# Patient Record
Sex: FEMALE | Race: WHITE | Employment: OTHER | URBAN - METROPOLITAN AREA
[De-identification: names, ages, dates, MRNs, and addresses within clinical notes are randomized per-mention and may not be internally consistent; named-entity substitution may affect disease eponyms.]

---

## 2017-11-30 ENCOUNTER — OFFICE VISIT (OUTPATIENT)
Dept: VASCULAR SURGERY | Age: 77
End: 2017-11-30

## 2017-11-30 ENCOUNTER — PROCEDURE VISIT (OUTPATIENT)
Dept: VASCULAR SURGERY | Age: 77
End: 2017-11-30

## 2017-11-30 VITALS
HEIGHT: 68 IN | WEIGHT: 182 LBS | DIASTOLIC BLOOD PRESSURE: 70 MMHG | BODY MASS INDEX: 27.58 KG/M2 | SYSTOLIC BLOOD PRESSURE: 130 MMHG

## 2017-11-30 DIAGNOSIS — I65.23 BILATERAL CAROTID ARTERY STENOSIS: Primary | ICD-10-CM

## 2017-11-30 PROCEDURE — 1036F TOBACCO NON-USER: CPT | Performed by: SURGERY

## 2017-11-30 PROCEDURE — 93880 EXTRACRANIAL BILAT STUDY: CPT | Performed by: SURGERY

## 2017-11-30 PROCEDURE — 1090F PRES/ABSN URINE INCON ASSESS: CPT | Performed by: SURGERY

## 2017-11-30 PROCEDURE — 1123F ACP DISCUSS/DSCN MKR DOCD: CPT | Performed by: SURGERY

## 2017-11-30 PROCEDURE — G8417 CALC BMI ABV UP PARAM F/U: HCPCS | Performed by: SURGERY

## 2017-11-30 PROCEDURE — G8484 FLU IMMUNIZE NO ADMIN: HCPCS | Performed by: SURGERY

## 2017-11-30 PROCEDURE — 4040F PNEUMOC VAC/ADMIN/RCVD: CPT | Performed by: SURGERY

## 2017-11-30 PROCEDURE — G8427 DOCREV CUR MEDS BY ELIG CLIN: HCPCS | Performed by: SURGERY

## 2017-11-30 PROCEDURE — G8400 PT W/DXA NO RESULTS DOC: HCPCS | Performed by: SURGERY

## 2017-11-30 PROCEDURE — G8598 ASA/ANTIPLAT THER USED: HCPCS | Performed by: SURGERY

## 2017-11-30 PROCEDURE — 99213 OFFICE O/P EST LOW 20 MIN: CPT | Performed by: SURGERY

## 2017-11-30 NOTE — PROGRESS NOTES
Subjective:      Patient ID: Ivana Lilly is a 69 y.o. female. HPI   Routine follow up for carotid surveillance (IVAN 96-74% and LICA 06-23%). No TIA, amaurosis or CVA. Past Medical History:   Diagnosis Date    Anxiety     Arthritis     Asthma     poorly controlled    CAD (coronary artery disease)     Cancer of peritoneum ([facility] [address]     Chronic low back pain     Depression     Hyperlipidemia 4/23/2012    Hypertension     Postoperative delirium 05/26/2016    Type II or unspecified type diabetes mellitus without mention of complication, not stated as uncontrolled      Allergies   Allergen Reactions    Cephalexin Rash    Pcn [Penicillins] Rash    Cephalosporins     Statins      Body aches    Cephalexin Rash     Current Outpatient Prescriptions   Medication Sig Dispense Refill    oxyCODONE-acetaminophen (PERCOCET)  MG per tablet Take 1 tablet by mouth every 8 hours as needed for Pain . Earliest Fill Date: 8/7/17 90 tablet 0    fluocinonide (LIDEX) 0.05 % cream Apply thin layer topically 2 times daily. 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  1 each 2    magnesium oxide (MAG-OX) 400 (241.3 MG) MG TABS tablet Take 1 tablet by mouth 3 times daily 90 tablet 1    lidocaine-prilocaine (EMLA) 2.5-2.5 % cream Apply a nickel sized amount to port 30 minutes prior to access as needed and cover with plastic wrap. 1 Tube 3    insulin glargine (LANTUS) 100 UNIT/ML injection vial Inject 5 Units into the skin nightly (Patient taking differently: Inject 18 Units into the skin nightly ) 1 vial 3    insulin lispro (HUMALOG) 100 UNIT/ML injection vial Inject 0-12 Units into the skin 3 times daily (with meals) 1 vial 3    ibuprofen (ADVIL;MOTRIN) 800 MG tablet Take 1 tablet by mouth every 6 hours as needed for Pain 40 tablet 1    amLODIPine (NORVASC) 5 MG tablet Take 5 mg by mouth nightly      Pregabalin (LYRICA

## 2018-08-17 ENCOUNTER — HOSPITAL ENCOUNTER (OUTPATIENT)
Dept: SURGERY | Age: 78
Discharge: HOME | End: 2018-08-17
Attending: ANESTHESIOLOGY | Admitting: ANESTHESIOLOGY

## 2018-08-17 VITALS
RESPIRATION RATE: 19 BRPM | DIASTOLIC BLOOD PRESSURE: 61 MMHG | BODY MASS INDEX: 25.39 KG/M2 | HEIGHT: 68 IN | OXYGEN SATURATION: 97 % | TEMPERATURE: 97.3 F | SYSTOLIC BLOOD PRESSURE: 142 MMHG | HEART RATE: 72 BPM | WEIGHT: 167.56 LBS

## 2018-08-17 DIAGNOSIS — R52 PAIN: ICD-10-CM

## 2018-08-17 LAB
ANION GAP SERPL CALCULATED.3IONS-SCNC: 11 MMOL/L (ref 3–16)
BASOPHILS ABSOLUTE: 0 K/UL (ref 0–0.2)
BASOPHILS RELATIVE PERCENT: 0.8 %
BUN BLDV-MCNC: 20 MG/DL (ref 7–20)
CALCIUM SERPL-MCNC: 9.5 MG/DL (ref 8.3–10.6)
CHLORIDE BLD-SCNC: 96 MMOL/L (ref 99–110)
CO2: 28 MMOL/L (ref 21–32)
CREAT SERPL-MCNC: 1 MG/DL (ref 0.6–1.2)
EKG ATRIAL RATE: 73 BPM
EKG DIAGNOSIS: NORMAL
EKG P AXIS: 46 DEGREES
EKG P-R INTERVAL: 178 MS
EKG Q-T INTERVAL: 398 MS
EKG QRS DURATION: 92 MS
EKG QTC CALCULATION (BAZETT): 438 MS
EKG R AXIS: -2 DEGREES
EKG T AXIS: 76 DEGREES
EKG VENTRICULAR RATE: 73 BPM
EOSINOPHILS ABSOLUTE: 0.1 K/UL (ref 0–0.6)
EOSINOPHILS RELATIVE PERCENT: 4.1 %
GFR AFRICAN AMERICAN: >60
GFR NON-AFRICAN AMERICAN: 54
GLUCOSE BLD-MCNC: 212 MG/DL (ref 70–99)
GLUCOSE BLD-MCNC: 287 MG/DL (ref 70–99)
GLUCOSE BLD-MCNC: 292 MG/DL (ref 70–99)
HCT VFR BLD CALC: 29.4 % (ref 36–48)
HEMOGLOBIN: 10 G/DL (ref 12–16)
LYMPHOCYTES ABSOLUTE: 0.7 K/UL (ref 1–5.1)
LYMPHOCYTES RELATIVE PERCENT: 20.1 %
MCH RBC QN AUTO: 31.1 PG (ref 26–34)
MCHC RBC AUTO-ENTMCNC: 33.9 G/DL (ref 31–36)
MCV RBC AUTO: 91.8 FL (ref 80–100)
MONOCYTES ABSOLUTE: 0.3 K/UL (ref 0–1.3)
MONOCYTES RELATIVE PERCENT: 9.5 %
NEUTROPHILS ABSOLUTE: 2.4 K/UL (ref 1.7–7.7)
NEUTROPHILS RELATIVE PERCENT: 65.5 %
PDW BLD-RTO: 15.1 % (ref 12.4–15.4)
PERFORMED ON: ABNORMAL
PERFORMED ON: ABNORMAL
PLATELET # BLD: 228 K/UL (ref 135–450)
PMV BLD AUTO: 7.9 FL (ref 5–10.5)
POTASSIUM SERPL-SCNC: 4.3 MMOL/L (ref 3.5–5.1)
RBC # BLD: 3.2 M/UL (ref 4–5.2)
SODIUM BLD-SCNC: 135 MMOL/L (ref 136–145)
WBC # BLD: 3.6 K/UL (ref 4–11)

## 2018-08-17 PROCEDURE — 93010 ELECTROCARDIOGRAM REPORT: CPT | Performed by: INTERNAL MEDICINE

## 2018-08-17 RX ORDER — LABETALOL HYDROCHLORIDE 5 MG/ML
5 INJECTION, SOLUTION INTRAVENOUS EVERY 10 MIN PRN
Status: DISCONTINUED | OUTPATIENT
Start: 2018-08-17 | End: 2018-08-18 | Stop reason: HOSPADM

## 2018-08-17 RX ORDER — DEXTROSE MONOHYDRATE 25 G/50ML
12.5 INJECTION, SOLUTION INTRAVENOUS PRN
Status: DISCONTINUED | OUTPATIENT
Start: 2018-08-17 | End: 2018-08-18 | Stop reason: HOSPADM

## 2018-08-17 RX ORDER — FENTANYL CITRATE 50 UG/ML
25 INJECTION, SOLUTION INTRAMUSCULAR; INTRAVENOUS EVERY 5 MIN PRN
Status: DISCONTINUED | OUTPATIENT
Start: 2018-08-17 | End: 2018-08-18 | Stop reason: HOSPADM

## 2018-08-17 RX ORDER — CLINDAMYCIN PHOSPHATE 900 MG/50ML
900 INJECTION INTRAVENOUS ONCE
Status: COMPLETED | OUTPATIENT
Start: 2018-08-17 | End: 2018-08-17

## 2018-08-17 RX ORDER — ONDANSETRON 2 MG/ML
4 INJECTION INTRAMUSCULAR; INTRAVENOUS
Status: ACTIVE | OUTPATIENT
Start: 2018-08-17 | End: 2018-08-17

## 2018-08-17 RX ORDER — SODIUM CHLORIDE 9 MG/ML
INJECTION, SOLUTION INTRAVENOUS CONTINUOUS
Status: DISCONTINUED | OUTPATIENT
Start: 2018-08-17 | End: 2018-08-18 | Stop reason: HOSPADM

## 2018-08-17 RX ORDER — HYDROMORPHONE HCL 110MG/55ML
0.25 PATIENT CONTROLLED ANALGESIA SYRINGE INTRAVENOUS EVERY 5 MIN PRN
Status: DISCONTINUED | OUTPATIENT
Start: 2018-08-17 | End: 2018-08-18 | Stop reason: HOSPADM

## 2018-08-17 RX ORDER — NICOTINE POLACRILEX 4 MG
15 LOZENGE BUCCAL PRN
Status: DISCONTINUED | OUTPATIENT
Start: 2018-08-17 | End: 2018-08-18 | Stop reason: HOSPADM

## 2018-08-17 RX ORDER — SODIUM CHLORIDE 0.9 % (FLUSH) 0.9 %
10 SYRINGE (ML) INJECTION PRN
Status: DISCONTINUED | OUTPATIENT
Start: 2018-08-17 | End: 2018-08-18 | Stop reason: HOSPADM

## 2018-08-17 RX ORDER — HYDROMORPHONE HCL 110MG/55ML
0.5 PATIENT CONTROLLED ANALGESIA SYRINGE INTRAVENOUS EVERY 5 MIN PRN
Status: DISCONTINUED | OUTPATIENT
Start: 2018-08-17 | End: 2018-08-18 | Stop reason: HOSPADM

## 2018-08-17 RX ORDER — FENTANYL CITRATE 50 UG/ML
50 INJECTION, SOLUTION INTRAMUSCULAR; INTRAVENOUS EVERY 5 MIN PRN
Status: DISCONTINUED | OUTPATIENT
Start: 2018-08-17 | End: 2018-08-18 | Stop reason: HOSPADM

## 2018-08-17 RX ORDER — DEXTROSE MONOHYDRATE 50 MG/ML
100 INJECTION, SOLUTION INTRAVENOUS PRN
Status: DISCONTINUED | OUTPATIENT
Start: 2018-08-17 | End: 2018-08-18 | Stop reason: HOSPADM

## 2018-08-17 RX ORDER — SODIUM CHLORIDE, SODIUM LACTATE, POTASSIUM CHLORIDE, CALCIUM CHLORIDE 600; 310; 30; 20 MG/100ML; MG/100ML; MG/100ML; MG/100ML
INJECTION, SOLUTION INTRAVENOUS CONTINUOUS
Status: DISCONTINUED | OUTPATIENT
Start: 2018-08-17 | End: 2018-08-18 | Stop reason: HOSPADM

## 2018-08-17 RX ORDER — SODIUM CHLORIDE 0.9 % (FLUSH) 0.9 %
10 SYRINGE (ML) INJECTION EVERY 12 HOURS SCHEDULED
Status: DISCONTINUED | OUTPATIENT
Start: 2018-08-17 | End: 2018-08-18 | Stop reason: HOSPADM

## 2018-08-17 RX ADMIN — SODIUM CHLORIDE: 9 INJECTION, SOLUTION INTRAVENOUS at 10:20

## 2018-08-17 RX ADMIN — CLINDAMYCIN PHOSPHATE 900 MG: 900 INJECTION INTRAVENOUS at 10:37

## 2018-08-17 ASSESSMENT — PAIN - FUNCTIONAL ASSESSMENT: PAIN_FUNCTIONAL_ASSESSMENT: 0-10

## 2018-08-17 ASSESSMENT — ENCOUNTER SYMPTOMS: SHORTNESS OF BREATH: 0

## 2018-08-17 ASSESSMENT — PAIN DESCRIPTION - DESCRIPTORS: DESCRIPTORS: ACHING

## 2018-08-17 NOTE — ANESTHESIA PRE-OP
Department of Anesthesiology  Preprocedure Note       Name:  Fady Gavin   Age:  69 y.o.  :  1940                                          MRN:  [id number]         Date:  2018      Surgeon:    Procedure:    Medications prior to admission:   Prior to Admission medications    Medication Sig Start Date End Date Taking? Authorizing Provider   oxyCODONE-acetaminophen (PERCOCET)  MG per tablet Take 1 tablet by mouth every 8 hours as needed for Pain . Earliest Fill Date: 17   Diann Major MD   fluocinonide (LIDEX) 0.05 % cream Apply thin layer topically 2 times daily. 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  16   Beatris Chamberlain MD   magnesium oxide (MAG-OX) 400 (241.3 MG) MG TABS tablet Take 1 tablet by mouth 3 times daily 16   DANIAL Grace CNP   lidocaine-prilocaine (EMLA) 2.5-2.5 % cream Apply a nickel sized amount to port 30 minutes prior to access as needed and cover with plastic wrap. 16   Beatris Chamberlain MD   prochlorperazine (COMPAZINE) 10 MG tablet Take 1 tablet by mouth every 6 hours as needed 16   Beatris Chamberlain MD   insulin glargine (LANTUS) 100 UNIT/ML injection vial Inject 5 Units into the skin nightly  Patient taking differently: Inject 18 Units into the skin nightly  16   Ana Maria Harris MD   insulin lispro (HUMALOG) 100 UNIT/ML injection vial Inject 0-12 Units into the skin 3 times daily (with meals) 16   Ana Maria Harris MD   ibuprofen (ADVIL;MOTRIN) 800 MG tablet Take 1 tablet by mouth every 6 hours as needed for Pain 16   Sidra Mendoza DO   amLODIPine (NORVASC) 5 MG tablet Take 5 mg by mouth nightly    Historical Provider, MD   mometasone-formoterol (DULERA) 200-5 MCG/ACT inhaler Inhale 2 puffs into the lungs every 12 hours    Historical Provider, MD   Pregabalin (LYRICA PO) Take 75 mg by mouth 3 times (COMPAZINE) 10 MG tablet Take 1 tablet by mouth every 6 hours as needed 60 tablet 3    insulin glargine (LANTUS) 100 UNIT/ML injection vial Inject 5 Units into the skin nightly (Patient taking differently: Inject 18 Units into the skin nightly ) 1 vial 3    insulin lispro (HUMALOG) 100 UNIT/ML injection vial Inject 0-12 Units into the skin 3 times daily (with meals) 1 vial 3    ibuprofen (ADVIL;MOTRIN) 800 MG tablet Take 1 tablet by mouth every 6 hours as needed for Pain 40 tablet 1    amLODIPine (NORVASC) 5 MG tablet Take 5 mg by mouth nightly      mometasone-formoterol (DULERA) 200-5 MCG/ACT inhaler Inhale 2 puffs into the lungs every 12 hours      Pregabalin (LYRICA PO) Take 75 mg by mouth 3 times daily Usually takes only BID      rosuvastatin (CRESTOR) 40 MG tablet Take 40 mg by mouth every evening.  alprazolam (XANAX) 0.5 MG tablet Take 0.5 mg by mouth nightly as needed.  ropinirole (REQUIP) 1 MG tablet Take 2 mg by mouth nightly       venlafaxine (EFFEXOR-XR) 37.5 MG XR capsule Take 37.5 mg by mouth daily.  aspirin 81 MG EC tablet Take 81 mg by mouth daily.  losartan (COZAAR) 25 MG tablet Take 100 mg by mouth daily Pt. takes 100mg of Cozaar daily.        Current Facility-Administered Medications   Medication Dose Route Frequency Provider Last Rate Last Dose    clindamycin (CLEOCIN) 900 mg in dextrose 5 % 50 mL IVPB  900 mg Intravenous Once Susana Marks MD         Facility-Administered Medications Ordered in Other Encounters   Medication Dose Route Frequency Provider Last Rate Last Dose    famotidine (PEPCID) injection 20 mg  20 mg Intravenous Daily Sunil Gutierrez MD   20 mg at 11/07/16 1320        LABS:  Lab Results   Component Value Date    WBC 3.9 (L) 06/19/2017    HGB 8.9 (L) 06/19/2017    HCT 28.3 (L) 06/19/2017    MCV 90.4 06/19/2017     06/19/2017    GLUCOSE 212 (H) 06/19/2017    PROTIME 12.2 05/17/2016    INR 1.07 05/17/2016    APTT 35.9 05/17/2016 Lab Results   Component Value Date     06/19/2017    K 4.2 06/19/2017     06/19/2017    CO2 28 06/19/2017    PHOS 3.5 06/06/2016    BUN 22 06/19/2017    CREATININE 1.2 06/19/2017       Problem List:  Patient Active Problem List   Diagnosis    Hyperlipidemia    Diabetes mellitus, type II ([facility] [address]    HTN (hypertension)    Vitamin D deficiency    CAD (coronary artery disease)    Elevated CA-125    Uterine leiomyoma    Abnormal abdominal CT scan    Cancer of peritoneum ([facility] [address]    Delirium due to general medical condition    Iron deficiency anemia    Encounter for antineoplastic chemotherapy    Impaired intestinal absorption    Neoplasm related pain    Weight loss           Jason Garcia MD   8/17/2018

## 2018-08-17 NOTE — PROGRESS NOTES
Discharge instructions reviewed with patient/. All home medications have been reviewed, questions answered and patient verbalized understanding. Discharge instructions signed. Pt dc'd per wheelchair. Patient discharged home with dentures, glasses, her walker and other belongings.  taking stable pt home.

## 2018-08-17 NOTE — H&P
by mouth daily Pt. takes 100mg of Cozaar daily.  fluocinonide (LIDEX) 0.05 % cream Apply thin layer topically 2 times daily. 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 1 each 2    lidocaine-prilocaine (EMLA) 2.5-2.5 % cream Apply a nickel sized amount to port 30 minutes prior to access as needed and cover with plastic wrap. 1 Tube 3    prochlorperazine (COMPAZINE) 10 MG tablet Take 1 tablet by mouth every 6 hours as needed 60 tablet 3    aspirin 81 MG EC tablet Take 81 mg by mouth daily. Current Facility-Administered Medications on File Prior to Encounter   Medication Dose Route Frequency Provider Last Rate Last Dose    famotidine (PEPCID) injection 20 mg  20 mg Intravenous Daily Ashia Lopez MD   20 mg at 11/07/16 1320       Allergies: Allergies   Allergen Reactions    Cephalexin Rash    Pcn [Penicillins] Rash    Cephalosporins     Cephalexin Rash        Social History:   reports that she has quit smoking. She has a 20.00 pack-year smoking history. She has never used smokeless tobacco. She reports that she drinks about 0.6 oz of alcohol per week . She reports that she does not use drugs. Family History:  family history includes Diabetes in her father and maternal uncle; Heart Disease in her brother, father, and maternal grandmother; High Blood Pressure in her mother. Physical Exam:  BP (!) 146/69   Pulse 88   Temp 99.2 °F (37.3 °C) (Temporal)   Resp 16   Ht 5' 8\" (1.727 m)   Wt 167 lb 9 oz (76 kg)   SpO2 96%   BMI 25.48 kg/m²     General appearance:  Appears comfortable. Well nourished  Eyes: Sclera clear, pupils equal  ENT: Moist mucus membranes, no thrush. Trachea midline. Cardiovascular: Regular rhythm, normal S1, S2. No murmur, gallop, rub.  No edema in lower extremities  Respiratory: Clear to auscultation bilaterally, no wheeze, good inspiratory effort  Gastrointestinal: Abdomen soft, non-tender, not distended, normal bowel sounds  Musculoskeletal: No cyanosis in digits, neck supple  Neurology: Cranial nerves grossly intact. Alert and oriented in time, place and person. No speech or motor deficits  Psychiatry: Appropriate affect. Not agitated  Skin: Warm, dry, normal turgor, no rash    Labs:  CBC:   Lab Results   Component Value Date    WBC 3.6 08/17/2018    RBC 3.20 08/17/2018    RBC 3.13 06/19/2017    HGB 10.0 08/17/2018    HCT 29.4 08/17/2018    MCV 91.8 08/17/2018    MCH 31.1 08/17/2018    MCHC 33.9 08/17/2018    RDW 15.1 08/17/2018     08/17/2018    MPV 7.9 08/17/2018     BMP:    Lab Results   Component Value Date     06/19/2017    K 4.2 06/19/2017     06/19/2017    CO2 28 06/19/2017    BUN 22 06/19/2017    CREATININE 1.2 06/19/2017    CALCIUM 9.0 06/19/2017    GFRAA >60 03/13/2017    GFRAA 100 04/23/2012    LABGLOM 46.5 06/19/2017    GLUCOSE 212 06/19/2017     EKG reviewed; NSR    Problem List  Active Problems:    * No active hospital problems. *  Resolved Problems:    * No resolved hospital problems. *        Assessment & Recommendation:     1. Chronic back pain-Patient is medically optimized for surgery. 2. DM 2-uncontrolled. Followed by endocrinology. Has had regular insulin this am.   3. CAD-stable. No cp. EKG reviewed. Thank you for the opportunity to participate in the care of your patient.   Joe Velazco PA-C    8/17/2018 10:38 AM

## 2018-08-21 NOTE — OP NOTE
Attending Surgeon:  Rupert Grullon MD  Assistant Surgeon:   None  Anesthesia:    [address]  Specimen Removed:  none  Complications:  none  Antibiotics: 2 gm Ancef     Preoperative Diagnosis:     1- M96.1  Post Laminectomy Syndrome   2- M54.17  Radiculopathy                                                 Interpretation  2. Placement of specialized epidural needle to the L1-2 interspace  4. Placement of SCS lead with 8 electrodes to the T7-8-9 area   5. Placement of SCS lead with 8 electrodes to the T8-9- area   6. Intra-operative complex programming of lead taking 30 minutes. 7.             Anchoring the spinal cord stimulator lead to the skin  8. Complex programming in recovery taking 30 minutes. 9.     Replacement of the [employer] generator with a [id number] [employer] [address]       The patient is an established patient with the above known diagnosis. The patient understands the reason for the procedure along with the risks, benefits and alternatives available. The patient and her  had the opportunity to ask questions prior to the procedure, and the patient has given written, informed, consent to proceed with the procedure. The patient is scheduled today for an SCS Permanent trial, this is one of the few cases that I have few choices to help control the patient pain, the patient has had [place] [facility] [place]amine[place] lead implanted by another surgeon, unfortunately the implant did not go well and she had coverage on the non-painful side, and she continues to have pain on the right side. Unfortunately and despite all the efforts, the patient pain was not controlled and the patient  has progressive scoliosis and Kyphosis which make her trial very challenging adding the fact she already has a surgical lead and she is post laminectomy patient.     As well to minimize the risks, the best way to do it is to do a permanent implant this way I can get her out of the bed and moving, decrease her requirements of Opioids and have a better chance to start her physical therapy. I do not see any other of trying to get the patient comfortable, the cost of the procedure will be much less than the cost of admitting the patient in the hospital, keeping in mind that is the only way I see fit to help this poor patient to ease her pain and give her some of her quality of life and dignity back. Medical Necessity: This patient has chronic pain that has failed to respond to conservative measures as outlined in the original history and physical exam on the patients symptoms include low back pain and disability of a moderate to severe degree with intermittent leg pain. The goals of treatment are to   1) Achieve optimal pain control, recognizing that a pain-free state may not be achievable;   2) Minimize adverse outcomes;   3) Enhance functional abilities, and physical and psychological well-being; and   4) Enhance the quality of life for patients with chronic pain. PROCEDURE NOTE:  After obtaining written informed consent patient was taken to the procedure room. Pre-procedure blood pressure and pulse were stable and recorded in patients clinic chart     A 20 -G IV was started on the patient hand with lactated ringers. The patient was placed in the prone position on fluoroscopy operating table. The area around the injection site was cleansed, shaved, and prepped in surgical asepsis manner under Strict Aseptic technique that was used to prep the patient. To help reduce the risk of infection 2 gm of Ancef was given IV within 30 minutes of the procedures. The patient was draped in typical sterile fashion. The patient was attached to a vital sign monitor, continuously monitoring saturation through pulse oximeter, pulse rate, and routine checks on the blood pressure.      After confirmation of vertebral count through thoracolumbar films via the C-Arm, the C-arm was used 4 gauze and tegaderm. The patient was then taken from the procedure room via stretcher, and placed in a recovery room. Once in the recovery room, the fine tune adjustments were made to the stimulation pattern. This additional programming was needed to give the patient optimal coverage during the trial.  Post operatively the patient was monitored until stable. Following the procedure the patient's vital signs were stable. Patients subjective complaints were reduced by 95% with the NRS score pre and post procedure, Vital Signs and Fluoroscopy time are recorded in the patient chart. The patient will continue to follow the previously established treatment plan, and will be seen in a few days for a dressing change and any reprogramming needs.      The patient was discharged home in good condition after being given discharge instructions

## 2019-01-17 ENCOUNTER — PROCEDURE VISIT (OUTPATIENT)
Dept: VASCULAR SURGERY | Age: 79
End: 2019-01-17
Payer: MEDICARE

## 2019-01-17 ENCOUNTER — OFFICE VISIT (OUTPATIENT)
Dept: VASCULAR SURGERY | Age: 79
End: 2019-01-17
Payer: MEDICARE

## 2019-01-17 VITALS
SYSTOLIC BLOOD PRESSURE: 128 MMHG | BODY MASS INDEX: 25.31 KG/M2 | DIASTOLIC BLOOD PRESSURE: 62 MMHG | WEIGHT: 167 LBS | HEIGHT: 68 IN

## 2019-01-17 DIAGNOSIS — I65.23 BILATERAL CAROTID ARTERY STENOSIS: Primary | ICD-10-CM

## 2019-01-17 PROCEDURE — G8400 PT W/DXA NO RESULTS DOC: HCPCS | Performed by: SURGERY

## 2019-01-17 PROCEDURE — 1101F PT FALLS ASSESS-DOCD LE1/YR: CPT | Performed by: SURGERY

## 2019-01-17 PROCEDURE — G8427 DOCREV CUR MEDS BY ELIG CLIN: HCPCS | Performed by: SURGERY

## 2019-01-17 PROCEDURE — 4040F PNEUMOC VAC/ADMIN/RCVD: CPT | Performed by: SURGERY

## 2019-01-17 PROCEDURE — 93880 EXTRACRANIAL BILAT STUDY: CPT | Performed by: SURGERY

## 2019-01-17 PROCEDURE — G8598 ASA/ANTIPLAT THER USED: HCPCS | Performed by: SURGERY

## 2019-01-17 PROCEDURE — G8419 CALC BMI OUT NRM PARAM NOF/U: HCPCS | Performed by: SURGERY

## 2019-01-17 PROCEDURE — G8484 FLU IMMUNIZE NO ADMIN: HCPCS | Performed by: SURGERY

## 2019-01-17 PROCEDURE — 1036F TOBACCO NON-USER: CPT | Performed by: SURGERY

## 2019-01-17 PROCEDURE — 99214 OFFICE O/P EST MOD 30 MIN: CPT | Performed by: SURGERY

## 2019-01-17 PROCEDURE — 1090F PRES/ABSN URINE INCON ASSESS: CPT | Performed by: SURGERY

## 2019-01-17 PROCEDURE — 1123F ACP DISCUSS/DSCN MKR DOCD: CPT | Performed by: SURGERY

## 2019-04-15 RX ORDER — NAPROXEN 250 MG/1
250 TABLET ORAL 2 TIMES DAILY WITH MEALS
COMMUNITY
End: 2019-12-22

## 2019-04-15 NOTE — PROGRESS NOTES
Name_______________________________________Printed:____________________  Date and time of surgery___5/6/19  1030_____________________Arrival Time:_____FEC  0900___________   1. Do not eat or drink anything after 12 midnight (or____hours) prior to surgery. This includes no water, chewing gum or mints. Endoscopy patients follow your doctors bowel prep instructions,which may include taking part of prep after midnight. 2. Take the following pills with a small sip of water on the morning of surgery_______losartan, takes amlodipine HS______________________________________________   3. Aspirin, Ibuprofen, Advil, Naproxen, Vitamin E and other Anti-inflammatory products should be stopped for 5 days before surgery or as directed by your physician. 4. Check with your Doctor regarding stopping Plavix, Coumadin,Eliquis, Lovenox,Effient,Pradaxa,Xarelto, Fragmin or other blood thinners and follow their instructions. 5. Do not smoke, and do not drink any alcoholic beverages 24 hours prior to surgery. This includes NA Beer. Refrain from the usage of any recreational drugs. 6. You may brush your teeth and gargle the morning of surgery. DO NOT SWALLOW WATER   7. You MUST make arrangements for a responsible adult to stay on site while you are here and take you home after your surgery. You will not be allowed to leave alone or drive yourself home. It is strongly suggested someone stay with you the first 24 hrs. Your surgery will be cancelled if you do not have a ride home. 8. A parent/legal guardian must accompany a child scheduled for surgery and plan to stay at the hospital until the child is discharged. Please do not bring other children with you. 9. Please wear simple, loose fitting clothing to the hospital.  Rocío Samsonain not bring valuables (money, credit cards, checkbooks, etc.) Do not wear any makeup (including no eye makeup) or nail polish on your fingers or toes.              10. DO NOT wear any jewelry or piercings on day of surgery. All body piercing jewelry must be removed. 11. If you have ___dentures, they will be removed before going to the OR; we will provide you a container. If you wear ___contact lenses or ___glasses, they will be removed; please bring a case for them. 12. Please see your family doctor/pediatrician for a history & physical and/or concerning medications. Bring any test results/reports from your physician's office. PCP__________________Phone___________H&P Appt. Date________             13 If you  have a Living Will and Durable Power of  for Healthcare, please bring in a copy. 15. Notify your Surgeon if you develop any illness between now and surgery  time, cough, cold, fever, sore throat, nausea, vomiting, etc.  Please notify your surgeon if you experience dizziness, shortness of breath or blurred vision between now & the time of your surgery             15. DO NOT shave your operative site 96 hours prior to surgery. For face & neck surgery, men may use an electric razor 48 hours prior to surgery. 16. Shower the night before surgery with ___Antibacterial soap ___Hibiclens             17. To provide excellent care visitors will be limited to one in the room at any given time. 18.  Please bring picture ID and insurance card. 19.  Visit our web site for additional information:  [email or web address]              20.During flu season no children under the age of 15 are permitted in the hospital for the safety of all patients. 21. If you take a long acting insulin in the evening only  take half of your usual  dose the night  before your procedure              22. If you use a c-pap please bring DOS if staying overnight,             23.For your convenience [facility] has a pharmacy on site to fill your prescriptions.              24. If you use oxygen and have a portable tank please bring it  with you the DOS             22. Bring a complete list of all your medications with name and dose include any supplements. 26. Other__________________________________________   *Please call pre admission testing if you any further questions   Zaid Argueta   [address]    [place] [phone] [place]  [phone]   [facility] [address] ED[address] JEAN   [phone]       All above information reviewed with patient/ or by phone. Patient/ verbalizes understanding. All questions and concerns addressed.                                                                                                  Patient/Rep____________________                                                                                                                                    PRE OP INSTRUCTIONS

## 2019-04-16 ENCOUNTER — ANESTHESIA EVENT (OUTPATIENT)
Dept: ENDOSCOPY | Age: 79
End: 2019-04-16
Payer: MEDICARE

## 2019-05-06 ENCOUNTER — ANESTHESIA (OUTPATIENT)
Dept: ENDOSCOPY | Age: 79
End: 2019-05-06
Payer: MEDICARE

## 2019-05-30 NOTE — PROGRESS NOTES
Name_______________________________________Printed:____________________  Date and time of surgery_6/6/19  1030_______________________Arrival Time:_____FEC  0900___________   1. Do not eat or drink anything after 12 midnight (or____hours) prior to surgery. This includes no water, chewing gum or mints. Endoscopy patients follow your doctors bowel prep instructions,which may include taking part of prep after midnight. 2. Take the following pills with a small sip of water on the morning of surgery_________losartan__________________________________________                  Do not take blood pressure medications ending in pril or sartan the carol prior to surgery or the morning of surgery_   3. Aspirin, Ibuprofen, Advil, Naproxen, Vitamin E and other Anti-inflammatory products should be stopped for 5 days before surgery or as directed by your physician. 4. Check with your Doctor regarding stopping Plavix, Coumadin,Eliquis, Lovenox,Effient,Pradaxa,Xarelto, Fragmin or other blood thinners and follow their instructions. 5. Do not smoke, and do not drink any alcoholic beverages 24 hours prior to surgery. This includes NA Beer. Refrain from the usage of any recreational drugs. 6. You may brush your teeth and gargle the morning of surgery. DO NOT SWALLOW WATER   7. You MUST make arrangements for a responsible adult to stay on site while you are here and take you home after your surgery. You will not be allowed to leave alone or drive yourself home. It is strongly suggested someone stay with you the first 24 hrs. Your surgery will be cancelled if you do not have a ride home. 8. A parent/legal guardian must accompany a child scheduled for surgery and plan to stay at the hospital until the child is discharged. Please do not bring other children with you.    9. Please wear simple, loose fitting clothing to the hospital.  Do not bring valuables (money, credit cards, checkbooks, etc.) Do not wear any makeup (including no eye makeup) or nail polish on your fingers or toes. 10. DO NOT wear any jewelry or piercings on day of surgery. All body piercing jewelry must be removed. 11. If you have ___dentures, they will be removed before going to the OR; we will provide you a container. If you wear ___contact lenses or ___glasses, they will be removed; please bring a case for them. 12. Please see your family doctor/pediatrician for a history & physical and/or concerning medications. Bring any test results/reports from your physician's office. PCP__________________Phone___________H&P Appt. Date________             13 If you  have a Living Will and Durable Power of  for Healthcare, please bring in a copy. 15. Notify your Surgeon if you develop any illness between now and surgery  time, cough, cold, fever, sore throat, nausea, vomiting, etc.  Please notify your surgeon if you experience dizziness, shortness of breath or blurred vision between now & the time of your surgery             15. DO NOT shave your operative site 96 hours prior to surgery. For face & neck surgery, men may use an electric razor 48 hours prior to surgery. 16. Shower the night before surgery with ___Antibacterial soap ___Hibiclens             17. To provide excellent care visitors will be limited to one in the room at any given time. 18.  Please bring picture ID and insurance card. 19.  Visit our web site for additional information:  [email or web address]              20.During flu season no children under the age of 15 are permitted in the hospital for the safety of all patients.                               21. If you take a long acting insulin in the evening only  take half of your usual  dose the night  before your procedure              22. If you use a c-pap please bring DOS if staying overnight,             23.For your convenience [facility] has a pharmacy on site to fill your prescriptions. 24. If you use oxygen and have a portable tank please bring it  with you the DOS             25. Bring a complete list of all your medications with name and dose include any supplements. 26. Other__________________________________________   *Please call pre admission testing if you any further questions   [address] [place] [address]. [facility]  [phone]   [address] [place] [address]       All above information reviewed with patient in person or by phone. Patient verbalizes understanding. All questions and concerns addressed.                                                                                                  Patient/Rep____________________                                                                                                                                    PRE OP INSTRUCTIONS

## 2019-06-06 ENCOUNTER — HOSPITAL ENCOUNTER (OUTPATIENT)
Age: 79
Setting detail: OUTPATIENT SURGERY
Discharge: HOME | End: 2019-06-06
Attending: INTERNAL MEDICINE | Admitting: INTERNAL MEDICINE
Payer: MEDICARE

## 2019-06-06 VITALS
SYSTOLIC BLOOD PRESSURE: 108 MMHG | RESPIRATION RATE: 11 BRPM | OXYGEN SATURATION: 100 % | DIASTOLIC BLOOD PRESSURE: 53 MMHG

## 2019-06-06 VITALS
HEART RATE: 67 BPM | WEIGHT: 170 LBS | BODY MASS INDEX: 25.76 KG/M2 | SYSTOLIC BLOOD PRESSURE: 139 MMHG | DIASTOLIC BLOOD PRESSURE: 66 MMHG | RESPIRATION RATE: 16 BRPM | OXYGEN SATURATION: 100 % | TEMPERATURE: 97.2 F | HEIGHT: 68 IN

## 2019-06-06 LAB
C DIFF TOXIN/ANTIGEN: NORMAL
GLUCOSE BLD-MCNC: 140 MG/DL (ref 70–99)
GLUCOSE BLD-MCNC: 172 MG/DL (ref 70–99)
PERFORMED ON: ABNORMAL
PERFORMED ON: ABNORMAL

## 2019-06-06 PROCEDURE — 87505 NFCT AGENT DETECTION GI: CPT

## 2019-06-06 PROCEDURE — 87328 CRYPTOSPORIDIUM AG IA: CPT

## 2019-06-06 PROCEDURE — 87324 CLOSTRIDIUM AG IA: CPT

## 2019-06-06 PROCEDURE — 87336 ENTAMOEB HIST DISPR AG IA: CPT

## 2019-06-06 PROCEDURE — 3700000001 HC ADD 15 MINUTES (ANESTHESIA): Performed by: INTERNAL MEDICINE

## 2019-06-06 PROCEDURE — 2709999900 HC NON-CHARGEABLE SUPPLY: Performed by: INTERNAL MEDICINE

## 2019-06-06 PROCEDURE — 3609009900 HC COLONOSCOPY W/CONTROL BLEEDING ANY METHOD: Performed by: INTERNAL MEDICINE

## 2019-06-06 PROCEDURE — 7100000011 HC PHASE II RECOVERY - ADDTL 15 MIN: Performed by: INTERNAL MEDICINE

## 2019-06-06 PROCEDURE — 2780000010 HC IMPLANT OTHER: Performed by: INTERNAL MEDICINE

## 2019-06-06 PROCEDURE — 7100000010 HC PHASE II RECOVERY - FIRST 15 MIN: Performed by: INTERNAL MEDICINE

## 2019-06-06 PROCEDURE — 87449 NOS EACH ORGANISM AG IA: CPT

## 2019-06-06 PROCEDURE — 2580000003 HC RX 258: Performed by: ANESTHESIOLOGY

## 2019-06-06 PROCEDURE — 6360000002 HC RX W HCPCS: Performed by: NURSE ANESTHETIST, CERTIFIED REGISTERED

## 2019-06-06 PROCEDURE — 3609010300 HC COLONOSCOPY W/BIOPSY SINGLE/MULTIPLE: Performed by: INTERNAL MEDICINE

## 2019-06-06 PROCEDURE — 88305 TISSUE EXAM BY PATHOLOGIST: CPT

## 2019-06-06 PROCEDURE — 2500000003 HC RX 250 WO HCPCS: Performed by: NURSE ANESTHETIST, CERTIFIED REGISTERED

## 2019-06-06 PROCEDURE — 87046 STOOL CULTR AEROBIC BACT EA: CPT

## 2019-06-06 PROCEDURE — 3700000000 HC ANESTHESIA ATTENDED CARE: Performed by: INTERNAL MEDICINE

## 2019-06-06 DEVICE — CLIP INT L235CM WRK CHN DIA2.8MM OPN 11MM BRAID CATH ROT BX/10: Type: IMPLANTABLE DEVICE | Site: TRANSVERSE COLON | Status: FUNCTIONAL

## 2019-06-06 RX ORDER — SODIUM CHLORIDE 0.9 % (FLUSH) 0.9 %
10 SYRINGE (ML) INJECTION EVERY 12 HOURS SCHEDULED
Status: DISCONTINUED | OUTPATIENT
Start: 2019-06-06 | End: 2019-06-06 | Stop reason: HOSPADM

## 2019-06-06 RX ORDER — PROPOFOL 10 MG/ML
INJECTION, EMULSION INTRAVENOUS PRN
Status: DISCONTINUED | OUTPATIENT
Start: 2019-06-06 | End: 2019-06-06 | Stop reason: SDUPTHER

## 2019-06-06 RX ORDER — LIDOCAINE HYDROCHLORIDE 10 MG/ML
1 INJECTION, SOLUTION EPIDURAL; INFILTRATION; INTRACAUDAL; PERINEURAL
Status: DISCONTINUED | OUTPATIENT
Start: 2019-06-06 | End: 2019-06-06 | Stop reason: HOSPADM

## 2019-06-06 RX ORDER — SODIUM CHLORIDE 9 MG/ML
INJECTION, SOLUTION INTRAVENOUS CONTINUOUS
Status: DISCONTINUED | OUTPATIENT
Start: 2019-06-06 | End: 2019-06-06 | Stop reason: HOSPADM

## 2019-06-06 RX ORDER — SODIUM CHLORIDE 0.9 % (FLUSH) 0.9 %
10 SYRINGE (ML) INJECTION PRN
Status: DISCONTINUED | OUTPATIENT
Start: 2019-06-06 | End: 2019-06-06 | Stop reason: HOSPADM

## 2019-06-06 RX ORDER — LIDOCAINE HYDROCHLORIDE 20 MG/ML
INJECTION, SOLUTION INFILTRATION; PERINEURAL PRN
Status: DISCONTINUED | OUTPATIENT
Start: 2019-06-06 | End: 2019-06-06 | Stop reason: SDUPTHER

## 2019-06-06 RX ADMIN — PROPOFOL 60 MG: 10 INJECTION, EMULSION INTRAVENOUS at 10:41

## 2019-06-06 RX ADMIN — PHENYLEPHRINE HYDROCHLORIDE 50 MCG: 10 INJECTION INTRAVENOUS at 10:50

## 2019-06-06 RX ADMIN — SODIUM CHLORIDE: 9 INJECTION, SOLUTION INTRAVENOUS at 10:11

## 2019-06-06 RX ADMIN — PROPOFOL 30 MG: 10 INJECTION, EMULSION INTRAVENOUS at 10:45

## 2019-06-06 RX ADMIN — PROPOFOL 20 MG: 10 INJECTION, EMULSION INTRAVENOUS at 10:51

## 2019-06-06 RX ADMIN — LIDOCAINE HYDROCHLORIDE 50 MG: 20 INJECTION, SOLUTION INFILTRATION; PERINEURAL at 10:41

## 2019-06-06 RX ADMIN — PROPOFOL 20 MG: 10 INJECTION, EMULSION INTRAVENOUS at 10:54

## 2019-06-06 ASSESSMENT — PAIN SCALES - GENERAL: PAINLEVEL_OUTOF10: 0

## 2019-06-06 ASSESSMENT — PAIN - FUNCTIONAL ASSESSMENT: PAIN_FUNCTIONAL_ASSESSMENT: 0-10

## 2019-06-06 NOTE — ANESTHESIA PRE PROCEDURE
Department of Anesthesiology  Preprocedure Note       Name:  Michael Kirkland   Age:  69 y.o.  :  1940                                          MRN:  [id number]         Date:  2019      Surgeon: Yosef Contreras):  Ange Hopkins MD    Procedure: COLONOSCOPY (N/A )    Medications prior to admission:   Prior to Admission medications    Medication Sig Start Date End Date Taking? Authorizing Provider   Olaparib (LYNPARZA) 150 MG TABS Take by mouth 3 times daily   Yes Historical Provider, MD   naproxen (NAPROSYN) 250 MG tablet Take 250 mg by mouth 2 times daily (with meals)   Yes Historical Provider, MD   NONFORMULARY Take by mouth 3 times daily LYMPARZA    Historical Provider, MD   oxyCODONE-acetaminophen (PERCOCET)  MG per tablet Take 1 tablet by mouth every 8 hours as needed for Pain . 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 16   Kim Cassidy MD   lidocaine-prilocaine (EMLA) 2.5-2.5 % cream Apply a nickel sized amount to port 30 minutes prior to access as needed and cover with plastic wrap. 16   Kim Cassidy MD   insulin glargine (LANTUS) 100 UNIT/ML injection vial Inject 5 Units into the skin nightly  Patient taking differently: Inject 18 Units into the skin nightly  16   Jamila Plaza MD   insulin lispro (HUMALOG) 100 UNIT/ML injection vial Inject 0-12 Units into the skin 3 times daily (with meals) 16   Jamila Plaza MD   ibuprofen (ADVIL;MOTRIN) 800 MG tablet Take 1 tablet by mouth every 6 hours as needed for Pain 16   Sidra Mendoza DO   amLODIPine (NORVASC) 5 MG tablet Take 5 mg by mouth nightly    Historical Provider, MD   Pregabalin (LYRICA PO) Take 75 mg by mouth 3 times daily Usually takes only BID    Historical Provider, MD   rosuvastatin (CRESTOR) 40 MG tablet Take 40 mg by mouth every evening.       Historical Provider, MD   alprazolam (XANAX) 0.5 MG tablet Take 0.5 mg by mouth nightly as needed. Historical Provider, MD   ropinirole (REQUIP) 1 MG tablet Take 2 mg by mouth nightly     Historical Provider, MD   venlafaxine (EFFEXOR-XR) 37.5 MG XR capsule Take 37.5 mg by mouth daily. Historical Provider, MD   aspirin 81 MG EC tablet Take 81 mg by mouth daily. Historical Provider, MD   losartan (COZAAR) 25 MG tablet Take 100 mg by mouth daily Pt. takes 100mg of Cozaar daily. Historical Provider, MD       Current medications:    Current Facility-Administered Medications   Medication Dose Route Frequency Provider Last Rate Last Dose    sodium chloride flush 0.9 % injection 10 mL  10 mL Intravenous 2 times per day Steve Bell MD        sodium chloride flush 0.9 % injection 10 mL  10 mL Intravenous PRN Steve Bell MD        lidocaine PF 1 % injection 1 mL  1 mL Intradermal Once PRN Steve Bell MD        0.9 % sodium chloride infusion   Intravenous Continuous Steve Bell MD        sodium chloride flush 0.9 % injection 10 mL  10 mL Intravenous 2 times per day Steve Bell MD        sodium chloride flush 0.9 % injection 10 mL  10 mL Intravenous PRN Steve Bell MD        lidocaine PF 1 % injection 1 mL  1 mL Intradermal Once PRN Steve Bell MD        0.9 % sodium chloride infusion   Intravenous Continuous Steve Bell MD         Facility-Administered Medications Ordered in Other Encounters   Medication Dose Route Frequency Provider Last Rate Last Dose    famotidine (PEPCID) injection 20 mg  20 mg Intravenous Daily Breann Vazquez MD   20 mg at 11/07/16 1320       Allergies:     Allergies   Allergen Reactions    Cephalexin Rash    Pcn [Penicillins] Rash    Cephalosporins     Cephalexin Rash       Problem List:    Patient Active Problem List   Diagnosis Code    Hyperlipidemia E78.5    Diabetes mellitus, type II ([facility] E11.9    HTN (hypertension) I10    Vitamin D deficiency E55.9    CAD (coronary artery disease) I25.10    Elevated CA-125 R97.1    Uterine leiomyoma D25.9    Abnormal abdominal CT scan R93.5    Cancer of peritoneum (HCC) C48.2    Delirium due to general medical condition F05    Iron deficiency anemia D50.9    Impaired intestinal absorption K90.9    Neoplasm related pain G89.3    Weight loss R63.4       Past Medical History:        Diagnosis Date    Anxiety     Arthritis     Asthma     poorly controlled    CAD (coronary artery disease)     Cancer of peritoneum ([address]     Chronic low back pain     Depression     Hyperlipidemia 4/23/2012    Hypertension     Postoperative delirium 05/26/2016    Type II or unspecified type diabetes mellitus without mention of complication, not stated as uncontrolled        Past Surgical History:        Procedure Laterality Date    APPENDECTOMY      BREAST BIOPSY Left 06/23/2017    Fibroadenomatous type fibrocystic change    CARDIAC SURGERY      CATARACT REMOVAL Bilateral     COLONOSCOPY      CORONARY ANGIOPLASTY WITH STENT PLACEMENT  1991    CORONARY ARTERY BYPASS GRAFT  2007    DILATION AND CURETTAGE OF UTERUS      Miscarriage    ENDOSCOPY, COLON, DIAGNOSTIC      EYE SURGERY      HEMICOLECTOMY  05/2016    HYSTERECTOMY      JOINT REPLACEMENT Bilateral     OTHER SURGICAL HISTORY N/A 08/17/2018    SPINAL CORD STIMULATOR LEAD REVISION AND GENERATOR    FRACISCO AND BSO  05/26/2016    TUNNELED VENOUS PORT PLACEMENT Left 7/27/2016    PORT-A-CATH PLACEMENT,LEFT        Social History:    Social History     Tobacco Use    Smoking status: Former Smoker     Packs/day: 2.00     Years: 10.00     Pack years: 20.00    Smokeless tobacco: Never Used   Substance Use Topics    Alcohol use:  Yes     Alcohol/week: 0.6 oz     Types: 1 Glasses of wine per week     Comment: 1-2 PER WEEK                                Counseling given: Not Answered      Vital Signs (Current):   Vitals: 04/15/19 1750 05/30/19 1240   Weight: 170 lb (77.1 kg) 170 lb (77.1 kg)   Height: 5' 8\" (1.727 m) 5' 8\" (1.727 m)                                              BP Readings from Last 3 Encounters:   01/17/19 128/62   08/17/18 (!) 142/61   11/30/17 130/70       NPO Status:                                                                                 BMI:   Wt Readings from Last 3 Encounters:   05/30/19 170 lb (77.1 kg)   01/17/19 167 lb (75.8 kg)   08/17/18 167 lb 9 oz (76 kg)     Body mass index is 25.85 kg/m². CBC:   Lab Results   Component Value Date    WBC 3.6 08/17/2018    RBC 3.20 08/17/2018    RBC 3.13 06/19/2017    HGB 10.0 08/17/2018    HCT 29.4 08/17/2018    MCV 91.8 08/17/2018    RDW 15.1 08/17/2018     08/17/2018       CMP:   Lab Results   Component Value Date     08/17/2018    K 4.3 08/17/2018    CL 96 08/17/2018    CO2 28 08/17/2018    BUN 20 08/17/2018    CREATININE 1.0 08/17/2018    GFRAA >60 08/17/2018    GFRAA 100 04/23/2012    AGRATIO 2 03/13/2017    LABGLOM 54 08/17/2018    GLUCOSE 287 08/17/2018    GLUCOSE 212 06/19/2017    PROT 6.0 06/19/2017    CALCIUM 9.5 08/17/2018    BILITOT 0.7 06/19/2017    ALKPHOS 66 06/19/2017    ALKPHOS 53 03/13/2017    AST 27 06/19/2017    ALT 16 06/19/2017       POC Tests: No results for input(s): POCGLU, POCNA, POCK, POCCL, POCBUN, POCHEMO, POCHCT in the last 72 hours.     Coags:   Lab Results   Component Value Date    PROTIME 12.2 05/17/2016    PROTIME 12.9 11/30/2011    INR 1.07 05/17/2016    APTT 35.9 05/17/2016       HCG (If Applicable): No results found for: PREGTESTUR, PREGSERUM, HCG, HCGQUANT     ABGs:   Lab Results   Component Value Date    PHART 7.372 05/30/2016    PO2ART 98.1 05/30/2016    IXH9DUN 21.6 05/30/2016    DYT4HZP 12.2 05/30/2016    BEART -11.3 05/30/2016    V8GERTVX 97.8 05/30/2016        Type & Screen (If Applicable):  No results found for: LABABO, LABRH    Anesthesia Evaluation    Airway: Mallampati: II  TM distance: >3 FB   Neck ROM: full  Mouth opening: > = 3 FB Dental:          Pulmonary:   (+) asthma:                            Cardiovascular:    (+) hypertension:, CAD:,         Rhythm: regular  Rate: normal                    Neuro/Psych:   (+) psychiatric history:            GI/Hepatic/Renal:             Endo/Other:    (+) Diabetes, . Abdominal:           Vascular:                                        Anesthesia Plan      MAC     ASA 2       Induction: intravenous. Anesthetic plan and risks discussed with patient. Plan discussed with CRNA.                   Sima Wheeler MD   6/6/2019

## 2019-06-06 NOTE — PROGRESS NOTES
Name:  Emmanuel Temple  Age:  69 y.o.  CSN:  [id number]            Past Medical History:        Diagnosis Date    Anxiety     Arthritis     Asthma     poorly controlled    CAD (coronary artery disease)     Cancer of peritoneum ([address]     Chronic low back pain     Depression     Hyperlipidemia 4/23/2012    Hypertension     Postoperative delirium 05/26/2016    Type II or unspecified type diabetes mellitus without mention of complication, not stated as uncontrolled        Past Surgical History:      Procedure Laterality Date    APPENDECTOMY      BREAST BIOPSY Left 06/23/2017    Fibroadenomatous type fibrocystic change    CARDIAC SURGERY      CATARACT REMOVAL Bilateral     COLONOSCOPY      CORONARY ANGIOPLASTY WITH STENT PLACEMENT  1991    CORONARY ARTERY BYPASS GRAFT  2007    DILATION AND CURETTAGE OF UTERUS      Miscarriage    ENDOSCOPY, COLON, DIAGNOSTIC      EYE SURGERY      HEMICOLECTOMY  05/2016    HYSTERECTOMY      JOINT REPLACEMENT Bilateral     bilat knee    OTHER SURGICAL HISTORY N/A 08/17/2018    SPINAL CORD STIMULATOR LEAD REVISION AND GENERATOR    FRACISCO AND BSO  05/26/2016    TUNNELED VENOUS PORT PLACEMENT Left 7/27/2016    PORT-A-CATH PLACEMENT,LEFT        Medications Prior to Admission:  Medications Prior to Admission: Olaparib (LYNPARZA) 150 MG TABS, Take by mouth 2 times daily   oxyCODONE-acetaminophen (PERCOCET)  MG per tablet, Take 1 tablet by mouth every 8 hours as needed for Pain . 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   insulin glargine (LANTUS) 100 UNIT/ML injection vial, Inject 5 Units into the skin nightly (Patient taking differently: Inject 10 Units into the skin nightly )  insulin lispro (HUMALOG) 100 UNIT/ML injection vial, Inject 0-12 Units into the skin 3 times daily (with meals) (Patient taking differently: Inject 10 Units into the skin 3 times daily (with meals) )  amLODIPine (NORVASC) 5 MG tablet, Take 5 mg by mouth nightly  Pregabalin (LYRICA PO), Take 75 mg by mouth 2 times daily Usually takes only BID  rosuvastatin (CRESTOR) 40 MG tablet, Take 40 mg by mouth every evening. alprazolam (XANAX) 0.5 MG tablet, Take 0.5 mg by mouth nightly as needed. ropinirole (REQUIP) 1 MG tablet, Take 2 mg by mouth nightly   venlafaxine (EFFEXOR-XR) 37.5 MG XR capsule, Take 75 mg by mouth daily   aspirin 81 MG EC tablet, Take 81 mg by mouth daily. losartan (COZAAR) 25 MG tablet, Take 100 mg by mouth daily Pt. takes 100mg of Cozaar daily. naproxen (NAPROSYN) 250 MG tablet, Take 250 mg by mouth 2 times daily (with meals)  NONFORMULARY, Take by mouth 3 times daily LYMPARZA  lidocaine-prilocaine (EMLA) 2.5-2.5 % cream, Apply a nickel sized amount to port 30 minutes prior to access as needed and cover with plastic wrap.  ibuprofen (ADVIL;MOTRIN) 800 MG tablet, Take 1 tablet by mouth every 6 hours as needed for Pain    Allergies:  Cephalexin; Pcn [penicillins];  Cephalosporins; and Cephalexin    Social History:  Social History     Socioeconomic History    Marital status:      Spouse name: Not on file    Number of children: Not on file    Years of education: Not on file    Highest education level: Not on file   Occupational History    Not on file   Social Needs    Financial resource strain: Not on file    Food insecurity:     Worry: Not on file     Inability: Not on file    Transportation needs:     Medical: Not on file     Non-medical: Not on file   Tobacco Use    Smoking status: Former Smoker     Packs/day: 2.00     Years: 10.00     Pack years: 20.00    Smokeless tobacco: Never Used   Substance and Sexual Activity    Alcohol use: Not Currently     Alcohol/week: 0.6 oz     Types: 1 Glasses of wine per week    Drug use: No    Sexual activity: Not on file   Lifestyle    Physical activity:     Days per week: Not on file     Minutes per session: Not on file    Stress: Not on file   Relationships    Social connections:     Talks on phone: Not on file     Gets together: Not on file     Attends Anabaptism service: Not on file     Active member of club or organization: Not on file     Attends meetings of clubs or organizations: Not on file     Relationship status: Not on file    Intimate partner violence:     Fear of current or ex partner: Not on file     Emotionally abused: Not on file     Physically abused: Not on file     Forced sexual activity: Not on file   Other Topics Concern    Not on file   Social History Narrative    Not on file       Family History:      Problem Relation Age of Onset    High Blood Pressure Mother     Heart Disease Father     Diabetes Father     Heart Disease Maternal Grandmother     Heart Disease Brother     Diabetes Maternal Uncle        Vital Signs:  Vitals:    06/06/19 0935   BP: (!) 142/46   Pulse: 75   Resp: 16   Temp: 97.1 °F (36.2 °C)   SpO2: 99%

## 2019-06-06 NOTE — ANESTHESIA POSTPROCEDURE EVALUATION
Department of Anesthesiology  Postprocedure Note    Patient: Fabiano Rice  MRN: [id number]  YOB: 1940  Date of evaluation: 6/6/2019  Time:  11:44 AM     Procedure Summary     Date:  06/06/19 Room / Location:  [facility] ENDO 02 / [facility] ENDOSCOPY    Anesthesia Start:  0088 Anesthesia Stop:  5941    Procedures:       COLONOSCOPY WITH BIOPSY (N/A )      COLONOSCOPY CONTROL HEMORRHAGE Diagnosis:  (CHRONIC DIARRHE K52.9)    Surgeon:  Caro Christianson MD Responsible Provider:  Liz Foreman MD    Anesthesia Type:  MAC ASA Status:  2          Anesthesia Type: MAC    Kat Phase I: Kat Score: 10    Kat Phase II: Kat Score: 8    Last vitals: Reviewed and per EMR flowsheets.        Anesthesia Post Evaluation    Level of consciousness: awake  Complications: no

## 2019-06-07 LAB
CRYPTOSPORIDIUM ANTIGEN STOOL: NORMAL
E HISTOLYTICA ANTIGEN STOOL: NORMAL
GI BACTERIAL PATHOGENS BY PCR: NORMAL
GIARDIA ANTIGEN STOOL: NORMAL

## 2019-09-27 ENCOUNTER — HOSPITAL ENCOUNTER (OUTPATIENT)
Age: 79
Discharge: HOME | End: 2019-09-27
Payer: MEDICARE

## 2019-09-27 ENCOUNTER — HOSPITAL ENCOUNTER (OUTPATIENT)
Dept: CT IMAGING | Age: 79
Discharge: HOME | End: 2019-09-27
Payer: MEDICARE

## 2019-09-27 DIAGNOSIS — C48.2 MALIGNANT NEOPLASM OF PERITONEUM, UNSPECIFIED (HCC): ICD-10-CM

## 2019-09-27 DIAGNOSIS — R97.1 ELEVATED CANCER ANTIGEN 125 (CA 125): ICD-10-CM

## 2019-09-27 PROCEDURE — 74177 CT ABD & PELVIS W/CONTRAST: CPT

## 2019-09-27 PROCEDURE — 6360000004 HC RX CONTRAST MEDICATION: Performed by: OBSTETRICS & GYNECOLOGY

## 2019-09-27 RX ADMIN — IOPAMIDOL 75 ML: 755 INJECTION, SOLUTION INTRAVENOUS at 15:02

## 2019-09-27 RX ADMIN — IOHEXOL 50 ML: 240 INJECTION, SOLUTION INTRATHECAL; INTRAVASCULAR; INTRAVENOUS; ORAL at 15:01

## 2019-10-10 ENCOUNTER — OFFICE VISIT (OUTPATIENT)
Dept: VASCULAR SURGERY | Age: 79
End: 2019-10-10
Payer: MEDICARE

## 2019-10-10 VITALS
DIASTOLIC BLOOD PRESSURE: 60 MMHG | WEIGHT: 174 LBS | BODY MASS INDEX: 27.97 KG/M2 | SYSTOLIC BLOOD PRESSURE: 132 MMHG | HEIGHT: 66 IN

## 2019-10-10 DIAGNOSIS — I70.1 RENAL ARTERY STENOSIS (HCC): Primary | ICD-10-CM

## 2019-10-10 PROCEDURE — G8598 ASA/ANTIPLAT THER USED: HCPCS | Performed by: SURGERY

## 2019-10-10 PROCEDURE — 4040F PNEUMOC VAC/ADMIN/RCVD: CPT | Performed by: SURGERY

## 2019-10-10 PROCEDURE — 1123F ACP DISCUSS/DSCN MKR DOCD: CPT | Performed by: SURGERY

## 2019-10-10 PROCEDURE — 1036F TOBACCO NON-USER: CPT | Performed by: SURGERY

## 2019-10-10 PROCEDURE — G8400 PT W/DXA NO RESULTS DOC: HCPCS | Performed by: SURGERY

## 2019-10-10 PROCEDURE — G8428 CUR MEDS NOT DOCUMENT: HCPCS | Performed by: SURGERY

## 2019-10-10 PROCEDURE — G8484 FLU IMMUNIZE NO ADMIN: HCPCS | Performed by: SURGERY

## 2019-10-10 PROCEDURE — G8419 CALC BMI OUT NRM PARAM NOF/U: HCPCS | Performed by: SURGERY

## 2019-10-10 PROCEDURE — 99214 OFFICE O/P EST MOD 30 MIN: CPT | Performed by: SURGERY

## 2019-10-10 PROCEDURE — 1090F PRES/ABSN URINE INCON ASSESS: CPT | Performed by: SURGERY

## 2019-12-22 ENCOUNTER — HOSPITAL ENCOUNTER (EMERGENCY)
Age: 79
Discharge: HOME | End: 2019-12-23
Attending: EMERGENCY MEDICINE
Payer: MEDICARE

## 2019-12-22 DIAGNOSIS — C48.2 PRIMARY PERITONEAL CARCINOMATOSIS (HCC): ICD-10-CM

## 2019-12-22 DIAGNOSIS — R73.9 HYPERGLYCEMIA: ICD-10-CM

## 2019-12-22 DIAGNOSIS — E86.0 DEHYDRATION: ICD-10-CM

## 2019-12-22 DIAGNOSIS — R53.83 OTHER FATIGUE: Primary | ICD-10-CM

## 2019-12-22 PROCEDURE — 99284 EMERGENCY DEPT VISIT MOD MDM: CPT

## 2019-12-22 ASSESSMENT — PAIN DESCRIPTION - PAIN TYPE: TYPE: ACUTE PAIN

## 2019-12-22 ASSESSMENT — PAIN SCALES - GENERAL: PAINLEVEL_OUTOF10: 8

## 2019-12-22 ASSESSMENT — PAIN DESCRIPTION - LOCATION: LOCATION: GENERALIZED

## 2019-12-23 ENCOUNTER — APPOINTMENT (OUTPATIENT)
Dept: GENERAL RADIOLOGY | Age: 79
End: 2019-12-23
Payer: MEDICARE

## 2019-12-23 VITALS
HEART RATE: 74 BPM | OXYGEN SATURATION: 96 % | HEIGHT: 69 IN | BODY MASS INDEX: 23.55 KG/M2 | TEMPERATURE: 98.4 F | DIASTOLIC BLOOD PRESSURE: 51 MMHG | SYSTOLIC BLOOD PRESSURE: 123 MMHG | RESPIRATION RATE: 14 BRPM | WEIGHT: 159 LBS

## 2019-12-23 LAB
ALBUMIN SERPL-MCNC: 3.6 G/DL (ref 3.4–5)
ALP BLD-CCNC: 44 U/L (ref 40–129)
ALT SERPL-CCNC: 6 U/L (ref 10–40)
ANION GAP SERPL CALCULATED.3IONS-SCNC: 9 MMOL/L (ref 3–16)
AST SERPL-CCNC: 16 U/L (ref 15–37)
BASE EXCESS VENOUS: 2 (ref -3–3)
BASOPHILS ABSOLUTE: 0.1 K/UL (ref 0–0.2)
BASOPHILS RELATIVE PERCENT: 0.7 %
BILIRUB SERPL-MCNC: 0.5 MG/DL (ref 0–1)
BILIRUBIN DIRECT: <0.2 MG/DL (ref 0–0.3)
BILIRUBIN URINE: NEGATIVE
BILIRUBIN, INDIRECT: ABNORMAL MG/DL (ref 0–1)
BLOOD, URINE: NEGATIVE
BUN BLDV-MCNC: 30 MG/DL (ref 7–20)
CALCIUM SERPL-MCNC: 9.1 MG/DL (ref 8.3–10.6)
CHLORIDE BLD-SCNC: 98 MMOL/L (ref 99–110)
CLARITY: CLEAR
CO2: 23 MMOL/L (ref 21–32)
COLOR: YELLOW
CREAT SERPL-MCNC: 0.7 MG/DL (ref 0.6–1.2)
EOSINOPHILS ABSOLUTE: 0.1 K/UL (ref 0–0.6)
EOSINOPHILS RELATIVE PERCENT: 0.8 %
GFR AFRICAN AMERICAN: >60
GFR NON-AFRICAN AMERICAN: >60
GLUCOSE BLD-MCNC: 229 MG/DL (ref 70–99)
GLUCOSE URINE: NEGATIVE MG/DL
HCO3 VENOUS: 25.8 MMOL/L (ref 23–29)
HCT VFR BLD CALC: 26.1 % (ref 36–48)
HEMOGLOBIN: 8.8 G/DL (ref 12–16)
INR BLD: 1.59 (ref 0.86–1.14)
KETONES, URINE: NEGATIVE MG/DL
LACTATE: 0.92 MMOL/L (ref 0.4–2)
LACTATE: 1 MMOL/L (ref 0.4–2)
LEUKOCYTE ESTERASE, URINE: NEGATIVE
LYMPHOCYTES ABSOLUTE: 0.6 K/UL (ref 1–5.1)
LYMPHOCYTES RELATIVE PERCENT: 5.7 %
MCH RBC QN AUTO: 35.6 PG (ref 26–34)
MCHC RBC AUTO-ENTMCNC: 33.9 G/DL (ref 31–36)
MCV RBC AUTO: 105.1 FL (ref 80–100)
MICROSCOPIC EXAMINATION: NORMAL
MONOCYTES ABSOLUTE: 0.1 K/UL (ref 0–1.3)
MONOCYTES RELATIVE PERCENT: 1.1 %
NEUTROPHILS ABSOLUTE: 9.5 K/UL (ref 1.7–7.7)
NEUTROPHILS RELATIVE PERCENT: 91.7 %
NITRITE, URINE: NEGATIVE
O2 SAT, VEN: 92 %
PCO2, VEN: 33.9 MM HG (ref 40–50)
PDW BLD-RTO: 16.8 % (ref 12.4–15.4)
PERFORMED ON: ABNORMAL
PERFORMED ON: NORMAL
PH UA: 7 (ref 5–8)
PH VENOUS: 7.49 (ref 7.35–7.45)
PLATELET # BLD: 134 K/UL (ref 135–450)
PMV BLD AUTO: 10.5 FL (ref 5–10.5)
PO2, VEN: 58 MM HG
POC SAMPLE TYPE: ABNORMAL
POC SAMPLE TYPE: NORMAL
POTASSIUM REFLEX MAGNESIUM: 4.3 MMOL/L (ref 3.5–5.1)
PRO-BNP: 625 PG/ML (ref 0–449)
PROTEIN UA: NEGATIVE MG/DL
PROTHROMBIN TIME: 18.5 SEC (ref 10–13.2)
RBC # BLD: 2.48 M/UL (ref 4–5.2)
SODIUM BLD-SCNC: 130 MMOL/L (ref 136–145)
SPECIFIC GRAVITY UA: 1.01 (ref 1–1.03)
TCO2 CALC VENOUS: 27 MMOL/L
TOTAL PROTEIN: 5.1 G/DL (ref 6.4–8.2)
TROPONIN: <0.01 NG/ML
URINE TYPE: NORMAL
UROBILINOGEN, URINE: 0.2 E.U./DL
WBC # BLD: 10.4 K/UL (ref 4–11)

## 2019-12-23 PROCEDURE — 83880 ASSAY OF NATRIURETIC PEPTIDE: CPT

## 2019-12-23 PROCEDURE — 80048 BASIC METABOLIC PNL TOTAL CA: CPT

## 2019-12-23 PROCEDURE — 96361 HYDRATE IV INFUSION ADD-ON: CPT

## 2019-12-23 PROCEDURE — 82803 BLOOD GASES ANY COMBINATION: CPT

## 2019-12-23 PROCEDURE — 84484 ASSAY OF TROPONIN QUANT: CPT

## 2019-12-23 PROCEDURE — 80076 HEPATIC FUNCTION PANEL: CPT

## 2019-12-23 PROCEDURE — 96360 HYDRATION IV INFUSION INIT: CPT

## 2019-12-23 PROCEDURE — 81003 URINALYSIS AUTO W/O SCOPE: CPT

## 2019-12-23 PROCEDURE — 83605 ASSAY OF LACTIC ACID: CPT

## 2019-12-23 PROCEDURE — 85610 PROTHROMBIN TIME: CPT

## 2019-12-23 PROCEDURE — 71045 X-RAY EXAM CHEST 1 VIEW: CPT

## 2019-12-23 PROCEDURE — 2580000003 HC RX 258: Performed by: EMERGENCY MEDICINE

## 2019-12-23 PROCEDURE — 85025 COMPLETE CBC W/AUTO DIFF WBC: CPT

## 2019-12-23 RX ORDER — 0.9 % SODIUM CHLORIDE 0.9 %
1000 INTRAVENOUS SOLUTION INTRAVENOUS ONCE
Status: COMPLETED | OUTPATIENT
Start: 2019-12-23 | End: 2019-12-23

## 2019-12-23 RX ADMIN — SODIUM CHLORIDE 1000 ML: 9 INJECTION, SOLUTION INTRAVENOUS at 02:31

## 2020-01-30 ENCOUNTER — HOSPITAL ENCOUNTER (EMERGENCY)
Age: 80
Discharge: HOME | End: 2020-01-30
Attending: EMERGENCY MEDICINE
Payer: MEDICARE

## 2020-01-30 VITALS
SYSTOLIC BLOOD PRESSURE: 188 MMHG | OXYGEN SATURATION: 100 % | DIASTOLIC BLOOD PRESSURE: 71 MMHG | RESPIRATION RATE: 18 BRPM | HEART RATE: 88 BPM | BODY MASS INDEX: 22.88 KG/M2 | HEIGHT: 68 IN | WEIGHT: 151 LBS | TEMPERATURE: 98.1 F

## 2020-01-30 LAB
HCT VFR BLD CALC: 26.9 % (ref 36–48)
HEMOGLOBIN: 8.9 G/DL (ref 12–16)
MCH RBC QN AUTO: 30.6 PG (ref 26–34)
MCHC RBC AUTO-ENTMCNC: 33.3 G/DL (ref 31–36)
MCV RBC AUTO: 91.9 FL (ref 80–100)
PDW BLD-RTO: 18.5 % (ref 12.4–15.4)
PLATELET # BLD: 101 K/UL (ref 135–450)
PMV BLD AUTO: 9.2 FL (ref 5–10.5)
RBC # BLD: 2.92 M/UL (ref 4–5.2)
WBC # BLD: 3.4 K/UL (ref 4–11)

## 2020-01-30 PROCEDURE — 30901 CONTROL OF NOSEBLEED: CPT

## 2020-01-30 PROCEDURE — 85027 COMPLETE CBC AUTOMATED: CPT

## 2020-01-30 PROCEDURE — 99283 EMERGENCY DEPT VISIT LOW MDM: CPT

## 2020-01-30 PROCEDURE — 36415 COLL VENOUS BLD VENIPUNCTURE: CPT

## 2020-01-30 NOTE — ED PROVIDER NOTES
tablet Take 100 mg by mouth daily Pt. takes 100mg of Cozaar daily. Allergies     She is allergic to cephalexin; pcn [penicillins]; cephalosporins; and cephalexin. Physical Exam     INITIAL VITALS: BP: (!) 188/71, Temp: 98.1 °F (36.7 °C), Pulse: 88, Resp: 18, SpO2: 100 %     Nursing note and vitals reviewed. General:  Adult female, alert and appropriately interactive. In no distress. HENT: Punctate lesion of right nasal septum with slow bleeding, no other lesions, no clots. Left nare patent and without blood. Eyes: Conjunctivae normal. No scleral icterus. Neck: Neck supple. No tracheal deviation present. CV: Normal rate. Regular rhythm. S1/S2 auscultated. No murmurs, gallops or rubs. Chest: Effort normal. Breath sounds clear to auscultation bilaterally. No wheezes. No rales or rhonchi. Musculoskeletal: No edema. No gross deformities. Neurological: Alert and appropriately interactive. Face symmetric, speech without dysarthria or obvious aphasia. Moving all extremities spontaneously. Skin: Warm, dry. No rash. No diaphoresis or erythema. Psychiatric: Calm and cooperative with appropriate mood and affect. Diagnostic Results       RADIOLOGY:  No orders to display       LABS:   Results for orders placed or performed during the hospital encounter of 01/30/20   CBC   Result Value Ref Range    WBC 3.4 (L) 4.0 - 11.0 K/uL    RBC 2.92 (L) 4.00 - 5.20 M/uL    Hemoglobin 8.9 (L) 12.0 - 16.0 g/dL    Hematocrit 26.9 (L) 36.0 - 48.0 %    MCV 91.9 80.0 - 100.0 fL    MCH 30.6 26.0 - 34.0 pg    MCHC 33.3 31.0 - 36.0 g/dL    RDW 18.5 (H) 12.4 - 15.4 %    Platelets 790 (L) 139 - 450 K/uL    MPV 9.2 5.0 - 10.5 fL         RECENT VITALS:  BP: (!) 188/71,Temp: 98.1 °F (36.7 °C), Pulse: 88, Resp: 18, SpO2: 100 %     Procedures   Epistaxis Mgmt  Date/Time: 1/30/2020 8:06 PM  Performed by: Claudean Gails, MD  Authorized by:  Claudean Gails, MD     Consent:     Consent obtained:  Verbal    Consent given by: Patient  Procedure details:     Treatment site:  R septum    Treatment method:  Silver nitrate    Treatment complexity:  Limited    Treatment episode: initial    Post-procedure details:     Assessment:  Bleeding stopped    Patient tolerance of procedure: Tolerated well, no immediate complications          ED Course     Nursing Notes, Past Medical Hx, Past Surgical Hx, Social Hx,Allergies, and Family Hx were reviewed. Patient was given the following medications:  Orders Placed This Encounter   Medications    DISCONTD: silver nitrate applicators applicator 1 each    DISCONTD: silver nitrate applicators 71-07 % applicator     ANAND HERRMANN: cabinet override       CONSULTS:  None    MEDICAL DECISIONMAKING / ASSESSMENT / Chay Vita is a 67 y.o. female who presents emergency department today with right epistaxis intermittently for 3 days. She has minimal bleeding on arrival and vital signs reassuring. There is a punctate spot on the right nasal septum that appears to be the source of bleeding. This was cauterized using silver nitrate with good hemostasis. Provided nasal clamp should she have any minor recurrent bleeding. Provided strict return instructions. Currently has ENT follow-up appointment on Monday which she will keep if any ongoing concerns. Discharged in stable condition with written and verbal instructions for which to return to the ED. Clinical Impression     1. Epistaxis    2.  Anticoagulated        Disposition     PATIENT REFERRED TO:  [facility] Emergency Department  [address]  [address] [facility] [address]  [phone]    If symptoms worsen      DISCHARGE MEDICATIONS:  Discharge Medication List as of 1/30/2020  5:02 PM          DISPOSITION Decision To Discharge 01/30/2020 04:36:27 PM       Serge Ellis MD  01/30/20 1167

## 2020-01-31 ENCOUNTER — HOSPITAL ENCOUNTER (EMERGENCY)
Age: 80
Discharge: HOME | End: 2020-01-31
Attending: EMERGENCY MEDICINE
Payer: MEDICARE

## 2020-01-31 VITALS
TEMPERATURE: 97.8 F | HEIGHT: 68 IN | WEIGHT: 150 LBS | DIASTOLIC BLOOD PRESSURE: 72 MMHG | RESPIRATION RATE: 18 BRPM | BODY MASS INDEX: 22.73 KG/M2 | HEART RATE: 84 BPM | SYSTOLIC BLOOD PRESSURE: 175 MMHG | OXYGEN SATURATION: 100 %

## 2020-01-31 PROCEDURE — 30901 CONTROL OF NOSEBLEED: CPT

## 2020-01-31 PROCEDURE — 99283 EMERGENCY DEPT VISIT LOW MDM: CPT

## 2020-01-31 PROCEDURE — 6370000000 HC RX 637 (ALT 250 FOR IP): Performed by: EMERGENCY MEDICINE

## 2020-01-31 PROCEDURE — 6370000000 HC RX 637 (ALT 250 FOR IP): Performed by: STUDENT IN AN ORGANIZED HEALTH CARE EDUCATION/TRAINING PROGRAM

## 2020-01-31 RX ORDER — OXYMETAZOLINE HYDROCHLORIDE 0.05 G/100ML
2 SPRAY NASAL ONCE
Status: COMPLETED | OUTPATIENT
Start: 2020-01-31 | End: 2020-01-31

## 2020-01-31 RX ADMIN — SILVER NITRATE APPLICATORS 1 EACH: 25; 75 STICK TOPICAL at 14:47

## 2020-01-31 RX ADMIN — OXYMETAZOLINE HCL 2 SPRAY: 0.05 SPRAY NASAL at 14:44

## 2020-01-31 ASSESSMENT — ENCOUNTER SYMPTOMS
DIARRHEA: 0
BLOOD IN STOOL: 0
COUGH: 0
TROUBLE SWALLOWING: 0
NAUSEA: 0
SORE THROAT: 0
SHORTNESS OF BREATH: 0
VOMITING: 0
ABDOMINAL PAIN: 0
APNEA: 0

## 2020-01-31 NOTE — ED PROVIDER NOTES
surgery; dieter and bso (cervix removed) (05/26/2016); hemicolectomy (05/2016); Cardiac surgery; Hysterectomy; Tunneled venous port placement (Left, 7/27/2016); other surgical history (N/A, 08/17/2018); Breast biopsy (Left, 06/23/2017); joint replacement (Bilateral); Colonoscopy (N/A, 6/6/2019); and Colonoscopy (6/6/2019). Her family history includes Diabetes in her father and maternal uncle; Heart Disease in her brother, father, and maternal grandmother; High Blood Pressure in her mother. She reports that she has quit smoking. She has a 20.00 pack-year smoking history. She has never used smokeless tobacco. She reports previous alcohol use of about 1.0 standard drinks of alcohol per week. She reports that she does not use drugs. Medications     Previous Medications    ALPRAZOLAM (XANAX) 0.5 MG TABLET    Take 0.5 mg by mouth nightly as needed. AMLODIPINE (NORVASC) 5 MG TABLET    Take 5 mg by mouth nightly    APIXABAN (ELIQUIS) 2.5 MG TABS TABLET    Take by mouth 2 times daily    ASPIRIN 81 MG EC TABLET    Take 81 mg by mouth daily. INSULIN GLARGINE (LANTUS) 100 UNIT/ML INJECTION VIAL    Inject 5 Units into the skin nightly    INSULIN LISPRO (HUMALOG) 100 UNIT/ML INJECTION VIAL    Inject 0-12 Units into the skin 3 times daily (with meals)    LIDOCAINE-PRILOCAINE (EMLA) 2.5-2.5 % CREAM    Apply a nickel sized amount to port 30 minutes prior to access as needed and cover with plastic wrap. LOSARTAN (COZAAR) 25 MG TABLET    Take 100 mg by mouth daily Pt. takes 100mg of Cozaar daily. NONFORMULARY    Take by mouth 3 times daily LYMPARZA    OLAPARIB (LYNPARZA) 150 MG TABS    Take by mouth 2 times daily     OXYCODONE-ACETAMINOPHEN (PERCOCET)  MG PER TABLET    Take 1 tablet by mouth every 8 hours as needed for Pain .  Earliest Fill Date: 8/7/17    PREGABALIN (LYRICA PO)    Take 75 mg by mouth 2 times daily Usually takes only BID    PROSTHESIS MISC    Cranial prosthesis for chemotherapy induced alopecia were reviewed. The patient was given the followingmedications:  Orders Placed This Encounter   Medications    oxymetazoline (AFRIN) 0.05 % nasal spray 2 spray    silver nitrate applicators applicator 1 each       CONSULTS:  None    MEDICAL DECISION MAKING / ASSESSMENT / Maria Esther Hi is a 67 y.o. female who presents with epistaxis. On presentation she was in no acute distress and hemodynamically stable. On physical examination the bleeding was almost completely hemostatic except for very slight bruise on the right anterior septum. Silver nitrate was applied to the spot. She was given Afrin and a nasal clip for further episodes at home. She was given strict return precautions and instructed to follow-up with ENT on Monday. This patient was also evaluated by the attending physician. All care plans werediscussed and agreed upon. Clinical Impression     1. Epistaxis        Disposition     PATIENT REFERRED TO:  No follow-up provider specified.     DISCHARGE MEDICATIONS:  New Prescriptions    No medications on file       DISPOSITION Decision To Discharge 01/31/2020 02:31:27 PM       Mason Kebede MD  Resident  01/31/20 [id number]

## 2020-02-08 ENCOUNTER — HOSPITAL ENCOUNTER (EMERGENCY)
Age: 80
Discharge: HOME | End: 2020-02-08
Attending: EMERGENCY MEDICINE
Payer: MEDICARE

## 2020-02-08 VITALS
WEIGHT: 150 LBS | BODY MASS INDEX: 22.73 KG/M2 | OXYGEN SATURATION: 100 % | TEMPERATURE: 98.2 F | HEIGHT: 68 IN | HEART RATE: 58 BPM | SYSTOLIC BLOOD PRESSURE: 164 MMHG | DIASTOLIC BLOOD PRESSURE: 105 MMHG | RESPIRATION RATE: 16 BRPM

## 2020-02-08 PROCEDURE — 99282 EMERGENCY DEPT VISIT SF MDM: CPT

## 2020-02-08 PROCEDURE — 30901 CONTROL OF NOSEBLEED: CPT

## 2020-02-08 RX ORDER — TRANEXAMIC ACID 100 MG/ML
1000 INJECTION, SOLUTION INTRAVENOUS ONCE
Status: DISCONTINUED | OUTPATIENT
Start: 2020-02-08 | End: 2020-02-08 | Stop reason: HOSPADM

## 2020-02-08 ASSESSMENT — ENCOUNTER SYMPTOMS
CHEST TIGHTNESS: 0
STRIDOR: 0
COUGH: 0
APNEA: 0
CONSTIPATION: 0
SORE THROAT: 0
SINUS PRESSURE: 0
DIARRHEA: 0
SHORTNESS OF BREATH: 0
NAUSEA: 0
WHEEZING: 0
ABDOMINAL PAIN: 0
VOMITING: 0
CHOKING: 0

## 2020-02-08 ASSESSMENT — PAIN SCALES - GENERAL: PAINLEVEL_OUTOF10: 6

## 2020-02-08 ASSESSMENT — PAIN DESCRIPTION - PAIN TYPE: TYPE: ACUTE PAIN

## 2020-02-08 NOTE — ED TRIAGE NOTES
Pt is an [facility] patient who arrives c/o continuing nosebleed. Pt was put on a new medication yesterday whose side effect includes nose bleeds. Pt was here a week ago for epistaxis as well.

## 2020-02-08 NOTE — ED PROVIDER NOTES
spelling/grammar errors. If there are any questions or concerns please feel free to contact the dictating provider for clarification.          Robert Singh MD  02/08/20 5031

## 2020-02-08 NOTE — ED PROVIDER NOTES
[address] [facility] RESIDENT NOTE       Date of evaluation: 2/8/2020    Chief Complaint     Epistaxis      History of Present Illness     Anali Cordova is a 67 y.o. female with past medical history of ovarian cancer who presents for 1/2-hour history of bleeding from right naris. Patient has been seen multiple times the past month for similar issues. Last Tuesday patient visited ENT and had areas cauterized with silver nitrate and both right and left nares. Patient states putting on her close when she started having right nosebleed. Stated been ongoing for about an hour and a half. Was dripping when she arrived at the ED but had stopped by the time she was seen by provider. There is evidence of bleeding from right naris on nasal exam and evidence of blood in oropharynx. Patient denies any lightheadedness or dizziness. She is on Eliquis for history of PE.  ENT gave her Afrin spray to use for 3 days and nasal saline spray to use for 2 weeks. Review of Systems     Review of Systems   Constitutional: Negative for chills, diaphoresis, fatigue and fever. HENT: Positive for nosebleeds and postnasal drip. Negative for congestion, sinus pressure, sneezing, sore throat and tinnitus. Respiratory: Negative for apnea, cough, choking, chest tightness, shortness of breath, wheezing and stridor. Cardiovascular: Negative for chest pain, palpitations and leg swelling. Gastrointestinal: Negative for abdominal pain, constipation, diarrhea, nausea and vomiting. All other systems reviewed and are negative.       Past Medical, Surgical, Family, and Social History     She has a past medical history of Anxiety, Arthritis, Asthma, CAD (coronary artery disease), Cancer of peritoneum ([address] Chronic low back pain, Depression, Embolism ([facility] [address] Hyperlipidemia, Hypertension, Postoperative delirium, and Type II or unspecified type diabetes mellitus without mention of complication, Behavior normal.         Thought Content: Thought content normal.         Judgment: Judgment normal.         Diagnostic Results     RADIOLOGY:  No orders to display       LABS:   No results found for this visit on 02/08/20. RECENT VITALS:  BP: (!) 164/105, Temp: 98.2 °F (36.8 °C),Pulse: 58, Resp: 16, SpO2: 100 %     Procedures     ED Course     Nursing Notes, Past Medical Hx, Past Surgical Hx, Social Hx, Allergies, and FamilyHx were reviewed. The patient was giventhe following medications:  Orders Placed This Encounter   Medications    tranexamic acid (CYKLOKAPRON) injection 1,000 mg    silver nitrate applicators applicator 1 each    silver nitrate applicators 08-45 % applicator     ZAHRAA LIMON: cabinet override       CONSULTS:  None    MEDICAL DECISION MAKING / ASSESSMENT / Arlene Weston is a 67 y.o. female who presents with nosebleeds. Patient underwent cautery with silver nitrate by ENT on 2/3/2020. Comes in today again with nosebleed. Was not bleeding on initial exam.  On reevaluation, patient was having slow nosebleed from right side. Even the recurrent nosebleeds, different treatment options were explained and ultimately patient and  agreed to have rapid [employer] placed. Patient tolerated placement of [employer] and was no further evidence of bleeding post placement. She was instructed to follow-up with her ENT in 2 days to have the [employer] removed and for further evaluation of nosebleeds. This patient was also evaluated by the attending physician. All care plans were discussed and agreed upon. Clinical Impression     1. Epistaxis        Disposition     PATIENT REFERRED TO:  Magda Magana MD  [address] [place] [address].   [address] [place] [address]    In 2 days  For [employer] removal.      DISCHARGE MEDICATIONS:  New Prescriptions    No medications on file       DISPOSITION  Discharge Home        Brook Montana MD  Resident  02/08/20 7938

## 2020-02-08 NOTE — ED NOTES
Discharge instructions reviewed with patient. Pt verbalized understanding. Pt discharged home with family.      Mike Merida RN  02/08/20 1053

## 2020-02-13 ENCOUNTER — HOSPITAL ENCOUNTER (OUTPATIENT)
Dept: VASCULAR LAB | Age: 80
Discharge: HOME | End: 2020-02-13
Payer: MEDICARE

## 2020-02-13 PROCEDURE — 93880 EXTRACRANIAL BILAT STUDY: CPT

## 2020-03-13 ENCOUNTER — TELEPHONE (OUTPATIENT)
Dept: VASCULAR SURGERY | Age: 80
End: 2020-03-13

## 2020-03-13 NOTE — TELEPHONE ENCOUNTER
I spoke to patients  and told him that per Dr. Padmini Daniel there are no changes in scan. RTO 6 months with scan & OV.

## 2020-06-21 ENCOUNTER — APPOINTMENT (OUTPATIENT)
Dept: GENERAL RADIOLOGY | Age: 80
End: 2020-06-21
Payer: MEDICARE

## 2020-06-21 ENCOUNTER — HOSPITAL ENCOUNTER (EMERGENCY)
Age: 80
Discharge: HOME | End: 2020-06-22
Attending: EMERGENCY MEDICINE
Payer: MEDICARE

## 2020-06-21 ENCOUNTER — APPOINTMENT (OUTPATIENT)
Dept: CT IMAGING | Age: 80
End: 2020-06-21
Payer: MEDICARE

## 2020-06-21 LAB
ALBUMIN SERPL-MCNC: 4.1 G/DL (ref 3.4–5)
ALP BLD-CCNC: 70 U/L (ref 40–129)
ALT SERPL-CCNC: 6 U/L (ref 10–40)
ANION GAP SERPL CALCULATED.3IONS-SCNC: 11 MMOL/L (ref 3–16)
AST SERPL-CCNC: 20 U/L (ref 15–37)
BANDED NEUTROPHILS RELATIVE PERCENT: 3 % (ref 0–7)
BASOPHILS ABSOLUTE: 0 K/UL (ref 0–0.2)
BASOPHILS RELATIVE PERCENT: 0 %
BILIRUB SERPL-MCNC: 0.6 MG/DL (ref 0–1)
BILIRUBIN DIRECT: <0.2 MG/DL (ref 0–0.3)
BILIRUBIN, INDIRECT: ABNORMAL MG/DL (ref 0–1)
BUN BLDV-MCNC: 32 MG/DL (ref 7–20)
CALCIUM SERPL-MCNC: 9.7 MG/DL (ref 8.3–10.6)
CHLORIDE BLD-SCNC: 96 MMOL/L (ref 99–110)
CO2: 23 MMOL/L (ref 21–32)
CREAT SERPL-MCNC: 0.8 MG/DL (ref 0.6–1.2)
EOSINOPHILS ABSOLUTE: 0 K/UL (ref 0–0.6)
EOSINOPHILS RELATIVE PERCENT: 0 %
GFR AFRICAN AMERICAN: >60
GFR NON-AFRICAN AMERICAN: >60
GLUCOSE BLD-MCNC: 137 MG/DL (ref 70–99)
HCT VFR BLD CALC: 33.1 % (ref 36–48)
HEMOGLOBIN: 11 G/DL (ref 12–16)
LACTIC ACID: 1.3 MMOL/L (ref 0.4–2)
LIPASE: 23 U/L (ref 13–60)
LYMPHOCYTES ABSOLUTE: 1 K/UL (ref 1–5.1)
LYMPHOCYTES RELATIVE PERCENT: 3 %
MCH RBC QN AUTO: 31.8 PG (ref 26–34)
MCHC RBC AUTO-ENTMCNC: 33.1 G/DL (ref 31–36)
MCV RBC AUTO: 96.1 FL (ref 80–100)
METAMYELOCYTES RELATIVE PERCENT: 1 %
MONOCYTES ABSOLUTE: 1 K/UL (ref 0–1.3)
MONOCYTES RELATIVE PERCENT: 3 %
NEUTROPHILS ABSOLUTE: 30 K/UL (ref 1.7–7.7)
NEUTROPHILS RELATIVE PERCENT: 90 %
PDW BLD-RTO: 18.8 % (ref 12.4–15.4)
PLATELET # BLD: 158 K/UL (ref 135–450)
PMV BLD AUTO: 7.9 FL (ref 5–10.5)
POTASSIUM REFLEX MAGNESIUM: 5 MMOL/L (ref 3.5–5.1)
RBC # BLD: 3.45 M/UL (ref 4–5.2)
SODIUM BLD-SCNC: 130 MMOL/L (ref 136–145)
TOTAL PROTEIN: 6.3 G/DL (ref 6.4–8.2)
WBC # BLD: 31.9 K/UL (ref 4–11)

## 2020-06-21 PROCEDURE — 81001 URINALYSIS AUTO W/SCOPE: CPT

## 2020-06-21 PROCEDURE — 80048 BASIC METABOLIC PNL TOTAL CA: CPT

## 2020-06-21 PROCEDURE — 85025 COMPLETE CBC W/AUTO DIFF WBC: CPT

## 2020-06-21 PROCEDURE — 99284 EMERGENCY DEPT VISIT MOD MDM: CPT

## 2020-06-21 PROCEDURE — 83690 ASSAY OF LIPASE: CPT

## 2020-06-21 PROCEDURE — 74177 CT ABD & PELVIS W/CONTRAST: CPT

## 2020-06-21 PROCEDURE — 6360000004 HC RX CONTRAST MEDICATION: Performed by: STUDENT IN AN ORGANIZED HEALTH CARE EDUCATION/TRAINING PROGRAM

## 2020-06-21 PROCEDURE — 71045 X-RAY EXAM CHEST 1 VIEW: CPT

## 2020-06-21 PROCEDURE — 80076 HEPATIC FUNCTION PANEL: CPT

## 2020-06-21 PROCEDURE — 83605 ASSAY OF LACTIC ACID: CPT

## 2020-06-21 RX ADMIN — IOPAMIDOL 80 ML: 755 INJECTION, SOLUTION INTRAVENOUS at 22:25

## 2020-06-21 ASSESSMENT — PAIN SCALES - GENERAL
PAINLEVEL_OUTOF10: 10
PAINLEVEL_OUTOF10: 6

## 2020-06-21 ASSESSMENT — PAIN DESCRIPTION - FREQUENCY
FREQUENCY: CONTINUOUS
FREQUENCY: CONTINUOUS

## 2020-06-21 ASSESSMENT — PAIN DESCRIPTION - DESCRIPTORS
DESCRIPTORS: ACHING
DESCRIPTORS: CRAMPING

## 2020-06-21 ASSESSMENT — PAIN DESCRIPTION - LOCATION
LOCATION: ABDOMEN
LOCATION: ABDOMEN

## 2020-06-21 ASSESSMENT — PAIN DESCRIPTION - PAIN TYPE
TYPE: ACUTE PAIN
TYPE: ACUTE PAIN

## 2020-06-22 VITALS
TEMPERATURE: 99.3 F | OXYGEN SATURATION: 99 % | HEIGHT: 68 IN | RESPIRATION RATE: 16 BRPM | HEART RATE: 82 BPM | SYSTOLIC BLOOD PRESSURE: 152 MMHG | BODY MASS INDEX: 22.58 KG/M2 | DIASTOLIC BLOOD PRESSURE: 70 MMHG | WEIGHT: 149 LBS

## 2020-06-22 LAB
BACTERIA: ABNORMAL /HPF
BILIRUBIN URINE: NEGATIVE
BLOOD, URINE: ABNORMAL
CLARITY: CLEAR
COLOR: YELLOW
EPITHELIAL CELLS, UA: ABNORMAL /HPF (ref 0–5)
GLUCOSE URINE: NEGATIVE MG/DL
KETONES, URINE: NEGATIVE MG/DL
LEUKOCYTE ESTERASE, URINE: ABNORMAL
MICROSCOPIC EXAMINATION: YES
NITRITE, URINE: NEGATIVE
PH UA: 6 (ref 5–8)
PROTEIN UA: 100 MG/DL
RBC UA: ABNORMAL /HPF (ref 0–4)
SPECIFIC GRAVITY UA: 1.01 (ref 1–1.03)
URINE TYPE: ABNORMAL
UROBILINOGEN, URINE: 0.2 E.U./DL
WBC UA: ABNORMAL /HPF (ref 0–5)

## 2020-06-22 RX ORDER — SENNA AND DOCUSATE SODIUM 50; 8.6 MG/1; MG/1
1 TABLET, FILM COATED ORAL DAILY
Qty: 30 TABLET | Refills: 0 | Status: ON HOLD | OUTPATIENT
Start: 2020-06-22 | End: 2020-07-03

## 2020-06-22 RX ORDER — POLYETHYLENE GLYCOL 3350 17 G/17G
17 POWDER, FOR SOLUTION ORAL DAILY
Qty: 1 BOTTLE | Refills: 0 | Status: SHIPPED | OUTPATIENT
Start: 2020-06-22 | End: 2020-06-29

## 2020-06-22 NOTE — ED TRIAGE NOTES
Pt to ED with c/o abdominal cramping, diarrhea, and nausea since chemo 2 days ago. Denies any vomiting, blood in stool, or urinary symptoms.

## 2020-06-22 NOTE — ED PROVIDER NOTES
VENLAFAXINE (EFFEXOR-XR) 37.5 MG XR CAPSULE    Take 75 mg by mouth daily        Allergies     She is allergic to cephalexin; pcn [penicillins]; cephalosporins; and cephalexin. Physical Exam     INITIAL VITALS: BP: (!) 193/67, Temp: 99.3 °F (37.4 °C), Pulse: 88, Resp: 16, SpO2: 99 %     General: Elderly appearing but in no acute distress  Eyes:  PERRLA. EOMI. No discharge from eyes. ENT:  No discharge from nose. OP clear. No tonsillar exudates. Neck:  Supple without lymphadenopathy. Pulmonary:   Non-labored breathing. Lungs CTAB. Cardiac:  Regular rate and rhythm. No murmurs, rubs, or gallops. Abdomen:  Soft. Periumbilical tenderness to palpation without rigidity rebound or guarding  Musculoskeletal:  No long bone deformity. No CVA tenderness. Vascular:  Extremities warm and perfused. Capillary refill <2seconds. Skin:  No rash. Neuro: Alert and oriented x 4. CN II-XII intact. 5/5 strength in all 4 distal extremities. Sensation grossly intact to light touch. Speech and mentation normal.    Extremities:  No peripheral edema. DiagnosticResults     RADIOLOGY:  XR CHEST PORTABLE   Final Result      1. No acute process or consolidation   2. Central venous catheter remains stable and some chronic interstitial changes are appreciated without segmental pneumonia               CT ABDOMEN PELVIS W IV CONTRAST Additional Contrast? None   Final Result      1. Small bowel ileus with fluid-filled slightly dilated small bowel loops with air-fluid levels and mild amount of ascites noted along the liver margin and right paracolic region of uncertain etiology. 2. Moderate amount of stool noted throughout the colon and in particular the rectum which may indicate constipation   3. Anterior abdominal wall small complex collections on either side of the umbilicus which may represent the sequela of previous injections or trauma, hematomas as seen on image 98   4.  Atherosclerotic disease without aneurysm and 23.0 13.0 - 60.0 U/L   Urinalysis   Result Value Ref Range    Color, UA Yellow Straw/Yellow    Clarity, UA Clear Clear    Glucose, Ur Negative Negative mg/dL    Bilirubin Urine Negative Negative    Ketones, Urine Negative Negative mg/dL    Specific Gravity, UA 1.010 1.005 - 1.030    Blood, Urine TRACE-INTACT (A) Negative    pH, UA 6.0 5.0 - 8.0    Protein,  (A) Negative mg/dL    Urobilinogen, Urine 0.2 <2.0 E.U./dL    Nitrite, Urine Negative Negative    Leukocyte Esterase, Urine SMALL (A) Negative    Microscopic Examination YES     Urine Type Voided    Microscopic Urinalysis   Result Value Ref Range    WBC, UA 6-9 (A) 0 - 5 /HPF    RBC, UA 0-2 0 - 4 /HPF    Epithelial Cells, UA 0-1 0 - 5 /HPF    Bacteria, UA 1+ (A) None Seen /HPF           RECENT VITALS:  BP: (!) 178/59, Temp: 99.3 °F (37.4 °C), Pulse: 85,Resp: 16, SpO2: 99 %     Procedures         ED Course     Nursing Notes, Past Medical Hx, Past Surgical Hx, Social Hx, Allergies, and Family Hx were reviewed. The patient was given the followingmedications:  Orders Placed This Encounter   Medications    iopamidol (ISOVUE-370) 76 % injection 80 mL    polyethylene glycol (MIRALAX) 17 GM/SCOOP powder     Sig: Take 17 g by mouth daily for 7 days PRN constipation     Dispense:  1 Bottle     Refill:  0    sennosides-docusate sodium (SENOKOT-S) 8.6-50 MG tablet     Sig: Take 1 tablet by mouth daily     Dispense:  30 tablet     Refill:  0       CONSULTS:  IP CONSULT TO Vika / MEGAN / Romana Bharat is a 67 y.o. female who presented to the emergency department with Abdominal Pain and Diarrhea   with a history and presentation as described above in HPI. The patient was evaluated by myself and the ED Attending Physician listed above. All management and disposition plans were discussed and agreed upon. Furthermore, a thorough chart review was performed during this encounter.       Vital signs on arrival: Hypertensive otherwise unremarkable. Labs remarkable for a significant leukocytosis of 31.9. Spoke to the oncology team and based on her chemotherapy regimen they would expect her leukocytosis to be this high. Patient without fever. Urinalysis only has 6 white blood cell counts without any urinary symptoms. Chest x-ray clear. Lower concern for occult bacteremia. Suspect leukocytosis is mostly driven by her chemotherapy regimen and white blood cell stimulation. Patient with an ileus on CT scan however she is been able to tolerate p.o. intake here in the emergency department and has been having bowel movements just prior to arrival.  Patient discharged home with a bowel read for constipation. She was informed of the finding of ascites is concerning for possible relapse of her cancer. Patient to be discharged with strict return precautions advised to follow-up with her oncologist early this week    The patient was reassessed prior to final disposition. Summary of therapeutics provided in the emergency department:  Medications   iopamidol (ISOVUE-370) 76 % injection 80 mL (80 mLs Intravenous Given 6/21/20 2225)       Clinical Impression     1. Ileus (HCC)    2. Leukocytosis, unspecified type        Disposition     PATIENT REFERRED TO:  No follow-up provider specified.     DISCHARGE MEDICATIONS:  New Prescriptions    POLYETHYLENE GLYCOL (MIRALAX) 17 GM/SCOOP POWDER    Take 17 g by mouth daily for 7 days PRN constipation    SENNOSIDES-DOCUSATE SODIUM (SENOKOT-S) 8.6-50 MG TABLET    Take 1 tablet by mouth daily       DISPOSITION Decision To Discharge 06/22/2020 12:35:14 Dariana Van MD  Resident  06/22/20 [id number]

## 2020-06-23 ENCOUNTER — CARE COORDINATION (OUTPATIENT)
Dept: FAMILY MEDICINE CLINIC | Age: 80
End: 2020-06-23

## 2020-06-23 NOTE — CARE COORDINATION
ACM placed call to patient to follow up on recent ED visit. HIPPA compliant message left with ACM contact information.     Marina Longoria RN, MSN  Ambulatory Care Manager  [phone]

## 2020-07-03 ENCOUNTER — APPOINTMENT (OUTPATIENT)
Dept: GENERAL RADIOLOGY | Age: 80
DRG: 375 | End: 2020-07-03
Payer: MEDICARE

## 2020-07-03 ENCOUNTER — APPOINTMENT (OUTPATIENT)
Dept: CT IMAGING | Age: 80
DRG: 375 | End: 2020-07-03
Payer: MEDICARE

## 2020-07-03 ENCOUNTER — HOSPITAL ENCOUNTER (INPATIENT)
Age: 80
LOS: 3 days | Discharge: HOME | DRG: 375 | End: 2020-07-06
Attending: EMERGENCY MEDICINE | Admitting: INTERNAL MEDICINE
Payer: MEDICARE

## 2020-07-03 PROBLEM — R10.9 ABDOMINAL PAIN: Status: ACTIVE | Noted: 2020-07-03

## 2020-07-03 LAB
ALBUMIN SERPL-MCNC: 3.8 G/DL (ref 3.4–5)
ALP BLD-CCNC: 70 U/L (ref 40–129)
ALT SERPL-CCNC: 9 U/L (ref 10–40)
AMORPHOUS: ABNORMAL /HPF
ANION GAP SERPL CALCULATED.3IONS-SCNC: 9 MMOL/L (ref 3–16)
AST SERPL-CCNC: 18 U/L (ref 15–37)
BACTERIA: ABNORMAL /HPF
BASOPHILS ABSOLUTE: 0 K/UL (ref 0–0.2)
BASOPHILS RELATIVE PERCENT: 0.6 %
BILIRUB SERPL-MCNC: 0.4 MG/DL (ref 0–1)
BILIRUBIN DIRECT: <0.2 MG/DL (ref 0–0.3)
BILIRUBIN URINE: NEGATIVE
BILIRUBIN, INDIRECT: ABNORMAL MG/DL (ref 0–1)
BLOOD, URINE: ABNORMAL
BUN BLDV-MCNC: 23 MG/DL (ref 7–20)
CALCIUM SERPL-MCNC: 9.5 MG/DL (ref 8.3–10.6)
CHLORIDE BLD-SCNC: 98 MMOL/L (ref 99–110)
CLARITY: CLEAR
CO2: 24 MMOL/L (ref 21–32)
COLOR: YELLOW
CREAT SERPL-MCNC: 0.8 MG/DL (ref 0.6–1.2)
EOSINOPHILS ABSOLUTE: 0 K/UL (ref 0–0.6)
EOSINOPHILS RELATIVE PERCENT: 0.4 %
EPITHELIAL CELLS, UA: ABNORMAL /HPF (ref 0–5)
GFR AFRICAN AMERICAN: >60
GFR NON-AFRICAN AMERICAN: >60
GLUCOSE BLD-MCNC: 176 MG/DL (ref 70–99)
GLUCOSE BLD-MCNC: 214 MG/DL (ref 70–99)
GLUCOSE BLD-MCNC: 251 MG/DL (ref 70–99)
GLUCOSE URINE: 100 MG/DL
HCT VFR BLD CALC: 27.3 % (ref 36–48)
HEMOGLOBIN: 9.2 G/DL (ref 12–16)
INR BLD: 1.28 (ref 0.86–1.14)
KETONES, URINE: NEGATIVE MG/DL
LACTIC ACID, SEPSIS: 0.8 MMOL/L (ref 0.4–1.9)
LACTIC ACID, SEPSIS: 0.9 MMOL/L (ref 0.4–1.9)
LEUKOCYTE ESTERASE, URINE: NEGATIVE
LIPASE: 21 U/L (ref 13–60)
LYMPHOCYTES ABSOLUTE: 0.4 K/UL (ref 1–5.1)
LYMPHOCYTES RELATIVE PERCENT: 7.3 %
MCH RBC QN AUTO: 32.3 PG (ref 26–34)
MCHC RBC AUTO-ENTMCNC: 33.5 G/DL (ref 31–36)
MCV RBC AUTO: 96.5 FL (ref 80–100)
MICROSCOPIC EXAMINATION: YES
MONOCYTES ABSOLUTE: 0.3 K/UL (ref 0–1.3)
MONOCYTES RELATIVE PERCENT: 5.7 %
MUCUS: ABNORMAL /LPF
NEUTROPHILS ABSOLUTE: 5.1 K/UL (ref 1.7–7.7)
NEUTROPHILS RELATIVE PERCENT: 86 %
NITRITE, URINE: NEGATIVE
PDW BLD-RTO: 17.4 % (ref 12.4–15.4)
PERFORMED ON: ABNORMAL
PERFORMED ON: ABNORMAL
PH UA: 6 (ref 5–8)
PLATELET # BLD: 131 K/UL (ref 135–450)
PMV BLD AUTO: 8.6 FL (ref 5–10.5)
POTASSIUM SERPL-SCNC: 5 MMOL/L (ref 3.5–5.1)
PROTEIN UA: >=300 MG/DL
PROTHROMBIN TIME: 14.9 SEC (ref 10–13.2)
RBC # BLD: 2.83 M/UL (ref 4–5.2)
RBC UA: ABNORMAL /HPF (ref 0–4)
SODIUM BLD-SCNC: 131 MMOL/L (ref 136–145)
SPECIFIC GRAVITY UA: 1.02 (ref 1–1.03)
TOTAL PROTEIN: 5.9 G/DL (ref 6.4–8.2)
TROPONIN: 0.02 NG/ML
TROPONIN: 0.02 NG/ML
URINE TYPE: ABNORMAL
UROBILINOGEN, URINE: 0.2 E.U./DL
WBC # BLD: 6 K/UL (ref 4–11)
WBC UA: ABNORMAL /HPF (ref 0–5)

## 2020-07-03 PROCEDURE — 96374 THER/PROPH/DIAG INJ IV PUSH: CPT

## 2020-07-03 PROCEDURE — 6370000000 HC RX 637 (ALT 250 FOR IP): Performed by: STUDENT IN AN ORGANIZED HEALTH CARE EDUCATION/TRAINING PROGRAM

## 2020-07-03 PROCEDURE — 83605 ASSAY OF LACTIC ACID: CPT

## 2020-07-03 PROCEDURE — 99285 EMERGENCY DEPT VISIT HI MDM: CPT

## 2020-07-03 PROCEDURE — 2580000003 HC RX 258: Performed by: EMERGENCY MEDICINE

## 2020-07-03 PROCEDURE — 2580000003 HC RX 258: Performed by: STUDENT IN AN ORGANIZED HEALTH CARE EDUCATION/TRAINING PROGRAM

## 2020-07-03 PROCEDURE — 85610 PROTHROMBIN TIME: CPT

## 2020-07-03 PROCEDURE — 6360000004 HC RX CONTRAST MEDICATION: Performed by: EMERGENCY MEDICINE

## 2020-07-03 PROCEDURE — 74022 RADEX COMPL AQT ABD SERIES: CPT

## 2020-07-03 PROCEDURE — 85025 COMPLETE CBC W/AUTO DIFF WBC: CPT

## 2020-07-03 PROCEDURE — 81001 URINALYSIS AUTO W/SCOPE: CPT

## 2020-07-03 PROCEDURE — 71260 CT THORAX DX C+: CPT

## 2020-07-03 PROCEDURE — 80048 BASIC METABOLIC PNL TOTAL CA: CPT

## 2020-07-03 PROCEDURE — 80076 HEPATIC FUNCTION PANEL: CPT

## 2020-07-03 PROCEDURE — 99223 1ST HOSP IP/OBS HIGH 75: CPT | Performed by: SURGERY

## 2020-07-03 PROCEDURE — 36415 COLL VENOUS BLD VENIPUNCTURE: CPT

## 2020-07-03 PROCEDURE — 6360000002 HC RX W HCPCS: Performed by: EMERGENCY MEDICINE

## 2020-07-03 PROCEDURE — 1200000000 HC SEMI PRIVATE

## 2020-07-03 PROCEDURE — 74177 CT ABD & PELVIS W/CONTRAST: CPT

## 2020-07-03 PROCEDURE — 83690 ASSAY OF LIPASE: CPT

## 2020-07-03 PROCEDURE — 84484 ASSAY OF TROPONIN QUANT: CPT

## 2020-07-03 PROCEDURE — 51798 US URINE CAPACITY MEASURE: CPT

## 2020-07-03 PROCEDURE — 93005 ELECTROCARDIOGRAM TRACING: CPT | Performed by: EMERGENCY MEDICINE

## 2020-07-03 PROCEDURE — 6360000002 HC RX W HCPCS: Performed by: STUDENT IN AN ORGANIZED HEALTH CARE EDUCATION/TRAINING PROGRAM

## 2020-07-03 RX ORDER — POLYETHYLENE GLYCOL 3350 17 G/17G
17 POWDER, FOR SOLUTION ORAL DAILY
Status: DISCONTINUED | OUTPATIENT
Start: 2020-07-03 | End: 2020-07-06 | Stop reason: HOSPADM

## 2020-07-03 RX ORDER — ACETAMINOPHEN 650 MG/1
650 SUPPOSITORY RECTAL EVERY 6 HOURS PRN
Status: DISCONTINUED | OUTPATIENT
Start: 2020-07-03 | End: 2020-07-06

## 2020-07-03 RX ORDER — BISACODYL 10 MG
10 SUPPOSITORY, RECTAL RECTAL DAILY PRN
Status: DISCONTINUED | OUTPATIENT
Start: 2020-07-03 | End: 2020-07-06 | Stop reason: HOSPADM

## 2020-07-03 RX ORDER — DEXTROSE MONOHYDRATE 25 G/50ML
12.5 INJECTION, SOLUTION INTRAVENOUS PRN
Status: DISCONTINUED | OUTPATIENT
Start: 2020-07-03 | End: 2020-07-06 | Stop reason: HOSPADM

## 2020-07-03 RX ORDER — ALPRAZOLAM 0.5 MG/1
0.5 TABLET ORAL NIGHTLY PRN
Status: DISCONTINUED | OUTPATIENT
Start: 2020-07-03 | End: 2020-07-06 | Stop reason: HOSPADM

## 2020-07-03 RX ORDER — NICOTINE POLACRILEX 4 MG
15 LOZENGE BUCCAL PRN
Status: DISCONTINUED | OUTPATIENT
Start: 2020-07-03 | End: 2020-07-06 | Stop reason: HOSPADM

## 2020-07-03 RX ORDER — ONDANSETRON 2 MG/ML
4 INJECTION INTRAMUSCULAR; INTRAVENOUS EVERY 6 HOURS PRN
Status: DISCONTINUED | OUTPATIENT
Start: 2020-07-03 | End: 2020-07-06 | Stop reason: HOSPADM

## 2020-07-03 RX ORDER — VENLAFAXINE HYDROCHLORIDE 75 MG/1
75 CAPSULE, EXTENDED RELEASE ORAL DAILY
Status: DISCONTINUED | OUTPATIENT
Start: 2020-07-03 | End: 2020-07-06 | Stop reason: HOSPADM

## 2020-07-03 RX ORDER — HYDRALAZINE HYDROCHLORIDE 20 MG/ML
10 INJECTION INTRAMUSCULAR; INTRAVENOUS EVERY 6 HOURS PRN
Status: DISCONTINUED | OUTPATIENT
Start: 2020-07-03 | End: 2020-07-06 | Stop reason: HOSPADM

## 2020-07-03 RX ORDER — SODIUM CHLORIDE 0.9 % (FLUSH) 0.9 %
10 SYRINGE (ML) INJECTION PRN
Status: DISCONTINUED | OUTPATIENT
Start: 2020-07-03 | End: 2020-07-06 | Stop reason: HOSPADM

## 2020-07-03 RX ORDER — SENNA AND DOCUSATE SODIUM 50; 8.6 MG/1; MG/1
2 TABLET, FILM COATED ORAL DAILY
Status: DISCONTINUED | OUTPATIENT
Start: 2020-07-03 | End: 2020-07-06 | Stop reason: HOSPADM

## 2020-07-03 RX ORDER — ACETAMINOPHEN 325 MG/1
650 TABLET ORAL EVERY 6 HOURS PRN
Status: DISCONTINUED | OUTPATIENT
Start: 2020-07-03 | End: 2020-07-06 | Stop reason: HOSPADM

## 2020-07-03 RX ORDER — SODIUM CHLORIDE 0.9 % (FLUSH) 0.9 %
10 SYRINGE (ML) INJECTION EVERY 12 HOURS SCHEDULED
Status: DISCONTINUED | OUTPATIENT
Start: 2020-07-03 | End: 2020-07-06 | Stop reason: HOSPADM

## 2020-07-03 RX ORDER — ROSUVASTATIN CALCIUM 20 MG/1
40 TABLET, COATED ORAL EVERY EVENING
Status: DISCONTINUED | OUTPATIENT
Start: 2020-07-03 | End: 2020-07-06 | Stop reason: HOSPADM

## 2020-07-03 RX ORDER — ONDANSETRON 2 MG/ML
4 INJECTION INTRAMUSCULAR; INTRAVENOUS ONCE
Status: COMPLETED | OUTPATIENT
Start: 2020-07-03 | End: 2020-07-03

## 2020-07-03 RX ORDER — ASPIRIN 81 MG/1
81 TABLET ORAL DAILY
Status: DISCONTINUED | OUTPATIENT
Start: 2020-07-03 | End: 2020-07-06 | Stop reason: HOSPADM

## 2020-07-03 RX ORDER — BISACODYL 10 MG
10 SUPPOSITORY, RECTAL RECTAL DAILY
Status: DISCONTINUED | OUTPATIENT
Start: 2020-07-03 | End: 2020-07-06 | Stop reason: HOSPADM

## 2020-07-03 RX ORDER — PROMETHAZINE HYDROCHLORIDE 12.5 MG/1
12.5 TABLET ORAL EVERY 6 HOURS PRN
Status: DISCONTINUED | OUTPATIENT
Start: 2020-07-03 | End: 2020-07-06 | Stop reason: HOSPADM

## 2020-07-03 RX ORDER — LOSARTAN POTASSIUM 100 MG/1
100 TABLET ORAL DAILY
Status: DISCONTINUED | OUTPATIENT
Start: 2020-07-03 | End: 2020-07-06 | Stop reason: HOSPADM

## 2020-07-03 RX ORDER — HYDRALAZINE HYDROCHLORIDE 25 MG/1
25 TABLET, FILM COATED ORAL PRN
Status: DISCONTINUED | OUTPATIENT
Start: 2020-07-03 | End: 2020-07-03

## 2020-07-03 RX ORDER — OXYCODONE AND ACETAMINOPHEN 10; 325 MG/1; MG/1
1 TABLET ORAL EVERY 8 HOURS PRN
Status: DISCONTINUED | OUTPATIENT
Start: 2020-07-03 | End: 2020-07-06 | Stop reason: HOSPADM

## 2020-07-03 RX ORDER — AMLODIPINE BESYLATE 5 MG/1
5 TABLET ORAL NIGHTLY
Status: DISCONTINUED | OUTPATIENT
Start: 2020-07-03 | End: 2020-07-04

## 2020-07-03 RX ORDER — PREGABALIN 75 MG/1
75 CAPSULE ORAL 2 TIMES DAILY
Status: DISCONTINUED | OUTPATIENT
Start: 2020-07-03 | End: 2020-07-06 | Stop reason: HOSPADM

## 2020-07-03 RX ORDER — ROPINIROLE 2 MG/1
2 TABLET, FILM COATED ORAL NIGHTLY
Status: DISCONTINUED | OUTPATIENT
Start: 2020-07-03 | End: 2020-07-06 | Stop reason: HOSPADM

## 2020-07-03 RX ORDER — DEXTROSE MONOHYDRATE 50 MG/ML
100 INJECTION, SOLUTION INTRAVENOUS PRN
Status: DISCONTINUED | OUTPATIENT
Start: 2020-07-03 | End: 2020-07-06 | Stop reason: HOSPADM

## 2020-07-03 RX ORDER — SODIUM CHLORIDE, SODIUM LACTATE, POTASSIUM CHLORIDE, CALCIUM CHLORIDE 600; 310; 30; 20 MG/100ML; MG/100ML; MG/100ML; MG/100ML
1000 INJECTION, SOLUTION INTRAVENOUS CONTINUOUS
Status: DISCONTINUED | OUTPATIENT
Start: 2020-07-03 | End: 2020-07-04

## 2020-07-03 RX ADMIN — ONDANSETRON 4 MG: 2 INJECTION INTRAMUSCULAR; INTRAVENOUS at 08:38

## 2020-07-03 RX ADMIN — LOSARTAN POTASSIUM 100 MG: 100 TABLET, FILM COATED ORAL at 16:11

## 2020-07-03 RX ADMIN — AMLODIPINE BESYLATE 5 MG: 5 TABLET ORAL at 20:10

## 2020-07-03 RX ADMIN — OXYCODONE AND ACETAMINOPHEN 1 TABLET: 10; 325 TABLET ORAL at 21:37

## 2020-07-03 RX ADMIN — SODIUM CHLORIDE, POTASSIUM CHLORIDE, SODIUM LACTATE AND CALCIUM CHLORIDE 1000 ML: 600; 310; 30; 20 INJECTION, SOLUTION INTRAVENOUS at 20:02

## 2020-07-03 RX ADMIN — Medication 10 ML: at 20:04

## 2020-07-03 RX ADMIN — POLYETHYLENE GLYCOL 3350 17 G: 17 POWDER, FOR SOLUTION ORAL at 16:11

## 2020-07-03 RX ADMIN — HYDRALAZINE HYDROCHLORIDE 10 MG: 20 INJECTION INTRAMUSCULAR; INTRAVENOUS at 16:55

## 2020-07-03 RX ADMIN — PREGABALIN 75 MG: 75 CAPSULE ORAL at 16:13

## 2020-07-03 RX ADMIN — SODIUM CHLORIDE, POTASSIUM CHLORIDE, SODIUM LACTATE AND CALCIUM CHLORIDE 1000 ML: 600; 310; 30; 20 INJECTION, SOLUTION INTRAVENOUS at 08:38

## 2020-07-03 RX ADMIN — VENLAFAXINE HYDROCHLORIDE 75 MG: 75 CAPSULE, EXTENDED RELEASE ORAL at 16:12

## 2020-07-03 RX ADMIN — ALPRAZOLAM 0.5 MG: 0.5 TABLET ORAL at 21:37

## 2020-07-03 RX ADMIN — ROSUVASTATIN CALCIUM 40 MG: 20 TABLET, COATED ORAL at 16:11

## 2020-07-03 RX ADMIN — IOPAMIDOL 80 ML: 755 INJECTION, SOLUTION INTRAVENOUS at 10:19

## 2020-07-03 RX ADMIN — ASPIRIN 81 MG: 81 TABLET, COATED ORAL at 16:11

## 2020-07-03 RX ADMIN — MINERAL OIL 330 ML: 1000 SOLUTION ORAL at 21:15

## 2020-07-03 ASSESSMENT — PAIN DESCRIPTION - DESCRIPTORS
DESCRIPTORS: ACHING
DESCRIPTORS: ACHING

## 2020-07-03 ASSESSMENT — ENCOUNTER SYMPTOMS
CONSTIPATION: 1
NAUSEA: 1
ABDOMINAL PAIN: 1
BLOOD IN STOOL: 0
VOMITING: 1
ANAL BLEEDING: 0
TROUBLE SWALLOWING: 0

## 2020-07-03 ASSESSMENT — PAIN SCALES - GENERAL
PAINLEVEL_OUTOF10: 3
PAINLEVEL_OUTOF10: 9
PAINLEVEL_OUTOF10: 6
PAINLEVEL_OUTOF10: 8

## 2020-07-03 ASSESSMENT — PAIN DESCRIPTION - PROGRESSION
CLINICAL_PROGRESSION: NOT CHANGED

## 2020-07-03 ASSESSMENT — PAIN DESCRIPTION - LOCATION
LOCATION: ABDOMEN
LOCATION: ABDOMEN

## 2020-07-03 ASSESSMENT — PAIN DESCRIPTION - ONSET
ONSET: ON-GOING
ONSET: ON-GOING

## 2020-07-03 ASSESSMENT — HEART SCORE: ECG: 0

## 2020-07-03 ASSESSMENT — PAIN DESCRIPTION - FREQUENCY
FREQUENCY: CONTINUOUS
FREQUENCY: CONTINUOUS

## 2020-07-03 ASSESSMENT — PAIN DESCRIPTION - PAIN TYPE
TYPE: ACUTE PAIN
TYPE: ACUTE PAIN

## 2020-07-03 ASSESSMENT — PAIN DESCRIPTION - ORIENTATION
ORIENTATION: MID
ORIENTATION: MID

## 2020-07-03 ASSESSMENT — PAIN - FUNCTIONAL ASSESSMENT: PAIN_FUNCTIONAL_ASSESSMENT: ACTIVITIES ARE NOT PREVENTED

## 2020-07-03 NOTE — ED TRIAGE NOTES
Pt arrived with c/o abd pain, vomiting, and constipation. Pt has history of ileus. Pt is awake alert and oriented to baseline. Respirations equal and unlabored. Skin is warm, dry and appropriate color.

## 2020-07-03 NOTE — ED NOTES
Patient ambulated to restroom with assist of walker (brought from home).       Sade Alegria RN  07/03/20 5046

## 2020-07-03 NOTE — PROGRESS NOTES
Pt admitted to room 6328 by wheelchair. Alert and oriented. Tele monitor applied, rate and rhythm verified. Call light in reach.

## 2020-07-03 NOTE — H&P
Internal Medicine PGY-1 Resident History & Physical      PCP: Caroline Nagy MD    Date of Admission: 7/3/2020    Date of Service: Pt seen/examined on 07/03/2020 and Admitted to Inpatient with expected LOS greater than two midnights due to medical therapy. Chief Complaint:  Abdominal pain      History Of Present Illness:    67 y.o. female with a PMHx of DM, HTN, HLD, CAD, CABG (2007), Depression, ovarian cancer, who presented to [facility] with abdominal pain, emesis and constipation. Patient abdominal pain, nausea and vomiting began yesterday night. Pt states that she presented to the ED 2 weeks ago for similar symptoms and was diagnosed with ileus on CT. The pain is dull and localized to the periumbilical region. She states that the pain got worse after having her breakfast and when she later that day drank some juice. She also states that she has chest pain when she moves and leans forward but that it is not related to taking stairs or activity level. She states that the chest pain has been going on for weeks. Pt stated that 1 week ago pt completed a course of chemotherapy. Her last BM was yesterday morning. Pt denied any fever, chills, SOB, and changes in medication or eating habits.     Past Medical History:          Diagnosis Date    Anxiety     Arthritis     Asthma     poorly controlled    CAD (coronary artery disease)     Cancer of peritoneum ([address]     Chronic low back pain     Depression     Embolism (HCC)     small pulmonary clot    Hyperlipidemia 4/23/2012    Hypertension     Postoperative delirium 05/26/2016    Type II or unspecified type diabetes mellitus without mention of complication, not stated as uncontrolled        Past Surgical History:          Procedure Laterality Date    APPENDECTOMY      BREAST BIOPSY Left 06/23/2017    Fibroadenomatous type fibrocystic change    CARDIAC SURGERY      CATARACT REMOVAL Bilateral     COLONOSCOPY      COLONOSCOPY N/A 6/6/2019 tablet Take 0.5 mg by mouth nightly. Yes Historical Provider, MD   ropinirole (REQUIP) 1 MG tablet Take 2 mg by mouth nightly    Yes Historical Provider, MD   venlafaxine (EFFEXOR-XR) 37.5 MG XR capsule Take 75 mg by mouth daily    Yes Historical Provider, MD   losartan (COZAAR) 25 MG tablet Take 100 mg by mouth daily Pt. takes 100mg of Cozaar daily. Yes Historical Provider, MD       Allergies:  Cephalexin; Pcn [penicillins]; Cephalosporins; and Cephalexin    Social History:      The patient currently lives at home with her     TOBACCO:   reports that she has quit smoking. She has a 20.00 pack-year smoking history. She has never used smokeless tobacco.  ETOH:  reports previous alcohol use of about 1.0 standard drinks of alcohol per week. Family History:     Positive as follows:        Problem Relation Age of Onset    High Blood Pressure Mother     Heart Disease Father     Diabetes Father     Heart Disease Maternal Grandmother     Heart Disease Brother     Diabetes Maternal Uncle        REVIEW OF SYSTEMS: Pertinent positives as noted in the HPI. All other systems reviewed and negative. ROS: Review of Systems    PHYSICAL EXAM PERFORMED:    BP (!) 173/70   Pulse 73   Temp 97.6 °F (36.4 °C) (Oral)   Resp 16   Ht 5' 8\" (1.727 m)   Wt 147 lb 14.9 oz (67.1 kg)   SpO2 100%   BMI 22.49 kg/m²     General appearance:  No apparent distress, appears stated age and cooperative. HEENT:  Normal cephalic,atraumatic without obvious deformity. Pupils equal, round, and reactive to light. Extra ocular muscles intact. Conjunctivae/corneas clear. Neck: Supple, with full range of motion. No jugular venous distention. Trachea midline. Respiratory:  Normal respiratory effort. Clear to auscultation, bilaterally without Rales/Wheezes/Rhonchi. Cardiovascular:  Regular rate and rhythm with normal S1/S2 without murmurs, rubs or gallops.   Abdomen: Soft, mildy tender in whit-umbilical region, non-distended with evidence of pulmonary embolism or acute intrathoracic abnormality. Dilation of the ulnar arteries may suggest underlying pulmonary arterial hypertension. Nonspecific subpleural reticular opacities in both lungs. This may represent a nonspecific interstitial fibrosis. ABDOMEN AND PELVIS:   Mildly dilated loops of fluid-filled small bowel throughout the abdomen, similar to the prior study, without discrete transition point. Mild ascites. Given mild scalloping of the liver contour, malignant ascites would be difficult to exclude. XR Acute Abd Series Chest 1 VW   Final Result          ASSESSMENT & PLAN:  Grisel Donahue is a 67 y.o. female w/ PMHx of DM, HTN, HLD, CAD, CABG (2007), Depression, ovarian cancer, who presented to [facility]. with abdominal pain, emesis and constipation. Abdominal pain likely 2/2 ileus  - CT abdomen pelvis showed mildly dilated loops of fluid-filled small bowel throughout the abdomen without discrete transition point identified. Similar to prior CT.  - Prior CT consistent with Iieus  - Pt abdomen has no rebound tenderness or guarding   - Pt put on bisacodyl, glycolax, senokot to 2 tabs qd  - consult to surgery    Chest pain likely muskoloskeletal vs CAD  - EKG with no signs of ischemia  - Pain not related to exercise.  - CT chest sowed no evidence of PE  - Pt with hx of CAD, CABG  - Troponin 0.02. Will trend.   - On ASA 81mg  - consult to cardio    HTN  -On admission, /70 -->191/77 -->179/77  - home meds reordered  - added Hydralazine q6Hr PRN for SBP >160    DM  -Insulin Glargine 5 U qhs    HLD  -rosuvastatin 40 qd    Depression  -Venlafaxine 75mg qd      Active Hospital Problems    Diagnosis Date Noted    Abdominal pain [R10.9] 07/03/2020                 DVT Prophylaxis: Apixaban  Diet: Diet NPO Effective Now  Code Status: Full Code    PT/OT Eval Status:    Dispo - >2 night stay       Desiree Donohue MD    I will discuss the patient with the senior resident and MD Topher Samayoa MD  Internal Medicine Resident,PGY-1

## 2020-07-03 NOTE — CONSULTS
General Surgery   Resident Consult Note    Reason for Consult: Obstruction vs Ileus    History of Present Illness:   Cathy Broussard is a 67 y.o. female with Hx of ovarian cancer with peritoneal metastasis, DM, Parkinson disease, and CAD who presents to [facility] for abdominal pain, constipation, and vomiting. She reports that her LLQ and periumbilical abdominal started a few days ago and has been relatively constant since onset. She believes that this pain is similar to that which brought her to the ED about 2 weeks ago. However, she believes her last BM was also a few days ago, which was softer than her typical BM. She also endorses nausea and an episode of vomiting last night; she has not had anything to eat or drink today. She denies any fever or chills. Of note, patient is a poor historian, often forgetting large aspects of her current illness and her medical history.     Past Medical History:        Diagnosis Date    Anxiety     Arthritis     Asthma     poorly controlled    CAD (coronary artery disease)     Cancer of peritoneum ([address]     Chronic low back pain     Depression     Embolism (HCC)     small pulmonary clot    Hyperlipidemia 4/23/2012    Hypertension     Postoperative delirium 05/26/2016    Type II or unspecified type diabetes mellitus without mention of complication, not stated as uncontrolled        Past Surgical History:        Procedure Laterality Date    APPENDECTOMY      BREAST BIOPSY Left 06/23/2017    Fibroadenomatous type fibrocystic change    CARDIAC SURGERY      CATARACT REMOVAL Bilateral     COLONOSCOPY      COLONOSCOPY N/A 6/6/2019    COLONOSCOPY WITH BIOPSY performed by Vinay Walker MD at [facility] [address]  6/6/2019    COLONOSCOPY CONTROL HEMORRHAGE performed by Vinay Walker MD at [address] [place]  2007    DILATION AND CURETTAGE OF UTERUS      Miscarriage  ENDOSCOPY, COLON, DIAGNOSTIC      EYE SURGERY      HEMICOLECTOMY  05/2016    HYSTERECTOMY      JOINT REPLACEMENT Bilateral     bilat knee    OTHER SURGICAL HISTORY N/A 08/17/2018    SPINAL CORD STIMULATOR LEAD REVISION AND GENERATOR    FRACISCO AND BSO  05/26/2016    TUNNELED VENOUS PORT PLACEMENT Left 7/27/2016    PORT-A-CATH PLACEMENT,LEFT        Allergies:  Cephalexin; Pcn [penicillins]; Cephalosporins; and Cephalexin    Medications:   Home Meds  Current Facility-Administered Medications on File Prior to Encounter   Medication Dose Route Frequency Provider Last Rate Last Dose    famotidine (PEPCID) injection 20 mg  20 mg Intravenous Daily Gabe Donaldson MD   20 mg at 11/07/16 1320     Current Outpatient Medications on File Prior to Encounter   Medication Sig Dispense Refill    apixaban (ELIQUIS) 2.5 MG TABS tablet Take 5 mg by mouth daily       oxyCODONE-acetaminophen (PERCOCET)  MG per tablet Take 1 tablet by mouth every 8 hours as needed for Pain . Earliest Fill Date: 8/7/17 (Patient taking differently: Take 1 tablet by mouth every 4-6 hours as needed for Pain. ) 90 tablet 0    insulin glargine (LANTUS) 100 UNIT/ML injection vial Inject 5 Units into the skin nightly (Patient taking differently: Inject 10 Units into the skin nightly ) 1 vial 3    insulin lispro (HUMALOG) 100 UNIT/ML injection vial Inject 0-12 Units into the skin 3 times daily (with meals) (Patient taking differently: Inject 6-16 Units into the skin 3 times daily (before meals) ) 1 vial 3    amLODIPine (NORVASC) 5 MG tablet Take 5 mg by mouth nightly      Pregabalin (LYRICA PO) Take 75 mg by mouth 2 times daily Usually takes only BID      rosuvastatin (CRESTOR) 40 MG tablet Take 40 mg by mouth every evening.  alprazolam (XANAX) 0.5 MG tablet Take 0.5 mg by mouth nightly.        ropinirole (REQUIP) 1 MG tablet Take 2 mg by mouth nightly       venlafaxine (EFFEXOR-XR) 37.5 MG XR capsule Take 75 mg by mouth daily  losartan (COZAAR) 25 MG tablet Take 100 mg by mouth daily Pt. takes 100mg of Cozaar daily. Current Meds  lactated ringers infusion 1,000 mL, Continuous  sodium chloride flush 0.9 % injection 10 mL, 2 times per day  sodium chloride flush 0.9 % injection 10 mL, PRN  acetaminophen (TYLENOL) tablet 650 mg, Q6H PRN    Or  acetaminophen (TYLENOL) suppository 650 mg, Q6H PRN  promethazine (PHENERGAN) tablet 12.5 mg, Q6H PRN    Or  ondansetron (ZOFRAN) injection 4 mg, Q6H PRN  bisacodyl (DULCOLAX) suppository 10 mg, Daily PRN  ALPRAZolam (XANAX) tablet 0.5 mg, Nightly PRN  amLODIPine (NORVASC) tablet 5 mg, Nightly  apixaban (ELIQUIS) tablet 5 mg, BID  aspirin EC tablet 81 mg, Daily  insulin glargine (LANTUS;BASAGLAR) injection pen 5 Units, Nightly  losartan (COZAAR) tablet 100 mg, Daily  oxyCODONE-acetaminophen (PERCOCET)  MG per tablet 1 tablet, Q8H PRN  pregabalin (LYRICA) capsule 75 mg, BID  rOPINIRole (REQUIP) tablet 2 mg, Nightly  rosuvastatin (CRESTOR) tablet 40 mg, QPM  sennosides-docusate sodium (SENOKOT-S) 8.6-50 MG tablet 2 tablet, Daily  venlafaxine (EFFEXOR XR) extended release capsule 75 mg, Daily  glucose (GLUTOSE) 40 % oral gel 15 g, PRN  dextrose 50 % IV solution, PRN  glucagon (rDNA) injection 1 mg, PRN  dextrose 5 % solution, PRN  bisacodyl (DULCOLAX) suppository 10 mg, Daily  polyethylene glycol (GLYCOLAX) packet 17 g, Daily  hydrALAZINE (APRESOLINE) injection 10 mg, Q6H PRN    famotidine (PEPCID) injection 20 mg, Daily        Family History:   Family History   Problem Relation Age of Onset    High Blood Pressure Mother     Heart Disease Father     Diabetes Father     Heart Disease Maternal Grandmother     Heart Disease Brother     Diabetes Maternal Uncle        Social History:   TOBACCO:   reports that she has quit smoking. She has a 20.00 pack-year smoking history.  She has never used smokeless tobacco.  ETOH:   reports previous alcohol use of about 1.0 standard drinks of alcohol per week. DRUGS:   reports no history of drug use. Review of Systems:     Constitutional: Negative. HEENT: Negative. Eyes: Negative. Respiratory: Negative. Cardiovascular: Negative. Gastrointestinal: Negative except for abdominal pain, nausea. Genitourinary: Negative. Musculoskeletal: Negative. Skin: Negative. Endocrine: Negative. Allergic/Immunologic: Negative. Neurological: Negative except for memory problems. Hematological: Negative. Psychiatric/Behavioral: Negative.     Physical exam:    Vitals:    07/03/20 1111 07/03/20 1600 07/03/20 1654 07/03/20 1726   BP:  (!) 191/77 (!) 179/77 (!) 185/73   Pulse:  89     Resp:  18     Temp:  97.9 °F (36.6 °C)     TempSrc:  Oral     SpO2:  99%     Weight: 147 lb 14.9 oz (67.1 kg)      Height: 5' 8\" (1.727 m)          General appearance: alert, no acute distress, grooming appropriate  Eyes: PERRLA, no scleral icterus, EOMI  Neck: trachea midline, no JVD, no lymphadenopathy  Chest/Lungs: No crackles/rales, wheezes/rhonchi, normal inspiratory effort  Cardiovascular: normal rate, regular rhythm  Abdomen: soft, non-distended, mild tenderness to palpation at the umbilicus and within the LLQ, no guarding/rigidity, well-healed midline laparotomy scar  Skin: warm and dry, no rashes  Extremities: no edema, no cyanosis  Neuro: A&Ox3, no focal deficits, sensation intact, slow gait    Labs:    CBC:   Recent Labs     07/03/20  0720   WBC 6.0   HGB 9.2*   HCT 27.3*   MCV 96.5   *     BMP:   Recent Labs     07/03/20  0720   *   K 5.0   CL 98*   CO2 24   BUN 23*   CREATININE 0.8     PT/INR:   Recent Labs     07/03/20  0720   PROTIME 14.9*   INR 1.28*     Liver Profile:   Lab Results   Component Value Date    AST 18 07/03/2020    ALT 9 07/03/2020    BILIDIR <0.2 07/03/2020    BILITOT 0.4 07/03/2020    ALKPHOS 70 07/03/2020     Lab Results   Component Value Date    CHOL 148 05/21/2012    HDL 58 05/21/2012    TRIG 65 05/21/2012     UA:   Lab Results   Component Value Date    COLORU Yellow 07/03/2020    PHUR 6.0 07/03/2020    WBCUA 3-5 07/03/2020    RBCUA 0-2 07/03/2020    MUCUS 2+ 07/03/2020    BACTERIA 1+ 07/03/2020    CLARITYU Clear 07/03/2020    SPECGRAV 1.025 07/03/2020    LEUKOCYTESUR Negative 07/03/2020    UROBILINOGEN 0.2 07/03/2020    BILIRUBINUR Negative 07/03/2020    BILIRUBINUR Negative 01/04/2012    BLOODU TRACE-INTACT 07/03/2020    GLUCOSEU 100 07/03/2020    GLUCOSEU Negative 01/04/2012    AMORPHOUS 2+ 07/03/2020       Imaging:   CT ABDOMEN PELVIS W IV CONTRAST Additional Contrast? None   Final Result      CHEST:   No evidence of pulmonary embolism or acute intrathoracic abnormality. Dilation of the ulnar arteries may suggest underlying pulmonary arterial hypertension. Nonspecific subpleural reticular opacities in both lungs. This may represent a nonspecific interstitial fibrosis. ABDOMEN AND PELVIS:   Mildly dilated loops of fluid-filled small bowel throughout the abdomen, similar to the prior study, without discrete transition point. Mild ascites. Given mild scalloping of the liver contour, malignant ascites would be difficult to exclude. CT CHEST PULMONARY EMBOLISM W CONTRAST   Final Result      CHEST:   No evidence of pulmonary embolism or acute intrathoracic abnormality. Dilation of the ulnar arteries may suggest underlying pulmonary arterial hypertension. Nonspecific subpleural reticular opacities in both lungs. This may represent a nonspecific interstitial fibrosis. ABDOMEN AND PELVIS:   Mildly dilated loops of fluid-filled small bowel throughout the abdomen, similar to the prior study, without discrete transition point. Mild ascites. Given mild scalloping of the liver contour, malignant ascites would be difficult to exclude. XR Acute Abd Series Chest 1 VW   Final Result            Assessment/Plan:   This is a 67 y.o. female with Hx of ovarian cancer with peritoneal metastasis, DM, Parkinson disease, and CAD presenting with LLQ abdominal pain, nausea/vomiting, and obstipation. History, physical exam, and labs/imaging most consistent with obstructive process vs ileus. Also with large stool burden on CT imaging.    - No acute surgical intervention at this time.  - SMOG enema tonight  - PO contrast tomorrow morning with abdominal films 4 hours afterward  - Continue NPO status. - Rest of care per primary team.  - Further plan pending surgical staff discussion. Xavier Kapoor MD PGY-1  07/03/20  6:31 PM  [phone]     I have seen, examined, and reviewed the patients chart. I agree with the residents assessment and have made appropriate changes.     Julio C Co

## 2020-07-03 NOTE — ED PROVIDER NOTES
[address] [facility]          ATTENDING PHYSICIAN NOTE       Date of evaluation: 7/3/2020    Chief Complaint     Emesis; Abdominal Pain; and Constipation      History of Present Illness     Jeison Martin is a 67 y.o. female who presents to the emergency department because of difficulty having bowel movement this morning in addition to nausea and vomiting yesterday with nausea continuing today. 7d ago last chemo cacoplat, taxol round. ONc Dr. Boom Olivera [address]  History of DM  Not eating since yest, vomit yest, no stool all yest.  Nauseated    Also with sternal pain wors with bending lifting. 5w ago started; Braulio Spray; hx heart ptca  And cabg 12y ago. No radiation. Positive nausea. Achy in sensation    She had a CT scan 1 week ago of abdomen pelvis. No modifying factors or associated signs or symptoms    Review of Systems     Review of Systems   Constitutional: Positive for appetite change. Negative for chills. HENT: Negative for trouble swallowing. Cardiovascular: Positive for chest pain. Gastrointestinal: Positive for abdominal pain, constipation, nausea and vomiting. Negative for anal bleeding and blood in stool. Genitourinary: Negative for difficulty urinating. Skin: Negative for rash. All other systems reviewed and are negative. Past Medical, Surgical, Family, and Social History     She has a past medical history of Anxiety, Arthritis, Asthma, CAD (coronary artery disease), Cancer of peritoneum ([facility] [address] Chronic low back pain, Depression, Embolism ([facility] [address] Hyperlipidemia, Hypertension, Postoperative delirium, and Type II or unspecified type diabetes mellitus without mention of complication, not stated as uncontrolled. She has a past surgical history that includes Coronary artery bypass graft (2007); Appendectomy; Cataract removal (Bilateral); Dilation and curettage of uterus; Coronary angioplasty with stent (1991);  Colonoscopy; Endoscopy, colon, diagnostic; eye surgery; dieter and bso (cervix removed) (05/26/2016); hemicolectomy (05/2016); Cardiac surgery; Hysterectomy; Tunneled venous port placement (Left, 7/27/2016); other surgical history (N/A, 08/17/2018); Breast biopsy (Left, 06/23/2017); joint replacement (Bilateral); Colonoscopy (N/A, 6/6/2019); and Colonoscopy (6/6/2019). Her family history includes Diabetes in her father and maternal uncle; Heart Disease in her brother, father, and maternal grandmother; High Blood Pressure in her mother. She reports that she has quit smoking. She has a 20.00 pack-year smoking history. She has never used smokeless tobacco. She reports previous alcohol use of about 1.0 standard drinks of alcohol per week. She reports that she does not use drugs. Medications     Previous Medications    ALPRAZOLAM (XANAX) 0.5 MG TABLET    Take 0.5 mg by mouth nightly as needed. AMLODIPINE (NORVASC) 5 MG TABLET    Take 5 mg by mouth nightly    APIXABAN (ELIQUIS) 2.5 MG TABS TABLET    Take by mouth 2 times daily    ASPIRIN 81 MG EC TABLET    Take 81 mg by mouth daily. INSULIN GLARGINE (LANTUS) 100 UNIT/ML INJECTION VIAL    Inject 5 Units into the skin nightly    INSULIN LISPRO (HUMALOG) 100 UNIT/ML INJECTION VIAL    Inject 0-12 Units into the skin 3 times daily (with meals)    LIDOCAINE-PRILOCAINE (EMLA) 2.5-2.5 % CREAM    Apply a nickel sized amount to port 30 minutes prior to access as needed and cover with plastic wrap. LOSARTAN (COZAAR) 25 MG TABLET    Take 100 mg by mouth daily Pt. takes 100mg of Cozaar daily. NONFORMULARY    Take by mouth 3 times daily LYMPARZA    OLAPARIB (LYNPARZA) 150 MG TABS    Take by mouth 2 times daily     OXYCODONE-ACETAMINOPHEN (PERCOCET)  MG PER TABLET    Take 1 tablet by mouth every 8 hours as needed for Pain .  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 ROPINIROLE (REQUIP) 1 MG TABLET    Take 2 mg by mouth nightly     ROSUVASTATIN (CRESTOR) 40 MG TABLET    Take 40 mg by mouth every evening. SENNOSIDES-DOCUSATE SODIUM (SENOKOT-S) 8.6-50 MG TABLET    Take 1 tablet by mouth daily    VENLAFAXINE (EFFEXOR-XR) 37.5 MG XR CAPSULE    Take 75 mg by mouth daily        Allergies     She is allergic to cephalexin; pcn [penicillins]; cephalosporins; and cephalexin. Physical Exam     INITIAL VITALS: BP: (!) 189/75, Temp: 98.4 °F (36.9 °C), Pulse: 89, Resp: 16, SpO2: 100 %   Physical Exam  Vitals signs and nursing note reviewed. Constitutional:       General: She is not in acute distress. Appearance: She is well-developed. She is not diaphoretic. HENT:      Head: Normocephalic and atraumatic. Eyes:      Conjunctiva/sclera: Conjunctivae normal.      Pupils: Pupils are equal, round, and reactive to light. Neck:      Musculoskeletal: Normal range of motion. Cardiovascular:      Rate and Rhythm: Normal rate and regular rhythm. Pulmonary:      Effort: Pulmonary effort is normal. No respiratory distress. Abdominal:      Palpations: Abdomen is soft. There is no mass. Tenderness: There is abdominal tenderness (Mild epigastric). There is no guarding or rebound. Musculoskeletal: Normal range of motion. Skin:     General: Skin is warm and dry. Neurological:      Mental Status: She is alert and oriented to person, place, and time. Diagnostic Results     EKG   Sinus rhythm. No acute ST elevation or depression. First-degree AV block. 2 PVCs. Indication sternal pain. Intervals per electronic record.     RADIOLOGY:  XR Acute Abd Series Chest 1 VW   Final Result          LABS:   Results for orders placed or performed during the hospital encounter of 07/03/20   CBC auto differential   Result Value Ref Range    WBC 6.0 4.0 - 11.0 K/uL    RBC 2.83 (L) 4.00 - 5.20 >=300 (A) Negative mg/dL    Urobilinogen, Urine 0.2 <2.0 E.U./dL    Nitrite, Urine Negative Negative    Leukocyte Esterase, Urine Negative Negative    Microscopic Examination YES     Urine Type NotGiven    Troponin (lab)   Result Value Ref Range    Troponin 0.02 (H) <0.01 ng/mL       ED BEDSIDE ULTRASOUND:      RECENT VITALS:  BP: 135/71,Temp: 98.4 °F (36.9 °C), Pulse: 72, Resp: 16, SpO2: 99 %     Procedures         ED Course     Nursing Notes, Past Medical Hx, Past Surgical Hx, Social Hx,Allergies, and Family Hx were reviewed. patient was given the following medications:  Orders Placed This Encounter   Medications    lactated ringers infusion 1,000 mL    ondansetron (ZOFRAN) injection 4 mg       CONSULTS:  IP CONSULT TO ONCOLOGY  IP CONSULT TO HOSPITALIST    MEDICAL DECISIONMAKING / ASSESSMENT / Celio Vu is a 67 y.o. female presented to the emergency department with above symptoms. Her troponin was slightly elevated. EKG without any acute ischemia seen. He does have a significant past medical history including stents and bypass. During her ED visit she continued to remain with abdominal discomfort and intermittent achy sensation in the chest.  Her urine was negative for leukocytes or nitrate. Her white count is 6000 and it was elevated last visit to 31,000 secondary to Neulasta most likely. Her H&H is 9 and 27 which runs about baseline looking at all labs. Her functions were unremarkable and an acute abdominal series showed no obstruction. She just had a CT scan a week ago and therefore I do not think she needs a repeat CT scan based on my physical exam today. I discussed the case with oncology on-call. Calls been placed to hospitalist secondary to elevated troponin cardiac history and vague symptoms. Heart score of 5. Clinical Impression     1. Generalized abdominal pain    2.  Chest pain, unspecified type        Disposition     PATIENT REFERRED TO:  No follow-up provider specified.     DISCHARGE MEDICATIONS:  New Prescriptions    No medications on file       DISPOSITION Decision To Admit 07/03/2020 09:04:41 AM       Wilner Kirby MD  07/03/20 [id number]

## 2020-07-04 ENCOUNTER — APPOINTMENT (OUTPATIENT)
Dept: GENERAL RADIOLOGY | Age: 80
DRG: 375 | End: 2020-07-04
Payer: MEDICARE

## 2020-07-04 LAB
ALBUMIN SERPL-MCNC: 3.9 G/DL (ref 3.4–5)
ANION GAP SERPL CALCULATED.3IONS-SCNC: 10 MMOL/L (ref 3–16)
BUN BLDV-MCNC: 17 MG/DL (ref 7–20)
CALCIUM SERPL-MCNC: 9.6 MG/DL (ref 8.3–10.6)
CHLORIDE BLD-SCNC: 98 MMOL/L (ref 99–110)
CO2: 25 MMOL/L (ref 21–32)
CREAT SERPL-MCNC: 0.8 MG/DL (ref 0.6–1.2)
FERRITIN: 683.4 NG/ML (ref 15–150)
FOLATE: 13.88 NG/ML (ref 4.78–24.2)
GFR AFRICAN AMERICAN: >60
GFR NON-AFRICAN AMERICAN: >60
GLUCOSE BLD-MCNC: 167 MG/DL (ref 70–99)
GLUCOSE BLD-MCNC: 169 MG/DL (ref 70–99)
GLUCOSE BLD-MCNC: 187 MG/DL (ref 70–99)
GLUCOSE BLD-MCNC: 193 MG/DL (ref 70–99)
GLUCOSE BLD-MCNC: 196 MG/DL (ref 70–99)
GLUCOSE BLD-MCNC: 202 MG/DL (ref 70–99)
HCT VFR BLD CALC: 25 % (ref 36–48)
HCT VFR BLD CALC: 25.2 % (ref 36–48)
HEMOGLOBIN: 8.6 G/DL (ref 12–16)
IMMATURE RETIC FRACT: 0.52 (ref 0.21–0.37)
IRON SATURATION: 28 % (ref 15–50)
IRON: 56 UG/DL (ref 37–145)
MAGNESIUM: 1.8 MG/DL (ref 1.8–2.4)
MCH RBC QN AUTO: 32.7 PG (ref 26–34)
MCHC RBC AUTO-ENTMCNC: 34.5 G/DL (ref 31–36)
MCV RBC AUTO: 94.8 FL (ref 80–100)
PDW BLD-RTO: 17.7 % (ref 12.4–15.4)
PERFORMED ON: ABNORMAL
PHOSPHORUS: 3.3 MG/DL (ref 2.5–4.9)
PLATELET # BLD: 117 K/UL (ref 135–450)
PMV BLD AUTO: 8.4 FL (ref 5–10.5)
POTASSIUM SERPL-SCNC: 4.5 MMOL/L (ref 3.5–5.1)
RBC # BLD: 2.64 M/UL (ref 4–5.2)
RETICULOCYTE ABSOLUTE COUNT: 0.05 M/UL (ref 0.02–0.1)
RETICULOCYTE COUNT PCT: 1.81 % (ref 0.5–2.18)
SODIUM BLD-SCNC: 133 MMOL/L (ref 136–145)
TOTAL IRON BINDING CAPACITY: 201 UG/DL (ref 260–445)
TRANSFERRIN: 179 MG/DL (ref 200–360)
TROPONIN: 0.02 NG/ML
VITAMIN B-12: >2000 PG/ML (ref 211–911)
WBC # BLD: 4.6 K/UL (ref 4–11)

## 2020-07-04 PROCEDURE — 1200000000 HC SEMI PRIVATE

## 2020-07-04 PROCEDURE — 80069 RENAL FUNCTION PANEL: CPT

## 2020-07-04 PROCEDURE — 99223 1ST HOSP IP/OBS HIGH 75: CPT | Performed by: INTERNAL MEDICINE

## 2020-07-04 PROCEDURE — 51798 US URINE CAPACITY MEASURE: CPT

## 2020-07-04 PROCEDURE — 6370000000 HC RX 637 (ALT 250 FOR IP): Performed by: STUDENT IN AN ORGANIZED HEALTH CARE EDUCATION/TRAINING PROGRAM

## 2020-07-04 PROCEDURE — 85027 COMPLETE CBC AUTOMATED: CPT

## 2020-07-04 PROCEDURE — 2580000003 HC RX 258: Performed by: STUDENT IN AN ORGANIZED HEALTH CARE EDUCATION/TRAINING PROGRAM

## 2020-07-04 PROCEDURE — 99232 SBSQ HOSP IP/OBS MODERATE 35: CPT | Performed by: SURGERY

## 2020-07-04 PROCEDURE — 36415 COLL VENOUS BLD VENIPUNCTURE: CPT

## 2020-07-04 PROCEDURE — 83540 ASSAY OF IRON: CPT

## 2020-07-04 PROCEDURE — 84484 ASSAY OF TROPONIN QUANT: CPT

## 2020-07-04 PROCEDURE — 6360000002 HC RX W HCPCS: Performed by: STUDENT IN AN ORGANIZED HEALTH CARE EDUCATION/TRAINING PROGRAM

## 2020-07-04 PROCEDURE — 84466 ASSAY OF TRANSFERRIN: CPT

## 2020-07-04 PROCEDURE — 6370000000 HC RX 637 (ALT 250 FOR IP): Performed by: INTERNAL MEDICINE

## 2020-07-04 PROCEDURE — 83735 ASSAY OF MAGNESIUM: CPT

## 2020-07-04 PROCEDURE — 74019 RADEX ABDOMEN 2 VIEWS: CPT

## 2020-07-04 PROCEDURE — 82728 ASSAY OF FERRITIN: CPT

## 2020-07-04 PROCEDURE — 85045 AUTOMATED RETICULOCYTE COUNT: CPT

## 2020-07-04 PROCEDURE — 82746 ASSAY OF FOLIC ACID SERUM: CPT

## 2020-07-04 PROCEDURE — 51701 INSERT BLADDER CATHETER: CPT

## 2020-07-04 PROCEDURE — 82607 VITAMIN B-12: CPT

## 2020-07-04 RX ORDER — INSULIN LISPRO 100 [IU]/ML
0-3 INJECTION, SOLUTION INTRAVENOUS; SUBCUTANEOUS NIGHTLY
Status: DISCONTINUED | OUTPATIENT
Start: 2020-07-04 | End: 2020-07-05

## 2020-07-04 RX ORDER — SIMETHICONE 80 MG
80 TABLET,CHEWABLE ORAL 2 TIMES DAILY
Status: DISCONTINUED | OUTPATIENT
Start: 2020-07-04 | End: 2020-07-06 | Stop reason: HOSPADM

## 2020-07-04 RX ORDER — AMLODIPINE BESYLATE 10 MG/1
10 TABLET ORAL NIGHTLY
Status: DISCONTINUED | OUTPATIENT
Start: 2020-07-04 | End: 2020-07-05

## 2020-07-04 RX ORDER — MAGNESIUM SULFATE IN WATER 40 MG/ML
2 INJECTION, SOLUTION INTRAVENOUS ONCE
Status: COMPLETED | OUTPATIENT
Start: 2020-07-04 | End: 2020-07-04

## 2020-07-04 RX ORDER — INSULIN LISPRO 100 [IU]/ML
0-6 INJECTION, SOLUTION INTRAVENOUS; SUBCUTANEOUS
Status: DISCONTINUED | OUTPATIENT
Start: 2020-07-05 | End: 2020-07-05

## 2020-07-04 RX ADMIN — HYDRALAZINE HYDROCHLORIDE 10 MG: 20 INJECTION INTRAMUSCULAR; INTRAVENOUS at 17:02

## 2020-07-04 RX ADMIN — AMLODIPINE BESYLATE 10 MG: 10 TABLET ORAL at 21:30

## 2020-07-04 RX ADMIN — SIMETHICONE 80 MG: 80 TABLET, CHEWABLE ORAL at 11:12

## 2020-07-04 RX ADMIN — Medication 10 ML: at 21:31

## 2020-07-04 RX ADMIN — APIXABAN 5 MG: 2.5 TABLET, FILM COATED ORAL at 21:30

## 2020-07-04 RX ADMIN — SIMETHICONE 80 MG: 80 TABLET, CHEWABLE ORAL at 21:29

## 2020-07-04 RX ADMIN — ACETAMINOPHEN 650 MG: 325 TABLET ORAL at 01:49

## 2020-07-04 RX ADMIN — PREGABALIN 75 MG: 75 CAPSULE ORAL at 21:30

## 2020-07-04 RX ADMIN — ROPINIROLE HYDROCHLORIDE 2 MG: 2 TABLET, FILM COATED ORAL at 21:30

## 2020-07-04 RX ADMIN — LOSARTAN POTASSIUM 100 MG: 100 TABLET, FILM COATED ORAL at 15:31

## 2020-07-04 RX ADMIN — ROSUVASTATIN CALCIUM 40 MG: 20 TABLET, COATED ORAL at 18:40

## 2020-07-04 RX ADMIN — OXYCODONE AND ACETAMINOPHEN 1 TABLET: 10; 325 TABLET ORAL at 21:29

## 2020-07-04 RX ADMIN — MAGNESIUM SULFATE HEPTAHYDRATE 2 G: 40 INJECTION, SOLUTION INTRAVENOUS at 10:45

## 2020-07-04 RX ADMIN — INSULIN LISPRO 1 UNITS: 100 INJECTION, SOLUTION INTRAVENOUS; SUBCUTANEOUS at 21:28

## 2020-07-04 RX ADMIN — ASPIRIN 81 MG: 81 TABLET, COATED ORAL at 15:32

## 2020-07-04 RX ADMIN — ALPRAZOLAM 0.5 MG: 0.5 TABLET ORAL at 15:32

## 2020-07-04 RX ADMIN — HYDRALAZINE HYDROCHLORIDE 10 MG: 20 INJECTION INTRAMUSCULAR; INTRAVENOUS at 11:44

## 2020-07-04 RX ADMIN — APIXABAN 5 MG: 2.5 TABLET, FILM COATED ORAL at 15:32

## 2020-07-04 RX ADMIN — INSULIN GLARGINE 5 UNITS: 100 INJECTION, SOLUTION SUBCUTANEOUS at 21:28

## 2020-07-04 ASSESSMENT — PAIN DESCRIPTION - PROGRESSION

## 2020-07-04 ASSESSMENT — PAIN DESCRIPTION - ORIENTATION
ORIENTATION: ANTERIOR
ORIENTATION: ANTERIOR

## 2020-07-04 ASSESSMENT — PAIN - FUNCTIONAL ASSESSMENT
PAIN_FUNCTIONAL_ASSESSMENT: PREVENTS OR INTERFERES SOME ACTIVE ACTIVITIES AND ADLS
PAIN_FUNCTIONAL_ASSESSMENT: PREVENTS OR INTERFERES SOME ACTIVE ACTIVITIES AND ADLS

## 2020-07-04 ASSESSMENT — PAIN DESCRIPTION - DESCRIPTORS
DESCRIPTORS: ACHING
DESCRIPTORS: ACHING

## 2020-07-04 ASSESSMENT — PAIN DESCRIPTION - PAIN TYPE
TYPE: ACUTE PAIN
TYPE: ACUTE PAIN

## 2020-07-04 ASSESSMENT — PAIN SCALES - GENERAL
PAINLEVEL_OUTOF10: 7
PAINLEVEL_OUTOF10: 3
PAINLEVEL_OUTOF10: 0

## 2020-07-04 ASSESSMENT — PAIN DESCRIPTION - FREQUENCY
FREQUENCY: CONTINUOUS
FREQUENCY: CONTINUOUS

## 2020-07-04 ASSESSMENT — PAIN DESCRIPTION - LOCATION
LOCATION: HEAD
LOCATION: HEAD

## 2020-07-04 ASSESSMENT — PAIN DESCRIPTION - ONSET
ONSET: ON-GOING
ONSET: ON-GOING

## 2020-07-04 NOTE — PROGRESS NOTES
Surgery Daily Progress Note      CC: Ileus vs constipation    SUBJECTIVE:  No acute events overnight. Pain improved from yesterday. Reports multiple BMs. No nausea. ROS:   A 14 point review of systems was conducted, significant findings as noted above. All other systems negative. OBJECTIVE:    PHYSICAL EXAM:  Vitals:    07/03/20 1726 07/03/20 1954 07/03/20 2239 07/04/20 0327   BP: (!) 185/73 (!) 170/77 (!) 152/70 131/71   Pulse:  93 80 78   Resp:  18 18 17   Temp:  98 °F (36.7 °C) 98.1 °F (36.7 °C) 98 °F (36.7 °C)   TempSrc:  Oral Oral Oral   SpO2:  100% 99% 100%   Weight:       Height:           General appearance: Alert, no acute distress, well-developed, well-nourished  HEENT: Normocephalic, extraocular movements grossly intact, moist mucous membranes  Chest/Lungs: normal inspiratory effort, symmetric chest rise, no accessory muscle use  Cardiovascular: Regular rate and rhythm  Abdomen: Soft, non-distended, non-tender to palpation throughout, no guarding/rebound  Neuro: A&Ox3, no gross motor or sensory neuro deficits  Extremities: no edema, no cyanosis  : No Begum catheter       ASSESSMENT & PLAN:   Fabienne Keyes is a 67 y.o. female with ileus vs constipation. Resolving, as pt is having multiple BMs.    - Advance diet as tolerated. - Medical management per primary team.    Diaz Jackson MD, PGY-1  07/04/20  6:15 AM  [phone]    I have seen, examined, and reviewed the patients chart. I agree with the residents assessment and have made appropriate changes.     Mariel Rose

## 2020-07-04 NOTE — PLAN OF CARE
Problem: Falls - Risk of:  Goal: Will remain free from falls  Description: Will remain free from falls  Outcome: Ongoing  Note: Patient is up ad trace and steady on her feet. Patient has non skid socks on. Will continue to monitor her safety. Problem: Pain:  Goal: Pain level will decrease  Description: Pain level will decrease  Outcome: Ongoing  Note: Patient has no complaints of pain at this time. Patient is resting in bed. Will continue to monitor her pain. Problem: Cardiac:  Goal: Ability to maintain vital signs within normal range will improve  Description: Ability to maintain vital signs within normal range will improve  Outcome: Ongoing  Note: Patient has elevated BP today due to being NPO and missing morning medications. Doctor was notified and interventions provided. Will continue to recheck vitals and make sure BP is WNL.

## 2020-07-04 NOTE — CONSULTS
GYNECOLOGIC ONCOLOGY CONSULT NOTE    [facility]  [id number]  7/4/2020    CONSULT TO: Di BRUNNER DO  CONSULT FROM: Brandon Rhodes MD  REASON FOR CONSULT: Ovarian cancer patient, known to our service. Now with abdominal pain. Chief Complaint: abdominal pain  History of present illness:  Melisa Turner is a pleasant 66 yo female admitted to [facility] under the care of the hospitalist service with abdominal pain, nausea, emesis and constipation. Past medical history significant for CAD, status post CABG in 2007, hypertension, hyperlipidemia, type 2 diabetes mellitus, metastatic ovarian cancer. She also has a hx of chronic low back pain for which she has a sacral nerve stimulator. Upon presentation to the ED, troponins were mildly elevated and so she has been on the cardiac unit and is planned to have a stress test on Monday. Additionally, her w/u included a CT A/P which demonstrated some constipation, however, no active gyn malignancy. Gen Sx was consulted regarding her abdominal symptoms. She was given a SMOG enema and a contrasted abdominal series was done yesterday showing some improvement. She has had BMs and states that pain and N/V has resolved. Melisa Turner has been on carbo/Doxil under the care of Dr. Juliana Nava. She received C6 of therapy on 6/19/20. She did have a pre tx ECHO done which showed a normal EF. GYNECOLOGIC ONCOLOGY HX:  Initial diagnosis of Ovarian carcinoma in 2016.  Stage IIIC  1. 5/26/2016: Surgery    Diagnostic laparoscopy, exploratory laparotomy, total abdominal hysterectomy, bilateral salpingo-oophorectomy, omentectomy, right hemicolectomy, removal of portion of sigmoid colon, removal of mesenteric carcinomatosis and fulguration of remaining <1cm lesions, removal of posterior cul-de-sac peritoneal carcinomatosis and fulguration of remaining <1cm lesions, removal of bladder and anterior pelvis peritoneal carcinomatosis and fulguration of remaining <1cm lesions, removal of superficial liver suspected metastatic lesion and fulguration of remaining <1cm lesions, and fulguration of diaphragmatic carcinomatosis all areas <1cm lesions, with overall achievement of optimal debulking With Dr. Kiana Heart and Gaetano      2. 7/1/2016 - 11/17/2016 Chemotherapy    Carbo/Taxol with Dr. Luis Navarrete  - Carbo/Taxol x 4 7/1/16 to 9/7/16 (stopped 2/2 taxol-induced PN)  - Carbo/Taxotere x 1, 10/5/16  - Carbo single agent x 1, 11/17/16    3. Maintenance chemotherapy  - Walterine Jerson - starting 3/5/18   - discontinued because of progression noted in December 2019. Recurrent Disease diagnosed in 12/2019  4. Second line chemotherapy  - Resumed cisplatin and taxotere on 12/20/19 but her  asked to stop because of hyperglycemia from steroid use. 5. Chemotherapy  - Start Carboplatin, Doxorubicin and Mvasi on 1/21/20 with chemo every month and Mvasi every other week. Dutch John Finer stopped because of epistaxis.    - C6 of carbo/doxil given on 6/19/20       Past Medical History:   Past Medical History:   Diagnosis Date    Anxiety     Arthritis     Asthma     poorly controlled    CAD (coronary artery disease)     Cancer of peritoneum ([address]     Chronic low back pain     Depression     Embolism (HCC)     small pulmonary clot    Hyperlipidemia 4/23/2012    Hypertension     Postoperative delirium 05/26/2016    Type II or unspecified type diabetes mellitus without mention of complication, not stated as uncontrolled        Past Surgical History:   Past Surgical History:   Procedure Laterality Date    APPENDECTOMY      BREAST BIOPSY Left 06/23/2017    Fibroadenomatous type fibrocystic change    CARDIAC SURGERY      CATARACT REMOVAL Bilateral     COLONOSCOPY      COLONOSCOPY N/A 6/6/2019    COLONOSCOPY WITH BIOPSY performed by Lizbeth Wills MD at [address]  6/6/2019    COLONOSCOPY CONTROL HEMORRHAGE performed by Lizbeth Wills MD at [place] WITH STENT PLACEMENT  1991    CORONARY ARTERY BYPASS GRAFT  2007    DILATION AND CURETTAGE OF UTERUS      Miscarriage    ENDOSCOPY, COLON, DIAGNOSTIC      EYE SURGERY      HEMICOLECTOMY  05/2016    HYSTERECTOMY      JOINT REPLACEMENT Bilateral     bilat knee    OTHER SURGICAL HISTORY N/A 08/17/2018    SPINAL CORD STIMULATOR LEAD REVISION AND GENERATOR    FRACISCO AND BSO  05/26/2016    TUNNELED VENOUS PORT PLACEMENT Left 7/27/2016    PORT-A-CATH PLACEMENT,LEFT        OB History: [id number]    GYN History: No abnormal PAPs or STDs    Social History:   Social History     Socioeconomic History    Marital status:      Spouse name: None    Number of children: None    Years of education: None    Highest education level: None   Occupational History    None   Social Needs    Financial resource strain: None    Food insecurity     Worry: None     Inability: None    Transportation needs     Medical: None     Non-medical: None   Tobacco Use    Smoking status: Former Smoker     Packs/day: 2.00     Years: 10.00     Pack years: 20.00    Smokeless tobacco: Never Used   Substance and Sexual Activity    Alcohol use: Not Currently     Alcohol/week: 1.0 standard drinks     Types: 1 Glasses of wine per week    Drug use: No    Sexual activity: None   Lifestyle    Physical activity     Days per week: None     Minutes per session: None    Stress: None   Relationships    Social connections     Talks on phone: None     Gets together: None     Attends Druze service: None     Active member of club or organization: None     Attends meetings of clubs or organizations: None     Relationship status: None    Intimate partner violence     Fear of current or ex partner: None     Emotionally abused: None     Physically abused: None     Forced sexual activity: None   Other Topics Concern    None   Social History Narrative    None       Family History:   Family History   Problem Relation Age of Onset    High Blood Pressure Mother     Heart Disease Father     Diabetes Father     Heart Disease Maternal Grandmother     Heart Disease Brother     Diabetes Maternal Uncle        Allergies:    Allergies   Allergen Reactions    Cephalexin Rash    Pcn [Penicillins] Rash    Cephalosporins     Cephalexin Rash       Meds:   Current Facility-Administered Medications   Medication Dose Route Frequency Provider Last Rate Last Dose    enoxaparin (LOVENOX) injection 60 mg  60 mg Subcutaneous BID Yosef Lay MD        [facility]) chewable tablet 80 mg  80 mg Oral BID Rebecca Perez MD   80 mg at 07/04/20 1112    lactated ringers infusion 1,000 mL  1,000 mL Intravenous Continuous Leonila Redmond  mL/hr at 07/03/20 2002 1,000 mL at 07/03/20 2002    sodium chloride flush 0.9 % injection 10 mL  10 mL Intravenous 2 times per day Holden Craig MD   10 mL at 07/03/20 2004    sodium chloride flush 0.9 % injection 10 mL  10 mL Intravenous PRN Holden Craig MD        acetaminophen (TYLENOL) tablet 650 mg  650 mg Oral Q6H PRN Holden Craig MD   650 mg at 07/04/20 0149    Or    acetaminophen (TYLENOL) suppository 650 mg  650 mg Rectal Q6H PRN Holden Craig MD        promethazine (PHENERGAN) tablet 12.5 mg  12.5 mg Oral Q6H PRN Holden Craig MD        Or    ondansetron [facility]) injection 4 mg  4 mg Intravenous Q6H PRN Holden Craig MD        bisacodyl (DULCOLAX) suppository 10 mg  10 mg Rectal Daily PRN Holden Craig MD        ALPRAZolam Thamas Myles) tablet 0.5 mg  0.5 mg Oral Nightly PRN Holden Craig MD   0.5 mg at 07/03/20 2137    [Held by provider] amLODIPine (NORVASC) tablet 5 mg  5 mg Oral Nightly Holden Craig MD   5 mg at 07/03/20 2010    [Held by provider] apixaban (ELIQUIS) tablet 5 mg  5 mg Oral BID Holden Craig MD       [facility] by [facility]] aspirin EC tablet 81 mg  81 mg Oral Daily Holden Craig MD   81 mg at 07/03/20 1611    insulin glargine (LANTUS;BASAGLAR) injection pen 5 Ears: No change in hearing. Musculoskeletal: No numbness, tingling or weakness of extremities. Cardio: No chest pain, palpitations, orthopnea or pedal edema. Pulmonary: No shortness of breath, coughing, sputum, difficulty in breathing, wheezing or dyspnea. Gastrointestinal: + abdominal pain, nausea and emesis and constipation. Genito-urinary: No vaginal bleeding, vaginal discharge, dysuria or hematuria. Integumentary: No rashes or excoriations of the skin. Breasts: No breast discharge or masses. Psych: No major psych symptoms. Vital Signs: BP (!) 186/76   Pulse 80   Temp 97.7 °F (36.5 °C) (Oral)   Resp 18   Ht 5' 8\" (1.727 m)   Wt 147 lb 14.9 oz (67.1 kg)   SpO2 98%   BMI 22.49 kg/m²     Physical Exam:  General: Alert and oriented x 3, Answers questions appropriately  HEENT:  Head Normocephalic. Neck supple, no enlarge thyroid, no lymphadenopathy. Heart:  Regular Rate and Rhythm. No S3 or S4. No murmurs  Lungs:  Clear to auscultation. Equal excursion bilaterally. No wheeze or rhonchi. Abdomen:  Soft, non tender. No pain. +Bowel sounds. No organomegaly. Extremities:  Equal strength. Skin:  No rashes, no edema, clubbing or cyanosis  Neuro:  CN II -XII grossly intact. Imaging:  See attached tabs    Labs:  Recent Labs     07/04/20  0535   WBC 4.6   HGB 8.6*   HCT 25.2*  25.0*   *     Recent Labs     07/04/20  0535   *   K 4.5   CL 98*   CO2 25   BUN 17   CREATININE 0.8   CALCIUM 9.6   PHOS 3.3     Recent Labs     07/03/20  0720   AST 18   ALT 9*   BILIDIR <0.2   BILITOT 0.4   ALKPHOS 70     Recent Labs     07/03/20  0720   INR 1.28*     No results for input(s):  in the last 72 hours.     Assessment:   Patient Active Problem List   Diagnosis Code    Hyperlipidemia E78.5    Diabetes mellitus, type II ([facility] [address] E11.9    HTN (hypertension) I10    Vitamin D deficiency E55.9    CAD (coronary artery disease) I25.10    Elevated CA-125 R97.1    Uterine leiomyoma D25.9    Abnormal abdominal CT scan R93.5    Cancer of peritoneum (HCC) C48.2    Delirium due to general medical condition F05    Iron deficiency anemia D50.9    Impaired intestinal absorption K90.9    Neoplasm related pain G89.3    Weight loss R63.4    Abdominal pain R10.9       Plan:      1. Discussed exam, labs and imaging findings with Kevin Ochoa and her . No signs of active disease. GI symptoms improving. Plan is for DC home on Monday pending stress test results. No active GYN ONC intervention at this time. Will have Kevin Don f/u with Dr. Zaid Guillen and Dr. Claudell Helm in the office upon discharge for further counseling regarding maintenance chemo. 2. We discussed that her acute mild elevation in troponins could be multi-factorial.  She has a hx of CABG, so could certainly be cardiac in nature. Could also be in result to her recent pain, N/V. Could also be due to her Doxil as this does have potential cardiac toxicity. She did have a pre-tx ECHO which showed a normal EF per patient and her . Will await further results and cardio recs from her stress test.     Will cont to follow. Please call if any questions/concerns.      Eladia Blevins  Gynecologic Oncology  [facility]  [phone]  7/4/2020  1:21 PM

## 2020-07-04 NOTE — PROGRESS NOTES
pt up to bathroom, unable to void, bladder scan for 497. St cath for 350 clear yellow urine. [employer] [facility] Resident as ordered.

## 2020-07-04 NOTE — PROGRESS NOTES
 venlafaxine  75 mg Oral Daily    bisacodyl  10 mg Rectal Daily    polyethylene glycol  17 g Oral Daily     PRN Meds: sodium chloride flush, acetaminophen **OR** acetaminophen, promethazine **OR** ondansetron, bisacodyl, ALPRAZolam, oxyCODONE-acetaminophen, glucose, dextrose, glucagon (rDNA), dextrose, hydrALAZINE      Intake/Output Summary (Last 24 hours) at 7/4/2020 1525  Last data filed at 7/4/2020 1511  Gross per 24 hour   Intake 3221.67 ml   Output 900 ml   Net 2321.67 ml       Physical Exam Performed:    BP (!) 181/74   Pulse 80   Temp 97.7 °F (36.5 °C) (Oral)   Resp 18   Ht 5' 8\" (1.727 m)   Wt 147 lb 14.9 oz (67.1 kg)   SpO2 98%   BMI 22.49 kg/m²     · Telemetry: Sinus rhythm   · Constitutional: Alert. Oriented to person, place, and time. No distress. · Head: Normocephalic and atraumatic. · Mouth/Throat: Lips appear moist. Oropharynx is clear and moist.  · Eyes: Conjunctivae normal. EOM are normal.   · Neck: Neck supple. No lymphadenopathy. No rigidity. No JVD present. · Cardiovascular: Normal rate, regular rhythm. Normal S1&S2. Carotid pulse 2+ bilaterally. · Pulmonary/Chest: Bilateral respiratory sounds present. No respiratory accessory muscle use. No wheezes, No rhonchi. · Abdominal: Soft. Normal bowel sounds present. No distension, No tenderness. No splenomegaly. No hernia. · Musculoskeletal: No tenderness. No edema    · Lymphadenopathy: Has no cervical adenopathy. · Neurological: Alert and oriented. Cranial nerve II-XII grossly intact, No gross deficit to touch. · Skin: Skin is warm and dry. No rash, lesions, ulcerations noted.   · Psychiatric: Mildly anxious regarding being in hospital      Labs:   Recent Labs     07/03/20  0720 07/04/20  0535   WBC 6.0 4.6   HGB 9.2* 8.6*   HCT 27.3* 25.2*  25.0*   * 117*     Recent Labs     07/03/20  0720 07/04/20  0535   * 133*   K 5.0 4.5   CL 98* 98*   CO2 24 25   BUN 23* 17   CREATININE 0.8 0.8   CALCIUM 9.5 9.6   PHOS --  3.3     Recent Labs     07/03/20  0720   AST 18   ALT 9*   BILIDIR <0.2   BILITOT 0.4   ALKPHOS 70     Recent Labs     07/03/20  0720   INR 1.28*     Recent Labs     07/03/20  0720 07/03/20  1749 07/04/20  0014   TROPONINI 0.02* 0.02* 0.02*       Urinalysis:      Lab Results   Component Value Date    NITRU Negative 07/03/2020    WBCUA 3-5 07/03/2020    BACTERIA 1+ 07/03/2020    RBCUA 0-2 07/03/2020    BLOODU TRACE-INTACT 07/03/2020    SPECGRAV 1.025 07/03/2020    GLUCOSEU 100 07/03/2020    GLUCOSEU Negative 01/04/2012       Radiology:  XR ABDOMEN (2 VIEWS)   Final Result   1. Gas within the transverse colon with no evidence of obstruction or residual contrast.   2. Small bowel dilatation in the lower abdomen, pelvis persists      CT ABDOMEN PELVIS W IV CONTRAST Additional Contrast? None   Final Result   Addendum 1 of 1   [** ADDENDUM #1 **   Addendum by Dr. Stephanie Garcia dictation by Dr. Noam Sears      On axial image 90 of series 609 corresponding to sagittal image 72 of    series 611 and coronal image 36 of 610      There is a 2.7 x 2.3 cm enhancing mural mass or intraluminal lesion within    the dilated small bowel which appears to be the obstructing point with    decompressed small bowel in the pelvis. FINDINGS reviewed with surgery resident staff. Final      CT CHEST PULMONARY EMBOLISM W CONTRAST   Final Result   Addendum 1 of 1   [** ADDENDUM #1 **   Addendum by Dr. Stephanie Garcia dictation by Dr. Noam Sears      On axial image 90 of series 609 corresponding to sagittal image 72 of    series 611 and coronal image 36 of 610      There is a 2.7 x 2.3 cm enhancing mural mass or intraluminal lesion within    the dilated small bowel which appears to be the obstructing point with    decompressed small bowel in the pelvis. FINDINGS reviewed with surgery resident staff.       Final      XR Acute Abd Series Chest 1 VW   Final Result      NM MYOCARDIAL SPECT REST EXERCISE OR RX    (Results Pending)     Assessment/Plan:    Octavio Francisco, 67 y.o. female w/ PMHx ovarian cancer. Admitted for abdominal pain and vague chest pain. Suspicion for constipation as etiology. Active Hospital Problems    Diagnosis Date Noted    Abdominal pain [R10.9] 07/03/2020     Abdominal pain likely 2/2 ileus due to chronic opioid use  - CT abdomen pelvis showed mildly dilated loops of fluid-filled small bowel throughout the abdomen without discrete transition point identified. Similar to prior CT.  - Prior CT consistent with Iieus. Pt takes 2 percocet daily.  - Pt abdomen has no rebound tenderness or guarding   - Pt put on bisacodyl, glycolax, senokot to 2 tabs qd, pain improved s/p bowel movement with enema  - liver panel and lipase negative  - clear liquid diet, advance as tolerated  - Per CT abdo/plavix addendum: There is a 2.7 x 2.3 cm enhancing mural mass or intraluminal lesion within the dilated small bowel which appears to be the obstructing point with decompressed small bowel in the pelvis. - no surgical intervention over the weekend, will await further surgical recommendations.     Chest pain likely muskoloskeletal vs CAD  - EKG with no signs of ischemia  - CT chest sowed no evidence of PE  - Pt with hx of CAD, CABG  - Troponin 0.02 x2, troponemia could be multi-factorial due to Doxil or recent pain/N/V  - On ASA 81mg  - consult to cardio  - NPO Sunday night for stress test on Monday     HTN, not controlled  - BP elevated at this time  - amlodipine increased to 10 mg nightly  - losartan 100 mg PO qD  - added Hydralazine q6Hr PRN for SBP >160     DM  -Insulin Glargine 5 U qhs  -hypoglycemia orders     HLD  -rosuvastatin 40 qd     Depression/Anxiety  -Venlafaxine 75mg qd  - Xanax PRN    Ovarian cancer  -seen by gyn/onc, no intervention recommended  -f/u with Dr. Solo Elmore and Dr. Jake Chew in the office upon discharge for further counseling regarding maintenance chemo.      DVT Prophylaxis: eliquis  Diet: DIET CLEAR LIQUID;  Diet NPO Time Specified  Code Status: Full Code    PT/OT Eval Status: pt up with assistance, no difficulties ambulating    Dispo - GMF, -plan for DC home Monday pending stress test results    Sohan Sauer MD    I will discuss the patient with the senior resident and MD Sohan Bhatt MD  Internal Medicine Resident PGY-3

## 2020-07-04 NOTE — CONSULTS
Results   Component Value Date    BNP 62.6 10/05/2012     Lab Results   Component Value Date    PROTIME 14.9 07/03/2020    PROTIME 18.5 12/23/2019    PROTIME 12.2 05/17/2016    PROTIME 12.9 11/30/2011    INR 1.28 07/03/2020    INR 1.59 12/23/2019    INR 1.07 05/17/2016     Lab Results   Component Value Date    CHOL 148 05/21/2012    HDL 58 05/21/2012    TRIG 65 05/21/2012       Diagnostic and imaging results reviewed. ECG: Normal sinus rhythm, no significant ST-T changes    I independently reviewed the ECG and telemetry.     Scheduled Meds:   enoxaparin  60 mg Subcutaneous BID    magnesium sulfate  2 g Intravenous Once    simethicone  80 mg Oral BID    sodium chloride flush  10 mL Intravenous 2 times per day    [Held by provider] amLODIPine  5 mg Oral Nightly    [Held by provider] apixaban  5 mg Oral BID    [Held by provider] aspirin  81 mg Oral Daily    insulin glargine  5 Units Subcutaneous Nightly    [Held by provider] losartan  100 mg Oral Daily    [Held by provider] pregabalin  75 mg Oral BID    [Held by provider] rOPINIRole  2 mg Oral Nightly    [Held by provider] rosuvastatin  40 mg Oral QPM    [Held by provider] sennosides-docusate sodium  2 tablet Oral Daily    [Held by provider] venlafaxine  75 mg Oral Daily    bisacodyl  10 mg Rectal Daily    [Held by provider] polyethylene glycol  17 g Oral Daily     Continuous Infusions:   lactated ringers 1,000 mL (07/03/20 2002)    dextrose       PRN Meds:.sodium chloride flush, acetaminophen **OR** acetaminophen, promethazine **OR** ondansetron, bisacodyl, ALPRAZolam, oxyCODONE-acetaminophen, glucose, dextrose, glucagon (rDNA), dextrose, hydrALAZINE     Assessment:   Patient Active Problem List    Diagnosis Date Noted    Abdominal pain 07/03/2020    Neoplasm related pain 07/08/2016    Weight loss 07/08/2016    Impaired intestinal absorption 07/05/2016    Iron deficiency anemia 06/13/2016    Delirium due to general medical condition 05/28/2016    Cancer [facility] [facility] [address]     Elevated CA-125 05/10/2016    Uterine leiomyoma 05/10/2016    Abnormal abdominal CT scan 05/10/2016    Hyperlipidemia 04/23/2012    Diabetes mellitus, type II ([facility] [address] 04/23/2012    HTN (hypertension) 04/23/2012    Vitamin D deficiency 04/23/2012    CAD (coronary artery disease) 04/23/2012      Active Hospital Problems    Diagnosis Date Noted    Abdominal pain [R10.9] 07/03/2020         Recommendation(s):    1. Atypical chest pain  2. CAD, status post CABG  3. Hypertension  4. Abdominal pain, on evaluation  5. Metastatic cancer    Patient could not state what kind of symptoms she had before her CABG  Clinically, this sounds like more musculoskeletal  EKG is within normal limits  Mild elevation of troponin of 0.02 and continues to be flat  Less likely to be cardiac  On palpation, no point tenderness noted    Patient stated that her  is very concerned about this and they would like to know whether it is coming from the heart or not    Proceed with a Lexiscan on Monday    Secondary to abdominal discomfort, her p.o. medications are on hold and she is on Lovenox 60 mg subcu twice daily    Thank you for allowing me to participate in the care of Ivelisse Polk . If you have any questions/comments, please do not hesitate to contact us. Giorgi Christopher MD   Cardiac Electrophysiology  [address] [place] [address]  Office [phone]    For any EP related issues after 5 PM, contact [facility] on call cardiology through .

## 2020-07-04 NOTE — PLAN OF CARE
Problem: Pain:  Goal: Pain level will decrease  Description: Pain level will decrease  Outcome: Ongoing   Medicated with Percocet ES one tab as ordered for generalized pain. Will continue to monitor alteration in comfort.

## 2020-07-05 LAB
ALBUMIN SERPL-MCNC: 3.8 G/DL (ref 3.4–5)
ANION GAP SERPL CALCULATED.3IONS-SCNC: 8 MMOL/L (ref 3–16)
BUN BLDV-MCNC: 15 MG/DL (ref 7–20)
CALCIUM SERPL-MCNC: 8.8 MG/DL (ref 8.3–10.6)
CHLORIDE BLD-SCNC: 99 MMOL/L (ref 99–110)
CO2: 25 MMOL/L (ref 21–32)
CREAT SERPL-MCNC: 0.8 MG/DL (ref 0.6–1.2)
GFR AFRICAN AMERICAN: >60
GFR NON-AFRICAN AMERICAN: >60
GLUCOSE BLD-MCNC: 123 MG/DL (ref 70–99)
GLUCOSE BLD-MCNC: 132 MG/DL (ref 70–99)
GLUCOSE BLD-MCNC: 151 MG/DL (ref 70–99)
GLUCOSE BLD-MCNC: 179 MG/DL (ref 70–99)
GLUCOSE BLD-MCNC: 240 MG/DL (ref 70–99)
HCT VFR BLD CALC: 23.8 % (ref 36–48)
HEMOGLOBIN: 8.2 G/DL (ref 12–16)
MAGNESIUM: 2.1 MG/DL (ref 1.8–2.4)
MCH RBC QN AUTO: 32.4 PG (ref 26–34)
MCHC RBC AUTO-ENTMCNC: 34.4 G/DL (ref 31–36)
MCV RBC AUTO: 94.1 FL (ref 80–100)
PDW BLD-RTO: 17.7 % (ref 12.4–15.4)
PERFORMED ON: ABNORMAL
PHOSPHORUS: 3.1 MG/DL (ref 2.5–4.9)
PLATELET # BLD: 120 K/UL (ref 135–450)
PMV BLD AUTO: 7.8 FL (ref 5–10.5)
POTASSIUM SERPL-SCNC: 4.2 MMOL/L (ref 3.5–5.1)
RBC # BLD: 2.53 M/UL (ref 4–5.2)
SODIUM BLD-SCNC: 132 MMOL/L (ref 136–145)
WBC # BLD: 4.4 K/UL (ref 4–11)

## 2020-07-05 PROCEDURE — 1200000000 HC SEMI PRIVATE

## 2020-07-05 PROCEDURE — 6370000000 HC RX 637 (ALT 250 FOR IP): Performed by: STUDENT IN AN ORGANIZED HEALTH CARE EDUCATION/TRAINING PROGRAM

## 2020-07-05 PROCEDURE — 36415 COLL VENOUS BLD VENIPUNCTURE: CPT

## 2020-07-05 PROCEDURE — 80069 RENAL FUNCTION PANEL: CPT

## 2020-07-05 PROCEDURE — 6370000000 HC RX 637 (ALT 250 FOR IP): Performed by: INTERNAL MEDICINE

## 2020-07-05 PROCEDURE — 97116 GAIT TRAINING THERAPY: CPT

## 2020-07-05 PROCEDURE — 99232 SBSQ HOSP IP/OBS MODERATE 35: CPT | Performed by: SURGERY

## 2020-07-05 PROCEDURE — 99233 SBSQ HOSP IP/OBS HIGH 50: CPT | Performed by: INTERNAL MEDICINE

## 2020-07-05 PROCEDURE — 2580000003 HC RX 258: Performed by: STUDENT IN AN ORGANIZED HEALTH CARE EDUCATION/TRAINING PROGRAM

## 2020-07-05 PROCEDURE — 97161 PT EVAL LOW COMPLEX 20 MIN: CPT

## 2020-07-05 PROCEDURE — 85027 COMPLETE CBC AUTOMATED: CPT

## 2020-07-05 PROCEDURE — 83735 ASSAY OF MAGNESIUM: CPT

## 2020-07-05 RX ORDER — INSULIN LISPRO 100 [IU]/ML
0-9 INJECTION, SOLUTION INTRAVENOUS; SUBCUTANEOUS NIGHTLY
Status: DISCONTINUED | OUTPATIENT
Start: 2020-07-05 | End: 2020-07-06 | Stop reason: HOSPADM

## 2020-07-05 RX ORDER — METOPROLOL SUCCINATE 50 MG/1
50 TABLET, EXTENDED RELEASE ORAL DAILY
Status: DISCONTINUED | OUTPATIENT
Start: 2020-07-05 | End: 2020-07-06

## 2020-07-05 RX ORDER — INSULIN LISPRO 100 [IU]/ML
0-18 INJECTION, SOLUTION INTRAVENOUS; SUBCUTANEOUS
Status: DISCONTINUED | OUTPATIENT
Start: 2020-07-05 | End: 2020-07-06 | Stop reason: HOSPADM

## 2020-07-05 RX ORDER — DILTIAZEM HYDROCHLORIDE 240 MG/1
240 CAPSULE, COATED, EXTENDED RELEASE ORAL DAILY
Status: DISCONTINUED | OUTPATIENT
Start: 2020-07-05 | End: 2020-07-05

## 2020-07-05 RX ORDER — AMLODIPINE BESYLATE 10 MG/1
10 TABLET ORAL DAILY
Status: DISCONTINUED | OUTPATIENT
Start: 2020-07-05 | End: 2020-07-06 | Stop reason: HOSPADM

## 2020-07-05 RX ADMIN — DOCUSATE SODIUM 50 MG AND SENNOSIDES 8.6 MG 2 TABLET: 8.6; 5 TABLET, FILM COATED ORAL at 08:32

## 2020-07-05 RX ADMIN — PREGABALIN 75 MG: 75 CAPSULE ORAL at 08:32

## 2020-07-05 RX ADMIN — PREGABALIN 75 MG: 75 CAPSULE ORAL at 21:23

## 2020-07-05 RX ADMIN — AMLODIPINE BESYLATE 10 MG: 10 TABLET ORAL at 11:23

## 2020-07-05 RX ADMIN — VENLAFAXINE HYDROCHLORIDE 75 MG: 75 CAPSULE, EXTENDED RELEASE ORAL at 08:32

## 2020-07-05 RX ADMIN — OXYCODONE AND ACETAMINOPHEN 1 TABLET: 10; 325 TABLET ORAL at 21:22

## 2020-07-05 RX ADMIN — ROSUVASTATIN CALCIUM 40 MG: 20 TABLET, COATED ORAL at 18:08

## 2020-07-05 RX ADMIN — ASPIRIN 81 MG: 81 TABLET, COATED ORAL at 08:32

## 2020-07-05 RX ADMIN — LOSARTAN POTASSIUM 100 MG: 100 TABLET, FILM COATED ORAL at 08:32

## 2020-07-05 RX ADMIN — INSULIN LISPRO 2 UNITS: 100 INJECTION, SOLUTION INTRAVENOUS; SUBCUTANEOUS at 21:13

## 2020-07-05 RX ADMIN — SIMETHICONE 80 MG: 80 TABLET, CHEWABLE ORAL at 08:32

## 2020-07-05 RX ADMIN — ALPRAZOLAM 0.5 MG: 0.5 TABLET ORAL at 21:23

## 2020-07-05 RX ADMIN — APIXABAN 5 MG: 2.5 TABLET, FILM COATED ORAL at 21:22

## 2020-07-05 RX ADMIN — Medication 10 ML: at 21:16

## 2020-07-05 RX ADMIN — INSULIN LISPRO 6 UNITS: 100 INJECTION, SOLUTION INTRAVENOUS; SUBCUTANEOUS at 16:47

## 2020-07-05 RX ADMIN — APIXABAN 5 MG: 2.5 TABLET, FILM COATED ORAL at 08:32

## 2020-07-05 RX ADMIN — ROPINIROLE HYDROCHLORIDE 2 MG: 2 TABLET, FILM COATED ORAL at 21:23

## 2020-07-05 RX ADMIN — METOPROLOL SUCCINATE 50 MG: 50 TABLET, EXTENDED RELEASE ORAL at 11:23

## 2020-07-05 RX ADMIN — ACETAMINOPHEN 650 MG: 325 TABLET ORAL at 23:59

## 2020-07-05 RX ADMIN — INSULIN LISPRO 1 UNITS: 100 INJECTION, SOLUTION INTRAVENOUS; SUBCUTANEOUS at 11:24

## 2020-07-05 RX ADMIN — INSULIN GLARGINE 5 UNITS: 100 INJECTION, SOLUTION SUBCUTANEOUS at 21:12

## 2020-07-05 RX ADMIN — SIMETHICONE 80 MG: 80 TABLET, CHEWABLE ORAL at 21:26

## 2020-07-05 RX ADMIN — Medication 10 ML: at 08:33

## 2020-07-05 ASSESSMENT — PAIN DESCRIPTION - PROGRESSION
CLINICAL_PROGRESSION: NOT CHANGED

## 2020-07-05 ASSESSMENT — PAIN DESCRIPTION - ONSET: ONSET: ON-GOING

## 2020-07-05 ASSESSMENT — PAIN SCALES - GENERAL
PAINLEVEL_OUTOF10: 0
PAINLEVEL_OUTOF10: 0
PAINLEVEL_OUTOF10: 7
PAINLEVEL_OUTOF10: 0
PAINLEVEL_OUTOF10: 0
PAINLEVEL_OUTOF10: 3

## 2020-07-05 ASSESSMENT — PAIN DESCRIPTION - PAIN TYPE: TYPE: ACUTE PAIN

## 2020-07-05 ASSESSMENT — PAIN DESCRIPTION - FREQUENCY: FREQUENCY: CONTINUOUS

## 2020-07-05 ASSESSMENT — PAIN DESCRIPTION - ORIENTATION: ORIENTATION: ANTERIOR

## 2020-07-05 ASSESSMENT — PAIN DESCRIPTION - DESCRIPTORS: DESCRIPTORS: ACHING

## 2020-07-05 ASSESSMENT — PAIN DESCRIPTION - LOCATION: LOCATION: HEAD

## 2020-07-05 ASSESSMENT — PAIN - FUNCTIONAL ASSESSMENT: PAIN_FUNCTIONAL_ASSESSMENT: PREVENTS OR INTERFERES SOME ACTIVE ACTIVITIES AND ADLS

## 2020-07-05 NOTE — PROGRESS NOTES
Occupational Therapy  D/c Note    Per chart review and PT eval. Pt is up ad trace and demo no Occupational Therapy needs. Will sign off from OT services.       Mayo Obrien OTR/L  [id number]

## 2020-07-05 NOTE — PLAN OF CARE
Problem: Falls - Risk of:  Goal: Will remain free from falls  Description: Will remain free from falls  7/5/2020 1021 by Minal Middleton RN  Outcome: Ongoing  Note: Patient is up ad trace and steady on her feet. She uses call light appropriately. Will continue to monitor safety. Problem: Pain:  Goal: Pain level will decrease  Description: Pain level will decrease  7/5/2020 1021 by Minal Middleton RN  Outcome: Ongoing  Note: Patient reported 0/10 pain this morning. Will continue to monitor her pain. Problem: Cardiac:  Goal: Ability to maintain vital signs within normal range will improve  Description: Ability to maintain vital signs within normal range will improve  7/5/2020 1021 by Minal Middleton RN  Outcome: Ongoing  Note: Patient has been running elevated BP. She was placed on new BP medications this morning. Will continue to monitor her BP.

## 2020-07-05 NOTE — PROGRESS NOTES
Surgery Daily Progress Note      CC: Ileus vs constipation    SUBJECTIVE:  No acute events overnight. Pt tolerating CLD. Pt up and ambulating in room this AM. Denies N/V, states abd pain improved. She had multiple BM late in the afternoon yesterday. ROS:   A 14 point review of systems was conducted, significant findings as noted above. All other systems negative. OBJECTIVE:    PHYSICAL EXAM:  Vitals:    07/04/20 1841 07/04/20 1925 07/04/20 2349 07/05/20 0359   BP: (!) 158/62 (!) 150/78 (!) 143/74 (!) 145/69   Pulse:  82 82 87   Resp:  18 18 18   Temp:  98.4 °F (36.9 °C) 98.3 °F (36.8 °C) 97.9 °F (36.6 °C)   TempSrc:  Oral Oral Oral   SpO2:  98% 98% 97%   Weight:       Height:           General appearance: Alert, no acute distress, well-developed, well-nourished  HEENT: Normocephalic, extraocular movements grossly intact, moist mucous membranes  Chest/Lungs: normal inspiratory effort, symmetric chest rise, no accessory muscle use  Cardiovascular: Regular rate and rhythm  Abdomen: Soft, non-distended, non-tender to palpation throughout, no guarding/rebound  Neuro: A&Ox3, no gross motor or sensory neuro deficits  Extremities: no edema, no cyanosis  : No Begum catheter       ASSESSMENT & PLAN:   Jaye Gutierrez is a 67 y.o. female with ileus vs constipation. Resolving, as pt is having multiple BMs.    - Continue full liquid diabetic diet  - Would not advance to full liquid diet with supplements even at discharge, given the possibility that there is metastatic dz possibly causing partial obstruction   - Needs nutritional support with supplements   - Medical management per primary team.    Donald Joshi DO  PGY1, General Surgery  07/05/20  6:45 AM  [phone]    I have seen, examined, and reviewed the patients chart. I agree with the residents assessment and have made appropriate changes.     Saadia Sawyer

## 2020-07-05 NOTE — PROGRESS NOTES
Internal Medicine PGY-1 Resident Progress Note        PCP: Zenaida Massey MD    Date of Admission: 7/3/2020    Chief Complaint: abdominal pain    Hospital Course:    67 y.o. female with a PMHx of DM, HTN, HLD, CAD, CABG (2007), Depression, ovarian cancer, who [facility] with abdominal pain, emesis and constipation. Patient's abdominal pain, nausea and vomiting began the night prior to admission. Pt states that she presented to the ED 2 weeks ago for similar symptoms and was diagnosed with ileus on CT. The pain is dull and localized to the periumbilical region. She states that the pain got worse after having her breakfast and when she later that day drank some juice. She also states that she has chest pain when she moves and leans forward but that it is not related to taking stairs or activity level. She states that the chest pain has been going on for weeks. Pt stated that 1 week ago pt completed a course of chemotherapy. Pt denied any fever, chills, SOB, and changes in medication or eating habits. Patient was given an enema overnight with successful bowel movement and improvement in symptoms. Due to troponemia of 0.02, plan is for cardiac stress test for Monday. CT abdomen/pelvis addendum; there is a 2.7 x 2.3 cm enhancing mural mass or intraluminal lesion within the dilated small bowel which appears to be the obstructing point with decompressed small bowel in the pelvis. Surgery is aware and we are awaiting their input. Patient received an abdominal Xray. .. Subjective:     Patient is feeling well thi AM and was seen after ambulating in the hallways. She reports that her nausea has resolved and she is now only sore in the periumbilical region. Pt denies any SOB or CP. She passed a liquid dark stool this AM.  at bedside and all his questions were answered. Per cardio, awaiting stress test on Monday. Awaiting recs per surgery with respect to newly found mass.     Medications: Reviewed    Infusion Medications    dextrose       Scheduled Medications    metoprolol succinate  50 mg Oral Daily    amLODIPine  10 mg Oral Daily    simethicone  80 mg Oral BID    insulin lispro  0-6 Units Subcutaneous TID     insulin lispro  0-3 Units Subcutaneous Nightly    sodium chloride flush  10 mL Intravenous 2 times per day    apixaban  5 mg Oral BID    aspirin  81 mg Oral Daily    insulin glargine  5 Units Subcutaneous Nightly    losartan  100 mg Oral Daily    pregabalin  75 mg Oral BID    rOPINIRole  2 mg Oral Nightly    rosuvastatin  40 mg Oral QPM    sennosides-docusate sodium  2 tablet Oral Daily    venlafaxine  75 mg Oral Daily    bisacodyl  10 mg Rectal Daily    polyethylene glycol  17 g Oral Daily     PRN Meds: sodium chloride flush, acetaminophen **OR** acetaminophen, promethazine **OR** ondansetron, bisacodyl, ALPRAZolam, oxyCODONE-acetaminophen, glucose, dextrose, glucagon (rDNA), dextrose, hydrALAZINE      Intake/Output Summary (Last 24 hours) at 7/5/2020 1302  Last data filed at 7/5/2020 1122  Gross per 24 hour   Intake 1267 ml   Output 950 ml   Net 317 ml       Physical Exam Performed:    BP (!) 172/66   Pulse 83   Temp 98 °F (36.7 °C) (Oral)   Resp 16   Ht 5' 8\" (1.727 m)   Wt 147 lb 14.9 oz (67.1 kg)   SpO2 99%   BMI 22.49 kg/m²     General appearance:  No apparent distress, appears stated age and cooperative. HEENT:  Normal cephalic,atraumatic without obvious deformity. Pupils equal, round, and reactive to light. Extra ocular muscles intact. Conjunctivae/corneas clear. Neck: Supple, with full range of motion. No jugular venous distention. Trachea midline. Respiratory:  Normal respiratory effort. Clear to auscultation, bilaterally without Rales/Wheezes/Rhonchi. Cardiovascular:  Regular rate and rhythm with normal S1/S2 without murmurs, rubs or gallops. Abdomen: Soft, still mildy tender in whit-umbilical region, non-distended with normal bowel sounds.  No rigidity or rebound tenderness. Musculoskeletal:  No clubbing, cyanosis oredema bilaterally. Full range of motion without deformity. Skin: Skin color, texture, turgor normal.  Norashes or lesions. Neurologic:  Neurovascularly intact without any focal sensory/motor deficits. Cranialnerves: II-XII intact, grossly non-focal.  Psychiatric:  Alert and oriented, thought content appropriate,normal insight  Capillary Refill: Brisk,< 3 seconds   Peripheral Pulses: +2 palpable, equal bilaterally     Labs:   Recent Labs     07/03/20  0720 07/04/20  0535 07/05/20  0500   WBC 6.0 4.6 4.4   HGB 9.2* 8.6* 8.2*   HCT 27.3* 25.2*  25.0* 23.8*   * 117* 120*     Recent Labs     07/03/20  0720 07/04/20  0535 07/05/20  0500   * 133* 132*   K 5.0 4.5 4.2   CL 98* 98* 99   CO2 24 25 25   BUN 23* 17 15   CREATININE 0.8 0.8 0.8   CALCIUM 9.5 9.6 8.8   PHOS  --  3.3 3.1     Recent Labs     07/03/20  0720   AST 18   ALT 9*   BILIDIR <0.2   BILITOT 0.4   ALKPHOS 70     Recent Labs     07/03/20  0720   INR 1.28*     Recent Labs     07/03/20  0720 07/03/20  1749 07/04/20  0014   TROPONINI 0.02* 0.02* 0.02*       Urinalysis:      Lab Results   Component Value Date    NITRU Negative 07/03/2020    WBCUA 3-5 07/03/2020    BACTERIA 1+ 07/03/2020    RBCUA 0-2 07/03/2020    BLOODU TRACE-INTACT 07/03/2020    SPECGRAV 1.025 07/03/2020    GLUCOSEU 100 07/03/2020    GLUCOSEU Negative 01/04/2012       Radiology:  XR ABDOMEN (2 VIEWS)   Final Result   1.  Gas within the transverse colon with no evidence of obstruction or residual contrast.   2. Small bowel dilatation in the lower abdomen, pelvis persists      CT ABDOMEN PELVIS W IV CONTRAST Additional Contrast? None   Final Result   Addendum 1 of 1   [** ADDENDUM #1 **   Addendum by Dr. Ashlee Tyler dictation by Dr. Jyothi Dejesus      On axial image 90 of series 609 corresponding to sagittal image 72 of    series 611 and coronal image 36 of 610      There is a 2.7 x 2.3 cm enhancing mural mass or intraluminal lesion within    the dilated small bowel which appears to be the obstructing point with    decompressed small bowel in the pelvis. FINDINGS reviewed with surgery resident staff. Final      CT CHEST PULMONARY EMBOLISM W CONTRAST   Final Result   Addendum 1 of 1   [** ADDENDUM #1 **   Addendum by Dr. Pastor Strange dictation by Dr. Helena Ace      On axial image 90 of series 609 corresponding to sagittal image 72 of    series 611 and coronal image 36 of 610      There is a 2.7 x 2.3 cm enhancing mural mass or intraluminal lesion within    the dilated small bowel which appears to be the obstructing point with    decompressed small bowel in the pelvis. FINDINGS reviewed with surgery resident staff. Final      XR Acute Abd Series Chest 1 VW   Final Result      NM MYOCARDIAL SPECT REST EXERCISE OR RX    (Results Pending)     Assessment/Plan:    Linda Jane, 67 y.o. female w/ PMHx ovarian cancer. Admitted for abdominal pain and vague chest pain. Suspicion for constipation as etiology. Active Hospital Problems    Diagnosis Date Noted    Abdominal pain [R10.9] 07/03/2020     Abdominal pain likely 2/2 ileus due to chronic opioid use  - CT abdomen pelvis showed mildly dilated loops of fluid-filled small bowel throughout the abdomen without discrete transition point identified. Similar to prior CT.  - Prior CT consistent with Iieus. Pt takes 2 percocet daily.  - Pt abdomen has no rebound tenderness or guarding   - Pt put on bisacodyl, glycolax, senokot to 2 tabs qd, pain improved s/p bowel movement with enema  - liver panel and lipase negative  - clear liquid diet per surgery. - Per CT abdo/pelvis addendum: There is a 2.7 x 2.3 cm enhancing mural mass or intraluminal lesion within the dilated small bowel which appears to be the obstructing point with decompressed small bowel in the pelvis.   - no surgical intervention over the weekend, will await further surgical recommendations.     Chest pain likely muskoloskeletal vs CAD  - EKG with no signs of ischemia  - CT chest sowed no evidence of PE  - Pt with hx of CAD, CABG  - Troponin 0.02 x3, troponemia could be multi-factorial due to Doxil or recent pain/N/V  - On ASA 81mg  - Per cardio, likely muskuloskeletal, Lexiscan Monday to rule out ischemia in light of cardiac history.  - NPO Sunday night for stress test on Monday     HTN, not controlled  - BP elevated at this time  - amlodipine increased to 10 mg nightly  - losartan 100 mg PO qD  - added Hydralazine q6Hr PRN for SBP >160     DM  -Insulin Glargine 5 U qhs  -hypoglycemia orders     HLD  -rosuvastatin 40 qd     Depression/Anxiety  -Venlafaxine 75mg qd  - Xanax PRN    Ovarian cancer  -seen by gyn/onc, no intervention recommended  -f/u with Dr. Corrine No and Dr. Hollie Jewell in the office upon discharge for further counseling regarding maintenance chemo.      DVT Prophylaxis: eliquis  Diet: Diet NPO, After Midnight  Dietary Nutrition Supplements: Standard High Calorie Oral Supplement, Diabetic Oral Supplement, Wound Healing Oral Supplement  DIET FULL LIQUID; Carb Control: 3 carb choices (45 gms)/meal  Code Status: Full Code    PT/OT Eval Status: pt up with assistance, no difficulties ambulating    Dispo - [facility], -plan for DC home Monday pending stress test results    Topher Scanlon MD    I will discuss the patient with the senior resident and MD Topher Jaimes MD  Internal Medicine Resident PGY-1

## 2020-07-05 NOTE — PROGRESS NOTES
[facility]   Cardiac Electrophysiology Progress Note     Admit Date: 7/3/2020     Reason for follow up: Atypical chest pain, PVCs    HPI and Interval History:   Patient seen and examined. Clinical notes reviewed. Telemetry reviewed. Frequent BMs  Complaints that food is too sweet  No major events overnight. Denies having chest pain, shortness of breath, dyspnea on exertion, Orthopnea, PND at the time of this visit. Review of System:  All other systems reviewed except for that noted above. Pertinent negatives and positives are:     · General: negative for fever, chills   · Ophthalmic ROS: negative for - eye pain or loss of vision  · ENT ROS: negative for - headaches, sore throat   · Respiratory: negative for - cough, sputum  · Cardiovascular: Reviewed in HPI  · Gastrointestinal: negative for - abdominal pain, diarrhea, N/V  · Hematology: negative for - bleeding, blood clots, bruising or jaundice  · Genito-Urinary:  negative for - Dysuria or incontinence  · Musculoskeletal: negative for - Joint swelling, muscle pain  · Neurological: negative for - confusion, dizziness, headaches   · Psychiatric: No anxiety, no depression. · Dermatological: negative for - rash      Physical Examination:  Vitals:    20 0829   BP: (!) 170/80   Pulse: 86   Resp: 18   Temp: 98 °F (36.7 °C)   SpO2: 99%        Intake/Output Summary (Last 24 hours) at 2020 0926  Last data filed at 2020 0831  Gross per 24 hour   Intake 1147 ml   Output 950 ml   Net 197 ml     In: 3570 [P.O.:1050; I.V.:97]  Out: 950    Wt Readings from Last 3 Encounters:   20 147 lb 14.9 oz (67.1 kg)   20 149 lb (67.6 kg)   20 150 lb (68 kg)     Temp  Av.9 °F (36.6 °C)  Min: 97 °F (36.1 °C)  Max: 98.4 °F (36.9 °C)  Pulse  Av.8  Min: 80  Max: 87  BP  Min: 143/74  Max: 186/76  SpO2  Av %  Min: 97 %  Max: 99 %    · Telemetry: Sinus rhythm, outflow tract PVCs  · Constitutional: Alert.  Oriented to person, place, and time. No distress. · Head: Normocephalic and atraumatic. · Mouth/Throat: Lips appear moist. Oropharynx is clear and moist.  · Eyes: Conjunctivae normal. EOM are normal.   · Neck: Neck supple. No lymphadenopathy. No rigidity. No JVD present. · Cardiovascular: Normal rate, regular rhythm. Normal S1&S2. Carotid pulse 2+ bilaterally. · Pulmonary/Chest: Bilateral respiratory sounds present. No respiratory accessory muscle use. No wheezes, No rhonchi. · Abdominal: Soft. Normal bowel sounds present. No distension, No tenderness. No splenomegaly. No hernia. · Musculoskeletal: No tenderness. No edema    · Lymphadenopathy: Has no cervical adenopathy. · Neurological: Alert and oriented. Cranial nerve II-XII grossly intact, No gross deficit to touch. · Skin: Skin is warm and dry. No rash, lesions, ulcerations noted. · Psychiatric: No anxiety nor agitation. Labs, diagnostic and imaging results reviewed. Reviewed. Recent Labs     07/03/20 0720 07/04/20 0535 07/05/20  0500   * 133* 132*   K 5.0 4.5 4.2   CL 98* 98* 99   CO2 24 25 25   PHOS  --  3.3 3.1   BUN 23* 17 15   CREATININE 0.8 0.8 0.8     Recent Labs     07/03/20  0720 07/04/20  0535 07/05/20  0500   WBC 6.0 4.6 4.4   HGB 9.2* 8.6* 8.2*   HCT 27.3* 25.2*  25.0* 23.8*   MCV 96.5 94.8 94.1   * 117* 120*     Lab Results   Component Value Date    CKTOTAL 41 05/21/2012    TROPONINI 0.02 07/04/2020     Estimated Creatinine Clearance: 58 mL/min (based on SCr of 0.8 mg/dL).    Lab Results   Component Value Date    BNP 62.6 10/05/2012     Lab Results   Component Value Date    PROTIME 14.9 07/03/2020    PROTIME 18.5 12/23/2019    PROTIME 12.2 05/17/2016    PROTIME 12.9 11/30/2011    INR 1.28 07/03/2020    INR 1.59 12/23/2019    INR 1.07 05/17/2016     Lab Results   Component Value Date    CHOL 148 05/21/2012    HDL 58 05/21/2012    TRIG 65 05/21/2012       Scheduled Meds:   dilTIAZem  240 mg Oral Daily    simethicone  80 mg Oral BID    insulin lispro  0-6 Units Subcutaneous TID     insulin lispro  0-3 Units Subcutaneous Nightly    sodium chloride flush  10 mL Intravenous 2 times per day    apixaban  5 mg Oral BID    aspirin  81 mg Oral Daily    insulin glargine  5 Units Subcutaneous Nightly    losartan  100 mg Oral Daily    pregabalin  75 mg Oral BID    rOPINIRole  2 mg Oral Nightly    rosuvastatin  40 mg Oral QPM    sennosides-docusate sodium  2 tablet Oral Daily    venlafaxine  75 mg Oral Daily    bisacodyl  10 mg Rectal Daily    polyethylene glycol  17 g Oral Daily     Continuous Infusions:   dextrose       PRN Meds:sodium chloride flush, acetaminophen **OR** acetaminophen, promethazine **OR** ondansetron, bisacodyl, ALPRAZolam, oxyCODONE-acetaminophen, glucose, dextrose, glucagon (rDNA), dextrose, hydrALAZINE     Patient Active Problem List    Diagnosis Date Noted    Abdominal pain 07/03/2020    Neoplasm related pain 07/08/2016    Weight loss 07/08/2016    Impaired intestinal absorption 07/05/2016    Iron deficiency anemia 06/13/2016    Delirium due to general medical condition 05/28/2016    [facility] per[facility]um [facility] [address]     Elevated CA-125 05/10/2016    Uterine leiomyoma 05/10/2016    Abnormal abdominal CT scan 05/10/2016    Hyperlipidemia 04/23/2012    Diabetes mellitus, type II ([facility] [address] 04/23/2012    HTN (hypertension) 04/23/2012    Vitamin D deficiency 04/23/2012    CAD (coronary artery disease) 04/23/2012      Active Hospital Problems    Diagnosis Date Noted    Abdominal pain [R10.9] 07/03/2020       Assessment and Plan:     1. Atypical chest pain  2. CAD, status post CABG  3. Hypertension, poorly controlled  4. Abdominal pain, on evaluation  5. Metastatic cancer  6.   Outflow tract PVCs    Patient could not state what kind of symptoms she had before her CABG  Clinically, this sounds like more musculoskeletal  EKG is within normal limits, apart from outflow tract PVCs  Mild elevation of troponin of 0.02 and

## 2020-07-05 NOTE — PROGRESS NOTES
Physical Therapy    Facility/Department: [address]  Initial Assessment and treatment/discharge    NAME: Rosalva Wiley  : 1940  MRN: [id number]    Date of Service: 2020    Discharge Recommendations:    Rosalva Wiley scored a 23/24 on the AM-PAC short mobility form. At this time, no further PT is recommended upon discharge due to patient modified independent with mobility. Recommend patient returns to prior setting with prior services. PT Equipment Recommendations  Equipment Needed: No    Assessment   Assessment: Patient tolerated session well, transferring and ambulating in room and hallways as above with steady gait using rollator. Patient declined stair trial reporting no concerns with stairs upon discharge. Patient has been up ad trace in room and was encouraged to ambulate in hallways as tolerated; she verbalized understanding. Patient currently functioning near baseline level and not in need of skilled PT at this time. She is planning to d/c home with supervision from . No therapy needs upon discharge; will sign off at this time. Treatment Diagnosis: impaired mobility associated with abdominal pain  Prognosis: Good  Decision Making: Low Complexity  PT Education: PT Role;General Safety;Gait Training  Patient Education: patient verbalized understanding. No Skilled PT: Independent with functional mobility   REQUIRES PT FOLLOW UP: No  Activity Tolerance  Activity Tolerance: Patient Tolerated treatment well       Patient Diagnosis(es): The primary encounter diagnosis was Generalized abdominal pain. A diagnosis of Chest pain, unspecified type was also pertinent to this visit.      has a past medical history of Anxiety, Arthritis, Asthma, CAD (coronary artery disease), Cancer of peritoneum ([address] Chronic low back pain, Depression, Embolism ([facility] [address] Hyperlipidemia, Hypertension, Postoperative delirium, and Type II or unspecified type diabetes mellitus without mention of complication, not stated as uncontrolled. has a past surgical history that includes Coronary artery bypass graft (2007); Appendectomy; Cataract removal (Bilateral); Dilation and curettage of uterus; Coronary angioplasty with stent (1991); Colonoscopy; Endoscopy, colon, diagnostic; eye surgery; dieter and bso (cervix removed) (05/26/2016); hemicolectomy (05/2016); Cardiac surgery; Hysterectomy; Tunneled venous port placement (Left, 7/27/2016); other surgical history (N/A, 08/17/2018); Breast biopsy (Left, 06/23/2017); joint replacement (Bilateral); Colonoscopy (N/A, 6/6/2019); and Colonoscopy (6/6/2019). Restrictions  Position Activity Restriction  Other position/activity restrictions: up with assistance  Vision/Hearing  Vision: Impaired  Vision Exceptions: Wears glasses at all times  Hearing: Within functional limits     Subjective  General  Chart Reviewed: Yes  Patient assessed for rehabilitation services?: Yes  Additional Pertinent Hx: Patient is a 68 y/o female admitted 7/3 with emesis, abdominal pain and constipation. PMH significant for diabetes, HTN, HLD, CAD, cancer of peritoneum, PE, anxiety, chronic low back pain, B knee replacement, CABG in 2007. Response To Previous Treatment: Not applicable  Family / Caregiver Present: No  Referring Practitioner: George Liu MD  Referral Date : 07/03/20  Diagnosis: abdominal pain  Follows Commands: Within Functional Limits  General Comment  Comments: Patient sitting EOB upon arrival.  Subjective  Subjective: Patient agreeable to PT evaluation.    Pain Screening  Patient Currently in Pain: Denies  Vital Signs  Patient Currently in Pain: Denies       Orientation  Orientation  Overall Orientation Status: Within Normal Limits  Social/Functional History  Social/Functional History  Lives With: Spouse  Type of Home: House  Home Layout: Two level, Bed/Bath upstairs, 1/2 bath on main level  Home Access: Stairs to enter without rails  Entrance Stairs - Number of Steps: 2  Bathroom Shower/Tub: Walk-in shower  Bathroom Toilet: Standard  Bathroom Equipment: (none)  Home Equipment: 4 wheeled walker  ADL Assistance: Independent  Homemaking Assistance: Needs assistance(patient typically has someone to assist with cleaning, needs to get this started back up)  Ambulation Assistance: Independent(with 4WW as needed)  Transfer Assistance: Independent  Additional Comments: patient denies history of falls at home  Cognition   Cognition  Overall Cognitive Status: WNL    Objective          AROM RLE (degrees)  RLE AROM: WFL  AROM LLE (degrees)  LLE AROM : WFL  Strength RLE  Strength RLE: WFL  Strength LLE  Strength LLE: WFL        Bed mobility  Comment: patient sitting EOB upon arrival, up in bathroom at end of session  Transfers  Sit to Stand: Modified independent  Stand to sit: Modified independent  Ambulation  Ambulation?: Yes  Ambulation 1  Surface: level tile  Device: Rollator  Assistance: Modified Independent  Quality of Gait: steady gait with no loss of balance noted, nick and mechanics WFL  Distance: 250' in room and hallways, returning to EOB, 10' into bathroom  Stairs/Curb  Stairs?: No(patient declined stair trial, reports no concerns regarding stairs at home)     Balance  Sitting - Static: Good  Sitting - Dynamic: Good  Standing - Static: Good  Standing - Dynamic: Good(mod I to ambulate with rollator)        Plan   Plan  Times per week: d/c from acute care PT  Safety Devices  Type of devices: (patient up in bathroom upon exit, RN aware)    OutComes Score                                                  AM-PAC Score  AM-PAC Inpatient Mobility Raw Score : 23 (07/05/20 1047)  AM-PAC Inpatient T-Scale Score : 56.93 (07/05/20 1047)  Mobility Inpatient CMS 0-100% Score: 11.2 (07/05/20 1047)  Mobility Inpatient CMS G-Code Modifier : CI (07/05/20 1047)          Goals  Short term goals  Time Frame for Short term goals: none; patient will be discharged from acute care PT  Patient Goals Patient goals : to go home       Therapy Time   Individual Concurrent Group Co-treatment   Time In 0945         Time Out 1008         Minutes 23         Timed Code Treatment Minutes: 8 Minutes    Timed Code Treatment Minutes:  8 Minutes    Total Treatment Minutes:    8 minutes treatment + 15 minutes evaluation = 23 total treatment minutes    Rahel CHENG [address]

## 2020-07-06 ENCOUNTER — APPOINTMENT (OUTPATIENT)
Dept: GENERAL RADIOLOGY | Age: 80
DRG: 375 | End: 2020-07-06
Payer: MEDICARE

## 2020-07-06 VITALS
OXYGEN SATURATION: 100 % | BODY MASS INDEX: 22.42 KG/M2 | HEIGHT: 68 IN | SYSTOLIC BLOOD PRESSURE: 174 MMHG | WEIGHT: 147.93 LBS | TEMPERATURE: 97.2 F | DIASTOLIC BLOOD PRESSURE: 67 MMHG | HEART RATE: 72 BPM | RESPIRATION RATE: 18 BRPM

## 2020-07-06 PROBLEM — R07.89 CHEST PAIN, ATYPICAL: Status: ACTIVE | Noted: 2020-07-06

## 2020-07-06 PROBLEM — R07.82 INTERCOSTAL PAIN: Status: ACTIVE | Noted: 2020-07-06

## 2020-07-06 LAB
ALBUMIN SERPL-MCNC: 3.4 G/DL (ref 3.4–5)
ANION GAP SERPL CALCULATED.3IONS-SCNC: 7 MMOL/L (ref 3–16)
BUN BLDV-MCNC: 17 MG/DL (ref 7–20)
CALCIUM SERPL-MCNC: 8.7 MG/DL (ref 8.3–10.6)
CHLORIDE BLD-SCNC: 97 MMOL/L (ref 99–110)
CO2: 24 MMOL/L (ref 21–32)
CREAT SERPL-MCNC: 0.7 MG/DL (ref 0.6–1.2)
EKG ATRIAL RATE: 86 BPM
EKG DIAGNOSIS: NORMAL
EKG P AXIS: 48 DEGREES
EKG P-R INTERVAL: 212 MS
EKG Q-T INTERVAL: 382 MS
EKG QRS DURATION: 94 MS
EKG QTC CALCULATION (BAZETT): 457 MS
EKG R AXIS: 10 DEGREES
EKG T AXIS: 48 DEGREES
EKG VENTRICULAR RATE: 86 BPM
GFR AFRICAN AMERICAN: >60
GFR NON-AFRICAN AMERICAN: >60
GLUCOSE BLD-MCNC: 107 MG/DL (ref 70–99)
GLUCOSE BLD-MCNC: 140 MG/DL (ref 70–99)
GLUCOSE BLD-MCNC: 167 MG/DL (ref 70–99)
GLUCOSE BLD-MCNC: 212 MG/DL (ref 70–99)
HCT VFR BLD CALC: 23.4 % (ref 36–48)
HEMOGLOBIN: 8.1 G/DL (ref 12–16)
LV EF: 66 %
LVEF MODALITY: NORMAL
MAGNESIUM: 1.8 MG/DL (ref 1.8–2.4)
MCH RBC QN AUTO: 32.7 PG (ref 26–34)
MCHC RBC AUTO-ENTMCNC: 34.8 G/DL (ref 31–36)
MCV RBC AUTO: 93.8 FL (ref 80–100)
PDW BLD-RTO: 18.1 % (ref 12.4–15.4)
PERFORMED ON: ABNORMAL
PHOSPHORUS: 3 MG/DL (ref 2.5–4.9)
PLATELET # BLD: 128 K/UL (ref 135–450)
PMV BLD AUTO: 7.9 FL (ref 5–10.5)
POTASSIUM SERPL-SCNC: 3.9 MMOL/L (ref 3.5–5.1)
RBC # BLD: 2.49 M/UL (ref 4–5.2)
SODIUM BLD-SCNC: 128 MMOL/L (ref 136–145)
WBC # BLD: 5.2 K/UL (ref 4–11)

## 2020-07-06 PROCEDURE — 3430000000 HC RX DIAGNOSTIC RADIOPHARMACEUTICAL: Performed by: INTERNAL MEDICINE

## 2020-07-06 PROCEDURE — 2580000003 HC RX 258: Performed by: INTERNAL MEDICINE

## 2020-07-06 PROCEDURE — 74018 RADEX ABDOMEN 1 VIEW: CPT

## 2020-07-06 PROCEDURE — 36415 COLL VENOUS BLD VENIPUNCTURE: CPT

## 2020-07-06 PROCEDURE — 6360000002 HC RX W HCPCS: Performed by: INTERNAL MEDICINE

## 2020-07-06 PROCEDURE — 6370000000 HC RX 637 (ALT 250 FOR IP): Performed by: STUDENT IN AN ORGANIZED HEALTH CARE EDUCATION/TRAINING PROGRAM

## 2020-07-06 PROCEDURE — 93017 CV STRESS TEST TRACING ONLY: CPT

## 2020-07-06 PROCEDURE — 80069 RENAL FUNCTION PANEL: CPT

## 2020-07-06 PROCEDURE — 6370000000 HC RX 637 (ALT 250 FOR IP): Performed by: INTERNAL MEDICINE

## 2020-07-06 PROCEDURE — 2580000003 HC RX 258: Performed by: STUDENT IN AN ORGANIZED HEALTH CARE EDUCATION/TRAINING PROGRAM

## 2020-07-06 PROCEDURE — 78452 HT MUSCLE IMAGE SPECT MULT: CPT

## 2020-07-06 PROCEDURE — 83735 ASSAY OF MAGNESIUM: CPT

## 2020-07-06 PROCEDURE — 6360000002 HC RX W HCPCS: Performed by: STUDENT IN AN ORGANIZED HEALTH CARE EDUCATION/TRAINING PROGRAM

## 2020-07-06 PROCEDURE — 85027 COMPLETE CBC AUTOMATED: CPT

## 2020-07-06 PROCEDURE — 99232 SBSQ HOSP IP/OBS MODERATE 35: CPT | Performed by: SURGERY

## 2020-07-06 PROCEDURE — A9502 TC99M TETROFOSMIN: HCPCS | Performed by: INTERNAL MEDICINE

## 2020-07-06 RX ORDER — METOPROLOL SUCCINATE 25 MG/1
75 TABLET, EXTENDED RELEASE ORAL DAILY
Qty: 30 TABLET | Refills: 3 | Status: CANCELLED | OUTPATIENT
Start: 2020-07-07

## 2020-07-06 RX ORDER — AMLODIPINE BESYLATE 10 MG/1
10 TABLET ORAL NIGHTLY
Qty: 30 TABLET | Refills: 0 | Status: ON HOLD
Start: 2020-07-06 | End: 2020-07-30 | Stop reason: HOSPADM

## 2020-07-06 RX ORDER — MAGNESIUM SULFATE IN WATER 40 MG/ML
2 INJECTION, SOLUTION INTRAVENOUS ONCE
Status: COMPLETED | OUTPATIENT
Start: 2020-07-06 | End: 2020-07-06

## 2020-07-06 RX ORDER — BISACODYL 10 MG
10 SUPPOSITORY, RECTAL RECTAL DAILY PRN
Qty: 30 SUPPOSITORY | Refills: 0 | Status: SHIPPED | OUTPATIENT
Start: 2020-07-06 | End: 2020-08-05

## 2020-07-06 RX ORDER — POTASSIUM CHLORIDE 750 MG/1
10 TABLET, EXTENDED RELEASE ORAL ONCE
Status: DISCONTINUED | OUTPATIENT
Start: 2020-07-06 | End: 2020-07-06 | Stop reason: HOSPADM

## 2020-07-06 RX ORDER — ASPIRIN 81 MG/1
81 TABLET ORAL DAILY
Qty: 30 TABLET | Refills: 3 | Status: SHIPPED | OUTPATIENT
Start: 2020-07-06 | End: 2020-07-22 | Stop reason: ALTCHOICE

## 2020-07-06 RX ORDER — SODIUM CHLORIDE 0.9 % (FLUSH) 0.9 %
10 SYRINGE (ML) INJECTION PRN
Status: COMPLETED | OUTPATIENT
Start: 2020-07-06 | End: 2020-07-06

## 2020-07-06 RX ORDER — LOSARTAN POTASSIUM 100 MG/1
100 TABLET ORAL DAILY
Qty: 30 TABLET | Refills: 0 | Status: ON HOLD | OUTPATIENT
Start: 2020-07-06 | End: 2020-09-18

## 2020-07-06 RX ORDER — METOPROLOL SUCCINATE 100 MG/1
100 TABLET, EXTENDED RELEASE ORAL DAILY
Qty: 30 TABLET | Refills: 3 | Status: SHIPPED | OUTPATIENT
Start: 2020-07-07

## 2020-07-06 RX ORDER — METOPROLOL SUCCINATE 25 MG/1
25 TABLET, EXTENDED RELEASE ORAL ONCE
Status: COMPLETED | OUTPATIENT
Start: 2020-07-06 | End: 2020-07-06

## 2020-07-06 RX ORDER — METOPROLOL SUCCINATE 100 MG/1
100 TABLET, EXTENDED RELEASE ORAL DAILY
Status: DISCONTINUED | OUTPATIENT
Start: 2020-07-07 | End: 2020-07-06 | Stop reason: HOSPADM

## 2020-07-06 RX ORDER — SODIUM CHLORIDE 9 MG/ML
INJECTION, SOLUTION INTRAVENOUS CONTINUOUS
Status: DISCONTINUED | OUTPATIENT
Start: 2020-07-06 | End: 2020-07-06

## 2020-07-06 RX ADMIN — METOPROLOL SUCCINATE 25 MG: 25 TABLET, EXTENDED RELEASE ORAL at 12:47

## 2020-07-06 RX ADMIN — REGADENOSON 0.4 MG: 0.08 INJECTION, SOLUTION INTRAVENOUS at 11:10

## 2020-07-06 RX ADMIN — SODIUM CHLORIDE: 9 INJECTION, SOLUTION INTRAVENOUS at 03:57

## 2020-07-06 RX ADMIN — INSULIN LISPRO 3 UNITS: 100 INJECTION, SOLUTION INTRAVENOUS; SUBCUTANEOUS at 14:22

## 2020-07-06 RX ADMIN — ACETAMINOPHEN 650 MG: 325 TABLET ORAL at 08:22

## 2020-07-06 RX ADMIN — APIXABAN 5 MG: 2.5 TABLET, FILM COATED ORAL at 08:22

## 2020-07-06 RX ADMIN — SIMETHICONE 80 MG: 80 TABLET, CHEWABLE ORAL at 08:23

## 2020-07-06 RX ADMIN — POLYETHYLENE GLYCOL 3350 17 G: 17 POWDER, FOR SOLUTION ORAL at 08:24

## 2020-07-06 RX ADMIN — ASPIRIN 81 MG: 81 TABLET, COATED ORAL at 08:22

## 2020-07-06 RX ADMIN — DOCUSATE SODIUM 50 MG AND SENNOSIDES 8.6 MG 2 TABLET: 8.6; 5 TABLET, FILM COATED ORAL at 08:23

## 2020-07-06 RX ADMIN — Medication 10 ML: at 09:46

## 2020-07-06 RX ADMIN — HYDRALAZINE HYDROCHLORIDE 10 MG: 20 INJECTION INTRAMUSCULAR; INTRAVENOUS at 04:05

## 2020-07-06 RX ADMIN — Medication 10 ML: at 08:27

## 2020-07-06 RX ADMIN — VENLAFAXINE HYDROCHLORIDE 75 MG: 75 CAPSULE, EXTENDED RELEASE ORAL at 08:22

## 2020-07-06 RX ADMIN — Medication 10 ML: at 11:07

## 2020-07-06 RX ADMIN — HYDRALAZINE HYDROCHLORIDE 10 MG: 20 INJECTION INTRAMUSCULAR; INTRAVENOUS at 12:47

## 2020-07-06 RX ADMIN — MAGNESIUM SULFATE 2 G: 2 INJECTION INTRAVENOUS at 14:09

## 2020-07-06 RX ADMIN — AMLODIPINE BESYLATE 10 MG: 10 TABLET ORAL at 08:22

## 2020-07-06 RX ADMIN — TETROFOSMIN 34.4 MILLICURIE: 1.38 INJECTION, POWDER, LYOPHILIZED, FOR SOLUTION INTRAVENOUS at 11:08

## 2020-07-06 RX ADMIN — PREGABALIN 75 MG: 75 CAPSULE ORAL at 08:23

## 2020-07-06 RX ADMIN — METOPROLOL SUCCINATE 50 MG: 50 TABLET, EXTENDED RELEASE ORAL at 08:22

## 2020-07-06 RX ADMIN — TETROFOSMIN 11 MILLICURIE: 1.38 INJECTION, POWDER, LYOPHILIZED, FOR SOLUTION INTRAVENOUS at 09:46

## 2020-07-06 RX ADMIN — LOSARTAN POTASSIUM 100 MG: 100 TABLET, FILM COATED ORAL at 08:23

## 2020-07-06 ASSESSMENT — PAIN DESCRIPTION - DESCRIPTORS: DESCRIPTORS: ACHING

## 2020-07-06 ASSESSMENT — PAIN SCALES - GENERAL
PAINLEVEL_OUTOF10: 0
PAINLEVEL_OUTOF10: 5

## 2020-07-06 ASSESSMENT — PAIN DESCRIPTION - ONSET: ONSET: ON-GOING

## 2020-07-06 ASSESSMENT — PAIN DESCRIPTION - ORIENTATION: ORIENTATION: ANTERIOR

## 2020-07-06 ASSESSMENT — PAIN - FUNCTIONAL ASSESSMENT: PAIN_FUNCTIONAL_ASSESSMENT: PREVENTS OR INTERFERES SOME ACTIVE ACTIVITIES AND ADLS

## 2020-07-06 ASSESSMENT — PAIN DESCRIPTION - PROGRESSION
CLINICAL_PROGRESSION: NOT CHANGED

## 2020-07-06 ASSESSMENT — PAIN DESCRIPTION - LOCATION: LOCATION: HEAD

## 2020-07-06 ASSESSMENT — PAIN DESCRIPTION - FREQUENCY: FREQUENCY: CONTINUOUS

## 2020-07-06 ASSESSMENT — PAIN DESCRIPTION - PAIN TYPE: TYPE: ACUTE PAIN

## 2020-07-06 NOTE — PROGRESS NOTES
Patient is discharging home at this time. IV and Telemetry box removed. Discharge instructions and prescriptions reviewed, all questions answered. Patient and spouse sated had no further questions at this time. All personal belongings packed and sent with patient. Patient leaving the unit via wheelchair with nursing staff to much  at front entrance and drive home.

## 2020-07-06 NOTE — DISCHARGE SUMMARY
Hospital Medicine Discharge Summary    Patient ID: Alyssa Elias   Gender: female  : 1940   Age: 67 y.o. MRN: [id number]  Code Status: Full Code   Patient's PCP: Brenda Rader MD    Admit Date: 7/3/2020     Discharge Date:   2020    Admitting Physician: No admitting provider for patient encounter. Discharge Physician: Dearl Cranker, MD     Discharge Diagnoses: Active Hospital Problems    Diagnosis Date Noted    Abdominal pain [R10.9] 2020     Priority: High    Chest pain, atypical [R07.89] 2020     Priority: Medium       The patient was seen and examined on day of discharge and this discharge summary is in conjunction with any daily progress note from day of discharge. Hospital Course:   67 y. o. female with a PMHx of DM, HTN, HLD, CAD, CABG (), Depression, ovarian cancer, who presented to [facility] with abdominal pain, emesis and constipation. She also stated that she had chest pain when she moved and leaned forward but that it was not related to taking stairs or activity level. Pt stated that 1 week ago pt completed a course of chemotherapy. Pt denied any fever, chills, SOB, and changes in medication or eating habits. Patient was given an enema overnight with successful bowel movement and improvement in symptoms. Due to troponemia of 0.02 x3, pt received a cardiac stress test on Monday that showed no evidence of ischemia and was cleared per cardio for discharge. Initially, CT found dilated small bowel with no transition, which looked similar to the previous CT and was consistent with an ileus. Later, an addendum was made by radiology stating that there is a 2.7 x 2.3 cm enhancing mural mass or intraluminal lesion within the dilated small bowel which appears to be the obstructing point with decompressed small bowel in the pelvis. Surgery was notified and instructed the patient go on a full liquid diet.  Surgery recommended not intervening surgically as risks of operating outweigh the benefit. They recommended to have the patient closely follow up with her oncologist. Patient received a repeat abdominal Xray that was unchanged from the previous one with no acute abnormalities. Patient was discharged stable and was told to maintain a full liquid diet and to follow up closely with her oncologist.       Disposition:  Home    Physical Exam Performed:     BP (!) 174/67   Pulse 72   Temp 97.2 °F (36.2 °C) (Oral)   Resp 18   Ht 5' 8\" (1.727 m)   Wt 147 lb 14.9 oz (67.1 kg)   SpO2 100%   BMI 22.49 kg/m²       General appearance:  No apparent distress, appears stated age and cooperative. HEENT:  Normal cephalic, atraumatic without obvious deformity. Pupils equal, round, and reactive to light. Extra ocular muscles intact. Conjunctivae/corneas clear. Neck: Supple, with full range of motion. No jugular venous distention. Trachea midline. Respiratory:  Normal respiratory effort. Clear to auscultation, bilaterally without Rales/Wheezes/Rhonchi. Cardiovascular:  Regular rate and rhythm with normal S1/S2 without murmurs, rubs or gallops. Abdomen: Soft, non-tender, non-distended with normal bowel sounds. Musculoskeletal:  No clubbing, cyanosis or edema bilaterally. Full range of motion without deformity. Skin: Skin color, texture, turgor normal.  No rashes or lesions. Neurologic:  Neurovascularly intact without any focal sensory/motor deficits. Cranial nerves: II-XII intact, grossly non-focal.  Psychiatric:  Alert and oriented, thought content appropriate, normal insight  Capillary Refill: Brisk,< 3 seconds   Peripheral Pulses: +2 palpable, equal bilaterally       Labs:  For convenience and continuity at follow-up the following most recent labs are provided:      CBC:    Lab Results   Component Value Date    WBC 5.2 07/06/2020    HGB 8.1 07/06/2020    HCT 23.4 07/06/2020     07/06/2020       Renal:    Lab Results   Component Value Date     07/06/2020 K 3.9 07/06/2020    K 5.0 06/21/2020    CL 97 07/06/2020    CO2 24 07/06/2020    BUN 17 07/06/2020    CREATININE 0.7 07/06/2020    CALCIUM 8.7 07/06/2020    PHOS 3.0 07/06/2020         Significant Diagnostic Studies    Radiology:   NM MYOCARDIAL SPECT REST EXERCISE OR RX   Final Result      XR ABDOMEN (KUB) (SINGLE AP VIEW)   Final Result   Impression: No significant change or acute abnormality. XR ABDOMEN (2 VIEWS)   Final Result   1. Gas within the transverse colon with no evidence of obstruction or residual contrast.   2. Small bowel dilatation in the lower abdomen, pelvis persists      CT ABDOMEN PELVIS W IV CONTRAST Additional Contrast? None   Final Result   Addendum 1 of 1   [                    ADDENDUM #1   Addendum by Dr. Vandana Brown dictation by Dr. Jacob Brownlee      On axial image 90 of series 609 corresponding to sagittal image 72 of    series 611 and coronal image 36 of 610      There is a 2.7 x 2.3 cm enhancing mural mass or intraluminal lesion within    the dilated small bowel which appears to be the obstructing point with    decompressed small bowel in the pelvis. FINDINGS reviewed with surgery resident staff. Final      CT CHEST PULMONARY EMBOLISM W CONTRAST   Final Result   Addendum 1 of 1   [                   ADDENDUM #1    Addendum by Dr. Vandana Brown dictation by Dr. Jacob Brownlee      On axial image 90 of series 609 corresponding to sagittal image 72 of    series 611 and coronal image 36 of 610      There is a 2.7 x 2.3 cm enhancing mural mass or intraluminal lesion within    the dilated small bowel which appears to be the obstructing point with    decompressed small bowel in the pelvis. FINDINGS reviewed with surgery resident staff.       Final      XR Acute Abd Series Chest 1 VW   Final Result             Consults:     IP CONSULT TO ONCOLOGY  IP CONSULT TO HOSPITALIST  IP CONSULT TO CARDIOLOGY  IP CONSULT TO GENERAL SURGERY  IP CONSULT TO GYNECOLOGIC ONCOLOGY    Disposition:  Home     Condition at Discharge: Stable    Discharge Instructions/Follow-up:   For your blood pressure take Toprol  mg daily, Norvasc 10 mg nightly and Losartan 100 mg daily. Continue with full liquid diet to avoid small bowel obstruction. Take Ensures 3-4 times daily to ensure good nutritional supplementation (Breakfast, Lunch, Dinner and [employer]). A small bowel mass was identified on the CT abdomen on 7/4/2020 and management is currently non-operative. Should you have worsening abdominal pain, despite full liquid diet and rectal suppository for constipation, please return to the hospital as you could have developed a complete bowel obstruction and will require surgical re-evaluation at that time. Your cardiac stress test on 7/6/2020 was normal.   Follow-up with your cancer doctors and a copy of the CT abdomen has been provided to them. Code Status:  Full Code    Activity: activity as tolerated    Diet: clear liquids      Discharge Medications:     Current Discharge Medication List           Details   metoprolol succinate (TOPROL XL) 100 MG extended release tablet Take 1 tablet by mouth daily  Qty: 30 tablet, Refills: 3      bisacodyl (BISAC-EVAC) 10 MG suppository Place 1 suppository rectally daily as needed for Constipation  Qty: 30 suppository, Refills: 0              Details   aspirin 81 MG EC tablet Take 1 tablet by mouth daily  Qty: 30 tablet, Refills: 3      apixaban (ELIQUIS) 5 MG TABS tablet Take 1 tablet by mouth 2 times daily  Qty: 60 tablet, Refills: 0      losartan (COZAAR) 100 MG tablet Take 1 tablet by mouth daily Pt. takes 100mg of Cozaar daily. Qty: 30 tablet, Refills: 0      amLODIPine (NORVASC) 10 MG tablet Take 1 tablet by mouth nightly  Qty: 30 tablet, Refills: 0              Details   oxyCODONE-acetaminophen (PERCOCET)  MG per tablet Take 1 tablet by mouth every 8 hours as needed for Pain .  Earliest Fill Date: 8/7/17  Qty: 90 tablet, Refills: 0      insulin glargine (LANTUS) 100 UNIT/ML injection vial Inject 5 Units into the skin nightly  Qty: 1 vial, Refills: 3      insulin lispro (HUMALOG) 100 UNIT/ML injection vial Inject 0-12 Units into the skin 3 times daily (with meals)  Qty: 1 vial, Refills: 3      Pregabalin (LYRICA PO) Take 75 mg by mouth 2 times daily Usually takes only BID      rosuvastatin (CRESTOR) 40 MG tablet Take 40 mg by mouth every evening. alprazolam (XANAX) 0.5 MG tablet Take 0.5 mg by mouth nightly. ropinirole (REQUIP) 1 MG tablet Take 2 mg by mouth nightly       venlafaxine (EFFEXOR-XR) 37.5 MG XR capsule Take 75 mg by mouth daily              Time Spent on discharge is more than 30 minutes in the examination, evaluation, counseling and review of medications and discharge plan. Signed:    Srinivasan Wolfe MD   7/6/2020      Thank you Echo Marmolejo MD for the opportunity to be involved in this patient's care.

## 2020-07-06 NOTE — PROGRESS NOTES
[facility]  Gynecologic Oncology   Progress Note    Recurrent ovarian ca on Doxil/carbo  N/v/constipation  Elevated troponin    SUBJECTIVE:  Pt in procedure, +BM    OBJECTIVE:           Physical Exam  VITALS:  BP (!) 174/67   Pulse 72   Temp 97.2 °F (36.2 °C) (Oral)   Resp 18   Ht 5' 8\" (1.727 m)   Wt 147 lb 14.9 oz (67.1 kg)   SpO2 100%   BMI 22.49 kg/m²   Pt not in room spoke with  for today's evaluation and reviewed all notes     DATA:  CBC with Differential:    Lab Results   Component Value Date    WBC 5.2 07/06/2020    RBC 2.49 07/06/2020    RBC 3.13 06/19/2017    HGB 8.1 07/06/2020    HCT 23.4 07/06/2020     07/06/2020    MCV 93.8 07/06/2020    MCH 32.7 07/06/2020    MCHC 34.8 07/06/2020    RDW 18.1 07/06/2020    SEGSPCT 63 05/21/2012    BANDSPCT 3 06/21/2020    METASPCT 1 06/21/2020    LYMPHOPCT 7.3 07/03/2020    LYMPHOPCT 30.6 06/19/2017    MONOPCT 5.7 07/03/2020    EOSPCT 2 04/23/2012    BASOPCT 0.6 07/03/2020    MONOSABS 0.3 07/03/2020    LYMPHSABS 0.4 07/03/2020    EOSABS 0.0 07/03/2020    BASOSABS 0.0 07/03/2020     BMP:    Lab Results   Component Value Date     07/06/2020    K 3.9 07/06/2020    K 5.0 06/21/2020    CL 97 07/06/2020    CO2 24 07/06/2020    BUN 17 07/06/2020    LABALBU 3.4 07/06/2020    CREATININE 0.7 07/06/2020    CALCIUM 8.7 07/06/2020    GFRAA >60 07/06/2020    GFRAA 100 04/23/2012    LABGLOM >60 07/06/2020    GLUCOSE 107 07/06/2020    GLUCOSE 212 06/19/2017     Magnesium:    Lab Results   Component Value Date    MG 1.80 07/06/2020     Phosphorus:    Lab Results   Component Value Date    PHOS 3.0 07/06/2020       ASSESSMENT AND PLAN:    1. GI-not passing stool. Pt has a h/o GI dysfunction. Today's KUB does not suggest obstruction or ileus. 2. [facility]-getting stress test currently. 3. ONC-last  with improvement has been on carbo/doxil. DEBRA on last CT scan.  Can consider observation as she did not tolerate avastin and progressed on a parp-I.   4. F/u with me in the office. Cinda Romano, [address] [place] [address] Oncology  [phone])

## 2020-07-06 NOTE — CARE COORDINATION
Case Management Assessment           Initial Evaluation                Date / Time of Evaluation: 7/6/2020 2:31 PM                 Assessment Completed by: Nakia Prabhakar    Patient Name: Cathy Broussard     YOB: 1940  Diagnosis: Abdominal pain [R10.9]  Abdominal pain [R10.9]     Date / Time: 7/3/2020  6:37 AM    Patient Admission Status: Inpatient    If patient is discharged prior to next notation, then this note serves as note for discharge by case management.      Current PCP: Abram Rodriguez MD  Clinic Patient: No    Chart Reviewed: Yes  Patient/ Family Interviewed: Yes    Initial assessment completed at bedside with: patient    Hospitalization in the last 30 days: No    Emergency Contacts:  Extended Emergency Contact Information  Primary Emergency Contact: Angi [facility]  Address: [address] Phone: [phone]  Mobile Phone: [phone]  Relation: Spouse  Secondary Emergency Contact: Rachele Arroyo  Address:                 Home Phone: [phone]  Relation: Child    Advance Directives:   Code Status: Full [address]: No       Copy present: No     In paper Chart: No    Scanned into EMR No    Financial  Payor: MEDICARE / Plan: MEDICARE PART A AND B / Product Type: *No Product type* /     Pre-cert required for SNF: No    Pharmacy    [address] 609 [facility] 9  F [phone]  [address] [place] [address]  Phone: [phone] Fax: [phone] [facility] 151 609 [facility], [address] [place] [address]  Phone: [phone] Fax: 102 [facility] [address] [place] [address] [phone] [address]  [facility] [address]  Phone: [phone] Fax: [phone]      Potential assistance Purchasing Medications: Potential Assistance Purchasing Medications: No  Does Patient want to participate in local refill/ meds to beds program?: No    Meds To Beds General Rules:  1. Can ONLY be done Monday- Friday between 8:30am-5pm  2. Prescription(s) must be in pharmacy by 3pm to be filled same day  3. Copy of patient's insurance/ prescription drug card and patient face sheet must be sent along with the prescription(s)  4. Cost of Rx cannot be added to hospital bill. If financial assistance is needed, please contact unit  or ;  or  CANNOT provide pharmacy voucher for patients co-pays  5.  Patients can then  the prescription on their way out of the hospital at discharge, or pharmacy can deliver to the bedside if staff is available. (payment due at time of pick-up or delivery - cash, check, or card accepted)     Able to afford home medications/ co-pay costs: Yes    ADLS  Support Systems: Spouse/Significant Other    PT AM-PAC: 23 /24  OT AM-PAC:   /24    [place]: home with   Steps:      Plans to RETURN to current housing: Yes  Barriers to RETURNING to current housing: medical stability    Home Care Information  Currently ACTIVE with [address]: No  Home Care Agency: Not Applicable    Currently ACTIVE with [place] on Aging: No  Passport/ Waiver: No  Passport/ Waiver Services: Not Applicable    Durable Medical Equipment  DME Provider:    Equipment: 4 wheeled walker w/seat    Home Oxygen and [address] prior to admission: [address]: Not Applicable  Other Respiratory Equipment:      Dialysis  Active with HD/PD prior to admission: No  Nephrologist:      HD Center:  Not Applicable    DISCHARGE PLAN:  Disposition: Home- No Services Needed    Transportation PLAN for discharge: family     Factors facilitating achievement of predicted outcomes: Family support    Barriers to discharge: medical stability    Additional Case Management Notes:   CM met with pt. She lives at home with . She states she has never had home care and does not want home care at this time. Pt's  can transport at [facility]. CM will continue to follow for any potential needs. No needs anticipated at this time. The Plan for Transition of Care is related to the following treatment goals of Abdominal pain [R10.9]  Abdominal pain [R10.9]    The Patient and/or patient representative Jim Smith and her family were provided with a choice of provider and agrees with the discharge plan Not Indicated    Freedom of choice list was provided with basic dialogue that supports the patient's individualized plan of care/goals and shares the quality data associated with the providers.  Not Indicated    Care Transition patient: No    Yesica Ram RN  [facility]  Case Management Department  Ph: [phone]

## 2020-07-06 NOTE — PLAN OF CARE
Problem: Pain:  Goal: Pain level will decrease  Description: Pain level will decrease  Outcome: Ongoing   Medicated with Percocet ES one tab for complaints of back pain. Denied any chest pain or SOB. Vital signs stable, afebrile. NPO after midnight for Lexiscan in AM. Will continue to monitor alteration in comfort.

## 2020-07-15 ENCOUNTER — HOSPITAL ENCOUNTER (OUTPATIENT)
Dept: GENERAL RADIOLOGY | Age: 80
Discharge: HOME | End: 2020-07-15
Payer: MEDICARE

## 2020-07-15 PROCEDURE — 74250 X-RAY XM SM INT 1CNTRST STD: CPT

## 2020-07-21 ENCOUNTER — NURSE ONLY (OUTPATIENT)
Dept: PRIMARY CARE CLINIC | Age: 80
End: 2020-07-21
Payer: MEDICARE

## 2020-07-21 PROCEDURE — 99211 OFF/OP EST MAY X REQ PHY/QHP: CPT | Performed by: NURSE PRACTITIONER

## 2020-07-22 ENCOUNTER — ANESTHESIA EVENT (OUTPATIENT)
Dept: OPERATING ROOM | Age: 80
DRG: 330 | End: 2020-07-22
Payer: MEDICARE

## 2020-07-22 ENCOUNTER — TELEPHONE (OUTPATIENT)
Dept: SURGERY | Age: 80
End: 2020-07-22

## 2020-07-22 RX ORDER — ALBUTEROL SULFATE 90 UG/1
2 AEROSOL, METERED RESPIRATORY (INHALATION) EVERY 4 HOURS PRN
COMMUNITY

## 2020-07-22 NOTE — PROGRESS NOTES
Patient's  called pat office and stated patient throwing up bowel prep- states he had put a call into michael from dr. Joon Pedraza office;pat nurse left message with michael that patient threw up bowel prep and to call  back with further instructions- made aware pat nurse left message for michael to call him back also

## 2020-07-22 NOTE — PROGRESS NOTES
Pat interview finished and completed with  sarah-maddie meds and pre-op instructions with - instructed to call dr. Ritchie Scott office for instructions on what meds patient should take [facility]

## 2020-07-22 NOTE — PROGRESS NOTES
PARTIAL INTERVIEW DONE WITH PATIENT. PATIENT STATES  DOES HER MEDS FOR HER AND KNOWS MORE OF HER HEALTH HISTORY.   NOT AT HOME AT PRESENT PATIENT STATES HE WILL BE HOME BY NOON TO CALL THEN-7/22/2020

## 2020-07-22 NOTE — PROGRESS NOTES
[facility] time__1245__________        Surgery time__1445__________    Take the following medications with a sip of water: Follow your MD/Surgeons pre-procedure instructions regarding your medications    Do not eat or drink anything after 12:00 midnight prior to your surgery. This includes water chewing gum, mints and ice chips. You may brush your teeth and gargle the morning of your surgery, but do not swallow the water     Please see your family doctor/pediatrician for a history and physical and/or concerning medications. Bring any test results/reports from your physicians office. If you are under the care of a heart doctor or specialist doctor, please be aware that you may be asked to them for clearance    You may be asked to stop blood thinners such as Coumadin, Plavix, Fragmin, Lovenox, etc., or any anti-inflammatories such as:  Aspirin, Ibuprofen, Advil, Naproxen prior to your surgery. We also ask that you stop any OTC medications such as fish oil, vitamin E, glucosamine, garlic, Multivitamins, COQ 10, etc. Stop eloquis    We ask that you do not smoke 24 hours prior to surgery  We ask that you do not  drink any alcoholic beverages 24 hours prior to surgery     You must make arrangements for a responsible adult to take you home after your surgery. For your safety you will not be allowed to leave alone or drive yourself home. Your surgery will be cancelled if you do not have a ride home. Also for your safety, it is strongly suggested that someone stay with you the first 24 hours after your surgery. A parent or legal guardian must accompany a child scheduled for surgery and plan to stay at the hospital until the child is discharged. Please do not bring other children with you. For your comfort, please wear simple loose fitting clothing to the hospital.  Please do not bring valuables.     Do not wear any make-up or nail polish on your fingers or toes      For your safety, please do not wear any jewelry or body piercing's on the day of surgery. All jewelry must be removed. If you have dentures, they will be removed before going to operating room. For your convenience, we will provide you with a container. If you wear contact lenses or glasses, they will be removed, please bring a case for them. If you have a living will and a durable power of  for healthcare, please bring in a copy. As part of our patient safety program to minimize surgical site infections, we ask you to do the following:    · Please notify your surgeon if you develop any illness between         now and the  day of your surgery. · This includes a cough, cold, fever, sore throat, nausea,         or vomiting, and diarrhea, etc.  ·  Please notify your surgeon if you experience dizziness, shortness         of breath or blurred vision between now and the time of your surgery. Do not shave your operative site 96 hours prior to surgery. For face and neck surgery, men may use an electric razor 48 hours   prior to surgery. You may shower the night before surgery or the morning of   your surgery with an antibacterial soap. You will need to bring a photo ID and insurance card    [facility] has an onsite pharmacy, would you like to utilize our pharmacy     If you will be staying overnight and use a C-pap machine, please bring   your C-pap to hospital     Our goal is to provide you with excellent care, therefore, visitors will be limited to two(2) in the room at a time so that we may focus on providing this care for you. Please contact pre-admission testing if you have any further questions. [facility] phone number:  [phone] PAT fax number:  [phone]  Please note these are generalized instructions for all surgical cases, you may be provided with more specific instructions according to your surgery.

## 2020-07-22 NOTE — PROGRESS NOTES
DR. Riley Rounds OFFICE CALLED FOR NEW ANTIBIOTIC ORDERS TO BE FAXED-PATIENT ALLERGIC TO [facility]-SPOKE WITH FREDO

## 2020-07-22 NOTE — TELEPHONE ENCOUNTER
Jack basilio/ [facility] is requesting a return call regarding co-case w/ Dr Mikal Melendrez. Please advise. Thank you!

## 2020-07-22 NOTE — PROGRESS NOTES
C-Difficile admission screening and protocol:     * Admitted with diarrhea? NO     *Prior history of C-Diff. In last 3 months? NO     *Antibiotic use in the past 6-8 weeks? NO     *Prior hospitalization or nursing home in the last month?    YES

## 2020-07-22 NOTE — PROGRESS NOTES
Preoperative Screening for Elective Surgery/Invasive Procedures While COVID-19 present in the community     Have you tested positive or have been told to self-isolate for COVID-19 like symptoms within the past 28 days? NO   Do you currently have any of the following symptoms? o Fever >100.0 F or 99.9 F in immunocompromised patients? NO  o New onset cough, shortness of breath or difficulty breathing? NO  o New onset sore throat, myalgia (muscle aches and pains), headache, loss of taste/smell or diarrhea? NO   Have you had a potential exposure to COVID-19 within the past 14 days by:NO  o Close contact with a confirmed case? NOClose contact with a healthcare worker,  or essential infrastructure worker (grocery store, [employer], gas station, public utilities or transportation)? NO  o Do you reside in a congregate setting such as; skilled nursing facility, adult home, correctional facility, homeless shelter or other institutional setting? NO  o Have you had recent travel to a known COVID-19 hotspot? NO    Indicate if the patient has a positive screen by answering yes to one or more of the above questions. Patients who test positive or screen positive prior to surgery or on the day of surgery should be evaluated in conjunction with the surgeon/proceduralist/anesthesiologist to determine the urgency of the procedure.

## 2020-07-23 ENCOUNTER — ANESTHESIA (OUTPATIENT)
Dept: OPERATING ROOM | Age: 80
DRG: 330 | End: 2020-07-23
Payer: MEDICARE

## 2020-07-23 ENCOUNTER — HOSPITAL ENCOUNTER (INPATIENT)
Age: 80
LOS: 7 days | Discharge: HOME | DRG: 330 | End: 2020-07-30
Attending: OBSTETRICS & GYNECOLOGY | Admitting: OBSTETRICS & GYNECOLOGY
Payer: MEDICARE

## 2020-07-23 VITALS
TEMPERATURE: 96 F | DIASTOLIC BLOOD PRESSURE: 55 MMHG | OXYGEN SATURATION: 100 % | SYSTOLIC BLOOD PRESSURE: 103 MMHG | RESPIRATION RATE: 21 BRPM

## 2020-07-23 PROBLEM — K56.600 PARTIAL SMALL BOWEL OBSTRUCTION (HCC): Status: ACTIVE | Noted: 2020-07-23

## 2020-07-23 LAB
A/G RATIO: 1.8 (ref 1.1–2.2)
ABO/RH: NORMAL
ALBUMIN SERPL-MCNC: 4.5 G/DL (ref 3.4–5)
ALP BLD-CCNC: 58 U/L (ref 40–129)
ALT SERPL-CCNC: 13 U/L (ref 10–40)
ANION GAP SERPL CALCULATED.3IONS-SCNC: 14 MMOL/L (ref 3–16)
ANTIBODY SCREEN: NORMAL
AST SERPL-CCNC: 28 U/L (ref 15–37)
BACTERIA: ABNORMAL /HPF
BASOPHILS ABSOLUTE: 0 K/UL (ref 0–0.2)
BASOPHILS RELATIVE PERCENT: 0.8 %
BILIRUB SERPL-MCNC: 0.7 MG/DL (ref 0–1)
BILIRUBIN URINE: ABNORMAL
BLOOD, URINE: ABNORMAL
BUN BLDV-MCNC: 16 MG/DL (ref 7–20)
CALCIUM SERPL-MCNC: 9.4 MG/DL (ref 8.3–10.6)
CHLORIDE BLD-SCNC: 97 MMOL/L (ref 99–110)
CLARITY: ABNORMAL
CO2: 22 MMOL/L (ref 21–32)
COLOR: ABNORMAL
COMMENT UA: ABNORMAL
CREAT SERPL-MCNC: 1.1 MG/DL (ref 0.6–1.2)
EOSINOPHILS ABSOLUTE: 0 K/UL (ref 0–0.6)
EOSINOPHILS RELATIVE PERCENT: 0.4 %
FINE CASTS, UA: ABNORMAL /LPF (ref 0–2)
GFR AFRICAN AMERICAN: 58
GFR NON-AFRICAN AMERICAN: 48
GLOBULIN: 2.5 G/DL
GLUCOSE BLD-MCNC: 186 MG/DL (ref 70–99)
GLUCOSE BLD-MCNC: 242 MG/DL (ref 70–99)
GLUCOSE BLD-MCNC: 252 MG/DL (ref 70–99)
GLUCOSE URINE: NEGATIVE MG/DL
HCT VFR BLD CALC: 28 % (ref 36–48)
HEMOGLOBIN: 9.6 G/DL (ref 12–16)
HYALINE CASTS: ABNORMAL /LPF (ref 0–2)
KETONES, URINE: ABNORMAL MG/DL
LEUKOCYTE ESTERASE, URINE: ABNORMAL
LYMPHOCYTES ABSOLUTE: 0.5 K/UL (ref 1–5.1)
LYMPHOCYTES RELATIVE PERCENT: 13.7 %
MCH RBC QN AUTO: 32.6 PG (ref 26–34)
MCHC RBC AUTO-ENTMCNC: 34.3 G/DL (ref 31–36)
MCV RBC AUTO: 95 FL (ref 80–100)
MICROSCOPIC EXAMINATION: YES
MONOCYTES ABSOLUTE: 0.4 K/UL (ref 0–1.3)
MONOCYTES RELATIVE PERCENT: 10.2 %
NEUTROPHILS ABSOLUTE: 2.6 K/UL (ref 1.7–7.7)
NEUTROPHILS RELATIVE PERCENT: 74.9 %
NITRITE, URINE: NEGATIVE
PDW BLD-RTO: 18.5 % (ref 12.4–15.4)
PERFORMED ON: ABNORMAL
PERFORMED ON: ABNORMAL
PH UA: 5 (ref 5–8)
PLATELET # BLD: 149 K/UL (ref 135–450)
PMV BLD AUTO: 7.9 FL (ref 5–10.5)
POTASSIUM SERPL-SCNC: 4.6 MMOL/L (ref 3.5–5.1)
PROTEIN UA: >=300 MG/DL
RBC # BLD: 2.94 M/UL (ref 4–5.2)
RBC UA: ABNORMAL /HPF (ref 0–4)
REPORT: NORMAL
SARS-COV-2: NOT DETECTED
SODIUM BLD-SCNC: 133 MMOL/L (ref 136–145)
SPECIFIC GRAVITY UA: 1.02 (ref 1–1.03)
THIS TEST SENT TO: NORMAL
TOTAL PROTEIN: 7 G/DL (ref 6.4–8.2)
URINE REFLEX TO CULTURE: ABNORMAL
URINE TYPE: ABNORMAL
UROBILINOGEN, URINE: 0.2 E.U./DL
WBC # BLD: 3.5 K/UL (ref 4–11)
WBC UA: ABNORMAL /HPF (ref 0–5)

## 2020-07-23 PROCEDURE — 2500000003 HC RX 250 WO HCPCS

## 2020-07-23 PROCEDURE — 6360000002 HC RX W HCPCS: Performed by: OBSTETRICS & GYNECOLOGY

## 2020-07-23 PROCEDURE — 85025 COMPLETE CBC W/AUTO DIFF WBC: CPT

## 2020-07-23 PROCEDURE — 88112 CYTOPATH CELL ENHANCE TECH: CPT

## 2020-07-23 PROCEDURE — 7100000000 HC PACU RECOVERY - FIRST 15 MIN: Performed by: OBSTETRICS & GYNECOLOGY

## 2020-07-23 PROCEDURE — S2900 ROBOTIC SURGICAL SYSTEM: HCPCS | Performed by: OBSTETRICS & GYNECOLOGY

## 2020-07-23 PROCEDURE — 7100000001 HC PACU RECOVERY - ADDTL 15 MIN: Performed by: OBSTETRICS & GYNECOLOGY

## 2020-07-23 PROCEDURE — 80053 COMPREHEN METABOLIC PANEL: CPT

## 2020-07-23 PROCEDURE — 2580000003 HC RX 258: Performed by: ANESTHESIOLOGY

## 2020-07-23 PROCEDURE — 6360000002 HC RX W HCPCS: Performed by: NURSE ANESTHETIST, CERTIFIED REGISTERED

## 2020-07-23 PROCEDURE — 0DB80ZZ EXCISION OF SMALL INTESTINE, OPEN APPROACH: ICD-10-PCS | Performed by: OBSTETRICS & GYNECOLOGY

## 2020-07-23 PROCEDURE — 2580000003 HC RX 258: Performed by: NURSE ANESTHETIST, CERTIFIED REGISTERED

## 2020-07-23 PROCEDURE — 2580000003 HC RX 258: Performed by: OBSTETRICS & GYNECOLOGY

## 2020-07-23 PROCEDURE — 2500000003 HC RX 250 WO HCPCS: Performed by: OBSTETRICS & GYNECOLOGY

## 2020-07-23 PROCEDURE — 0DN80ZZ RELEASE SMALL INTESTINE, OPEN APPROACH: ICD-10-PCS | Performed by: OBSTETRICS & GYNECOLOGY

## 2020-07-23 PROCEDURE — 2500000003 HC RX 250 WO HCPCS: Performed by: ANESTHESIOLOGY

## 2020-07-23 PROCEDURE — 1200000000 HC SEMI PRIVATE

## 2020-07-23 PROCEDURE — 8E0W4CZ ROBOTIC ASSISTED PROCEDURE OF TRUNK REGION, PERCUTANEOUS ENDOSCOPIC APPROACH: ICD-10-PCS | Performed by: OBSTETRICS & GYNECOLOGY

## 2020-07-23 PROCEDURE — 44120 REMOVAL OF SMALL INTESTINE: CPT | Performed by: SURGERY

## 2020-07-23 PROCEDURE — 88305 TISSUE EXAM BY PATHOLOGIST: CPT

## 2020-07-23 PROCEDURE — 86850 RBC ANTIBODY SCREEN: CPT

## 2020-07-23 PROCEDURE — 2709999900 HC NON-CHARGEABLE SUPPLY: Performed by: OBSTETRICS & GYNECOLOGY

## 2020-07-23 PROCEDURE — 86901 BLOOD TYPING SEROLOGIC RH(D): CPT

## 2020-07-23 PROCEDURE — 3700000000 HC ANESTHESIA ATTENDED CARE: Performed by: OBSTETRICS & GYNECOLOGY

## 2020-07-23 PROCEDURE — 88307 TISSUE EXAM BY PATHOLOGIST: CPT

## 2020-07-23 PROCEDURE — 6360000002 HC RX W HCPCS: Performed by: ANESTHESIOLOGY

## 2020-07-23 PROCEDURE — 2500000003 HC RX 250 WO HCPCS: Performed by: NURSE ANESTHETIST, CERTIFIED REGISTERED

## 2020-07-23 PROCEDURE — 0DJD8ZZ INSPECTION OF LOWER INTESTINAL TRACT, VIA NATURAL OR ARTIFICIAL OPENING ENDOSCOPIC: ICD-10-PCS | Performed by: SURGERY

## 2020-07-23 PROCEDURE — 81001 URINALYSIS AUTO W/SCOPE: CPT

## 2020-07-23 PROCEDURE — 2720000010 HC SURG SUPPLY STERILE: Performed by: OBSTETRICS & GYNECOLOGY

## 2020-07-23 PROCEDURE — 86900 BLOOD TYPING SEROLOGIC ABO: CPT

## 2020-07-23 PROCEDURE — 3600000009 HC SURGERY ROBOT BASE: Performed by: OBSTETRICS & GYNECOLOGY

## 2020-07-23 PROCEDURE — 0DN84ZZ RELEASE SMALL INTESTINE, PERCUTANEOUS ENDOSCOPIC APPROACH: ICD-10-PCS | Performed by: OBSTETRICS & GYNECOLOGY

## 2020-07-23 PROCEDURE — 88342 IMHCHEM/IMCYTCHM 1ST ANTB: CPT

## 2020-07-23 PROCEDURE — 0DNE0ZZ RELEASE LARGE INTESTINE, OPEN APPROACH: ICD-10-PCS | Performed by: SURGERY

## 2020-07-23 PROCEDURE — 3700000001 HC ADD 15 MINUTES (ANESTHESIA): Performed by: OBSTETRICS & GYNECOLOGY

## 2020-07-23 PROCEDURE — 3600000019 HC SURGERY ROBOT ADDTL 15MIN: Performed by: OBSTETRICS & GYNECOLOGY

## 2020-07-23 RX ORDER — MAGNESIUM HYDROXIDE 1200 MG/15ML
LIQUID ORAL CONTINUOUS PRN
Status: COMPLETED | OUTPATIENT
Start: 2020-07-23 | End: 2020-07-23

## 2020-07-23 RX ORDER — POLYETHYLENE GLYCOL 3350 17 G/17G
17 POWDER, FOR SOLUTION ORAL DAILY
Status: DISCONTINUED | OUTPATIENT
Start: 2020-07-25 | End: 2020-07-24

## 2020-07-23 RX ORDER — HYDROCODONE BITARTRATE AND ACETAMINOPHEN 5; 325 MG/1; MG/1
1 TABLET ORAL PRN
Status: DISCONTINUED | OUTPATIENT
Start: 2020-07-23 | End: 2020-07-23 | Stop reason: HOSPADM

## 2020-07-23 RX ORDER — HYDRALAZINE HYDROCHLORIDE 20 MG/ML
5 INJECTION INTRAMUSCULAR; INTRAVENOUS EVERY 10 MIN PRN
Status: DISCONTINUED | OUTPATIENT
Start: 2020-07-23 | End: 2020-07-23 | Stop reason: HOSPADM

## 2020-07-23 RX ORDER — SODIUM CHLORIDE 0.9 % (FLUSH) 0.9 %
10 SYRINGE (ML) INJECTION EVERY 12 HOURS SCHEDULED
Status: DISCONTINUED | OUTPATIENT
Start: 2020-07-23 | End: 2020-07-23 | Stop reason: HOSPADM

## 2020-07-23 RX ORDER — ONDANSETRON 2 MG/ML
INJECTION INTRAMUSCULAR; INTRAVENOUS PRN
Status: DISCONTINUED | OUTPATIENT
Start: 2020-07-23 | End: 2020-07-23 | Stop reason: SDUPTHER

## 2020-07-23 RX ORDER — ACETAMINOPHEN 500 MG
500 TABLET ORAL EVERY 6 HOURS
Status: DISCONTINUED | OUTPATIENT
Start: 2020-07-23 | End: 2020-07-30 | Stop reason: HOSPADM

## 2020-07-23 RX ORDER — SODIUM CHLORIDE 9 MG/ML
INJECTION, SOLUTION INTRAVENOUS CONTINUOUS
Status: DISCONTINUED | OUTPATIENT
Start: 2020-07-23 | End: 2020-07-23

## 2020-07-23 RX ORDER — BUPIVACAINE HYDROCHLORIDE 2.5 MG/ML
INJECTION, SOLUTION EPIDURAL; INFILTRATION; INTRACAUDAL
Status: COMPLETED | OUTPATIENT
Start: 2020-07-23 | End: 2020-07-23

## 2020-07-23 RX ORDER — ROCURONIUM BROMIDE 10 MG/ML
INJECTION, SOLUTION INTRAVENOUS PRN
Status: DISCONTINUED | OUTPATIENT
Start: 2020-07-23 | End: 2020-07-23 | Stop reason: SDUPTHER

## 2020-07-23 RX ORDER — LIDOCAINE HYDROCHLORIDE 20 MG/ML
INJECTION, SOLUTION EPIDURAL; INFILTRATION; INTRACAUDAL; PERINEURAL PRN
Status: DISCONTINUED | OUTPATIENT
Start: 2020-07-23 | End: 2020-07-23 | Stop reason: SDUPTHER

## 2020-07-23 RX ORDER — DEXTROSE MONOHYDRATE 50 MG/ML
100 INJECTION, SOLUTION INTRAVENOUS PRN
Status: DISCONTINUED | OUTPATIENT
Start: 2020-07-23 | End: 2020-07-26 | Stop reason: SDUPTHER

## 2020-07-23 RX ORDER — SODIUM CHLORIDE, SODIUM LACTATE, POTASSIUM CHLORIDE, CALCIUM CHLORIDE 600; 310; 30; 20 MG/100ML; MG/100ML; MG/100ML; MG/100ML
INJECTION, SOLUTION INTRAVENOUS CONTINUOUS PRN
Status: DISCONTINUED | OUTPATIENT
Start: 2020-07-23 | End: 2020-07-23 | Stop reason: SDUPTHER

## 2020-07-23 RX ORDER — PROPOFOL 10 MG/ML
INJECTION, EMULSION INTRAVENOUS PRN
Status: DISCONTINUED | OUTPATIENT
Start: 2020-07-23 | End: 2020-07-23 | Stop reason: SDUPTHER

## 2020-07-23 RX ORDER — FENTANYL CITRATE 50 UG/ML
50 INJECTION, SOLUTION INTRAMUSCULAR; INTRAVENOUS EVERY 5 MIN PRN
Status: DISCONTINUED | OUTPATIENT
Start: 2020-07-23 | End: 2020-07-23 | Stop reason: HOSPADM

## 2020-07-23 RX ORDER — KETOROLAC TROMETHAMINE 15 MG/ML
15 INJECTION, SOLUTION INTRAMUSCULAR; INTRAVENOUS EVERY 6 HOURS
Status: DISCONTINUED | OUTPATIENT
Start: 2020-07-23 | End: 2020-07-25

## 2020-07-23 RX ORDER — ROPINIROLE 1 MG/1
2 TABLET, FILM COATED ORAL NIGHTLY
Status: DISCONTINUED | OUTPATIENT
Start: 2020-07-23 | End: 2020-07-30 | Stop reason: HOSPADM

## 2020-07-23 RX ORDER — PREGABALIN 75 MG/1
75 CAPSULE ORAL 2 TIMES DAILY
Status: DISCONTINUED | OUTPATIENT
Start: 2020-07-23 | End: 2020-07-30 | Stop reason: HOSPADM

## 2020-07-23 RX ORDER — FENTANYL CITRATE 50 UG/ML
25 INJECTION, SOLUTION INTRAMUSCULAR; INTRAVENOUS EVERY 5 MIN PRN
Status: DISCONTINUED | OUTPATIENT
Start: 2020-07-23 | End: 2020-07-23 | Stop reason: HOSPADM

## 2020-07-23 RX ORDER — NALOXONE HYDROCHLORIDE 0.4 MG/ML
0.4 INJECTION, SOLUTION INTRAMUSCULAR; INTRAVENOUS; SUBCUTANEOUS PRN
Status: DISCONTINUED | OUTPATIENT
Start: 2020-07-23 | End: 2020-07-24

## 2020-07-23 RX ORDER — INSULIN GLARGINE 100 [IU]/ML
5 INJECTION, SOLUTION SUBCUTANEOUS NIGHTLY
Status: DISCONTINUED | OUTPATIENT
Start: 2020-07-24 | End: 2020-07-26

## 2020-07-23 RX ORDER — SODIUM CHLORIDE 0.9 % (FLUSH) 0.9 %
10 SYRINGE (ML) INJECTION EVERY 12 HOURS SCHEDULED
Status: DISCONTINUED | OUTPATIENT
Start: 2020-07-23 | End: 2020-07-30 | Stop reason: HOSPADM

## 2020-07-23 RX ORDER — SODIUM CHLORIDE 450 MG/100ML
INJECTION, SOLUTION INTRAVENOUS CONTINUOUS
Status: DISCONTINUED | OUTPATIENT
Start: 2020-07-23 | End: 2020-07-24

## 2020-07-23 RX ORDER — DEXTROSE MONOHYDRATE 25 G/50ML
12.5 INJECTION, SOLUTION INTRAVENOUS PRN
Status: DISCONTINUED | OUTPATIENT
Start: 2020-07-23 | End: 2020-07-30 | Stop reason: HOSPADM

## 2020-07-23 RX ORDER — SODIUM CHLORIDE 0.9 % (FLUSH) 0.9 %
10 SYRINGE (ML) INJECTION PRN
Status: DISCONTINUED | OUTPATIENT
Start: 2020-07-23 | End: 2020-07-23 | Stop reason: HOSPADM

## 2020-07-23 RX ORDER — HYDROCODONE BITARTRATE AND ACETAMINOPHEN 5; 325 MG/1; MG/1
2 TABLET ORAL PRN
Status: DISCONTINUED | OUTPATIENT
Start: 2020-07-23 | End: 2020-07-23 | Stop reason: HOSPADM

## 2020-07-23 RX ORDER — AMLODIPINE BESYLATE 10 MG/1
10 TABLET ORAL NIGHTLY
Status: DISCONTINUED | OUTPATIENT
Start: 2020-07-23 | End: 2020-07-27

## 2020-07-23 RX ORDER — PHENYLEPHRINE HCL IN 0.9% NACL 1 MG/10 ML
SYRINGE (ML) INTRAVENOUS PRN
Status: DISCONTINUED | OUTPATIENT
Start: 2020-07-23 | End: 2020-07-23 | Stop reason: SDUPTHER

## 2020-07-23 RX ORDER — ALPRAZOLAM 0.5 MG/1
0.5 TABLET ORAL NIGHTLY
Status: DISCONTINUED | OUTPATIENT
Start: 2020-07-23 | End: 2020-07-30 | Stop reason: HOSPADM

## 2020-07-23 RX ORDER — MEPERIDINE HYDROCHLORIDE 25 MG/ML
12.5 INJECTION INTRAMUSCULAR; INTRAVENOUS; SUBCUTANEOUS
Status: DISCONTINUED | OUTPATIENT
Start: 2020-07-23 | End: 2020-07-23 | Stop reason: HOSPADM

## 2020-07-23 RX ORDER — NICOTINE POLACRILEX 4 MG
15 LOZENGE BUCCAL PRN
Status: DISCONTINUED | OUTPATIENT
Start: 2020-07-23 | End: 2020-07-30 | Stop reason: HOSPADM

## 2020-07-23 RX ORDER — DEXAMETHASONE SODIUM PHOSPHATE 4 MG/ML
INJECTION, SOLUTION INTRA-ARTICULAR; INTRALESIONAL; INTRAMUSCULAR; INTRAVENOUS; SOFT TISSUE PRN
Status: DISCONTINUED | OUTPATIENT
Start: 2020-07-23 | End: 2020-07-23 | Stop reason: SDUPTHER

## 2020-07-23 RX ORDER — ONDANSETRON 2 MG/ML
4 INJECTION INTRAMUSCULAR; INTRAVENOUS
Status: DISCONTINUED | OUTPATIENT
Start: 2020-07-23 | End: 2020-07-23 | Stop reason: HOSPADM

## 2020-07-23 RX ORDER — ONDANSETRON 4 MG/1
4 TABLET, ORALLY DISINTEGRATING ORAL EVERY 8 HOURS PRN
Status: DISCONTINUED | OUTPATIENT
Start: 2020-07-23 | End: 2020-07-30 | Stop reason: HOSPADM

## 2020-07-23 RX ORDER — CIPROFLOXACIN 2 MG/ML
400 INJECTION, SOLUTION INTRAVENOUS ONCE
Status: COMPLETED | OUTPATIENT
Start: 2020-07-23 | End: 2020-07-23

## 2020-07-23 RX ORDER — PROMETHAZINE HYDROCHLORIDE 25 MG/ML
6.25 INJECTION, SOLUTION INTRAMUSCULAR; INTRAVENOUS
Status: DISCONTINUED | OUTPATIENT
Start: 2020-07-23 | End: 2020-07-23 | Stop reason: HOSPADM

## 2020-07-23 RX ORDER — VENLAFAXINE HYDROCHLORIDE 75 MG/1
75 CAPSULE, EXTENDED RELEASE ORAL DAILY
Status: DISCONTINUED | OUTPATIENT
Start: 2020-07-24 | End: 2020-07-30 | Stop reason: HOSPADM

## 2020-07-23 RX ORDER — LOSARTAN POTASSIUM 100 MG/1
100 TABLET ORAL DAILY
Status: DISCONTINUED | OUTPATIENT
Start: 2020-07-24 | End: 2020-07-25

## 2020-07-23 RX ORDER — METOPROLOL SUCCINATE 50 MG/1
100 TABLET, EXTENDED RELEASE ORAL DAILY
Status: DISCONTINUED | OUTPATIENT
Start: 2020-07-24 | End: 2020-07-30 | Stop reason: HOSPADM

## 2020-07-23 RX ORDER — ONDANSETRON 2 MG/ML
4 INJECTION INTRAMUSCULAR; INTRAVENOUS EVERY 6 HOURS PRN
Status: DISCONTINUED | OUTPATIENT
Start: 2020-07-23 | End: 2020-07-30 | Stop reason: HOSPADM

## 2020-07-23 RX ORDER — SODIUM CHLORIDE 0.9 % (FLUSH) 0.9 %
10 SYRINGE (ML) INJECTION PRN
Status: DISCONTINUED | OUTPATIENT
Start: 2020-07-23 | End: 2020-07-30 | Stop reason: HOSPADM

## 2020-07-23 RX ORDER — ALBUTEROL SULFATE 2.5 MG/3ML
2.5 SOLUTION RESPIRATORY (INHALATION) EVERY 6 HOURS PRN
Status: DISCONTINUED | OUTPATIENT
Start: 2020-07-23 | End: 2020-07-30 | Stop reason: HOSPADM

## 2020-07-23 RX ORDER — EPHEDRINE SULFATE/0.9% NACL/PF 50 MG/5 ML
SYRINGE (ML) INTRAVENOUS PRN
Status: DISCONTINUED | OUTPATIENT
Start: 2020-07-23 | End: 2020-07-23 | Stop reason: SDUPTHER

## 2020-07-23 RX ORDER — FENTANYL CITRATE 50 UG/ML
INJECTION, SOLUTION INTRAMUSCULAR; INTRAVENOUS PRN
Status: DISCONTINUED | OUTPATIENT
Start: 2020-07-23 | End: 2020-07-23 | Stop reason: SDUPTHER

## 2020-07-23 RX ORDER — ROSUVASTATIN CALCIUM 40 MG/1
40 TABLET, COATED ORAL EVERY EVENING
Status: DISCONTINUED | OUTPATIENT
Start: 2020-07-23 | End: 2020-07-30 | Stop reason: HOSPADM

## 2020-07-23 RX ORDER — GLYCOPYRROLATE 0.2 MG/ML
INJECTION INTRAMUSCULAR; INTRAVENOUS PRN
Status: DISCONTINUED | OUTPATIENT
Start: 2020-07-23 | End: 2020-07-23 | Stop reason: SDUPTHER

## 2020-07-23 RX ADMIN — FENTANYL CITRATE 25 MCG: 50 INJECTION, SOLUTION INTRAMUSCULAR; INTRAVENOUS at 21:29

## 2020-07-23 RX ADMIN — SODIUM CHLORIDE: 9 INJECTION, SOLUTION INTRAVENOUS at 15:49

## 2020-07-23 RX ADMIN — DEXAMETHASONE SODIUM PHOSPHATE 8 MG: 4 INJECTION, SOLUTION INTRAMUSCULAR; INTRAVENOUS at 17:43

## 2020-07-23 RX ADMIN — CIPROFLOXACIN 400 MG: 2 INJECTION, SOLUTION INTRAVENOUS at 15:44

## 2020-07-23 RX ADMIN — ONDANSETRON 4 MG: 2 INJECTION INTRAMUSCULAR; INTRAVENOUS at 23:41

## 2020-07-23 RX ADMIN — ROCURONIUM BROMIDE 20 MG: 10 INJECTION INTRAVENOUS at 19:30

## 2020-07-23 RX ADMIN — Medication 10 MG: at 22:49

## 2020-07-23 RX ADMIN — ROCURONIUM BROMIDE 30 MG: 10 INJECTION INTRAVENOUS at 17:26

## 2020-07-23 RX ADMIN — Medication 20 MG: at 17:38

## 2020-07-23 RX ADMIN — SODIUM CHLORIDE, SODIUM LACTATE, POTASSIUM CHLORIDE, AND CALCIUM CHLORIDE: .6; .31; .03; .02 INJECTION, SOLUTION INTRAVENOUS at 17:19

## 2020-07-23 RX ADMIN — Medication 200 MCG: at 16:24

## 2020-07-23 RX ADMIN — SUGAMMADEX 200 MG: 100 INJECTION, SOLUTION INTRAVENOUS at 20:54

## 2020-07-23 RX ADMIN — Medication 200 MCG: at 16:39

## 2020-07-23 RX ADMIN — FENTANYL CITRATE 50 MCG: 50 INJECTION INTRAMUSCULAR; INTRAVENOUS at 16:04

## 2020-07-23 RX ADMIN — KETOROLAC TROMETHAMINE 15 MG: 15 INJECTION, SOLUTION INTRAMUSCULAR; INTRAVENOUS at 23:41

## 2020-07-23 RX ADMIN — GLYCOPYRROLATE 0.2 MG: 0.2 INJECTION, SOLUTION INTRAMUSCULAR; INTRAVENOUS at 15:50

## 2020-07-23 RX ADMIN — ROCURONIUM BROMIDE 50 MG: 10 INJECTION INTRAVENOUS at 16:01

## 2020-07-23 RX ADMIN — Medication 200 MCG: at 17:15

## 2020-07-23 RX ADMIN — FENTANYL CITRATE 25 MCG: 50 INJECTION, SOLUTION INTRAMUSCULAR; INTRAVENOUS at 21:23

## 2020-07-23 RX ADMIN — PROPOFOL 100 MG: 10 INJECTION, EMULSION INTRAVENOUS at 16:00

## 2020-07-23 RX ADMIN — ONDANSETRON 4 MG: 2 INJECTION INTRAMUSCULAR; INTRAVENOUS at 17:43

## 2020-07-23 RX ADMIN — SODIUM CHLORIDE, SODIUM LACTATE, POTASSIUM CHLORIDE, AND CALCIUM CHLORIDE: .6; .31; .03; .02 INJECTION, SOLUTION INTRAVENOUS at 18:41

## 2020-07-23 RX ADMIN — Medication 20 MG: at 18:20

## 2020-07-23 RX ADMIN — FENTANYL CITRATE 50 MCG: 50 INJECTION INTRAMUSCULAR; INTRAVENOUS at 15:51

## 2020-07-23 RX ADMIN — Medication 200 MCG: at 16:49

## 2020-07-23 RX ADMIN — SODIUM CHLORIDE: 9 INJECTION, SOLUTION INTRAVENOUS at 13:27

## 2020-07-23 RX ADMIN — LIDOCAINE HYDROCHLORIDE 100 MG: 20 INJECTION, SOLUTION EPIDURAL; INFILTRATION; INTRACAUDAL; PERINEURAL at 16:00

## 2020-07-23 RX ADMIN — Medication 200 MCG: at 17:02

## 2020-07-23 RX ADMIN — Medication 10 MG: at 16:51

## 2020-07-23 ASSESSMENT — PULMONARY FUNCTION TESTS
PIF_VALUE: 12
PIF_VALUE: 19
PIF_VALUE: 16
PIF_VALUE: 15
PIF_VALUE: 15
PIF_VALUE: 21
PIF_VALUE: 21
PIF_VALUE: 15
PIF_VALUE: 14
PIF_VALUE: 14
PIF_VALUE: 13
PIF_VALUE: 21
PIF_VALUE: 15
PIF_VALUE: 15
PIF_VALUE: 14
PIF_VALUE: 16
PIF_VALUE: 12
PIF_VALUE: 20
PIF_VALUE: 16
PIF_VALUE: 15
PIF_VALUE: 15
PIF_VALUE: 14
PIF_VALUE: 14
PIF_VALUE: 16
PIF_VALUE: 21
PIF_VALUE: 15
PIF_VALUE: 15
PIF_VALUE: 16
PIF_VALUE: 14
PIF_VALUE: 15
PIF_VALUE: 21
PIF_VALUE: 0
PIF_VALUE: 15
PIF_VALUE: 1
PIF_VALUE: 14
PIF_VALUE: 15
PIF_VALUE: 11
PIF_VALUE: 21
PIF_VALUE: 19
PIF_VALUE: 21
PIF_VALUE: 15
PIF_VALUE: 15
PIF_VALUE: 16
PIF_VALUE: 16
PIF_VALUE: 15
PIF_VALUE: 14
PIF_VALUE: 15
PIF_VALUE: 15
PIF_VALUE: 18
PIF_VALUE: 21
PIF_VALUE: 15
PIF_VALUE: 21
PIF_VALUE: 15
PIF_VALUE: 15
PIF_VALUE: 13
PIF_VALUE: 16
PIF_VALUE: 15
PIF_VALUE: 15
PIF_VALUE: 0
PIF_VALUE: 14
PIF_VALUE: 16
PIF_VALUE: 19
PIF_VALUE: 15
PIF_VALUE: 14
PIF_VALUE: 15
PIF_VALUE: 6
PIF_VALUE: 14
PIF_VALUE: 15
PIF_VALUE: 15
PIF_VALUE: 16
PIF_VALUE: 14
PIF_VALUE: 15
PIF_VALUE: 0
PIF_VALUE: 15
PIF_VALUE: 21
PIF_VALUE: 35
PIF_VALUE: 15
PIF_VALUE: 14
PIF_VALUE: 15
PIF_VALUE: 0
PIF_VALUE: 15
PIF_VALUE: 15
PIF_VALUE: 16
PIF_VALUE: 21
PIF_VALUE: 15
PIF_VALUE: 12
PIF_VALUE: 14
PIF_VALUE: 15
PIF_VALUE: 19
PIF_VALUE: 15
PIF_VALUE: 19
PIF_VALUE: 11
PIF_VALUE: 15
PIF_VALUE: 19
PIF_VALUE: 12
PIF_VALUE: 15
PIF_VALUE: 0
PIF_VALUE: 15
PIF_VALUE: 19
PIF_VALUE: 20
PIF_VALUE: 19
PIF_VALUE: 15
PIF_VALUE: 14
PIF_VALUE: 20
PIF_VALUE: 14
PIF_VALUE: 16
PIF_VALUE: 14
PIF_VALUE: 11
PIF_VALUE: 15
PIF_VALUE: 19
PIF_VALUE: 19
PIF_VALUE: 14
PIF_VALUE: 16
PIF_VALUE: 15
PIF_VALUE: 19
PIF_VALUE: 16
PIF_VALUE: 14
PIF_VALUE: 13
PIF_VALUE: 15
PIF_VALUE: 0
PIF_VALUE: 15
PIF_VALUE: 14
PIF_VALUE: 16
PIF_VALUE: 21
PIF_VALUE: 15
PIF_VALUE: 15
PIF_VALUE: 12
PIF_VALUE: 15
PIF_VALUE: 14
PIF_VALUE: 15
PIF_VALUE: 15
PIF_VALUE: 14
PIF_VALUE: 15
PIF_VALUE: 15
PIF_VALUE: 16
PIF_VALUE: 14
PIF_VALUE: 15
PIF_VALUE: 15
PIF_VALUE: 14
PIF_VALUE: 14
PIF_VALUE: 19
PIF_VALUE: 21
PIF_VALUE: 17
PIF_VALUE: 16
PIF_VALUE: 15
PIF_VALUE: 14
PIF_VALUE: 15
PIF_VALUE: 21
PIF_VALUE: 14
PIF_VALUE: 15
PIF_VALUE: 19
PIF_VALUE: 13
PIF_VALUE: 16
PIF_VALUE: 16
PIF_VALUE: 15
PIF_VALUE: 19
PIF_VALUE: 13
PIF_VALUE: 15
PIF_VALUE: 12
PIF_VALUE: 14
PIF_VALUE: 12
PIF_VALUE: 14
PIF_VALUE: 16
PIF_VALUE: 15
PIF_VALUE: 14
PIF_VALUE: 15
PIF_VALUE: 14
PIF_VALUE: 11
PIF_VALUE: 21
PIF_VALUE: 11
PIF_VALUE: 15
PIF_VALUE: 14
PIF_VALUE: 19
PIF_VALUE: 14
PIF_VALUE: 20
PIF_VALUE: 21
PIF_VALUE: 15
PIF_VALUE: 16
PIF_VALUE: 14
PIF_VALUE: 14
PIF_VALUE: 15
PIF_VALUE: 16
PIF_VALUE: 15
PIF_VALUE: 1
PIF_VALUE: 15
PIF_VALUE: 19
PIF_VALUE: 12
PIF_VALUE: 20
PIF_VALUE: 15
PIF_VALUE: 16
PIF_VALUE: 16
PIF_VALUE: 15
PIF_VALUE: 16
PIF_VALUE: 16
PIF_VALUE: 15
PIF_VALUE: 16
PIF_VALUE: 14
PIF_VALUE: 15
PIF_VALUE: 19
PIF_VALUE: 15
PIF_VALUE: 19
PIF_VALUE: 15
PIF_VALUE: 15
PIF_VALUE: 16
PIF_VALUE: 15
PIF_VALUE: 14
PIF_VALUE: 19
PIF_VALUE: 12
PIF_VALUE: 15
PIF_VALUE: 16
PIF_VALUE: 21
PIF_VALUE: 14
PIF_VALUE: 15
PIF_VALUE: 11
PIF_VALUE: 15
PIF_VALUE: 12
PIF_VALUE: 14
PIF_VALUE: 15
PIF_VALUE: 20
PIF_VALUE: 15
PIF_VALUE: 14
PIF_VALUE: 15
PIF_VALUE: 14
PIF_VALUE: 15
PIF_VALUE: 21
PIF_VALUE: 15
PIF_VALUE: 16
PIF_VALUE: 15
PIF_VALUE: 14
PIF_VALUE: 14
PIF_VALUE: 12
PIF_VALUE: 0
PIF_VALUE: 0
PIF_VALUE: 15
PIF_VALUE: 21
PIF_VALUE: 15
PIF_VALUE: 11
PIF_VALUE: 19
PIF_VALUE: 15
PIF_VALUE: 19
PIF_VALUE: 15
PIF_VALUE: 21
PIF_VALUE: 14
PIF_VALUE: 16
PIF_VALUE: 15
PIF_VALUE: 22
PIF_VALUE: 16
PIF_VALUE: 12
PIF_VALUE: 17
PIF_VALUE: 12
PIF_VALUE: 21
PIF_VALUE: 20
PIF_VALUE: 20
PIF_VALUE: 16
PIF_VALUE: 15
PIF_VALUE: 3
PIF_VALUE: 14
PIF_VALUE: 14
PIF_VALUE: 15
PIF_VALUE: 14
PIF_VALUE: 20

## 2020-07-23 ASSESSMENT — PAIN SCALES - GENERAL
PAINLEVEL_OUTOF10: 10

## 2020-07-23 ASSESSMENT — PAIN - FUNCTIONAL ASSESSMENT: PAIN_FUNCTIONAL_ASSESSMENT: 0-10

## 2020-07-23 ASSESSMENT — LIFESTYLE VARIABLES: SMOKING_STATUS: 0

## 2020-07-23 NOTE — H&P
Date of Surgery Update:  Micheline Cervantes was seen, history and physical examination reviewed, and patient examined by me today. There have been no significant clinical changes since the completion of the previous history and physical.    Preop clinic note is in care everywhere under [facility]. CVS-RRR  Pulm-CTAB    The risk, benefits, and alternatives of the proposed procedure have been explained to the patient (or appropriate guardian) and understanding verbalized. All questions answered. Patient wishes to proceed.     Electronically signed by: Marianne Lynn MD,7/23/2020,2:59 PM

## 2020-07-23 NOTE — ANESTHESIA PRE PROCEDURE
Department of Anesthesiology  Preprocedure Note       Name:  Jaye Gutierrez   Age:  67 y.o.  :  1940                                          MRN:  [id number]         Date:  2020      Surgeon: Kassandra Mcnamara):  MD Rosita May MD    Procedure: ROBOTIC LAPAROSCOPIC BOWEL RESECTION, REMOVAL OF TUMOR, POSSIBLE OPEN AND ANY OTHER PROCEDURES (N/A Pelvis)    Medications prior to admission:   Prior to Admission medications    Medication Sig Start Date End Date Taking? Authorizing Provider   albuterol sulfate HFA (VENTOLIN HFA) 108 (90 Base) MCG/ACT inhaler Inhale 2 puffs into the lungs every 4 hours as needed for Wheezing    Historical Provider, MD   apixaban (ELIQUIS) 5 MG TABS tablet Take 1 tablet by mouth 2 times daily 9/3/99 8/7/75  Edvin Vargas MD   losartan (COZAAR) 100 MG tablet Take 1 tablet by mouth daily Pt. takes 100mg of Cozaar daily. 68  Edvin Vargas MD   amLODIPine (NORVASC) 10 MG tablet Take 1 tablet by mouth nightly 47  Edvin Vargas MD   metoprolol succinate (TOPROL XL) 100 MG extended release tablet Take 1 tablet by mouth daily 49   Edvin Vargas MD   bisacodyl (BISAC-EVAC) 10 MG suppository Place 1 suppository rectally daily as needed for Constipation 22  Edvin Vargas MD   oxyCODONE-acetaminophen (PERCOCET)  MG per tablet Take 1 tablet by mouth every 8 hours as needed for Pain . Earliest Fill Date: 17  Patient taking differently: Take 1 tablet by mouth every 4-6 hours as needed for Pain.   17   Perez Mack MD   insulin glargine (LANTUS) 100 UNIT/ML injection vial Inject 5 Units into the skin nightly  Patient taking differently: Inject 6 Units into the skin nightly  16   Sonya Atkins MD   insulin lispro (HUMALOG) 100 UNIT/ML injection vial Inject 0-12 Units into the skin 3 times daily (with meals)  Patient taking differently: Inject 6-16 Units into the skin 3 times daily (before meals)  16   Sonya Atkins MD Pregabalin (LYRICA PO) Take 75 mg by mouth 2 times daily Usually takes only BID    Historical Provider, MD   rosuvastatin (CRESTOR) 40 MG tablet Take 40 mg by mouth every evening. Historical Provider, MD   alprazolam Daryel Ham) 0.5 MG tablet Take 0.5 mg by mouth nightly. Historical Provider, MD   ropinirole (REQUIP) 1 MG tablet Take 2 mg by mouth nightly     Historical Provider, MD   venlafaxine (EFFEXOR-XR) 37.5 MG XR capsule Take 75 mg by mouth daily     Historical Provider, MD       Current medications:    No current facility-administered medications for this visit. No current outpatient medications on file. Facility-Administered Medications Ordered in Other Visits   Medication Dose Route Frequency Provider Last Rate Last Dose    0.9 % sodium chloride infusion   Intravenous Continuous Ping Smith MD 75 mL/hr at 07/23/20 1327      sodium chloride flush 0.9 % injection 10 mL  10 mL Intravenous 2 times per day Ping Smith MD        sodium chloride flush 0.9 % injection 10 mL  10 mL Intravenous PRN Ping Smith MD        ciprofloxacin (CIPRO) IVPB 400 mg  400 mg Intravenous Once Kallie Barrera MD        metronidazole (FLAGYL) 500 mg in NaCl 100 mL IVPB premix  500 mg Intravenous Once Kallie Barrera MD        famotidine (PEPCID) injection 20 mg  20 mg Intravenous Daily Lashell Pa MD   20 mg at 11/07/16 1320       Allergies:     Allergies   Allergen Reactions    Cephalexin Rash    Pcn [Penicillins] Rash    Cephalosporins      UNABLE TO RECALL REACTION    Cephalexin Rash       Problem List:    Patient Active Problem List   Diagnosis Code    Hyperlipidemia E78.5    Diabetes mellitus, type II ([facility] [address] E11.9    HTN (hypertension) I10    Vitamin D deficiency E55.9    CAD (coronary artery disease) I25.10    Elevated CA-125 R97.1    Uterine leiomyoma D25.9    Abnormal abdominal CT scan R93.5    Cancer of peritoneum ([facility] [address] C48.2    Delirium due to general medical condition F05    Iron deficiency anemia D50.9    Impaired intestinal absorption K90.9    Neoplasm related pain G89.3    Weight loss R63.4    Abdominal pain R10.9    Chest pain, atypical R07.89       Past Medical History:        Diagnosis Date    Anxiety     Arthritis     Asthma     poorly controlled    CAD (coronary artery disease)     Cancer of peritoneum (HCC)     ovarian    Chronic low back pain     Depression     Embolism (HCC)     small pulmonary clot    Hx of blood clots     x1    Hyperlipidemia 4/23/2012    Hypertension     Postoperative delirium 05/26/2016    Pulmonary embolism ([address]     Short-term memory loss     Type II or unspecified type diabetes mellitus without mention of complication, not stated as uncontrolled     Wears dentures     Wears glasses        Past Surgical History:        Procedure Laterality Date    APPENDECTOMY      BREAST BIOPSY Left 06/23/2017    Fibroadenomatous type fibrocystic change    CARDIAC SURGERY      quadruple bypass    CATARACT REMOVAL Bilateral     COLONOSCOPY      COLONOSCOPY N/A 6/6/2019    COLONOSCOPY WITH BIOPSY performed by Nga Canchola MD at [address] [facility]  6/6/2019    COLONOSCOPY CONTROL HEMORRHAGE performed by Nga Canchola MD at [address] [facility] CORONARY ARTERY BYPASS GRAFT  2007    DILATION AND CURETTAGE OF UTERUS      Miscarriage    ENDOSCOPY, COLON, DIAGNOSTIC      EYE SURGERY      HEMICOLECTOMY  05/2016    HYSTERECTOMY      JOINT REPLACEMENT Bilateral     bilat knee    OTHER SURGICAL HISTORY N/A 08/17/2018    SPINAL CORD STIMULATOR LEAD REVISION AND GENERATOR    FRACISCO AND BSO  05/26/2016    TUNNELED VENOUS PORT PLACEMENT Left 7/27/2016    PORT-A-CATH PLACEMENT,LEFT        Social History:    Social History     Tobacco Use    Smoking status: Former Smoker     Packs/day: 2.00     Years: 10.00     Pack years: 20.00     Last attempt to quit: 7/22/2000 Years since quittin.0    Smokeless tobacco: Never Used   Substance Use Topics    Alcohol use: Not Currently     Alcohol/week: 1.0 standard drinks     Types: 1 Glasses of wine per week                                Counseling given: Not Answered      Vital Signs (Current): There were no vitals filed for this visit. BP Readings from Last 3 Encounters:   20 (!) 180/80   20 (!) 174/67   20 (!) 152/70       NPO Status:                                                                                 BMI:   Wt Readings from Last 3 Encounters:   20 150 lb (68 kg)   20 147 lb 14.9 oz (67.1 kg)   20 149 lb (67.6 kg)     There is no height or weight on file to calculate BMI.    CBC:   Lab Results   Component Value Date    WBC 5.2 2020    RBC 2.49 2020    RBC 3.13 2017    HGB 8.1 2020    HCT 23.4 2020    MCV 93.8 2020    RDW 18.1 2020     2020       CMP:   Lab Results   Component Value Date     2020    K 3.9 2020    K 5.0 2020    CL 97 2020    CO2 24 2020    BUN 17 2020    CREATININE 0.7 2020    GFRAA >60 2020    GFRAA 100 2012    AGRATIO 2 2017    LABGLOM >60 2020    GLUCOSE 107 2020    GLUCOSE 212 2017    PROT 5.9 2020    PROT 6.0 2017    CALCIUM 8.7 2020    BILITOT 0.4 2020    ALKPHOS 70 2020    AST 18 2020    ALT 9 2020       POC Tests: No results for input(s): POCGLU, POCNA, POCK, POCCL, POCBUN, POCHEMO, POCHCT in the last 72 hours.     Coags:   Lab Results   Component Value Date    PROTIME 14.9 2020    PROTIME 12.9 2011    INR 1.28 2020    APTT 35.9 2016       HCG (If Applicable): No results found for: PREGTESTUR, PREGSERUM, HCG, HCGQUANT     ABGs:   Lab Results   Component Value Date    PHART 7.372 2016    PO2ART 98.1 2016

## 2020-07-24 LAB
ANION GAP SERPL CALCULATED.3IONS-SCNC: 17 MMOL/L (ref 3–16)
BASOPHILS ABSOLUTE: 0 K/UL (ref 0–0.2)
BASOPHILS RELATIVE PERCENT: 0.3 %
BUN BLDV-MCNC: 18 MG/DL (ref 7–20)
CALCIUM SERPL-MCNC: 8.2 MG/DL (ref 8.3–10.6)
CHLORIDE BLD-SCNC: 97 MMOL/L (ref 99–110)
CO2: 18 MMOL/L (ref 21–32)
CREAT SERPL-MCNC: 1.1 MG/DL (ref 0.6–1.2)
EOSINOPHILS ABSOLUTE: 0 K/UL (ref 0–0.6)
EOSINOPHILS RELATIVE PERCENT: 0 %
GFR AFRICAN AMERICAN: 58
GFR NON-AFRICAN AMERICAN: 48
GLUCOSE BLD-MCNC: 141 MG/DL (ref 70–99)
GLUCOSE BLD-MCNC: 156 MG/DL (ref 70–99)
GLUCOSE BLD-MCNC: 253 MG/DL (ref 70–99)
GLUCOSE BLD-MCNC: 303 MG/DL (ref 70–99)
GLUCOSE BLD-MCNC: 320 MG/DL (ref 70–99)
GLUCOSE BLD-MCNC: 335 MG/DL (ref 70–99)
HCT VFR BLD CALC: 23.8 % (ref 36–48)
HEMOGLOBIN: 8.2 G/DL (ref 12–16)
LYMPHOCYTES ABSOLUTE: 0.1 K/UL (ref 1–5.1)
LYMPHOCYTES RELATIVE PERCENT: 1.5 %
MAGNESIUM: 1.8 MG/DL (ref 1.8–2.4)
MCH RBC QN AUTO: 33.2 PG (ref 26–34)
MCHC RBC AUTO-ENTMCNC: 34.4 G/DL (ref 31–36)
MCV RBC AUTO: 96.6 FL (ref 80–100)
MONOCYTES ABSOLUTE: 0.4 K/UL (ref 0–1.3)
MONOCYTES RELATIVE PERCENT: 4 %
NEUTROPHILS ABSOLUTE: 8.2 K/UL (ref 1.7–7.7)
NEUTROPHILS RELATIVE PERCENT: 94.2 %
PDW BLD-RTO: 18.5 % (ref 12.4–15.4)
PERFORMED ON: ABNORMAL
PHOSPHORUS: 4.3 MG/DL (ref 2.5–4.9)
PLATELET # BLD: 124 K/UL (ref 135–450)
PMV BLD AUTO: 7.9 FL (ref 5–10.5)
POTASSIUM REFLEX MAGNESIUM: 4.2 MMOL/L (ref 3.5–5.1)
RBC # BLD: 2.46 M/UL (ref 4–5.2)
SODIUM BLD-SCNC: 132 MMOL/L (ref 136–145)
TRIGL SERPL-MCNC: 23 MG/DL (ref 0–150)
WBC # BLD: 8.7 K/UL (ref 4–11)

## 2020-07-24 PROCEDURE — 2700000000 HC OXYGEN THERAPY PER DAY

## 2020-07-24 PROCEDURE — 1200000000 HC SEMI PRIVATE

## 2020-07-24 PROCEDURE — 36415 COLL VENOUS BLD VENIPUNCTURE: CPT

## 2020-07-24 PROCEDURE — 97530 THERAPEUTIC ACTIVITIES: CPT

## 2020-07-24 PROCEDURE — 83735 ASSAY OF MAGNESIUM: CPT

## 2020-07-24 PROCEDURE — 94770 HC ETCO2 MONITOR DAILY: CPT

## 2020-07-24 PROCEDURE — 6370000000 HC RX 637 (ALT 250 FOR IP): Performed by: OBSTETRICS & GYNECOLOGY

## 2020-07-24 PROCEDURE — 84478 ASSAY OF TRIGLYCERIDES: CPT

## 2020-07-24 PROCEDURE — 6370000000 HC RX 637 (ALT 250 FOR IP): Performed by: INTERNAL MEDICINE

## 2020-07-24 PROCEDURE — 97535 SELF CARE MNGMENT TRAINING: CPT

## 2020-07-24 PROCEDURE — 97162 PT EVAL MOD COMPLEX 30 MIN: CPT

## 2020-07-24 PROCEDURE — 80048 BASIC METABOLIC PNL TOTAL CA: CPT

## 2020-07-24 PROCEDURE — 6360000002 HC RX W HCPCS: Performed by: OBSTETRICS & GYNECOLOGY

## 2020-07-24 PROCEDURE — 97116 GAIT TRAINING THERAPY: CPT

## 2020-07-24 PROCEDURE — 97165 OT EVAL LOW COMPLEX 30 MIN: CPT

## 2020-07-24 PROCEDURE — 94761 N-INVAS EAR/PLS OXIMETRY MLT: CPT

## 2020-07-24 PROCEDURE — 2580000003 HC RX 258: Performed by: INTERNAL MEDICINE

## 2020-07-24 PROCEDURE — 2500000003 HC RX 250 WO HCPCS: Performed by: OBSTETRICS & GYNECOLOGY

## 2020-07-24 PROCEDURE — 84100 ASSAY OF PHOSPHORUS: CPT

## 2020-07-24 PROCEDURE — 2580000003 HC RX 258: Performed by: OBSTETRICS & GYNECOLOGY

## 2020-07-24 PROCEDURE — 85025 COMPLETE CBC W/AUTO DIFF WBC: CPT

## 2020-07-24 PROCEDURE — 94150 VITAL CAPACITY TEST: CPT

## 2020-07-24 RX ORDER — OXYCODONE AND ACETAMINOPHEN 10; 325 MG/1; MG/1
1 TABLET ORAL 4 TIMES DAILY PRN
COMMUNITY

## 2020-07-24 RX ORDER — OXYCODONE HYDROCHLORIDE 5 MG/1
5 TABLET ORAL EVERY 4 HOURS PRN
Status: DISCONTINUED | OUTPATIENT
Start: 2020-07-24 | End: 2020-07-30 | Stop reason: HOSPADM

## 2020-07-24 RX ORDER — ROPINIROLE 2 MG/1
2 TABLET, FILM COATED ORAL 3 TIMES DAILY PRN
COMMUNITY

## 2020-07-24 RX ORDER — OXYCODONE HYDROCHLORIDE 10 MG/1
10 TABLET ORAL EVERY 4 HOURS PRN
Status: DISCONTINUED | OUTPATIENT
Start: 2020-07-24 | End: 2020-07-30 | Stop reason: HOSPADM

## 2020-07-24 RX ORDER — VENLAFAXINE HYDROCHLORIDE 75 MG/1
75 CAPSULE, EXTENDED RELEASE ORAL DAILY
COMMUNITY

## 2020-07-24 RX ORDER — PREGABALIN 75 MG/1
75 CAPSULE ORAL 3 TIMES DAILY
Status: ON HOLD | COMMUNITY
End: 2020-07-30 | Stop reason: SDUPTHER

## 2020-07-24 RX ORDER — SODIUM CHLORIDE 9 MG/ML
INJECTION, SOLUTION INTRAVENOUS CONTINUOUS
Status: DISCONTINUED | OUTPATIENT
Start: 2020-07-24 | End: 2020-07-26

## 2020-07-24 RX ADMIN — SODIUM CHLORIDE, PRESERVATIVE FREE 10 ML: 5 INJECTION INTRAVENOUS at 21:10

## 2020-07-24 RX ADMIN — SODIUM CHLORIDE: 9 INJECTION, SOLUTION INTRAVENOUS at 10:15

## 2020-07-24 RX ADMIN — METOPROLOL SUCCINATE 100 MG: 50 TABLET, EXTENDED RELEASE ORAL at 08:03

## 2020-07-24 RX ADMIN — ACETAMINOPHEN 500 MG: 500 TABLET, FILM COATED ORAL at 22:48

## 2020-07-24 RX ADMIN — SODIUM CHLORIDE: 4.5 INJECTION, SOLUTION INTRAVENOUS at 00:30

## 2020-07-24 RX ADMIN — INSULIN LISPRO 3 UNITS: 100 INJECTION, SOLUTION INTRAVENOUS; SUBCUTANEOUS at 17:40

## 2020-07-24 RX ADMIN — INSULIN LISPRO 14 UNITS: 100 INJECTION, SOLUTION INTRAVENOUS; SUBCUTANEOUS at 08:03

## 2020-07-24 RX ADMIN — KETOROLAC TROMETHAMINE 15 MG: 15 INJECTION, SOLUTION INTRAMUSCULAR; INTRAVENOUS at 17:39

## 2020-07-24 RX ADMIN — AMLODIPINE BESYLATE 10 MG: 10 TABLET ORAL at 21:09

## 2020-07-24 RX ADMIN — ACETAMINOPHEN 500 MG: 500 TABLET, FILM COATED ORAL at 06:33

## 2020-07-24 RX ADMIN — VENLAFAXINE HYDROCHLORIDE 75 MG: 75 CAPSULE, EXTENDED RELEASE ORAL at 08:03

## 2020-07-24 RX ADMIN — ROPINIROLE HYDROCHLORIDE 2 MG: 1 TABLET, FILM COATED ORAL at 21:09

## 2020-07-24 RX ADMIN — INSULIN LISPRO 3 UNITS: 100 INJECTION, SOLUTION INTRAVENOUS; SUBCUTANEOUS at 13:08

## 2020-07-24 RX ADMIN — ACETAMINOPHEN 500 MG: 500 TABLET, FILM COATED ORAL at 17:40

## 2020-07-24 RX ADMIN — INSULIN LISPRO 1 UNITS: 100 INJECTION, SOLUTION INTRAVENOUS; SUBCUTANEOUS at 17:40

## 2020-07-24 RX ADMIN — ACETAMINOPHEN 500 MG: 500 TABLET, FILM COATED ORAL at 10:59

## 2020-07-24 RX ADMIN — INSULIN LISPRO 1 UNITS: 100 INJECTION, SOLUTION INTRAVENOUS; SUBCUTANEOUS at 21:10

## 2020-07-24 RX ADMIN — ROSUVASTATIN CALCIUM 40 MG: 40 TABLET, FILM COATED ORAL at 17:39

## 2020-07-24 RX ADMIN — SODIUM CHLORIDE, PRESERVATIVE FREE 10 ML: 5 INJECTION INTRAVENOUS at 08:03

## 2020-07-24 RX ADMIN — PREGABALIN 75 MG: 75 CAPSULE ORAL at 08:03

## 2020-07-24 RX ADMIN — INSULIN GLARGINE 5 UNITS: 100 INJECTION, SOLUTION SUBCUTANEOUS at 21:10

## 2020-07-24 RX ADMIN — INSULIN HUMAN 5 UNITS: 100 INJECTION, SUSPENSION SUBCUTANEOUS at 10:59

## 2020-07-24 RX ADMIN — KETOROLAC TROMETHAMINE 15 MG: 15 INJECTION, SOLUTION INTRAMUSCULAR; INTRAVENOUS at 22:47

## 2020-07-24 RX ADMIN — KETOROLAC TROMETHAMINE 15 MG: 15 INJECTION, SOLUTION INTRAMUSCULAR; INTRAVENOUS at 06:33

## 2020-07-24 RX ADMIN — PREGABALIN 75 MG: 75 CAPSULE ORAL at 21:09

## 2020-07-24 RX ADMIN — ALPRAZOLAM 0.5 MG: 0.5 TABLET ORAL at 21:09

## 2020-07-24 RX ADMIN — KETOROLAC TROMETHAMINE 15 MG: 15 INJECTION, SOLUTION INTRAMUSCULAR; INTRAVENOUS at 10:59

## 2020-07-24 RX ADMIN — LOSARTAN POTASSIUM 100 MG: 100 TABLET, FILM COATED ORAL at 08:02

## 2020-07-24 RX ADMIN — LEUCINE, PHENYLALANINE, LYSINE, METHIONINE, ISOLEUCINE, VALINE, HISTIDINE, THREONINE, TRYPTOPHAN, ALANINE, GLYCINE, ARGININE, PROLINE, SERINE, TYROSINE, SODIUM ACETATE, DIBASIC POTASSIUM PHOSPHATE, MAGNESIUM CHLORIDE, SODIUM CHLORIDE, CALCIUM CHLORIDE, DEXTROSE
365; 280; 290; 200; 300; 290; 240; 210; 90; 1035; 515; 575; 340; 250; 20; 340; 261; 51; 59; 33; 20 INJECTION INTRAVENOUS at 21:09

## 2020-07-24 ASSESSMENT — PAIN DESCRIPTION - DESCRIPTORS
DESCRIPTORS: ACHING;CONSTANT

## 2020-07-24 ASSESSMENT — PAIN - FUNCTIONAL ASSESSMENT
PAIN_FUNCTIONAL_ASSESSMENT: PREVENTS OR INTERFERES WITH MANY ACTIVE NOT PASSIVE ACTIVITIES
PAIN_FUNCTIONAL_ASSESSMENT: PREVENTS OR INTERFERES WITH ALL ACTIVE AND SOME PASSIVE ACTIVITIES
PAIN_FUNCTIONAL_ASSESSMENT: PREVENTS OR INTERFERES WITH MANY ACTIVE NOT PASSIVE ACTIVITIES
PAIN_FUNCTIONAL_ASSESSMENT: PREVENTS OR INTERFERES SOME ACTIVE ACTIVITIES AND ADLS

## 2020-07-24 ASSESSMENT — PAIN SCALES - GENERAL
PAINLEVEL_OUTOF10: 5
PAINLEVEL_OUTOF10: 5
PAINLEVEL_OUTOF10: 3
PAINLEVEL_OUTOF10: 10
PAINLEVEL_OUTOF10: 8
PAINLEVEL_OUTOF10: 7
PAINLEVEL_OUTOF10: 7

## 2020-07-24 ASSESSMENT — PAIN DESCRIPTION - ONSET
ONSET: ON-GOING

## 2020-07-24 ASSESSMENT — PAIN DESCRIPTION - LOCATION
LOCATION: ABDOMEN

## 2020-07-24 ASSESSMENT — PAIN DESCRIPTION - ORIENTATION
ORIENTATION: MID

## 2020-07-24 ASSESSMENT — PAIN DESCRIPTION - PAIN TYPE
TYPE: ACUTE PAIN;SURGICAL PAIN
TYPE: ACUTE PAIN;SURGICAL PAIN
TYPE: ACUTE PAIN
TYPE: ACUTE PAIN;SURGICAL PAIN

## 2020-07-24 ASSESSMENT — PAIN DESCRIPTION - PROGRESSION
CLINICAL_PROGRESSION: NOT CHANGED

## 2020-07-24 ASSESSMENT — PAIN DESCRIPTION - FREQUENCY
FREQUENCY: CONTINUOUS

## 2020-07-24 NOTE — PROGRESS NOTES
[facility]  Gynecologic Oncology   Progress Note    POD1 s/p meena conv elap/STACIE/SBR-RA/tumor debulking    SUBJECTIVE:  Pain controlled, forgetful    OBJECTIVE:           Physical Exam  VITALS:  BP (!) 137/57   Pulse 65   Temp 98.6 °F (37 °C) (Oral)   Resp 14   Ht 5' 8\" (1.727 m)   Wt 147 lb 0.8 oz (66.7 kg)   SpO2 100%   BMI 22.36 kg/m²   24HR INTAKE/OUTPUT:    Intake/Output Summary (Last 24 hours) at 7/24/2020 1908  Last data filed at 7/24/2020 1155  Gross per 24 hour   Intake 660 ml   Output 750 ml   Net -90 ml     CONSTITUTIONAL:  awake, alert, cooperative, no apparent distress, and appears stated age  ABDOMEN:  Incision c/d/i, gregory in place  MUSCULOSKELETAL:  There is no redness, warmth, or swelling of the joints. Full range of motion noted. Motor strength is 5 out of 5 all extremities bilaterally.   Tone is normal.    DATA:  CBC with Differential:    Lab Results   Component Value Date    WBC 8.7 07/24/2020    RBC 2.46 07/24/2020    RBC 3.13 06/19/2017    HGB 8.2 07/24/2020    HCT 23.8 07/24/2020     07/24/2020    MCV 96.6 07/24/2020    MCH 33.2 07/24/2020    MCHC 34.4 07/24/2020    RDW 18.5 07/24/2020    SEGSPCT 63 05/21/2012    BANDSPCT 3 06/21/2020    METASPCT 1 06/21/2020    LYMPHOPCT 1.5 07/24/2020    LYMPHOPCT 30.6 06/19/2017    MONOPCT 4.0 07/24/2020    EOSPCT 2 04/23/2012    BASOPCT 0.3 07/24/2020    MONOSABS 0.4 07/24/2020    LYMPHSABS 0.1 07/24/2020    EOSABS 0.0 07/24/2020    BASOSABS 0.0 07/24/2020     BMP:    Lab Results   Component Value Date     07/24/2020    K 4.2 07/24/2020    CL 97 07/24/2020    CO2 18 07/24/2020    BUN 18 07/24/2020    LABALBU 4.5 07/23/2020    CREATININE 1.1 07/24/2020    CALCIUM 8.2 07/24/2020    GFRAA 58 07/24/2020    GFRAA 100 04/23/2012    LABGLOM 48 07/24/2020    GLUCOSE 335 07/24/2020    GLUCOSE 212 06/19/2017     Magnesium:    Lab Results   Component Value Date    MG 1.80 07/24/2020     Phosphorus:    Lab Results   Component Value Date    PHOS 4.3 07/24/2020       ASSESSMENT AND PLAN:    Medicine team following as pt is a sensitive diabetic. It can be difficult to control her sugars. Her home sliding scale has been placed, her home lantus is ordered to start tonight. Reviewed surgery with  and pt. I expect pt to have a slow return to bowel function. She has had weight loss and has been on liquids for almost 3 weeks. I am starting TPN as we await her bowel function. Maintain clears until passing gas and then advance. I would not be surprised if pt experiences an ileus. PT/OT pt is elderly, lives with her . She has become more forgetful over the past year. Dr. Lucia Corrales will round this weekend. Cinda Melendrez, [address] Oncology  [phone])

## 2020-07-24 NOTE — ANESTHESIA POSTPROCEDURE EVALUATION
Department of Anesthesiology  Postprocedure Note    Patient: Alex Keen  MRN: [id number]  YOB: 1940  Date of evaluation: 7/23/2020  Time:  9:06 PM     Procedure Summary     Date:  07/23/20 Room / Location:  [facility] 10 / [facility]    Anesthesia Start:  1550 Anesthesia Stop:  2106    Procedure:  ROBOTIC LAPAROSCOPIC CONVERTED TO OPEN EXTENSIVE LYSIS OF ADHESIONS, SMALL BOWEL RESECTION WITH ANASTAMOSIS, FLEXIBLE SIGMOIDOSCOPY, REMOVAL OF TUMOR (N/A Pelvis) Diagnosis:  (PERITONEAL CANCER, BOWEL OBSTRUCTION)    Surgeon:  Olivia Seymour MD Responsible Provider:  Ngoc Manriquez MD    Anesthesia Type:  general ASA Status:  3          Anesthesia Type: general    Kat Phase I: Kat Score: 8    Kat Phase II:      Last vitals: Reviewed and per EMR flowsheets.        Anesthesia Post Evaluation    Patient location during evaluation: PACU  Patient participation: waiting for patient participation  Level of consciousness: sleepy but conscious  Pain score: 3  Airway patency: patent  Nausea & Vomiting: no nausea and no vomiting  Complications: no  Cardiovascular status: blood pressure returned to baseline  Respiratory status: acceptable  Hydration status: euvolemic

## 2020-07-24 NOTE — PROGRESS NOTES
4 Eyes Admission Assessment     I agree as the admission nurse that 2 RN's have performed a thorough Head to Toe Skin Assessment on the patient. ALL assessment sites listed below have been assessed on admission. Areas assessed by both nurses:   [x]   Head, Face, and Ears   [x]   Shoulders, Back, and Chest  [x]   Arms, Elbows, and Hands   [x]   Coccyx, Sacrum, and Ischum  [x]   Legs, Feet, and Heels         Does the Patient have Skin Breakdown?   No         Librado Prevention initiated:  No   Wound Care Orders initiated:  No      [facility] nurse consulted for Pressure Injury (Stage 3,4, Unstageable, DTI, NWPT, and Complex wounds):  No      Nurse 1 eSignature: Electronically signed by Kassy King RN on 7/24/20 at 1:09 AM EDT    **SHARE this note so that the co-signing nurse is able to place an eSignature**    Nurse 2 eSignature: Electronically signed by Anushka Nielson RN on 7/24/20 at 7:32 AM EDT

## 2020-07-24 NOTE — PLAN OF CARE
Problem: Nutrition  Goal: Optimal nutrition therapy  Outcome: Ongoing     Nutrition Problem #1: Inadequate oral intake  Intervention: Food and/or Nutrient Delivery: Continue Current Diet, Continue Current Parenteral Nutrition  Nutritional Goals:  Tolerate most appropriate nutrition therapy

## 2020-07-24 NOTE — OP NOTE
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 OPERATION PERFORMED:  Small bowel resection with anastomosis. SURGEON:  Giselle Dinh MD    ANESTHESIA:  General endotracheal.    COMPLICATIONS:  None. SPECIMENS:  Small bowel resection. ESTIMATED BLOOD LOSS:  100 mL. INDICATIONS:  The patient is a 61-year-old female. She is under the  care of Dr. Mary Elena of Gynecology/Oncology. She unfortunately has  peritoneal disease from ovarian cancer and has failed chemotherapy. She  started having intermittent obstructive-type symptoms without a clear  complete obstruction. Dr. Malou Romano was planning for a diagnostic  laparoscopy with lysis of adhesions, possible debulking. She asked me  to be involved if a small bowel lesion or abnormality was found. I did  have a chance to meet her and her  in the preoperative area and  discussed my plan for robotic versus laparoscopic versus open  adhesiolysis and possible bowel resection, possible partial colectomy,  possible ostomy depending on the operative circumstances. She is at  high risk given her recent chemotherapy, her malnutrition, her inability  to tolerate regular diet, and her age. She did understand the risks and  agreed to proceed.   Risks specifically discussed including bleeding;  infection; hernia; need for additional operations; damage to bowel,  bladder, ureter; anastomotic leak; potential ostomy. OPERATIVE PROCEDURE:  The patient was already in the operating room and  Dr. Clinton Scheuermann had obtained laparoscopic and robotic access with robotic  ports already placed. I was then called into the room during the  adhesiolysis. I then sat at the console of the already docked Allmoxy robot. I noted extensive postoperative adhesions from her previous laparotomies  in the pelvis. The small bowel was, using a combination of blunt and  sharp dissection,  out of the pelvis as best as possible. At  this point, we noted in the left lower quadrant pelvic sidewall, there  was tumor recurrence causing desmoplastic reaction in the small bowel  loops and in the mesentery. We also noted that the previous colon  anastomosis was in this area as well. After adhesiolysis for  approximately 45 minutes by myself and additional adhesiolysis by Dr. Clinton Scheuermann, we determined that this was actually very difficult and decided  to go ahead and convert to an open operation. I had briefly stepped out of the room and Dr. Clinton Scheuermann had already  converted to an open operation. An incision was made, and a large wound  protector was then placed. Dr. Clinton Scheuermann and I then commenced extensive  adhesiolysis. She also did secondary debulking of several lesions of  tumor recurrence. Eventually, we were able to lyse all of the small  bowel adhesions and to also separate out the colon from the recurrence  of the tumor as well as the bladder. Initially, there was a fair amount  of concern for colonic injury due to the tumor and the adhesiolysis, so  I decided to go ahead and perform a flexible sigmoidoscopy and a leak  test.    Saline was placed in the pelvis and I used a flexible colonoscope to  pass transanally under direct luminal visualization and insufflation to  approximately 40 cm from the anal verge.   The bowel was clamped from  above and I continued to view the lumen endoscopically. It actually  appeared to be completely healthy. There was no evidence of leak. There was no evidence of intraluminal abnormality and no evidence of  tumor invasion. I continued scoping all the way to the anal verge and  the scope was then removed after the colon was desufflated. At this point, I was reassured that there was no colonic injury, so no  colonic resection was going to be necessary. I scrubbed back into the  case, and we ran the small bowel and noted areas of concern including  area where there was tumor that was near obstructing the small bowel and  actually, this was in several areas that were fairly close together to  each other as well as some injury that was also in the same area from  the adhesiolysis. This was located in the distal jejunal/proximal  ileum. We chose this area of approximately 30 cm of small bowel for  resection. We noted on either side of this proposed area, there was  nice, healthy-appearing bowel. We chose areas of transection and  circumferentially dissected around using Bovie electrocautery, and SOURAV  75 blue staplers were used to transect both these areas. The  intervening mesentery was taken down using the LigaSure device with good  hemostasis. The small bowel was then passed off the table labeled as  small bowel resection. I then lined up an anastomosis. The corner of each staple line was  excised, and a SOURAV 75 blue stapler was passed down either end. The  antimesenteric borders were then brought together and the stapler was  then fired, creating a stapled side-to-side, functional end-to-end small  bowel to small bowel anastomosis. Stapler was then removed and the  anastomosis was inspected intraluminally, which was hemostatic.   I then  used Allis graspers to grasp the common enterotomy, and a TX-60 blue  load stapler was used to close the common enterotomy; 3-0 Vicryl sutures  were then used to reinforce the anastomosis in several areas including  the apex x2 as well as the staple line. The anastomosis was inspected  and appeared to be completely intact, widely patent, well perfused, and  hemostatic without any signs of twisting. The mesenteric defect was  then closed using 3-0 Vicryl [id number] in order to narrow the  mesenteric defect. The bowel was then placed back into the abdominal  cavity. The abdomen was then thoroughly irrigated in all four  quadrants. Dr. Kushal Nelson then continued debulking the tumor and this will be dictated  separately by her. I then did scrub out of the case and all counts were  correct at this time. There were no complications. Dr. Kushal Nelson then  completed the case with the closure, which will be dictated separately  by her.         Sampson Bañuelos MD    D: 07/24/2020 10:34:21       T: 07/24/2020 13:04:21     [email or web address]  Job#: [id number]     Doc#: [id number]    CC:

## 2020-07-24 NOTE — PROGRESS NOTES
Pt arrived to PACU from OR. Pt sleeping on 3l/nc with oral airway in place. Abd soft. Dressing C/D/I. Begum to gravity draining blue urine from surgery.   VSS

## 2020-07-24 NOTE — PROGRESS NOTES
Pt to room 3116 from PACU. Oriented to room and call light. Pt c/o abdominal pain rated 10/10, PCA pump started. No other complaints at this time. Begum in place draining blue urine. Fall precautions in place, call light and bedside table within reach.

## 2020-07-24 NOTE — PROGRESS NOTES
EXTENSIVE LYSIS OF ADHESIONS, EXCISION OF PERITONEAL NODULES, TUMOR DEBULKING, SMALL BOWEL RESECTION WITH ANASTAMOSIS, FLEXIBLE SIGMOIDOSCOPY. PMH as noted including CAD, CABG, Ovarian Ca, B TKR. Exam: See above. Clinical Presentation: See above. Patient Education: Role of PT, POC, Need to call for assist, Safe use of Walker. Barriers to Learning: Cognitive. REQUIRES PT FOLLOW UP: Yes  Activity Tolerance  Activity Tolerance: Patient Tolerated treatment well;Patient limited by cognitive status       Patient Diagnosis(es): There were no encounter diagnoses. has a past medical history of Anxiety, Arthritis, Asthma, CAD (coronary artery disease), Cancer of peritoneum ([address] Chronic low back pain, Depression, Embolism ([address] Hx of blood clots, Hyperlipidemia, Hypertension, Postoperative delirium, Pulmonary embolism ([address] Short-term memory loss, Type II or unspecified type diabetes mellitus without mention of complication, not stated as uncontrolled, Wears dentures, and Wears glasses. has a past surgical history that includes Coronary artery bypass graft (2007); Appendectomy; Cataract removal (Bilateral); Dilation and curettage of uterus; Coronary angioplasty with stent (1991); Colonoscopy; Endoscopy, colon, diagnostic; eye surgery; dieter and bso (cervix removed) (05/26/2016); hemicolectomy (05/2016); Hysterectomy; Tunneled venous port placement (Left, 7/27/2016); other surgical history (N/A, 08/17/2018); Breast biopsy (Left, 06/23/2017); joint replacement (Bilateral); Colonoscopy (N/A, 6/6/2019); Colonoscopy (6/6/2019); Cardiac surgery; and laparoscopy (N/A, 7/23/2020). Restrictions  Restrictions/Precautions  Restrictions/Precautions: Fall Risk  Position Activity Restriction  Other position/activity restrictions: Diet : Clear Liquid. PCA Pump. Recent Abdominal Surg : DESTIN Drain.   Vision/Hearing  Vision: Impaired  Vision Exceptions: Wears glasses at all times  Hearing: Within functional limits Subjective  General  Chart Reviewed: Yes  Patient assessed for rehabilitation services?: Yes  Additional Pertinent Hx: 66 y/o female admit 7/23/2020 with Peritoneal Ca, Bowel Obstruction. 7/23/2020 S/P ROBOTIC LAPAROSCOPIC CONVERTED TO OPEN EXTENSIVE LYSIS OF ADHESIONS, EXCISION OF PERITONEAL NODULES, TUMOR DEBULKING, SMALL BOWEL RESECTION WITH ANASTAMOSIS, FLEXIBLE SIGMOIDOSCOPY. PMH as noted including CAD, CABG, Ovarian Ca, B TKR. Family / Caregiver Present: Yes( Connor Crawford). )  Referring Practitioner: Dr. Floyd. Diagnosis: 7/23/2020 S/P ROBOTIC LAPAROSCOPIC CONVERTED TO OPEN EXTENSIVE LYSIS OF ADHESIONS, EXCISION OF PERITONEAL NODULES, TUMOR DEBULKING, SMALL BOWEL RESECTION WITH ANASTAMOSIS, FLEXIBLE SIGMOIDOSCOPY. Other (Comment): Pt forgetful, alittle impulsive. Subjective  Subjective: Pt/ agreeable to PT Eval/Rx. Pain Screening  Patient Currently in Pain: Yes          Orientation  Orientation  Overall Orientation Status: Impaired(Pt generally forgetful; confused to recent events/situation. Confused to use of PCA pump.)  Orientation Level: Oriented to person;Oriented to place  Social/Functional History  Social/Functional History  Lives With: Spouse  Type of Home: House  Home Layout: Two level, Bed/Bath upstairs, 1/2 bath on main level  Home Access: Stairs to enter without rails  Entrance Stairs - Number of Steps: 2 steps to enter. Bathroom Shower/Tub: Walk-in shower  Bathroom Toilet: Standard  Bathroom Accessibility: Accessible  Home Equipment: 4 wheeled walker  ADL Assistance: Independent  Homemaking Assistance: ( assist as needed.)  Ambulation Assistance: Independent(Occassional use of Walker.)  Transfer Assistance: Independent  Active : No( usually drives.)  Additional Comments:  very supportive and able to provide assist/support upon d/c.   Cognition   Cognition  Overall Cognitive Status: Exceptions  Arousal/Alertness: Appropriate responses to stimuli  Following Commands: Follows one step commands with repetition  Memory: Decreased recall of recent events  Safety Judgement: Decreased awareness of need for assistance  Problem Solving: Assistance required to identify errors made;Assistance required to correct errors made  Insights: Decreased awareness of deficits  Initiation: Requires cues for some  Sequencing: Requires cues for some    Objective     Observation/Palpation  Observation: Recent abdomial surg; DESTIN drain in place. AROM RLE (degrees)  RLE AROM: WFL  AROM LLE (degrees)  LLE AROM : WFL  AROM RUE (degrees)  RUE AROM : WFL  AROM LUE (degrees)  LUE AROM : WFL  Strength RLE  Strength RLE: WFL  Strength LLE  Strength LLE: WFL        Bed mobility  Supine to Sit: Contact guard assistance(HOB elevated. Use of Bedrail.)  Transfers  Sit to Stand: Contact guard assistance(With Walker. Cues for safe hand placement.)  Stand to sit: Contact guard assistance(With Walker. Cues for safe hand placement.)  Ambulation  Ambulation?: Yes  Ambulation 1  Surface: level tile  Device: Rolling Walker  Assistance: Contact guard assistance  Distance: Pt amb to/from bathroom (~15' x 2), 36' with Rolling Walker CGA. Kyphotic posture, diminished step length/clearance. Cues for safe transitional activities. Plan   Plan  Times per week: 3-5x week while in acute care setting.   Current Treatment Recommendations: Functional Mobility Training, Transfer Training, Gait Training, Stair training, Safety Education & Training, Patient/Caregiver Education & Training  Safety Devices  Type of devices: Call light within reach, Chair alarm in place, Left in chair, Nurse notified      AM-PAC Score  AM-PAC Inpatient Mobility Raw Score : 18 (07/24/20 1306)  AM-PAC Inpatient T-Scale Score : 43.63 (07/24/20 1306)  Mobility Inpatient CMS 0-100% Score: 46.58 (07/24/20 1306)  Mobility Inpatient CMS G-Code Modifier : CK (07/24/20 1306)          Goals  Short term goals  Time Frame for Short term goals: Upon d/c acute care setting. Short term goal 1: Bed Mob SBA. Short term goal 2: Transfers with/without assist device SBA/CGA. Short term goal 3: Amb with/without assist device 100' SBA/CGA. Short term goal 4: Stair Negotiation as approp. Patient Goals   Patient goals : Return home with family.        Therapy Time   Individual Concurrent Group Co-treatment   Time In 1100         Time Out 1140         Minutes 401 [facility] Bo Mcgee

## 2020-07-24 NOTE — PLAN OF CARE
Problem: Pain:  Goal: Pain level will decrease  Description: Pain level will decrease  Outcome: Ongoing  Note: Pt assessed for pain. Pt in pain and assessed with 0-10 pain rating scale. Pt given prescribed analgesic for pain. (See eMar) Pt satisfied with pain relief thus far. Will reassess and continue to monitor. Goal: Control of acute pain  Description: Control of acute pain  Outcome: Ongoing  Note: Pt assessed for pain. Pt in pain and assessed with 0-10 pain rating scale. Pt given prescribed analgesic for pain. (See eMar) Pt satisfied with pain relief thus far. Will reassess and continue to monitor. Goal: Control of chronic pain  Description: Control of chronic pain  Outcome: Ongoing  Note: Pt assessed for pain. Pt in pain and assessed with 0-10 pain rating scale. Pt given prescribed analgesic for pain. (See eMar) Pt satisfied with pain relief thus far. Will reassess and continue to monitor. Problem: Falls - Risk of:  Goal: Will remain free from falls  Description: Will remain free from falls  Outcome: Ongoing  Note: Fall risk assessment completed. Fall precautions in place. Call light within reach. Pt educated on calling for assistance before getting up. Walkway free of clutter. Will continue to monitor. Goal: Absence of physical injury  Description: Absence of physical injury  Outcome: Ongoing  Note: Pt is free of injury. No injury noted. Fall precautions in place. Call light within reach. Will monitor.

## 2020-07-24 NOTE — CONSULTS
Comprehensive Nutrition Assessment    Type and Reason for Visit:  Initial, Consult    Nutrition Recommendations/Plan:   1. Recommend check TG, Mg, Phos, CMP now if not done in last 24 hours. 2. Bag 1: Clinimix 5/20 starting at 30 mL/hr  3. Physician/LIP to monitor closely and correct lytes (Phos,Mg,K+) d/t risk of refeeding syndrome  4. Bag 2: As long as electrolytes WNL, advance Clinimix 5/20 to goal rate of 75 mL/hr  5. Recommend 250 mL 20% lipids 2x times per week  6. Recommend FSBS, monitor glucose, need for insulin  7. Pharmacy to adjust MVI and Trace Elements as needed   8. When PN to be discontinued, cut rate by 50% and let current bag run out. 9. Continue diet per MD  10. Recommend pharmacy consult as they order and manage TPN     Nutrition Assessment:  Consult for PN. Pt with hx of DM, HTN, HLD, CAD, CABG (2007), Depression, ovarian cancer. S/p surgery (yesterday) for peritoneal cancer and bowel  obstruction. Pt with poor po intake for 2-3 weeks PTA and MD anticipates slow return of bowel function. Pt was 174 lb x 9 months, currently 147 lb. Malnutrition Assessment:  Malnutrition Status: Moderate malnutrition    Context:  Chronic Illness     Findings of the 6 clinical characteristics of malnutrition:  Energy Intake:  7 - 75% or less estimated energy requirements for 1 month or longer  Weight Loss:  7 - Greater than 10% over 6 months     Body Fat Loss:  Unable to assess     Muscle Mass Loss:  Unable to assess    Fluid Accumulation:  No significant fluid accumulation     Strength:  Not Performed    Estimated Daily Nutrient Needs:  Energy (kcal):  5905-6116 kcal (25-30 kcal/kg ABW); Weight Used for Energy Requirements:  Current     Protein (g):   gm (1.2-1.5 gm/kg ABW);  Weight Used for Protein Requirements:  Current        Fluid (ml/day):  1 ml/kcal;     Nutrition Related Findings:  no edema; high glucose      Wounds:  Surgical Wound       Current Nutrition Therapies:    DIET CLEAR LIQUID; Carb Control: 4 carb choices (60 gms)/meal  Goal PN Orders Provides: If PN desired, recommend Clinimix 5/20 at 75 ml/hr + biweekly 20% 250 ml lipids. This provides 1800 ml TV, 90 gm of protein, 360 gm of dextrose, 1584 calories (+143 kcal daily avg from lipids). GIR 3.7. Anthropometric Measures:  · Height: 5' 8\" (172.7 cm)  · Current Body Weight: 147 lb (66.7 kg)   · Admission Body Weight: 150 lb (68 kg)    · Usual Body Weight: 174 lb (78.9 kg)     · Ideal Body Weight: 140 lbs; % Ideal Body Weight 105 %   · BMI: 22.4  · Adjusted Body Weight:  ; No Adjustment   · BMI Categories: Normal Weight (BMI 22.0 to 24.9) age over 72       Nutrition Diagnosis:   · Inadequate oral intake related to altered GI structure as evidenced by poor intake prior to admission    Nutrition Interventions:   Food and/or Nutrient Delivery:  Continue Current Diet, Continue Current Parenteral Nutrition  Nutrition Education/Counseling:  No recommendation at this time   Coordination of Nutrition Care:  Continued Inpatient Monitoring    Goals: Tolerate most appropriate nutrition therapy       Nutrition Monitoring and Evaluation:   Behavioral-Environmental Outcomes:  (na)   Food/Nutrient Intake Outcomes:  Diet Advancement/Tolerance, Food and Nutrient Intake, Parenteral Nutrition Intake/Tolerance  Physical Signs/Symptoms Outcomes:  Biochemical Data, GI Status, Nausea or Vomiting, Hemodynamic Status, Nutrition Focused Physical Findings, Skin, Weight     Discharge Planning:     Too soon to determine     Electronically signed by Ligia Barnhart RD, LD on 7/24/20 at 10:02 AM EDT    Contact: [phone]

## 2020-07-24 NOTE — BRIEF OP NOTE
Brief Postoperative Note      Patient: Bryson Smallwood  YOB: 1940  MRN: [id number]    Date of Procedure: 7/23/2020    Pre-Op Diagnosis: PERITONEAL CANCER, BOWEL OBSTRUCTION    Post-Op Diagnosis: Same       Procedure(s):  ROBOTIC LAPAROSCOPIC CONVERTED TO OPEN EXTENSIVE LYSIS OF ADHESIONS, EXCISION OF PERITONEAL NODULES, TUMOR DEBULKING, SMALL BOWEL RESECTION WITH ANASTAMOSIS, FLEXIBLE SIGMOIDOSCOPY    Surgeon(s):  MD Champ Powell MD    Assistant:  Surgical Assistant: Greg Guy; Raman Martínez; Juanita Davenport    Anesthesia: General    Estimated Blood Loss (mL): less than 528     Complications: None    Specimens:   ID Type Source Tests Collected by Time Destination   A : ASCITIES FOR CYTOLOGY Body Fluid Fluid CYTOLOGY, NON-GYN Champ Arias MD 7/23/2020 1636    B : Small bowel adhesion Tissue Tissue SURGICAL PATHOLOGY Champ Arias MD 7/23/2020 1646    C : interior peritoneum nodule Tissue Tissue SURGICAL PATHOLOGY Champ Arias MD 7/23/2020 1391    D :  small bowel mesenteric nodule Tissue Tissue SURGICAL PATHOLOGY Champ Arias MD 7/23/2020 1900    E : PORTION OF SMALL BOWEL Tissue Duodenum SURGICAL PATHOLOGY Champ Arias MD 7/23/2020 1953        Implants:  * No implants in log *      Drains:   Urethral Catheter Non-latex;Straight-tip 16 fr (Active)   Urine Color Yellow 07/24/20 0445   Urine Appearance Clear 07/24/20 0445   Output (mL) 225 mL 07/24/20 0445       Findings: carcinomatosis of peritoneum, bowel, multiple sites of concern for obstruction. Most likely site of inciting obstruction in the ileum and resected. Remaining tumor is less than one centimeter.      Electronically signed by Champ Arias MD on 7/24/2020 at 7:37 AM

## 2020-07-24 NOTE — PLAN OF CARE
Problem: Pain:  Goal: Pain level will decrease  Description: Pain level will decrease  7/24/2020 0916 by Michell Orellana RN  Outcome: Ongoing  7/24/2020 0107 by Benton Del Valle RN  Outcome: Ongoing  Note: Pt assessed for pain. Pt in pain and assessed with 0-10 pain rating scale. Pt given prescribed analgesic for pain. (See eMar) Pt satisfied with pain relief thus far. Will reassess and continue to monitor. Goal: Control of acute pain  Description: Control of acute pain  7/24/2020 0916 by Michell Orellana RN  Outcome: Ongoing  7/24/2020 0107 by Benton Del Valle RN  Outcome: Ongoing  Note: Pt assessed for pain. Pt in pain and assessed with 0-10 pain rating scale. Pt given prescribed analgesic for pain. (See eMar) Pt satisfied with pain relief thus far. Will reassess and continue to monitor. Goal: Control of chronic pain  Description: Control of chronic pain  7/24/2020 0916 by Michell Orellana RN  Outcome: Ongoing  7/24/2020 0107 by Benton Del Valle RN  Outcome: Ongoing  Note: Pt assessed for pain. Pt in pain and assessed with 0-10 pain rating scale. Pt given prescribed analgesic for pain. (See eMar) Pt satisfied with pain relief thus far. Will reassess and continue to monitor. Problem: Falls - Risk of:  Goal: Will remain free from falls  Description: Will remain free from falls  7/24/2020 0916 by Michell Orellana RN  Outcome: Ongoing  7/24/2020 0107 by Benton Del Valle RN  Outcome: Ongoing  Note: Fall risk assessment completed. Fall precautions in place. Call light within reach. Pt educated on calling for assistance before getting up. Walkway free of clutter. Will continue to monitor. Goal: Absence of physical injury  Description: Absence of physical injury  7/24/2020 0916 by Michell Orellana RN  Outcome: Ongoing  7/24/2020 0107 by Benton Del Valle RN  Outcome: Ongoing  Note: Pt is free of injury. No injury noted. Fall precautions in place. Call light within reach. Will monitor.

## 2020-07-24 NOTE — CARE COORDINATION
INITIAL CASE MANAGEMENT ASSESSMENT    Reviewed chart, met with patient to assess possible discharge needs. Explained Case Management role/services. Living Situation: lives at home with her     ADLs: independent     DME: walker - states she uses occasionally    PT/OT Recs: await eval     Active Services: none     Transportation:  can transport home at discharge     Medications: no issues--uses [employer]    PCP: Dr Severo Crooked    PLAN/COMMENTS: patient plans to return home at discharge with her  -- denies dc needs at present   Follow for PT/OT eval to determine ant additional discharge needs    SW/CM provided contact information for patient or family to call with any questions. SW/CM will follow and assist as needed.   Electronically signed by Vidhi Sanchez on 7/24/2020 at 11:46 AM'

## 2020-07-24 NOTE — PROGRESS NOTES
Pt rounded on this morning Q2h, whiteboard updated, pt given ice water and needs assessed. Pt A&O, with moments of confusion, needs very frequent reorientation on the use of PCA pump. Pt has no needs or concerns at this time. Call light within reach, pt verbalized understanding to call prior to ambulating. Will continue to monitor and reassess.    Electronically signed by Luiz Tyler RN on 7/24/2020 at 8:20 PM

## 2020-07-24 NOTE — PROGRESS NOTES
Occupational Therapy   Occupational Therapy Initial Assessment  Date: 2020   Patient Name: Lupe Foy  MRN: [id number]     : 1940    Date of Service: 2020    Discharge Recommendations:  Home with assist PRN     Lupe Foy scored a 20/24 on the [facility] ADL Inpatient form. At this time, no further OT is recommended upon discharge due to level of independence and support from  at home prn. Recommend patient returns to prior setting with prior services. Assessment   Performance deficits / Impairments: Decreased endurance;Decreased ADL status; Decreased strength  Assessment: 66 y/o female admit 2020 with Peritoneal Ca, Bowel Obstruction. 2020 S/P ROBOTIC LAPAROSCOPIC CONVERTED TO OPEN EXTENSIVE LYSIS OF ADHESIONS, EXCISION OF PERITONEAL NODULES, TUMOR DEBULKING, SMALL BOWEL RESECTION WITH ANASTAMOSIS, FLEXIBLE SIGMOIDOSCOPY. PMH as noted including CAD, CABG, Ovarian cancer. PTA, pt lives at home with , is independent with ADLs and occasionally uses of RW/4WW. Today, pt completed functional mobility and grooming tasks at sink with SBA. Pt denied need for any additional ADLs. Anticipate pt will be supervision for UE bathing/dressing and min A for toileting and LE bathing/dressing. Pt would benefit from skilled therapy while in acute care and anticipate pt will be safe to return home at discharge with assistance from  prn. Prognosis: Good  Decision Making: Low Complexity  OT Education: OT Role;Plan of Care  REQUIRES OT FOLLOW UP: Yes  Activity Tolerance  Activity Tolerance: Patient Tolerated treatment well  Safety Devices  Safety Devices in place: Yes  Type of devices: Left in chair;Call light within reach;Nurse notified; Chair alarm in place;Gait belt;Patient at risk for falls           Patient Diagnosis(es): There were no encounter diagnoses.      has a past medical history of Anxiety, Arthritis, Asthma, CAD (coronary artery disease), Cancer of peritoneum [facility]), Chronic low back pain, Depression, Embolism ([facility] [address] Hx of blood clots, Hyperlipidemia, Hypertension, Postoperative delirium, Pulmonary embolism ([facility] [address] Short-term memory loss, Type II or unspecified type diabetes mellitus without mention of complication, not stated as uncontrolled, Wears dentures, and Wears glasses. has a past surgical history that includes Coronary artery bypass graft (2007); Appendectomy; Cataract removal (Bilateral); Dilation and curettage of uterus; Coronary angioplasty with stent (1991); Colonoscopy; Endoscopy, colon, diagnostic; eye surgery; dieter and bso (cervix removed) (05/26/2016); hemicolectomy (05/2016); Hysterectomy; Tunneled venous port placement (Left, 7/27/2016); other surgical history (N/A, 08/17/2018); Breast biopsy (Left, 06/23/2017); joint replacement (Bilateral); Colonoscopy (N/A, 6/6/2019); Colonoscopy (6/6/2019); Cardiac surgery; and laparoscopy (N/A, 7/23/2020). Restrictions  Restrictions/Precautions  Restrictions/Precautions: Fall Risk  Position Activity Restriction  Other position/activity restrictions: Diet : Clear Liquid. PCA Pump. Recent Abdominal Surg : DESTIN Drain. Subjective   General  Chart Reviewed: Yes  Patient assessed for rehabilitation services?: Yes  Additional Pertinent Hx: 68 y/o female admit 7/23/2020 with Peritoneal Ca, Bowel Obstruction. 7/23/2020 S/P ROBOTIC LAPAROSCOPIC CONVERTED TO OPEN EXTENSIVE LYSIS OF ADHESIONS, EXCISION OF PERITONEAL NODULES, TUMOR DEBULKING, SMALL BOWEL RESECTION WITH ANASTAMOSIS, FLEXIBLE SIGMOIDOSCOPY. PMH as noted including CAD, CABG, Ovarian cancer. Family / Caregiver Present: Yes()  Referring Practitioner: Lakeisha Decker MD  Subjective  Subjective: Pt in chair upon arrival and agreeable to therapy. General Comment  Comments: Per RN, okay for therapy.     Social/Functional History  Social/Functional History  Lives With: Spouse  Type of Home: House  Home Layout: Two level, Bed/Bath upstairs, 1/2 bath on

## 2020-07-24 NOTE — CONSULTS
Internal Medical Consult    PCP: Pantera Foley MD    Date of Service: Pt seen/examined on 07/24/20 and Reason for consult: Management of hypertension, Diabetes    History Of Present Illness:     67 y.o. female Octavio Francisco has a past medical type 2 diabetes, hypertension, hyperlipidemia, coronary disease status post CABG 2007, depression, chronic memory changes, anemia, stage IIIc peritoneal cancer, papillary serous primary peritoneal carcinoma, recent multiple admissions for bowel obstruction/partial small bowel obstruction. During the last admission to [facility] this month she has not regained her ability to eat solid food, and after discussion with family patient decided to undergo surgical intervention. Patient underwent ROBOTIC LAPAROSCOPIC CONVERTED TO OPEN EXTENSIVE LYSIS OF ADHESIONS, EXCISION OF PERITONEAL NODULES, TUMOR DEBULKING, SMALL BOWEL RESECTION WITH ANASTAMOSIS, FLEXIBLE SIGMOIDOSCOPY    Patient had [facility] 07/03 to 07/06 where she presented abdominal pain emesis constipation. CT showed 2.7 x 2.3 enhancing uterine mass or intraluminal lesion within the dilated small bowel which appears to be obstructing point with decompressed small bowel in the pelvis. Surgery decided to do conservative management and follow-up closely with her oncologist as an outpatient. Patient was discharged and told to maintain full liquid diet. Internal medicine consulted for management of diabetes, hypertension and postop management. Patient was seen and examined on postoperative day #1, she states pain is 5 out of 10 intensity, she is out of bed in chair, does not want to wear end-tidal CO2 measurement because she wants to eat. Denies nausea vomiting. Patient's care was discussed extensively with her  at bedside, he says diabetes in good control at home and he keeps a blood glucose anywhere between 100-120.      past Medical History:          Diagnosis Date    Anxiety     Arthritis     Asthma     poorly controlled    CAD (coronary artery disease)     Cancer of peritoneum ([address]     ovarian    Chronic low back pain     Depression     Embolism (HCC)     small pulmonary clot    Hx of blood clots     x1    Hyperlipidemia 4/23/2012    Hypertension     Postoperative delirium 05/26/2016    Pulmonary embolism (HCC)     Short-term memory loss     Type II or unspecified type diabetes mellitus without mention of complication, not stated as uncontrolled     Wears dentures     Wears glasses        Past Surgical History:          Procedure Laterality Date    APPENDECTOMY      BREAST BIOPSY Left 06/23/2017    Fibroadenomatous type fibrocystic change    CARDIAC SURGERY      quadruple bypass    CATARACT REMOVAL Bilateral     COLONOSCOPY      COLONOSCOPY N/A 6/6/2019    COLONOSCOPY WITH BIOPSY performed by Vinay Walker MD at [address] [facility]  6/6/2019    COLONOSCOPY CONTROL HEMORRHAGE performed by Vinay Walker MD at [address] [facility] CORONARY ARTERY BYPASS GRAFT  2007    DILATION AND CURETTAGE OF UTERUS      Miscarriage    ENDOSCOPY, COLON, DIAGNOSTIC      EYE SURGERY      HEMICOLECTOMY  05/2016    HYSTERECTOMY      JOINT REPLACEMENT Bilateral     bilat knee    LAPAROSCOPY N/A 7/23/2020    ROBOTIC LAPAROSCOPIC CONVERTED TO OPEN EXTENSIVE LYSIS OF ADHESIONS, EXCISION OF PERITONEAL NODULES, TUMOR DEBULKING, SMALL BOWEL RESECTION WITH ANASTAMOSIS, FLEXIBLE SIGMOIDOSCOPY performed by Kiran Magana MD at [address] [facility] 08/17/2018    [address]    FRACISCO AND BSO  05/26/2016    TUNNELED VENOUS PORT PLACEMENT Left 7/27/2016    PORT-A-CATH PLACEMENT,LEFT        Medications Prior to Admission:      Prior to Admission medications    Medication Sig Start Date End Date Taking?  Authorizing Provider   rOPINIRole (REQUIP) 2 MG tablet Take 2 mg by mouth 3 times daily as needed (RLS)   Yes Historical Provider, MD   oxyCODONE-acetaminophen (PERCOCET)  MG per tablet Take 1 tablet by mouth 4 times daily as needed for Pain. Yes Historical Provider, MD   pregabalin (LYRICA) 75 MG capsule Take 75 mg by mouth 3 times daily. Yes Historical Provider, MD   venlafaxine (EFFEXOR XR) 75 MG extended release capsule Take 75 mg by mouth 2 times daily   Yes Historical Provider, MD   albuterol sulfate HFA (VENTOLIN HFA) 108 (90 Base) MCG/ACT inhaler Inhale 2 puffs into the lungs every 4 hours as needed for Wheezing   Yes Historical Provider, MD   apixaban (ELIQUIS) 5 MG TABS tablet Take 1 tablet by mouth 2 times daily 9/0/99 4/6/58 Yes Anay Pruitt MD   losartan (COZAAR) 100 MG tablet Take 1 tablet by mouth daily Pt. takes 100mg of Cozaar daily. 3/1/46 8/1/62 Yes Anay Pruitt MD   amLODIPine (NORVASC) 10 MG tablet Take 1 tablet by mouth nightly 9/8/37 6/2/20 Yes Anay Pruitt MD   metoprolol succinate (TOPROL XL) 100 MG extended release tablet Take 1 tablet by mouth daily 9/5/77  Yes Anay Pruitt MD   bisacodyl (BISAC-EVAC) 10 MG suppository Place 1 suppository rectally daily as needed for Constipation 1/1/78 4/5/22 Yes Anay Pruitt MD   insulin glargine (LANTUS) 100 UNIT/ML injection vial Inject 5 Units into the skin nightly  Patient taking differently: Inject 6 Units into the skin nightly  6/6/16  Yes Ute Ochoa MD   insulin lispro (HUMALOG) 100 UNIT/ML injection vial Inject 0-12 Units into the skin 3 times daily (with meals)  Patient taking differently: Inject 6-16 Units into the skin 3 times daily (before meals)  6/6/16  Yes Ute Ochoa MD   rosuvastatin (CRESTOR) 40 MG tablet Take 40 mg by mouth every evening. Yes Historical Provider, MD   alprazolam Shirin John) 0.5 MG tablet Take 0.5 mg by mouth nightly. Yes Historical Provider, MD       Allergies:  Cephalexin; Pcn [penicillins];  Cephalosporins; and Cephalexin    Social History:      The patient currently lives at home with her     TOBACCO:   reports that she quit smoking about 20 years ago. She has a 20.00 pack-year smoking history. She has never used smokeless tobacco.  ETOH:   reports previous alcohol use of about 1.0 standard drinks of alcohol per week. Family History:      Reviewed        Problem Relation Age of Onset    High Blood Pressure Mother     Diabetes Father     Heart Disease Maternal Grandmother     Diabetes Maternal Uncle        REVIEW OF SYSTEMS:   Pertinent positives as noted in the HPI. All other systems reviewed and negative. PHYSICAL EXAM PERFORMED:    BP (!) 163/64   Pulse 88   Temp 98.2 °F (36.8 °C) (Oral)   Resp 13   Ht 5' 8\" (1.727 m)   Wt 147 lb 0.8 oz (66.7 kg)   SpO2 100%   BMI 22.36 kg/m²     General appearance:  No apparent distress, appears stated age and cooperative. HEENT:  Normal cephalic, atraumatic without obvious deformity. Pupils equal, round, and reactive to light. Extra ocular muscles intact. Conjunctivae/corneas clear. Neck: Supple, with full range of motion. No jugular venous distention. Trachea midline. Respiratory:  Normal respiratory effort. Clear to auscultation, bilaterally without Rales/Wheezes/Rhonchi. Cardiovascular:  Regular rate and rhythm with normal S1/S2 without murmurs, rubs or gallops. Abdomen: Soft, mild tenderness at the surgical site, dressing present clean dry  Musculoskeletal:  No clubbing, cyanosis or edema bilaterally. Full range of motion without deformity. Skin: Skin color, texture, turgor normal.  No rashes or lesions. Neurologic:  Neurovascularly intact without any focal sensory/motor deficits.  Cranial nerves: II-XII intact, grossly non-focal.  Psychiatric:  Alert and oriented, thought content appropriate, normal insight  Capillary Refill: Brisk,< 3 seconds   Peripheral Pulses: +2 palpable, equal bilaterally       Labs:     Recent Labs     07/23/20  1320 07/24/20  0503   WBC 3.5* 8.7   HGB 9.6* 8.2*   HCT 28.0* 23.8*    124*     Recent Labs     07/23/20  1320 07/24/20  0503   * 132*   K 4.6 4.2   CL 97* 97*   CO2 22 18*   BUN 16 18   CREATININE 1.1 1.1   CALCIUM 9.4 8.2*   PHOS  --  4.3     Recent Labs     07/23/20  1320   AST 28   ALT 13   BILITOT 0.7   ALKPHOS 58     No results for input(s): INR in the last 72 hours. No results for input(s): Manuel Lust in the last 72 hours.     Urinalysis:      Lab Results   Component Value Date    NITRU Negative 07/23/2020    WBCUA 3-5 07/23/2020    BACTERIA 1+ 07/23/2020    RBCUA 0-2 07/23/2020    BLOODU SMALL 07/23/2020    SPECGRAV 1.018 07/23/2020    GLUCOSEU Negative 07/23/2020    GLUCOSEU Negative 01/04/2012       Radiology:     CXR: I have reviewed the CXR with the following interpretation: None  EKG:  I have reviewed the EKG with the following interpretation: None    No orders to display       ASSESSMENT/PLAN:    Active Hospital Problems    Diagnosis Date Noted    Partial small bowel obstruction ([facility] [address] [K56.600] 07/23/2020     Small bowel obstruction, ROBOTIC LAPAROSCOPIC CONVERTED TO OPEN EXTENSIVE LYSIS OF ADHESIONS, EXCISION OF PERITONEAL NODULES, TUMOR DEBULKING, SMALL BOWEL RESECTION WITH ANASTAMOSIS, FLEXIBLE SIGMOIDOSCOPY, estimated blood loss less than 100  Follow-up biopsy results  Pain controlled but surgery  Bowel regimen while on narcotics  Diet advancement per surgery    Hypertension  Continue home amlodipine, losartan, Toprol-XL  -We will avoid adding further medications at this point as some BP elevation in the hospital could be secondary to postop pain    Anxiety/depression  Continue home venlafaxine    Hyperglycemia due Type 2 diabetes, last A1c 7.0 10/2019  She also received dose of dexamethasone 8mg  Continue home Lantus 5 units at night, she did not get this last night  We will give one-time dose of 5 units of NPH, add 3 units prandial insulin, and low-dose sliding scale insulin    CAD, status post CABG 2007  Continue statin  I do not see aspirin on her PTA medications likely due to underlying anemia    Anemia multifactorial, likely to chronic disease, iron deficiency. Monitor closely, hemoglobin this morning was 8.2 which is around her baseline earlier this month when she was hospitalized she was also 8.2  Consider IV iron dose if hemoglobin drops   Transfuse for hemoglobin less than 7    Hyponatremia  - she is on 0.45% nacl  - change to normal saline  - Monitor renal profile daily    DVT Prophylaxis: Lovenox  Diet: DIET CLEAR LIQUID; Carb Control: 4 carb choices (60 gms)/meal  Code Status: Full Code    PT/OT Eval Status: Per primary team    Dispo -per primary team       Pallavi Duarte MD   Hospitalist    Thank you Abram Rodriguez MD for the opportunity to be involved in this patient's care. If you have any questions or concerns please feel free to contact me at [phone].

## 2020-07-25 LAB
ANION GAP SERPL CALCULATED.3IONS-SCNC: 12 MMOL/L (ref 3–16)
BASOPHILS ABSOLUTE: 0 K/UL (ref 0–0.2)
BASOPHILS RELATIVE PERCENT: 0.6 %
BUN BLDV-MCNC: 24 MG/DL (ref 7–20)
CALCIUM SERPL-MCNC: 7.7 MG/DL (ref 8.3–10.6)
CHLORIDE BLD-SCNC: 98 MMOL/L (ref 99–110)
CO2: 20 MMOL/L (ref 21–32)
CREAT SERPL-MCNC: 1.5 MG/DL (ref 0.6–1.2)
EOSINOPHILS ABSOLUTE: 0.2 K/UL (ref 0–0.6)
EOSINOPHILS RELATIVE PERCENT: 2.7 %
GFR AFRICAN AMERICAN: 41
GFR NON-AFRICAN AMERICAN: 34
GLUCOSE BLD-MCNC: 121 MG/DL (ref 70–99)
GLUCOSE BLD-MCNC: 159 MG/DL (ref 70–99)
GLUCOSE BLD-MCNC: 165 MG/DL (ref 70–99)
GLUCOSE BLD-MCNC: 307 MG/DL (ref 70–99)
GLUCOSE BLD-MCNC: 328 MG/DL (ref 70–99)
HCT VFR BLD CALC: 21.7 % (ref 36–48)
HEMOGLOBIN: 7.3 G/DL (ref 12–16)
LYMPHOCYTES ABSOLUTE: 0.5 K/UL (ref 1–5.1)
LYMPHOCYTES RELATIVE PERCENT: 7.5 %
MAGNESIUM: 1.7 MG/DL (ref 1.8–2.4)
MCH RBC QN AUTO: 32.3 PG (ref 26–34)
MCHC RBC AUTO-ENTMCNC: 33.7 G/DL (ref 31–36)
MCV RBC AUTO: 95.9 FL (ref 80–100)
MONOCYTES ABSOLUTE: 0.6 K/UL (ref 0–1.3)
MONOCYTES RELATIVE PERCENT: 8.9 %
NEUTROPHILS ABSOLUTE: 5.5 K/UL (ref 1.7–7.7)
NEUTROPHILS RELATIVE PERCENT: 80.3 %
PDW BLD-RTO: 18.3 % (ref 12.4–15.4)
PERFORMED ON: ABNORMAL
PHOSPHORUS: 3.8 MG/DL (ref 2.5–4.9)
PLATELET # BLD: 132 K/UL (ref 135–450)
PMV BLD AUTO: 8.4 FL (ref 5–10.5)
POTASSIUM SERPL-SCNC: 4.2 MMOL/L (ref 3.5–5.1)
RBC # BLD: 2.26 M/UL (ref 4–5.2)
SODIUM BLD-SCNC: 130 MMOL/L (ref 136–145)
WBC # BLD: 6.9 K/UL (ref 4–11)

## 2020-07-25 PROCEDURE — 6370000000 HC RX 637 (ALT 250 FOR IP): Performed by: INTERNAL MEDICINE

## 2020-07-25 PROCEDURE — 84100 ASSAY OF PHOSPHORUS: CPT

## 2020-07-25 PROCEDURE — 2500000003 HC RX 250 WO HCPCS

## 2020-07-25 PROCEDURE — 1200000000 HC SEMI PRIVATE

## 2020-07-25 PROCEDURE — 94760 N-INVAS EAR/PLS OXIMETRY 1: CPT

## 2020-07-25 PROCEDURE — 80048 BASIC METABOLIC PNL TOTAL CA: CPT

## 2020-07-25 PROCEDURE — 6360000002 HC RX W HCPCS: Performed by: OBSTETRICS & GYNECOLOGY

## 2020-07-25 PROCEDURE — 6370000000 HC RX 637 (ALT 250 FOR IP): Performed by: OBSTETRICS & GYNECOLOGY

## 2020-07-25 PROCEDURE — 85025 COMPLETE CBC W/AUTO DIFF WBC: CPT

## 2020-07-25 PROCEDURE — 2580000003 HC RX 258: Performed by: OBSTETRICS & GYNECOLOGY

## 2020-07-25 PROCEDURE — 2580000003 HC RX 258: Performed by: INTERNAL MEDICINE

## 2020-07-25 PROCEDURE — 83735 ASSAY OF MAGNESIUM: CPT

## 2020-07-25 RX ORDER — 0.9 % SODIUM CHLORIDE 0.9 %
500 INTRAVENOUS SOLUTION INTRAVENOUS ONCE
Status: COMPLETED | OUTPATIENT
Start: 2020-07-25 | End: 2020-07-25

## 2020-07-25 RX ORDER — MAGNESIUM SULFATE 1 G/100ML
1 INJECTION INTRAVENOUS ONCE
Status: COMPLETED | OUTPATIENT
Start: 2020-07-25 | End: 2020-07-25

## 2020-07-25 RX ADMIN — ENOXAPARIN SODIUM 40 MG: 40 INJECTION SUBCUTANEOUS at 08:28

## 2020-07-25 RX ADMIN — INSULIN LISPRO 4 UNITS: 100 INJECTION, SOLUTION INTRAVENOUS; SUBCUTANEOUS at 18:46

## 2020-07-25 RX ADMIN — ROSUVASTATIN CALCIUM 40 MG: 40 TABLET, FILM COATED ORAL at 18:44

## 2020-07-25 RX ADMIN — SODIUM CHLORIDE 500 ML: 9 INJECTION, SOLUTION INTRAVENOUS at 06:03

## 2020-07-25 RX ADMIN — INSULIN LISPRO 3 UNITS: 100 INJECTION, SOLUTION INTRAVENOUS; SUBCUTANEOUS at 12:14

## 2020-07-25 RX ADMIN — MAGNESIUM SULFATE HEPTAHYDRATE 1 G: 1 INJECTION, SOLUTION INTRAVENOUS at 12:14

## 2020-07-25 RX ADMIN — PREGABALIN 75 MG: 75 CAPSULE ORAL at 08:29

## 2020-07-25 RX ADMIN — ASCORBIC ACID, VITAMIN A PALMITATE, CHOLECALCIFEROL, THIAMINE HYDROCHLORIDE, RIBOFLAVIN-5 PHOSPHATE SODIUM, PYRIDOXINE HYDROCHLORIDE, NIACINAMIDE, DEXPANTHENOL, ALPHA-TOCOPHEROL ACETATE, VITAMIN K1, FOLIC ACID, BIOTIN, CYANOCOBALAMIN: 200; 3300; 200; 6; 3.6; 6; 40; 15; 10; 150; 600; 60; 5 INJECTION, SOLUTION INTRAVENOUS at 18:45

## 2020-07-25 RX ADMIN — ACETAMINOPHEN 500 MG: 500 TABLET, FILM COATED ORAL at 23:17

## 2020-07-25 RX ADMIN — SODIUM CHLORIDE, PRESERVATIVE FREE 10 ML: 5 INJECTION INTRAVENOUS at 22:00

## 2020-07-25 RX ADMIN — OXYCODONE 5 MG: 5 TABLET ORAL at 14:18

## 2020-07-25 RX ADMIN — SODIUM CHLORIDE: 9 INJECTION, SOLUTION INTRAVENOUS at 04:39

## 2020-07-25 RX ADMIN — INSULIN LISPRO 3 UNITS: 100 INJECTION, SOLUTION INTRAVENOUS; SUBCUTANEOUS at 18:44

## 2020-07-25 RX ADMIN — INSULIN GLARGINE 5 UNITS: 100 INJECTION, SOLUTION SUBCUTANEOUS at 21:01

## 2020-07-25 RX ADMIN — ROPINIROLE HYDROCHLORIDE 2 MG: 1 TABLET, FILM COATED ORAL at 21:01

## 2020-07-25 RX ADMIN — LOSARTAN POTASSIUM 100 MG: 100 TABLET, FILM COATED ORAL at 08:29

## 2020-07-25 RX ADMIN — INSULIN LISPRO 2 UNITS: 100 INJECTION, SOLUTION INTRAVENOUS; SUBCUTANEOUS at 21:01

## 2020-07-25 RX ADMIN — ACETAMINOPHEN 500 MG: 500 TABLET, FILM COATED ORAL at 18:44

## 2020-07-25 RX ADMIN — INSULIN LISPRO 1 UNITS: 100 INJECTION, SOLUTION INTRAVENOUS; SUBCUTANEOUS at 12:16

## 2020-07-25 RX ADMIN — METOPROLOL SUCCINATE 100 MG: 50 TABLET, EXTENDED RELEASE ORAL at 08:29

## 2020-07-25 RX ADMIN — ACETAMINOPHEN 500 MG: 500 TABLET, FILM COATED ORAL at 04:39

## 2020-07-25 RX ADMIN — VENLAFAXINE HYDROCHLORIDE 75 MG: 75 CAPSULE, EXTENDED RELEASE ORAL at 08:29

## 2020-07-25 RX ADMIN — KETOROLAC TROMETHAMINE 15 MG: 15 INJECTION, SOLUTION INTRAMUSCULAR; INTRAVENOUS at 04:39

## 2020-07-25 RX ADMIN — PREGABALIN 75 MG: 75 CAPSULE ORAL at 21:01

## 2020-07-25 RX ADMIN — ACETAMINOPHEN 500 MG: 500 TABLET, FILM COATED ORAL at 12:13

## 2020-07-25 RX ADMIN — ALPRAZOLAM 0.5 MG: 0.5 TABLET ORAL at 21:01

## 2020-07-25 ASSESSMENT — PAIN SCALES - GENERAL
PAINLEVEL_OUTOF10: 4
PAINLEVEL_OUTOF10: 6
PAINLEVEL_OUTOF10: 6
PAINLEVEL_OUTOF10: 3
PAINLEVEL_OUTOF10: 3
PAINLEVEL_OUTOF10: 2

## 2020-07-25 NOTE — PROGRESS NOTES
Pt is very lethargic this AM awakens only to swallow pills but then goes back to sleep she refuses clear liquids.

## 2020-07-25 NOTE — PROGRESS NOTES
Pt  refused to use the bathroom all night. she has not voided yet. she sat on the toilet but could not go. attempted bladder scanner, but with DESTIN dressing covering bladder its showing empty bladder. MD Mccauley Overall notified. New order received \"Give a 500 cc bolus of IV fluids. Give it a few more hours. If no voids by noon, reinsert seymour\". Will chago out the order. .Electronically signed by Lonni Frankel, RN on 7/25/2020 at 5:40 AM

## 2020-07-25 NOTE — CONSULTS
Comprehensive Nutrition Assessment    Type and Reason for Visit:  Reassess, Consult(PN recs)    Nutrition Recommendations/Plan:   Refer to MD for PN recs (5/20 formula goal rate of 75 ml per hour. Lipid infusions of 250 ml (20% lipids) twice weekly  Add Ensure Clear tid to start    Nutrition Assessment:  Follow-up. Consult received for PN recs. Noted that PN started (5/20 formula), at 30 ml per hour. Goal rate of 75 ml per hour, remains appropriate. Noted triglyceride level at 23, taken this date. Lipid infusions of 250 ml (20%) twice weekly would also be appropriate. Pt also ordered a Clear Liquid / Alanis 66 Diet. Will add Ensure Clear tid to start. Malnutrition Assessment:  Malnutrition Status: Moderate malnutrition    Context:  Chronic Illness     Findings of the 6 clinical characteristics of malnutrition:  Energy Intake:  7 - 75% or less estimated energy requirements for 1 month or longer  Weight Loss:  7 - Greater than 10% over 6 months     Body Fat Loss:  Unable to assess     Muscle Mass Loss:  Unable to assess    Fluid Accumulation:  No significant fluid accumulation     Strength:  Not Performed    Estimated Daily Nutrient Needs:  Energy (kcal):  8984-3620 kcal (25-30 kcal/kg ABW); Weight Used for Energy Requirements:  Current     Protein (g):   gm (1.2-1.5 gm/kg ABW); Weight Used for Protein Requirements:  Current        Fluid (ml/day):  1 ml/kcal; Weight Used for Fluid Requirements:         Nutrition Related Findings:  No edema, BS up & down. Regimen in place to help manage. TG at 23. Noted small BM this date. Wounds:  Surgical Wound       Current Nutrition Therapies:    DIET CLEAR LIQUID; Carb Control: 4 carb choices (60 gms)/meal  PN-Adult Premix 5/20 - Standard Electrolytes - Central Line  ·  Parenteral Nutrition :     If PN desired, recommend Clinimix 5/20 at 75 ml/hr + biweekly 20% 250 ml lipids.   This provides 1800 ml TV, 90 gm of protein, 360 gm of dextrose, 1584 calories (+143 kcal daily avg from lipids). GIR 3.7. Anthropometric Measures:  · Height: 5' 8\" (172.7 cm)  · Current Body Weight: 147 lb (66.7 kg)   · Admission Body Weight: 150 lb (68 kg)    · Usual Body Weight: 174 lb (78.9 kg)     · Ideal Body Weight: 140 lbs; % Ideal Body Weight 105 %   · BMI: 22.4  · BMI Categories: Normal Weight (BMI 22.0 to 24.9) age over 72       Nutrition Diagnosis:   · Inadequate oral intake related to altered GI structure as evidenced by poor intake prior to admission      Nutrition Interventions:   Food and/or Nutrient Delivery:  Continue Current Diet, Start Oral Nutrition Supplement  Nutrition Education/Counseling:  No recommendation at this time   Coordination of Nutrition Care:  Continued Inpatient Monitoring    Goals: Tolerate most appropriate nutrition therapy       Nutrition Monitoring and Evaluation:   Behavioral-Environmental Outcomes:  (na)   Food/Nutrient Intake Outcomes:  Diet Advancement/Tolerance, Supplement Intake, Parenteral Nutrition Intake/Tolerance  Physical Signs/Symptoms Outcomes:  Biochemical Data, Constipation, Diarrhea, Nausea or Vomiting, Fluid Status or Edema, Nutrition Focused Physical Findings, Skin, Weight     Discharge Planning:     Too soon to determine     Electronically signed by Kathy Wyatt RD, LD on 7/24/20 at 9:06 PM EDT    Contact: [phone]

## 2020-07-25 NOTE — PROGRESS NOTES
Hospitalist Progress Note      PCP: Echo Marmolejo MD    Date of Admission: 7/23/2020      Subjective:   denies chest pain, nausea, vomiting, shortness of breath, fever or chills. mention feels overall better    Medications:  Reviewed    Infusion Medications    PN-Adult Premix 5/20 - Standard Electrolytes - Central Line      sodium chloride 50 mL/hr at 07/25/20 0439    PN-Adult Premix 5/20 - Standard Electrolytes - Central Line 30 mL/hr at 07/24/20 2109    dextrose       Scheduled Medications    insulin lispro  0.05 Units/kg Subcutaneous TID WC    insulin lispro  0-6 Units Subcutaneous TID WC    insulin lispro  0-3 Units Subcutaneous Nightly    ALPRAZolam  0.5 mg Oral Nightly    amLODIPine  10 mg Oral Nightly    insulin glargine  5 Units Subcutaneous Nightly    metoprolol succinate  100 mg Oral Daily    pregabalin  75 mg Oral BID    rOPINIRole  2 mg Oral Nightly    rosuvastatin  40 mg Oral QPM    venlafaxine  75 mg Oral Daily    sodium chloride flush  10 mL Intravenous 2 times per day    acetaminophen  500 mg Oral Q6H    enoxaparin  40 mg Subcutaneous Daily     PRN Meds: oxyCODONE **OR** oxyCODONE, HYDROmorphone, albuterol, sodium chloride flush, glucose, dextrose, glucagon (rDNA), dextrose, ondansetron **OR** ondansetron      Intake/Output Summary (Last 24 hours) at 7/25/2020 1428  Last data filed at 7/25/2020 1220  Gross per 24 hour   Intake 920 ml   Output --   Net 920 ml       Physical Exam Performed:    BP (!) 106/50   Pulse 58   Temp 97 °F (36.1 °C) (Oral)   Resp 16   Ht 5' 8\" (1.727 m)   Wt 147 lb 0.8 oz (66.7 kg)   SpO2 96%   BMI 22.36 kg/m²     General appearance: No apparent distress,   HEENT:  Conjunctivae/corneas clear. Neck: Supple, with full range of motion. Respiratory: Has decreased breathing sounds bilaterally however normal respiratory effort. bilaterally without Rales/Wheezes/Rhonchi.   Cardiovascular: Regular rate and rhythm with normal S1/S2 without murmurs or rubs  Abdomen: Soft, non-tender, non-distended, normal bowel sounds. Musculoskeletal: No cyanosis or edema bilaterally  Neurologic:  without any focal sensory/motor deficits. grossly non-focal.  Psychiatric: Alert and oriented, Normal mood  Peripheral Pulses: +2 palpable, equal bilaterally       Labs:   Recent Labs     07/23/20  1320 07/24/20  0503 07/25/20  0844   WBC 3.5* 8.7 6.9   HGB 9.6* 8.2* 7.3*   HCT 28.0* 23.8* 21.7*    124* 132*     Recent Labs     07/23/20  1320 07/24/20  0503 07/25/20  0844   * 132* 130*   K 4.6 4.2 4.2   CL 97* 97* 98*   CO2 22 18* 20*   BUN 16 18 24*   CREATININE 1.1 1.1 1.5*   CALCIUM 9.4 8.2* 7.7*   PHOS  --  4.3 3.8     Recent Labs     07/23/20  1320   AST 28   ALT 13   BILITOT 0.7   ALKPHOS 58     No results for input(s): INR in the last 72 hours. No results for input(s): Noelle Grumet in the last 72 hours. Urinalysis:      Lab Results   Component Value Date    NITRU Negative 07/23/2020    WBCUA 3-5 07/23/2020    BACTERIA 1+ 07/23/2020    RBCUA 0-2 07/23/2020    BLOODU SMALL 07/23/2020    SPECGRAV 1.018 07/23/2020    GLUCOSEU Negative 07/23/2020    GLUCOSEU Negative 01/04/2012       Radiology:  No orders to display         Assessment/Plan:    Active Hospital Problems    Diagnosis    Malignant neoplasm of ovary ([facility] [address] [C56.9]    Partial small bowel obstruction ([facility] [address] [K56.600]     Patient is a 61-year-old female with past medical history of diabetes hypertension hyperlipidemia coronary artery disease status post CABG 2007, depression, anemia, stage III peritoneal cancer who presented to hospital for small bowel obstruction and 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   Medicine has been consulted for medical management    Assessment  Small bowel obstruction status post surgical intervention, POD 2  Acute kidney injury  Anemia of acute blood loss on anemia of chronic disease  Hyponatremia  Hypomagnesemia  Hypertension  Anxiety/depression  Diabetes mellitus  CAD status post CABG 2007    Plan  OB/GYN surgical oncology following, patient has been started on clear liquids diet, advance as tolerated  Nephrology consulted for KLAUDIA, hyponatremia, continue IV fluid hydration at this time, hold losartan given KLAUDIA  Continue to monitor BMP, monitor and replace electrolytes  Insulin sliding scale, patient is on 5 units Lantus long-acting  Continue amlodipine, Xanax as needed  Continue DVT prophylaxis with Lovenox  Diet: DIET CLEAR LIQUID; Carb Control: 4 carb choices (60 gms)/meal  PN-Adult Premix 5/20 - Standard Electrolytes - Central Line  Dietary Nutrition Supplements: Clear Liquid Oral Supplement  PN-Adult Premix 5/20 - Standard Electrolytes - Central Line  Code Status: Full Code    PT/OT Eval Status: ordered    Dispo -pending clinical improvement, KLAUDIA, hyponatremia, advance diet as tolerated-discharge planning per primary team  Patient's labs, radiology, medical reconciliation, consults reviewed    Karyna Bird MD

## 2020-07-25 NOTE — PLAN OF CARE
Problem: Pain:  Goal: Pain level will decrease  Description: Pain level will decrease  7/24/2020 2002 by Nancy Elias RN  Outcome: Ongoing  Note: Pain /discomfort being managed with PRN analgesics per MD orders. Patient able to express presence and absence of pain and rate pain appropriately using numerical scale. Problem: Pain:  Goal: Control of acute pain  Description: Control of acute pain  7/24/2020 2002 by Nancy Elias RN  Outcome: Ongoing     Problem: Pain:  Goal: Control of chronic pain  Description: Control of chronic pain  7/24/2020 2002 by Nancy Elias RN  Outcome: Ongoing     Problem: Falls - Risk of:  Goal: Will remain free from falls  Description: Will remain free from falls  7/24/2020 2002 by Nancy Elias RN  Outcome: Ongoing  Note: Patient educated on fall prevention. Call light is within reach, bed locked in lowest position, personal items within reach, and bed alarm is on. Will round on patient per unit guidelines. Problem: Falls - Risk of:  Goal: Absence of physical injury  Description: Absence of physical injury  7/24/2020 2002 by Nancy Elias RN  Outcome: Ongoing  Note: Pt is free of injury. No injury noted. Fall precautions in place. Call light within reach. Will monitor.         Problem: Nutrition  Goal: Optimal nutrition therapy  7/24/2020 2002 by Nancy Elias RN  Outcome: Ongoing

## 2020-07-25 NOTE — PLAN OF CARE
Nutrition Problem #1: Inadequate oral intake  Intervention: Food and/or Nutrient Delivery: Continue Current Diet, Start Oral Nutrition Supplement  Nutritional Goals:  Tolerate most appropriate nutrition therapy

## 2020-07-25 NOTE — PROGRESS NOTES
Patient A&O in the chair. Pt asleep in a chair, refuses to go bed. Patient tolerating PO intake well. PM medications given without complications. Patient denies pain, nausea or vomiting. Surgical dressing CDI. Pt has left chest port that is accessed, pt receiving TPN per order. Call light within reach, able to make needs known, fall precautions in place. Will monitor. Electronically signed by Lizeth Blancas RN on 7/25/2020 at 1:07 AM

## 2020-07-26 LAB
ANION GAP SERPL CALCULATED.3IONS-SCNC: 10 MMOL/L (ref 3–16)
BASOPHILS ABSOLUTE: 0 K/UL (ref 0–0.2)
BASOPHILS RELATIVE PERCENT: 0.3 %
BUN BLDV-MCNC: 28 MG/DL (ref 7–20)
CALCIUM SERPL-MCNC: 7.9 MG/DL (ref 8.3–10.6)
CHLORIDE BLD-SCNC: 100 MMOL/L (ref 99–110)
CO2: 18 MMOL/L (ref 21–32)
CREAT SERPL-MCNC: 1.4 MG/DL (ref 0.6–1.2)
EOSINOPHILS ABSOLUTE: 0.2 K/UL (ref 0–0.6)
EOSINOPHILS RELATIVE PERCENT: 2.8 %
ESTIMATED AVERAGE GLUCOSE: 99.7 MG/DL
GFR AFRICAN AMERICAN: 44
GFR NON-AFRICAN AMERICAN: 36
GLUCOSE BLD-MCNC: 204 MG/DL (ref 70–99)
GLUCOSE BLD-MCNC: 249 MG/DL (ref 70–99)
GLUCOSE BLD-MCNC: 272 MG/DL (ref 70–99)
GLUCOSE BLD-MCNC: 283 MG/DL (ref 70–99)
GLUCOSE BLD-MCNC: 286 MG/DL (ref 70–99)
GLUCOSE BLD-MCNC: 306 MG/DL (ref 70–99)
HBA1C MFR BLD: 5.1 %
HCT VFR BLD CALC: 21.6 % (ref 36–48)
HEMOGLOBIN: 7.3 G/DL (ref 12–16)
LYMPHOCYTES ABSOLUTE: 0.4 K/UL (ref 1–5.1)
LYMPHOCYTES RELATIVE PERCENT: 7.2 %
MAGNESIUM: 1.9 MG/DL (ref 1.8–2.4)
MCH RBC QN AUTO: 32.3 PG (ref 26–34)
MCHC RBC AUTO-ENTMCNC: 33.6 G/DL (ref 31–36)
MCV RBC AUTO: 96 FL (ref 80–100)
MONOCYTES ABSOLUTE: 0.4 K/UL (ref 0–1.3)
MONOCYTES RELATIVE PERCENT: 6.3 %
NEUTROPHILS ABSOLUTE: 4.6 K/UL (ref 1.7–7.7)
NEUTROPHILS RELATIVE PERCENT: 83.4 %
PDW BLD-RTO: 18.1 % (ref 12.4–15.4)
PERFORMED ON: ABNORMAL
PHOSPHORUS: 3.2 MG/DL (ref 2.5–4.9)
PLATELET # BLD: 150 K/UL (ref 135–450)
PMV BLD AUTO: 9.1 FL (ref 5–10.5)
POTASSIUM SERPL-SCNC: 4 MMOL/L (ref 3.5–5.1)
RBC # BLD: 2.25 M/UL (ref 4–5.2)
SODIUM BLD-SCNC: 128 MMOL/L (ref 136–145)
WBC # BLD: 5.6 K/UL (ref 4–11)

## 2020-07-26 PROCEDURE — 94760 N-INVAS EAR/PLS OXIMETRY 1: CPT

## 2020-07-26 PROCEDURE — 2500000003 HC RX 250 WO HCPCS

## 2020-07-26 PROCEDURE — 2580000003 HC RX 258: Performed by: INTERNAL MEDICINE

## 2020-07-26 PROCEDURE — 1200000000 HC SEMI PRIVATE

## 2020-07-26 PROCEDURE — 36591 DRAW BLOOD OFF VENOUS DEVICE: CPT

## 2020-07-26 PROCEDURE — 36592 COLLECT BLOOD FROM PICC: CPT

## 2020-07-26 PROCEDURE — 83036 HEMOGLOBIN GLYCOSYLATED A1C: CPT

## 2020-07-26 PROCEDURE — 6370000000 HC RX 637 (ALT 250 FOR IP): Performed by: INTERNAL MEDICINE

## 2020-07-26 PROCEDURE — 83735 ASSAY OF MAGNESIUM: CPT

## 2020-07-26 PROCEDURE — 80048 BASIC METABOLIC PNL TOTAL CA: CPT

## 2020-07-26 PROCEDURE — 84100 ASSAY OF PHOSPHORUS: CPT

## 2020-07-26 PROCEDURE — 6370000000 HC RX 637 (ALT 250 FOR IP): Performed by: OBSTETRICS & GYNECOLOGY

## 2020-07-26 PROCEDURE — 51798 US URINE CAPACITY MEASURE: CPT

## 2020-07-26 PROCEDURE — 6360000002 HC RX W HCPCS: Performed by: OBSTETRICS & GYNECOLOGY

## 2020-07-26 PROCEDURE — 85025 COMPLETE CBC W/AUTO DIFF WBC: CPT

## 2020-07-26 PROCEDURE — 2580000003 HC RX 258: Performed by: OBSTETRICS & GYNECOLOGY

## 2020-07-26 RX ORDER — NICOTINE POLACRILEX 4 MG
15 LOZENGE BUCCAL PRN
Status: DISCONTINUED | OUTPATIENT
Start: 2020-07-26 | End: 2020-07-26 | Stop reason: SDUPTHER

## 2020-07-26 RX ORDER — INSULIN GLARGINE 100 [IU]/ML
10 INJECTION, SOLUTION SUBCUTANEOUS NIGHTLY
Status: DISCONTINUED | OUTPATIENT
Start: 2020-07-26 | End: 2020-07-27

## 2020-07-26 RX ORDER — DEXTROSE MONOHYDRATE 50 MG/ML
100 INJECTION, SOLUTION INTRAVENOUS PRN
Status: DISCONTINUED | OUTPATIENT
Start: 2020-07-26 | End: 2020-07-30 | Stop reason: HOSPADM

## 2020-07-26 RX ORDER — DEXTROSE MONOHYDRATE 25 G/50ML
12.5 INJECTION, SOLUTION INTRAVENOUS PRN
Status: DISCONTINUED | OUTPATIENT
Start: 2020-07-26 | End: 2020-07-26 | Stop reason: SDUPTHER

## 2020-07-26 RX ADMIN — INSULIN LISPRO 3 UNITS: 100 INJECTION, SOLUTION INTRAVENOUS; SUBCUTANEOUS at 12:40

## 2020-07-26 RX ADMIN — ENOXAPARIN SODIUM 40 MG: 40 INJECTION SUBCUTANEOUS at 08:39

## 2020-07-26 RX ADMIN — INSULIN LISPRO 3 UNITS: 100 INJECTION, SOLUTION INTRAVENOUS; SUBCUTANEOUS at 08:39

## 2020-07-26 RX ADMIN — POTASSIUM PHOSPHATE, MONOBASIC POTASSIUM PHOSPHATE, DIBASIC: 224; 236 INJECTION, SOLUTION, CONCENTRATE INTRAVENOUS at 18:16

## 2020-07-26 RX ADMIN — METOPROLOL SUCCINATE 100 MG: 50 TABLET, EXTENDED RELEASE ORAL at 08:40

## 2020-07-26 RX ADMIN — ROSUVASTATIN CALCIUM 40 MG: 40 TABLET, FILM COATED ORAL at 18:16

## 2020-07-26 RX ADMIN — INSULIN GLARGINE 10 UNITS: 100 INJECTION, SOLUTION SUBCUTANEOUS at 22:08

## 2020-07-26 RX ADMIN — PREGABALIN 75 MG: 75 CAPSULE ORAL at 08:39

## 2020-07-26 RX ADMIN — INSULIN LISPRO 3 UNITS: 100 INJECTION, SOLUTION INTRAVENOUS; SUBCUTANEOUS at 22:07

## 2020-07-26 RX ADMIN — INSULIN LISPRO 3 UNITS: 100 INJECTION, SOLUTION INTRAVENOUS; SUBCUTANEOUS at 18:15

## 2020-07-26 RX ADMIN — ACETAMINOPHEN 500 MG: 500 TABLET, FILM COATED ORAL at 18:14

## 2020-07-26 RX ADMIN — VENLAFAXINE HYDROCHLORIDE 75 MG: 75 CAPSULE, EXTENDED RELEASE ORAL at 08:40

## 2020-07-26 RX ADMIN — INSULIN LISPRO 4 UNITS: 100 INJECTION, SOLUTION INTRAVENOUS; SUBCUTANEOUS at 18:14

## 2020-07-26 RX ADMIN — ACETAMINOPHEN 500 MG: 500 TABLET, FILM COATED ORAL at 11:06

## 2020-07-26 RX ADMIN — ACETAMINOPHEN 500 MG: 500 TABLET, FILM COATED ORAL at 05:32

## 2020-07-26 RX ADMIN — SODIUM CHLORIDE: 9 INJECTION, SOLUTION INTRAVENOUS at 05:32

## 2020-07-26 RX ADMIN — SODIUM CHLORIDE, PRESERVATIVE FREE 10 ML: 5 INJECTION INTRAVENOUS at 08:40

## 2020-07-26 ASSESSMENT — PAIN SCALES - GENERAL
PAINLEVEL_OUTOF10: 3
PAINLEVEL_OUTOF10: 3
PAINLEVEL_OUTOF10: 2
PAINLEVEL_OUTOF10: 2

## 2020-07-26 ASSESSMENT — PAIN DESCRIPTION - PAIN TYPE: TYPE: ACUTE PAIN

## 2020-07-26 NOTE — CONSULTS
[address]                                  CONSULTATION    PATIENT NAME: Nanette Gr                    :        1940  MED REC NO:   [id number]                          ROOM:       3116  ACCOUNT NO:   [de-identified]                           ADMIT DATE: 2020  PROVIDER:     Bhavesh Barney MD    RENAL CONSULTATION    CONSULT DATE:  2020    REASON FOR ADMISSION:   status post laparoscopic surgery  with lysis of adhesions, excision of peritoneal nodules due to cancer. REASON FOR CONSULTATION:    Acute kidney injury. Hyponatremia     HISTORY OF PRESENTING COMPLAINT:  I was asked by Dr. Roge Underwood to render opinion on this 81-year-old  female whom I am seeing in the room while she was resting. She has a history of diabetes,hypertension, previous CABG in , known to have papillary serous primary peritoneal carcinoma with multiple admissions for bowel obstruction. She came in for reported surgery which was done. Following that, her creatinine has gone up from a baseline of 0.7 to current creatinine of 1.5 with reduced urine output. She also has dropped her sodium to 130. seeing in a room where she was sleeping, resting. REVIEW OF THE SYSTEMS:  Not much history available. 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 PERSONAL AND SOCIAL HISTORY:  Lives at home with her . Stopped  smoking 20 years ago. One standard drink of alcohol per week. ADMISSION MEDICATIONS:  Include Requip, oxycodone, Lyrica, Effexor,  Eliquis, losartan, amlodipine, insulin, Crestor. ALLERGIES:  Ana Cools. PHYSICAL EXAMINATION:  GENERAL:  On examination, the patient is resting.   VITAL SIGNS:  Temperature 98.3, pulse 76, blood pressure initially  104/46, maximum blood pressure 168/65, currently 125/46. HEENT:  Head normocephalic, atraumatic. Conjunctivae, no icterus. CARDIAC:  Normal heart sounds. NECK:  JVD difficult to see as she was lying. CHEST:  Clear to auscultation. ABDOMEN:  Distended. EXTREMITIES:  Bilateral lower extremities, she has mild edema. NEUROLOGIC:  She was resting. LABORATORY DATA:  Sodium 130, potassium 4.2, chloride 90, CO2 20, BUN  24, creatinine 1.5, calcium is 7.7, magnesium 1.7. White cells 6.9,  hemoglobin 7.3, platelet count is 199. Urinalysis shows specific  gravity of 1.018, greater than 300 mg protein, 3 to 5 white cells, 2 red  blood cells. CT scan on 07/03/2020, kidneys enhanced normally. ASSESSMENT AND PLAN:     Acute kidney injury   due to hemodynamic reason due to surgery  expect improvement. Continue with IV fluids. chekk urine Na      Possible CKD   due to diabetic nephropathy. Check urine for protein, creatinine. Hyponatremia  Related due to surgery/Pain  Some contribution of high Glucose      History of peritoneal carcinomatosis   for which she has lysis of adhesions and also debulking of the tumor. Further management as per team.     Diabetes. Check for proteinuria. Last A1C 7.7 in 2016  Was 8 in 2011    CAD  previous CABG     previous hemicolectomy.   Mentioned in records           Chilo Brown MD    D: 07/26/2020 0:15:07       T: 07/26/2020 4:58:43     [email or web address]  Job#: [id number]     Doc#: [id number]    CC:

## 2020-07-26 NOTE — PROGRESS NOTES
Hospitalist Progress Note      PCP: Kathya Bryant MD    Date of Admission: 7/23/2020    Subjective:   Patient seems a little drowsy than yesterday however patient follows commands    Medications:  Reviewed    Infusion Medications    dextrose      PN-Adult Premix 5/20 - Central      PN-Adult Premix 5/20 - Standard Electrolytes - Central Line 42 mL/hr at 07/25/20 1845    sodium chloride 50 mL/hr at 07/26/20 0532     Scheduled Medications    insulin lispro  0-12 Units Subcutaneous TID WC    insulin lispro  0-6 Units Subcutaneous Nightly    insulin glargine  10 Units Subcutaneous Nightly    insulin lispro  0.05 Units/kg Subcutaneous TID WC    ALPRAZolam  0.5 mg Oral Nightly    amLODIPine  10 mg Oral Nightly    metoprolol succinate  100 mg Oral Daily    pregabalin  75 mg Oral BID    rOPINIRole  2 mg Oral Nightly    rosuvastatin  40 mg Oral QPM    venlafaxine  75 mg Oral Daily    sodium chloride flush  10 mL Intravenous 2 times per day    acetaminophen  500 mg Oral Q6H    enoxaparin  40 mg Subcutaneous Daily     PRN Meds: glucagon (rDNA), dextrose, oxyCODONE **OR** oxyCODONE, HYDROmorphone, albuterol, sodium chloride flush, glucose, dextrose, ondansetron **OR** ondansetron      Intake/Output Summary (Last 24 hours) at 7/26/2020 1608  Last data filed at 7/26/2020 1239  Gross per 24 hour   Intake 120 ml   Output 100 ml   Net 20 ml       Physical Exam Performed:    BP (!) 154/67   Pulse 60   Temp 98.2 °F (36.8 °C) (Oral)   Resp 16   Ht 5' 8\" (1.727 m)   Wt 147 lb 0.8 oz (66.7 kg)   SpO2 96%   BMI 22.36 kg/m²     General appearance: No apparent distress, patient seems a little drowsy however easy to arouse, opens eyes and follows commands  HEENT:  Conjunctivae/corneas clear. Neck: Supple, with full range of motion. Respiratory: Has decreased breathing sounds bilaterally however normal respiratory effort. bilaterally without Rales/Wheezes/Rhonchi.   Cardiovascular: Regular rate and rhythm with normal S1/S2 without murmurs or rubs  Abdomen: Soft, non-tender, non-distended, normal bowel sounds. Musculoskeletal: No cyanosis or edema bilaterally  Neurologic:  without any focal sensory/motor deficits. grossly non-focal.  Psychiatric: Alert and oriented, Normal mood  Peripheral Pulses: +2 palpable, equal bilaterally       Labs:   Recent Labs     07/24/20  0503 07/25/20  0844 07/26/20  1245   WBC 8.7 6.9 5.6   HGB 8.2* 7.3* 7.3*   HCT 23.8* 21.7* 21.6*   * 132* 150     Recent Labs     07/24/20  0503 07/25/20  0844 07/26/20  0737   * 130* 128*   K 4.2 4.2 4.0   CL 97* 98* 100   CO2 18* 20* 18*   BUN 18 24* 28*   CREATININE 1.1 1.5* 1.4*   CALCIUM 8.2* 7.7* 7.9*   PHOS 4.3 3.8 3.2     No results for input(s): AST, ALT, BILIDIR, BILITOT, ALKPHOS in the last 72 hours. No results for input(s): INR in the last 72 hours. No results for input(s): [place] in the last 72 hours. Urinalysis:      Lab Results   Component Value Date    NITRU Negative 07/23/2020    WBCUA 3-5 07/23/2020    BACTERIA 1+ 07/23/2020    RBCUA 0-2 07/23/2020    BLOODU SMALL 07/23/2020    SPECGRAV 1.018 07/23/2020    GLUCOSEU Negative 07/23/2020    GLUCOSEU Negative 01/04/2012       Radiology:  No orders to display         Assessment/Plan:    Active Hospital Problems    Diagnosis    Malignant neoplasm of ovary ([facility] [address] [C56.9]    Partial small bowel obstruction ([address] [K56.600]     Patient is a 25-year-old female with past medical history of diabetes hypertension hyperlipidemia coronary artery disease status post CABG 2007, depression, anemia, stage III peritoneal cancer who presented to hospital for small bowel obstruction and 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   Medicine has been consulted for medical management    Assessment  Small bowel obstruction status post surgical intervention, POD 2  Acute kidney injury  Anemia of acute blood loss on anemia of chronic disease  Hyponatremia  Hypomagnesemia  Hypertension  Anxiety/depression  Diabetes mellitus  CAD status post CABG 2007    Plan  OB/GYN surgical oncology following, patient has been started on clear liquids diet, advance as tolerated  Nephrology consulted for KLAUDIA, hyponatremia, continue IV fluid hydration at this time, hold losartan given KLAUDIA  I have adjusted insulin regimen with medium sliding scale, Lantus insulin to 10 units nightly  Continue amlodipine, Xanax as needed  Continue DVT prophylaxis with Lovenox  Diet: DIET CLEAR LIQUID; Carb Control: 4 carb choices (60 gms)/meal  Dietary Nutrition Supplements: Clear Liquid Oral Supplement  PN-Adult Premix 5/20 - Standard Electrolytes - Central Line  PN-Adult Premix 5/20 - Central  Code Status: Full Code    PT/OT Eval Status: ordered    Dispo --discharge planning per primary team-continue to monitor creatinine, Na, advance diet as tolerated, PT/OT evaluation  Patient's labs, radiology, medical reconciliation, consults reviewed    Ji Fuentes MD

## 2020-07-26 NOTE — PROGRESS NOTES
Admit Date: 7/23/2020    Reason for admission    stage III peritoneal cancer with  small bowel obstruction and carcinomatosis from ovarian cancer had surgery . INTERVAL HISTORY  alert  Taking liquids   Creat 1-->1.5-->1.4  Na 130-->128 but glucose high   Urine  Has edema   On TPN    PLAN  D/C fluids   Check Bladder scan      Acute kidney injury   due to hemodynamic reason due to surgery  expect improvement. chekk urine Na      Possible CKD   due to diabetic nephropathy. Check urine for protein, creatinine. Hyponatremia  Related due to surgery/Pain  Some contribution of high Glucose      History of peritoneal carcinomatosis   for which she has lysis of adhesions and also debulking of the tumor. Further management as per team.     Diabetes. Check for proteinuria. Last A1C 7.7 in 2016  Was 8 in 2011    CAD  previous CABG     previous hemicolectomy. Mentioned in records     [address]  64-year-old  female whom I am seeing in the room while she was resting. 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 She came in for reported surgery which was done. Following that, her creatinine has gone up from a baseline of 0.7 to current creatinine of 1.5 with reduced urine output. She also has dropped her sodium to 130. seeing in a room where she was sleeping, resting      Subjective:   Patient has no complaint       Review of Systems  Seen in room     Objective:     Patient Vitals for the past 8 hrs:   BP Temp Temp src Pulse Resp SpO2   07/26/20 0749 (!) 163/65 98.3 °F (36.8 °C) Oral 67 16 94 %   07/26/20 0526 (!) 158/61 97.6 °F (36.4 °C) Oral 64 18 92 %       I/O last 3 completed shifts:   In: 240 [P.O.:240]  Out: -         General appearance:Awake, alert,   Neck: , no JVD and thyroid not enlarged,   Lungs: clear to auscultation bilaterally   Heart: regular rate and rhythm, S1, S2 normal, no murmur, click, rub or gallop  Abdomen: soft, mild -tender; bowel sounds normal; no masses,  no organomegaly  Extremities: puffy , atraumatic, no edema  Skin: Skin color, texture, turgor normal. No rashes or lesions  Neurologic: Grossly normal     .l  Lab Results   Component Value Date    CREATININE 1.4 (H) 07/26/2020    BUN 28 (H) 07/26/2020     (L) 07/26/2020    K 4.0 07/26/2020     07/26/2020    CO2 18 (L) 07/26/2020     Lab Results   Component Value Date    WBC 6.9 07/25/2020    HGB 7.3 (L) 07/25/2020    HCT 21.7 (L) 07/25/2020    MCV 95.9 07/25/2020     (L) 07/25/2020            AGLUCOSE)Magnesium:    Lab Results   Component Value Date    MG 1.90 07/26/2020     Phosphorus:    Lab Results   Component Value Date    PHOS 3.2 07/26/2020       Uric Acid:  No components found for: URIC    Active Problems:    Partial small bowel obstruction (HCC)    Malignant neoplasm of ovary (HCC)  Resolved Problems:    * No resolved hospital problems.  *

## 2020-07-26 NOTE — PLAN OF CARE
Problem: Pain:  Goal: Pain level will decrease  Description: Pain level will decrease  Outcome: Ongoing  Note: Pain /discomfort being managed with PRN analgesics per MD orders. Patient able to express presence and absence of pain and rate pain appropriately using numerical scale. Problem: Pain:  Goal: Control of acute pain  Description: Control of acute pain  Outcome: Ongoing     Problem: Pain:  Goal: Control of chronic pain  Description: Control of chronic pain  Outcome: Ongoing     Problem: Falls - Risk of:  Goal: Will remain free from falls  Description: Will remain free from falls  Outcome: Ongoing  Note: Patient educated on fall prevention. Call light is within reach, bed locked in lowest position, personal items within reach, and bed alarm is on. Will round on patient per unit guidelines. Problem: Falls - Risk of:  Goal: Absence of physical injury  Description: Absence of physical injury  Outcome: Ongoing  Note: Pt is free of injury. No injury noted. Fall precautions in place. Call light within reach. Will monitor.         Problem: Nutrition  Goal: Optimal nutrition therapy  Outcome: Ongoing

## 2020-07-26 NOTE — ACP (ADVANCE CARE PLANNING)
ADVANCED CARE PLANNING    Name:Flory Youssef       :  1940              MRN:  [id number]    Purpose of Encounter: Advanced care planning in light of Advanced care planning in light of acute and chronic deteriorating medical conditions. Parties in attendance: :Marisa Grullon MD ( myself ),  at bedside and POA-  Roque Salgado ([phone])    Decisional Capacity: YES    Subjective: Patient/family understand in this voluntary conversation what inteventions and plans patient would want implemented in light of the following diagnoses:    Diagnosis: Ovarian cancer with complications i.e. small bowel obstruction, peritoneal carcinomatosis  Acute renal failure  Hyponatremia  Uncontrolled diabetes mellitus    Objective: Patient has been having chronic decline in functional status with increased dependence on others for ADLs, increased frequency of Hospitalizations. Likelihood of further hospitalizations with likelihood of escalation of medical interventions with the expectation of returning to a diminishing baseline level of functionality. Discussion highlights: I discussed with patient Ema Bajwa at the bedside- who is also POA the ramifications of their acute and chronic medical problems- and the likely outcomes expected, both in terms of statistics, and as part of my experience seeing patients with similar conditions. I discussed with patient POA advanced directives in the setting of her current medical condition including above diagnosis and patient was given complete information regarding all CODE STATUS including full code, DNR CC, DNR CCA. We discussed pros and cons of each CODE STATUS and information regarding chest compressions, intubation/ventilator support, defibrillator application if needed. Patient , patient herself also verbalizes understanding and wishes to be Full Code at this time.     Goals of Care Determinations: Patient and family wishes to

## 2020-07-26 NOTE — PROGRESS NOTES
Progress Note     Name: Grisel Donahue Room: A9D-0472/4394-64   YOB: 1940 MRN: [id number]   Sex: female CSN: [id number]    LOS: 3   PCP: Kathya Bryant MD   Attending: Manoj Reeves MD Admitting: Manoj Reeves MD        Subjective:   Awake and alert. On TPN. Mild pain. Denies N/V. Incontinent yesterday. Has had some small volume BM and voids. Very weak.  at bedside. Physical Exam:   BP (!) 154/67   Pulse 60   Temp 98.2 °F (36.8 °C) (Oral)   Resp 16   Ht 5' 8\" (1.727 m)   Wt 147 lb 0.8 oz (66.7 kg)   SpO2 96%   BMI 22.36 kg/m²     Gen: AAO  Resp: CTAB  CV: RRR  Abd: +BS, soft, nondistended, appropriately tender  Inc: c/d/i, no erythema. DESTIN dressing in place. Ext: nontender, no evidence of DVT       Allergies   Allergen Reactions    Cephalexin Rash    Pcn [Penicillins] Rash    Cephalosporins      UNABLE TO RECALL REACTION    Cephalexin Rash       Medications:   Reviewed    Diagnostic:   Reviewed    Labs:   reviewed  Recent Labs     07/25/20  0844   WBC 6.9   HGB 7.3*   HCT 21.7*   *     Recent Labs     07/26/20  0737   *   K 4.0      CO2 18*   BUN 28*   CREATININE 1.4*   CALCIUM 7.9*   PHOS 3.2     Recent Labs     07/23/20  1320   AST 28   ALT 13   BILITOT 0.7   ALKPHOS 58     No results for input(s): INR in the last 72 hours. No results for input(s):  in the last 72 hours.     Assessment:     Patient Active Problem List   Diagnosis Code    Hyperlipidemia E78.5    Diabetes mellitus, type II ([address] E11.9    HTN (hypertension) I10    Vitamin D deficiency E55.9    CAD (coronary artery disease) I25.10    Elevated CA-125 R97.1    Uterine leiomyoma D25.9    Abnormal abdominal CT scan R93.5    Cancer of peritoneum ([address] C48.2    Delirium due to general medical condition F05    Iron deficiency anemia D50.9    Impaired intestinal absorption K90.9    Neoplasm related pain G89.3    Weight loss R63.4    Abdominal pain R10.9    Chest pain,

## 2020-07-27 ENCOUNTER — APPOINTMENT (OUTPATIENT)
Dept: GENERAL RADIOLOGY | Age: 80
DRG: 330 | End: 2020-07-27
Attending: OBSTETRICS & GYNECOLOGY
Payer: MEDICARE

## 2020-07-27 LAB
ANION GAP SERPL CALCULATED.3IONS-SCNC: 12 MMOL/L (ref 3–16)
BASOPHILS ABSOLUTE: 0 K/UL (ref 0–0.2)
BASOPHILS RELATIVE PERCENT: 0.2 %
BILIRUBIN URINE: NEGATIVE
BLOOD, URINE: NEGATIVE
BUN BLDV-MCNC: 21 MG/DL (ref 7–20)
CALCIUM SERPL-MCNC: 8 MG/DL (ref 8.3–10.6)
CHLORIDE BLD-SCNC: 100 MMOL/L (ref 99–110)
CLARITY: CLEAR
CO2: 19 MMOL/L (ref 21–32)
COLOR: YELLOW
CREAT SERPL-MCNC: 1.1 MG/DL (ref 0.6–1.2)
CREATININE URINE: 8.7 MG/DL (ref 28–259)
EOSINOPHILS ABSOLUTE: 0.2 K/UL (ref 0–0.6)
EOSINOPHILS RELATIVE PERCENT: 2.7 %
EPITHELIAL CELLS, UA: 1 /HPF (ref 0–5)
GFR AFRICAN AMERICAN: 58
GFR NON-AFRICAN AMERICAN: 48
GLUCOSE BLD-MCNC: 136 MG/DL (ref 70–99)
GLUCOSE BLD-MCNC: 205 MG/DL (ref 70–99)
GLUCOSE BLD-MCNC: 233 MG/DL (ref 70–99)
GLUCOSE BLD-MCNC: 265 MG/DL (ref 70–99)
GLUCOSE BLD-MCNC: 301 MG/DL (ref 70–99)
GLUCOSE URINE: NEGATIVE MG/DL
HCT VFR BLD CALC: 22.2 % (ref 36–48)
HEMOGLOBIN: 7.4 G/DL (ref 12–16)
HYALINE CASTS: 1 /LPF (ref 0–8)
KETONES, URINE: NEGATIVE MG/DL
LEUKOCYTE ESTERASE, URINE: NEGATIVE
LYMPHOCYTES ABSOLUTE: 0.6 K/UL (ref 1–5.1)
LYMPHOCYTES RELATIVE PERCENT: 10.5 %
MAGNESIUM: 1.9 MG/DL (ref 1.8–2.4)
MCH RBC QN AUTO: 32.1 PG (ref 26–34)
MCHC RBC AUTO-ENTMCNC: 33.5 G/DL (ref 31–36)
MCV RBC AUTO: 95.6 FL (ref 80–100)
MICROSCOPIC EXAMINATION: NORMAL
MONOCYTES ABSOLUTE: 0.4 K/UL (ref 0–1.3)
MONOCYTES RELATIVE PERCENT: 6.9 %
NEUTROPHILS ABSOLUTE: 4.4 K/UL (ref 1.7–7.7)
NEUTROPHILS RELATIVE PERCENT: 79.7 %
NITRITE, URINE: NEGATIVE
PDW BLD-RTO: 17.6 % (ref 12.4–15.4)
PERFORMED ON: ABNORMAL
PH UA: 5 (ref 5–8)
PHOSPHORUS: 2.4 MG/DL (ref 2.5–4.9)
PLATELET # BLD: 146 K/UL (ref 135–450)
PMV BLD AUTO: 8.8 FL (ref 5–10.5)
POTASSIUM SERPL-SCNC: 4.4 MMOL/L (ref 3.5–5.1)
PROTEIN PROTEIN: 7 MG/DL
PROTEIN UA: NEGATIVE MG/DL
PROTEIN/CREAT RATIO: 0.8 MG/DL
RBC # BLD: 2.32 M/UL (ref 4–5.2)
RBC UA: 0 /HPF (ref 0–4)
SODIUM BLD-SCNC: 131 MMOL/L (ref 136–145)
SPECIFIC GRAVITY UA: 1 (ref 1–1.03)
URINE TYPE: NORMAL
UROBILINOGEN, URINE: 0.2 E.U./DL
WBC # BLD: 5.6 K/UL (ref 4–11)
WBC UA: 1 /HPF (ref 0–5)

## 2020-07-27 PROCEDURE — 83735 ASSAY OF MAGNESIUM: CPT

## 2020-07-27 PROCEDURE — 82570 ASSAY OF URINE CREATININE: CPT

## 2020-07-27 PROCEDURE — 85025 COMPLETE CBC W/AUTO DIFF WBC: CPT

## 2020-07-27 PROCEDURE — 6370000000 HC RX 637 (ALT 250 FOR IP): Performed by: HOSPITALIST

## 2020-07-27 PROCEDURE — 6370000000 HC RX 637 (ALT 250 FOR IP): Performed by: OBSTETRICS & GYNECOLOGY

## 2020-07-27 PROCEDURE — 71045 X-RAY EXAM CHEST 1 VIEW: CPT

## 2020-07-27 PROCEDURE — 6370000000 HC RX 637 (ALT 250 FOR IP): Performed by: INTERNAL MEDICINE

## 2020-07-27 PROCEDURE — 80048 BASIC METABOLIC PNL TOTAL CA: CPT

## 2020-07-27 PROCEDURE — 36415 COLL VENOUS BLD VENIPUNCTURE: CPT

## 2020-07-27 PROCEDURE — 2580000003 HC RX 258: Performed by: OBSTETRICS & GYNECOLOGY

## 2020-07-27 PROCEDURE — 84100 ASSAY OF PHOSPHORUS: CPT

## 2020-07-27 PROCEDURE — 1200000000 HC SEMI PRIVATE

## 2020-07-27 PROCEDURE — 84156 ASSAY OF PROTEIN URINE: CPT

## 2020-07-27 PROCEDURE — 97530 THERAPEUTIC ACTIVITIES: CPT

## 2020-07-27 PROCEDURE — 97535 SELF CARE MNGMENT TRAINING: CPT

## 2020-07-27 PROCEDURE — 81001 URINALYSIS AUTO W/SCOPE: CPT

## 2020-07-27 PROCEDURE — 97116 GAIT TRAINING THERAPY: CPT

## 2020-07-27 PROCEDURE — 2500000003 HC RX 250 WO HCPCS: Performed by: OBSTETRICS & GYNECOLOGY

## 2020-07-27 PROCEDURE — 94760 N-INVAS EAR/PLS OXIMETRY 1: CPT

## 2020-07-27 RX ORDER — LOSARTAN POTASSIUM 100 MG/1
100 TABLET ORAL DAILY
Status: DISCONTINUED | OUTPATIENT
Start: 2020-07-27 | End: 2020-07-30 | Stop reason: HOSPADM

## 2020-07-27 RX ORDER — TORSEMIDE 20 MG/1
20 TABLET ORAL DAILY
Status: DISCONTINUED | OUTPATIENT
Start: 2020-07-27 | End: 2020-07-29

## 2020-07-27 RX ORDER — INSULIN GLARGINE 100 [IU]/ML
15 INJECTION, SOLUTION SUBCUTANEOUS NIGHTLY
Status: DISCONTINUED | OUTPATIENT
Start: 2020-07-27 | End: 2020-07-29

## 2020-07-27 RX ADMIN — ACETAMINOPHEN 500 MG: 500 TABLET, FILM COATED ORAL at 17:54

## 2020-07-27 RX ADMIN — TORSEMIDE 20 MG: 20 TABLET ORAL at 18:19

## 2020-07-27 RX ADMIN — SODIUM CHLORIDE, PRESERVATIVE FREE 10 ML: 5 INJECTION INTRAVENOUS at 08:54

## 2020-07-27 RX ADMIN — ACETAMINOPHEN 500 MG: 500 TABLET, FILM COATED ORAL at 04:56

## 2020-07-27 RX ADMIN — VENLAFAXINE HYDROCHLORIDE 75 MG: 75 CAPSULE, EXTENDED RELEASE ORAL at 08:53

## 2020-07-27 RX ADMIN — INSULIN LISPRO 6 UNITS: 100 INJECTION, SOLUTION INTRAVENOUS; SUBCUTANEOUS at 17:55

## 2020-07-27 RX ADMIN — INSULIN LISPRO 8 UNITS: 100 INJECTION, SOLUTION INTRAVENOUS; SUBCUTANEOUS at 12:03

## 2020-07-27 RX ADMIN — INSULIN LISPRO 3 UNITS: 100 INJECTION, SOLUTION INTRAVENOUS; SUBCUTANEOUS at 17:55

## 2020-07-27 RX ADMIN — INSULIN LISPRO 3 UNITS: 100 INJECTION, SOLUTION INTRAVENOUS; SUBCUTANEOUS at 12:04

## 2020-07-27 RX ADMIN — ROSUVASTATIN CALCIUM 40 MG: 40 TABLET, FILM COATED ORAL at 17:54

## 2020-07-27 RX ADMIN — SODIUM CHLORIDE, PRESERVATIVE FREE 10 ML: 5 INJECTION INTRAVENOUS at 20:16

## 2020-07-27 RX ADMIN — INSULIN LISPRO 6 UNITS: 100 INJECTION, SOLUTION INTRAVENOUS; SUBCUTANEOUS at 08:54

## 2020-07-27 RX ADMIN — OXYCODONE 5 MG: 5 TABLET ORAL at 09:09

## 2020-07-27 RX ADMIN — PREGABALIN 75 MG: 75 CAPSULE ORAL at 20:15

## 2020-07-27 RX ADMIN — PREGABALIN 75 MG: 75 CAPSULE ORAL at 08:54

## 2020-07-27 RX ADMIN — LOSARTAN POTASSIUM 100 MG: 100 TABLET, FILM COATED ORAL at 12:16

## 2020-07-27 RX ADMIN — METOPROLOL SUCCINATE 100 MG: 50 TABLET, EXTENDED RELEASE ORAL at 08:53

## 2020-07-27 RX ADMIN — INSULIN LISPRO 3 UNITS: 100 INJECTION, SOLUTION INTRAVENOUS; SUBCUTANEOUS at 08:55

## 2020-07-27 RX ADMIN — ALPRAZOLAM 0.5 MG: 0.5 TABLET ORAL at 20:15

## 2020-07-27 RX ADMIN — I.V. FAT EMULSION 250 ML: 20 EMULSION INTRAVENOUS at 17:54

## 2020-07-27 RX ADMIN — ACETAMINOPHEN 500 MG: 500 TABLET, FILM COATED ORAL at 11:48

## 2020-07-27 RX ADMIN — ASCORBIC ACID, VITAMIN A PALMITATE, CHOLECALCIFEROL, THIAMINE HYDROCHLORIDE, RIBOFLAVIN-5 PHOSPHATE SODIUM, PYRIDOXINE HYDROCHLORIDE, NIACINAMIDE, DEXPANTHENOL, ALPHA-TOCOPHEROL ACETATE, VITAMIN K1, FOLIC ACID, BIOTIN, CYANOCOBALAMIN: 200; 3300; 200; 6; 3.6; 6; 40; 15; 10; 150; 600; 60; 5 INJECTION, SOLUTION INTRAVENOUS at 17:54

## 2020-07-27 RX ADMIN — ROPINIROLE HYDROCHLORIDE 2 MG: 1 TABLET, FILM COATED ORAL at 20:15

## 2020-07-27 ASSESSMENT — PAIN DESCRIPTION - ONSET
ONSET: ON-GOING
ONSET: ON-GOING

## 2020-07-27 ASSESSMENT — PAIN SCALES - GENERAL
PAINLEVEL_OUTOF10: 4
PAINLEVEL_OUTOF10: 0
PAINLEVEL_OUTOF10: 6
PAINLEVEL_OUTOF10: 0
PAINLEVEL_OUTOF10: 0

## 2020-07-27 ASSESSMENT — PAIN DESCRIPTION - LOCATION
LOCATION: ABDOMEN
LOCATION: ABDOMEN

## 2020-07-27 ASSESSMENT — PAIN - FUNCTIONAL ASSESSMENT
PAIN_FUNCTIONAL_ASSESSMENT: ACTIVITIES ARE NOT PREVENTED
PAIN_FUNCTIONAL_ASSESSMENT: ACTIVITIES ARE NOT PREVENTED

## 2020-07-27 ASSESSMENT — PAIN DESCRIPTION - ORIENTATION
ORIENTATION: LOWER;MID
ORIENTATION: MID;LOWER

## 2020-07-27 ASSESSMENT — PAIN DESCRIPTION - DESCRIPTORS
DESCRIPTORS: ACHING
DESCRIPTORS: ACHING;DISCOMFORT

## 2020-07-27 ASSESSMENT — PAIN DESCRIPTION - FREQUENCY
FREQUENCY: INTERMITTENT
FREQUENCY: CONTINUOUS

## 2020-07-27 ASSESSMENT — PAIN DESCRIPTION - PAIN TYPE
TYPE: SURGICAL PAIN
TYPE: ACUTE PAIN

## 2020-07-27 ASSESSMENT — PAIN DESCRIPTION - PROGRESSION
CLINICAL_PROGRESSION: NOT CHANGED
CLINICAL_PROGRESSION: NOT CHANGED

## 2020-07-27 NOTE — PROGRESS NOTES
Pt resting in bed comfortably this AM. Pt complains of being cold. RN got pt comfortable in bed with maia osegueraaped over pt. No further needs voiced at this time. Fresh ice water provided. Call light in reach. Will continue to monitor.

## 2020-07-27 NOTE — PROGRESS NOTES
In to assess pt. A&Ox4 VSS. Rates pain in abd 6/10, treated with prn pain pill, see MAR. Denies nausea. 4 stab sites well approximated with surgical glue, no redness, warmth or drainage noted. DESTIN dressing to midline incision D&I with green light flashing. Pt tolerating clear liquid breakfast well. IV site and left chest port WDL. Call light within reach, will continue to monitor.   Electronically signed by Edmond Martinez RN on 7/27/2020 at 11:27 AM

## 2020-07-27 NOTE — PROGRESS NOTES
Physical Therapy    Facility/Department: [address] ORTHOPEDICS  Initial Assessment    NAME: Bernice Mera  : 1940  MRN: [id number]    Date of Service: 2020    Discharge Recommendations:  Home with Home health PT, Patient would benefit from continued therapy after discharge, 24 hour supervision or assist   Bernice Mera scored a 18/24 on the AM-PAC short mobility form. Current research shows that an AM-PAC score of 18 or greater is typically associated with a discharge to the patient's home setting. Based on the patient's AM-PAC score and their current functional mobility deficits, it is recommended that the patient have 2-3 sessions per week of Physical Therapy at d/c to increase the patient's independence. At this time, this patient demonstrates the endurance and safety to discharge home with home therapy and a follow up treatment frequency of 2-3x/wk. Please see assessment section for further patient specific details. Assessment   Body structures, Functions, Activity limitations: Decreased functional mobility ; Decreased cognition;Decreased endurance  Assessment: Pt is a 67 y.o. female admitted 2020 w/ peritoneal cancer, bowel obstruction. Pt underwent robotic laparoscopic converted to open extensive lysis of adhesions, excision of peritoneal nodules, tumor debulking, small bowel resection with anastamosis, flexible sigmoidoscopy. Pt lives with  in 2 story home with a few [place],  assists and supports pt when needed but indep other than that. 2020 status: Bed mobility CGA, transfers CGA w/ cues for hand placement. Pt amb 200' CGA/min A w/ rollator. Pt had good control of walker in the beginnig but after becoming fatigued at around 150' pt needed min A to control walker and keep it closer to her body. Currently, pt is functioning below her baseline and would benefit from continued therapy while in the acute setting.  D/t family support at home pt to be OK to DC home with inital 24/7 supervision/assist and home PT level 1. Recommend contd. use of 4 wheeled walker at home to increase stability and safety after DC. Prognosis: Fair  Decision Making: Medium Complexity  History: as noted  PT Education: Goals;PT Role;Plan of Care;Home Exercise Program;Gait Training  Patient Education: Role of PT, POC, Need to call for assist, Safe use of Walker. REQUIRES PT FOLLOW UP: Yes  Activity Tolerance  Activity Tolerance: Patient Tolerated treatment well;Patient limited by cognitive status  Activity Tolerance: Pt forgetful at times throughout therapy session       Patient Diagnosis(es): There were no encounter diagnoses. has a past medical history of Anxiety, Arthritis, Asthma, CAD (coronary artery disease), Cancer of peritoneum ([address] Chronic low back pain, Depression, Embolism ([address] Hx of blood clots, Hyperlipidemia, Hypertension, Postoperative delirium, Pulmonary embolism ([address] Short-term memory loss, Type II or unspecified type diabetes mellitus without mention of complication, not stated as uncontrolled, Wears dentures, and Wears glasses. has a past surgical history that includes Coronary artery bypass graft (2007); Appendectomy; Cataract removal (Bilateral); Dilation and curettage of uterus; Coronary angioplasty with stent (1991); Colonoscopy; Endoscopy, colon, diagnostic; eye surgery; dieter and bso (cervix removed) (05/26/2016); hemicolectomy (05/2016); Hysterectomy; Tunneled venous port placement (Left, 7/27/2016); other surgical history (N/A, 08/17/2018); Breast biopsy (Left, 06/23/2017); joint replacement (Bilateral); Colonoscopy (N/A, 6/6/2019); Colonoscopy (6/6/2019); Cardiac surgery; and laparoscopy (N/A, 7/23/2020). Restrictions  Restrictions/Precautions  Restrictions/Precautions: Fall Risk  Position Activity Restriction  Other position/activity restrictions: Diet : Clear Liquid. Recent Abdominal Surg.  DESTIN drain  Vision/Hearing  Vision: Impaired  Vision Exceptions: Wears glasses at all times  Hearing: Within functional limits     Subjective  General  Chart Reviewed: Yes  Patient assessed for rehabilitation services?: Yes  Additional Pertinent Hx: 68 y/o female admit 7/23/2020 with Peritoneal Ca, Bowel Obstruction. 7/23/2020 S/P ROBOTIC LAPAROSCOPIC CONVERTED TO OPEN EXTENSIVE LYSIS OF ADHESIONS, EXCISION OF PERITONEAL NODULES, TUMOR DEBULKING, SMALL BOWEL RESECTION WITH ANASTAMOSIS, FLEXIBLE SIGMOIDOSCOPY. PMH as noted including CAD, CABG, Ovarian Ca, B TKR. Family / Caregiver Present: No  Referring Practitioner: Eliseo Fisher MD  Referral Date : 07/27/20  Diagnosis: 7/23/2020 S/P ROBOTIC LAPAROSCOPIC CONVERTED TO OPEN EXTENSIVE LYSIS OF ADHESIONS, EXCISION OF PERITONEAL NODULES, TUMOR DEBULKING, SMALL BOWEL RESECTION WITH ANASTAMOSIS, FLEXIBLE SIGMOIDOSCOPY. Follows Commands: Within Functional Limits  Other (Comment): Pt forgetful, alittle impulsive. Subjective  Subjective: Pt lying in bed. Alert and oriented. Agreeable to therapy. Pain Screening  Patient Currently in Pain: Yes  Orientation  Orientation  Overall Orientation Status: Impaired(Pt generally forgetful; confused to recent events/situation. Orientation Level: Oriented to person;Oriented to place  Social/Functional History  Social/Functional History  Lives With: Spouse  Type of Home: House  Home Layout: Two level, Bed/Bath upstairs, 1/2 bath on main level  Home Access: Stairs to enter without rails  Entrance Stairs - Number of Steps: 2 steps to enter. Bathroom Shower/Tub: Walk-in shower  Bathroom Toilet: Standard  Bathroom Accessibility: Accessible  Home Equipment: 4 wheeled walker  ADL Assistance: Independent  Homemaking Assistance: ( assist as needed.)  Ambulation Assistance: Independent(Occassional use of Walker.)  Transfer Assistance: Independent  Active : No( usually drives.)  Additional Comments:  very supportive and able to provide assist/support upon d/c.   Cognition Cognition  Overall Cognitive Status: Exceptions  Arousal/Alertness: Appropriate responses to stimuli  Following Commands: Follows one step commands with repetition  Memory: Decreased recall of recent events;Decreased short term memory  Safety Judgement: Decreased awareness of need for assistance  Problem Solving: Assistance required to identify errors made;Assistance required to correct errors made  Insights: Decreased awareness of deficits  Initiation: Requires cues for some  Sequencing: Requires cues for some    Objective  Sensation  Overall Sensation Status: WFL  Bed mobility  Supine to Sit: Contact guard assistance(HOB elevated w/ cues)  Sit to Supine: Unable to assess(Pt in BS chair at end of session)  Transfers  Sit to Stand: Contact guard assistance(w/ cues for hand placement)  Stand to sit: Contact guard assistance(w/ cues for hand placement)  Ambulation  Ambulation?: Yes  More Ambulation?: No  Ambulation 1  Surface: level tile  Device: Rolling Walker  Assistance: Contact guard assistance;Minimal assistance(Pt fatigue at 150' and needed min A and cues to control walker and stay within the walker base)  Quality of Gait: step through pattern with decrease step length and height. Cues needed to keep the walker close and stay within the walker base. Pt fatigued at 150' and needed min A and cues. Gait Deviations: Decreased step length;Decreased step height  Distance: 200'  Stairs/Curb  Stairs?: No  Balance  Posture: Fair  Sitting - Static: Good  Sitting - Dynamic: Good  Standing - Static: Good;-(@ rollator)  Standing - Dynamic: Good;-(@ rollator)    Plan   Plan  Times per week: 3-5x week while in acute care setting.   Current Treatment Recommendations: Functional Mobility Training, Transfer Training, Gait Training, Stair training, Safety Education & Training, Patient/Caregiver Education & Training  Safety Devices  Type of devices: Call light within reach, Chair alarm in place, Left in chair, Nurse notified    Goals  Short term goals  Time Frame for Short term goals: Upon d/c acute care setting. All goals ongoing 7/27/2020  Short term goal 1: Bed Mob SBA. Short term goal 2: Transfers with/without assist device SBA/CGA. Short term goal 3: Amb with/without assist device 100' SBA/CGA. Short term goal 4: Stair Negotiation as approp. Patient Goals   Patient goals : Return home with family.        Therapy Time   Individual Concurrent Group Co-treatment   Time In 1500         Time Out 1530         Minutes [facility] Physical Therapist  I attest that I was present for and made a skilled & mindful clinical judgement during the evaluation and/or treatment of this patient on 7/27/2020  Electronically signed by Maxime Calixto, [address] on 7/27/2020 at 4:01 PM

## 2020-07-27 NOTE — PROGRESS NOTES
peritoneal carcinoma with multiple admissions for bowel obstruction. She came in for reported surgery which was done. Following that, her creatinine has gone up from a baseline of 0.7 to current creatinine of 1.5 with reduced urine output. She also has dropped her sodium to 130. PMH:  Past Medical History:   Diagnosis Date    Anxiety     Arthritis     Asthma     poorly controlled    CAD (coronary artery disease)     Cancer of peritoneum ([address]     ovarian    Chronic low back pain     Depression     Embolism (HCC)     small pulmonary clot    Hx of blood clots     x1    Hyperlipidemia 4/23/2012    Hypertension     Postoperative delirium 05/26/2016    Pulmonary embolism (HCC)     Short-term memory loss     Type II or unspecified type diabetes mellitus without mention of complication, not stated as uncontrolled     Wears dentures     Wears glasses          Objective:     Vitals:   Vitals:    07/27/20 0419 07/27/20 0647 07/27/20 0724 07/27/20 1130   BP: 124/72  (!) 172/87 (!) 180/72   Pulse: 69  75 66   Resp: 18  15 15   Temp: 98.5 °F (36.9 °C)  98.5 °F (36.9 °C) 98.4 °F (36.9 °C)   TempSrc: Oral  Oral Oral   SpO2: 98%  96% 100%   Weight:  147 lb 0.8 oz (66.7 kg)     Height:             PE:  Gen appearance: Alert, appears stated age and cooperative, pale. Eyes: Eyelids,conjunctiva and pupils look normal   ENT: External inspection of the ears and nose are within normal limits             Oral mucosa  Is moist   Neuro: Grossly no focal neurological deficits, normal sensation, grossly cranial nerves intact   Neck:  No JVD, no mass, no thyroid enlargement   Cardio: S1 S2 normal, No added sounds   Resp: Subcutaneous crepitus on the right shoulder and right chest.  Lungs otherwise okay no wheezing.   Coarse crepitations bibasilarly  GI:  Soft, non-tender, BS +          No palpable kidney, no renal angle tenderness   MS: No swollen or tender joints, no cyanosis, clubbing   DERM: no rashes, thickening Results   Component Value Date    PHART 7.372 05/30/2016    PO2ART 98.1 05/30/2016    FTN7OLV 21.6 05/30/2016           Juanis Salazar  [facility]. Off: [phone]  Cell: [phone].  ( until 5 pm)

## 2020-07-27 NOTE — PLAN OF CARE
Problem: Pain:  Goal: Pain level will decrease  Description: Pain level will decrease  Outcome: Ongoing  Note: Pt assessed for pain. Pt denies any pain at this time. Will continue to monitor pt and assess for pain throughout rest of shift. Goal: Control of acute pain  Description: Control of acute pain  Outcome: Ongoing  Note: Pt assessed for pain. Pt denies any pain at this time. Will continue to monitor pt and assess for pain throughout rest of shift. Goal: Control of chronic pain  Description: Control of chronic pain  Outcome: Ongoing  Note: Pt assessed for pain. Pt denies any pain at this time. Will continue to monitor pt and assess for pain throughout rest of shift. Problem: Falls - Risk of:  Goal: Will remain free from falls  Description: Will remain free from falls  Outcome: Ongoing  Note: Fall risk assessment completed. Fall precautions in place. Call light within reach. Pt educated on calling for assistance before getting up. Walkway free of clutter. Will continue to monitor. Goal: Absence of physical injury  Description: Absence of physical injury  Outcome: Ongoing  Note: Pt is free of injury. No injury noted. Fall precautions in place. Call light within reach. Will monitor. Problem: Nutrition  Goal: Optimal nutrition therapy  Outcome: Ongoing     Problem: Skin Integrity:  Goal: Will show no infection signs and symptoms  Description: Will show no infection signs and symptoms  Outcome: Ongoing  Note: Pt is free of signs and symptoms of infection. Incision and dressing are clean, dry and intact. Vital signs stable. Will monitor.     Goal: Absence of new skin breakdown  Description: Absence of new skin breakdown  Outcome: Ongoing

## 2020-07-27 NOTE — PROGRESS NOTES
Hospitalist Progress Note  7/27/2020 9:42 AM    PCP: Kathya Bryant MD    [phone]     Date of Admission: 7/23/2020                                                                                                                     HOSPITAL COURSE    Patient demographics:  The patient  Grisel Donahue is a 67 y.o. female     Significant past medical history:   Patient Active Problem List   Diagnosis    Hyperlipidemia    Diabetes mellitus, type II [facility]    HTN (hypertension)    Vitamin D deficiency    CAD (coronary artery disease)    Elevated CA-125    Uterine leiomyoma    Abnormal abdominal CT scan    Cancer of peritoneum ([address]    Delirium due to general medical condition    Iron deficiency anemia    Impaired intestinal absorption    Neoplasm related pain    Weight loss    Abdominal pain    Chest pain, atypical    Partial small bowel obstruction ([address]    Malignant neoplasm of ovary ([facility]         Presenting symptoms:      Diagnostic workup:      CONSULTS DURING ADMISSION :   IP CONSULT TO DIETITIAN  IP CONSULT TO HOSPITALIST  IP CONSULT TO PHARMACY  IP CONSULT TO DIETITIAN  IP CONSULT TO NEPHROLOGY      Patient was diagnosed with:        Treatment while inpatient:  67years old female with medical history significant for type 2 diabetes mellitus hypertension coronary artery disease status post coronary artery bypass grafting in 2007. Patient also has a history of stage IIIc peritoneal cancer with multiple adhesions and small bowel obstruction.   General surgery was consulted and patient underwent small bowel resection and lysis of adhesions 7/24                                                                                  ----------------------------------------------------------      SUBJECTIVE COMPLAINTS-small bowel obstruction    Diet: DIET CLEAR LIQUID; Carb Control: 4 carb choices (60 gms)/meal  Dietary Nutrition Supplements: Clear Liquid Oral Supplement  PN-Adult Premix 5/20 - [facility]      OBJECTIVE:   Patient Active Problem List   Diagnosis    Hyperlipidemia    Diabetes mellitus, type II ([facility]    HTN (hypertension)    Vitamin D deficiency    CAD (coronary artery disease)    Elevated CA-125    Uterine leiomyoma    Abnormal abdominal CT scan    Cancer of peritoneum ([facility]    Delirium due to general medical condition    Iron deficiency anemia    Impaired intestinal absorption    Neoplasm related pain    Weight loss    Abdominal pain    Chest pain, atypical    Partial small bowel obstruction (HCC)    Malignant neoplasm of ovary (HCC)       Allergies  Cephalexin; Pcn [penicillins];  Cephalosporins; and Cephalexin    Medications    Scheduled Meds:   fat emulsion  250 mL Intravenous Once per day on Mon Thu    insulin lispro  0-12 Units Subcutaneous TID WC    insulin lispro  0-6 Units Subcutaneous Nightly    insulin glargine  10 Units Subcutaneous Nightly    insulin lispro  0.05 Units/kg Subcutaneous TID WC    ALPRAZolam  0.5 mg Oral Nightly    amLODIPine  10 mg Oral Nightly    metoprolol succinate  100 mg Oral Daily    pregabalin  75 mg Oral BID    rOPINIRole  2 mg Oral Nightly    rosuvastatin  40 mg Oral QPM    venlafaxine  75 mg Oral Daily    sodium chloride flush  10 mL Intravenous 2 times per day    acetaminophen  500 mg Oral Q6H    [Held by provider] enoxaparin  40 mg Subcutaneous Daily     Continuous Infusions:   dextrose      PN-Adult Premix 5/20 - [facility] 42 mL/hr at 07/26/20 1816     PRN Meds:  glucagon (rDNA), dextrose, oxyCODONE **OR** oxyCODONE, HYDROmorphone, albuterol, sodium chloride flush, glucose, dextrose, ondansetron **OR** ondansetron    Vitals   Vitals /wt   Patient Vitals for the past 8 hrs:   BP Temp Temp src Pulse Resp SpO2 Weight   07/27/20 0724 (!) 172/87 98.5 °F (36.9 °C) Oral 75 15 96 % --   07/27/20 0647 -- -- -- -- -- -- 147 lb 0.8 oz (66.7 kg)   07/27/20 0419 124/72 98.5 °F (36.9 °C) Oral 69 18 98 % --        72HR INTAKE/OUTPUT: 07/26/20  1132 07/26/20  1618 07/26/20  2119 07/27/20  0617   POCGLU 286* 283* 204* 272* 265*     Recent Labs     07/26/20  0737   LABA1C 5.1      Lab Results   Component Value Date    LABA1C 5.1 07/26/2020         ASSESSMENT AND PLAN    Small bowel obstruction status post surgical intervention, 7/24  Liquid diet  Advance as tolerated   general surgery is following      Acute kidney injury  IV fluids and hold losartan  Nephrology is following      Anemia of acute blood loss on anemia of chronic disease  GYN surgical oncology following    Hyponatremia  Hypomagnesemia  Supplements    Diabetes mellitus  Sliding scale insulin    Anxiety/depression  CAD status post CABG 2007       Continue DVT prophylaxis with Lovenox        Code Status: Full Code        Dispo -cc        The patient and / or the family were informed of the results of any tests, a time was given to answer questions, a plan was proposed and they agreed with plan. Gokul Sanchez MD    This note was transcribed using [id number] [employer]. Please disregard any translational errors.

## 2020-07-27 NOTE — PROGRESS NOTES
Assisted pt in ordering full liquid/soft diet for supper. Pt tolerating well, will continue to monitor.   Electronically signed by Bernice Oliveros RN on 7/27/2020 at 6:33 PM

## 2020-07-27 NOTE — PROGRESS NOTES
Spoke with pt's  at bedside who is verbally upset about pt's FSBS of 301 and BP of 180/72. Went over home medications and discussed how home medication Cozaar was not ordered here and the sliding scale we have ordered for her is lower. Explained that blood glucose could be elevated d/t the TPN. Then called Dr. Darío Plata. Yahaira Schumacher to discuss. New orders obtained.  pleased.    Electronically signed by Domenico Apodaca RN on 7/27/2020 at 12:30 PM

## 2020-07-27 NOTE — PROGRESS NOTES
BLE wrapped with ace wrap per order. Pt tolerated well. Will continue to monitor.   Electronically signed by Kevin Peraza RN on 7/27/2020 at 4:43 PM

## 2020-07-27 NOTE — PROGRESS NOTES
Occupational Therapy       Occupational Therapy Treatment Session   Date: 2020   Patient Name: Tuan Ruggiero  MRN: [id number]     : 1940    Date of Service: 2020    Assessment: Pt tolerated session well this date - continues to be limited by decreased strength and activity tolerance. Pt completed bed mobility with CGA and sit <> stand transfers with CGA. Pt required cues for safety with transfers and 4WW management. Pt tolerated longer ambulation this date however as she fatigued required up to min A to remain safe. Pt completed grooming tasks at the sink, anticipate requiring up to min A for ADL needs. Pt plans to d/c home with  when medically stable. Recommend 24/7 supervision/assist and home health OT. Discharge Recommendations:  24 hour supervision or assist, Home with Home health OT, S Level 1       [address] [place] [address]: LEVEL 1 STANDARD    - Initial home health evaluation to occur within 24-48 hours, in patient home   - Therapy to evaluate with goal of regaining prior level of functioning   - Therapy to evaluate if patient has [address] needs for personal care    Assessment   Performance deficits / Impairments: Decreased endurance;Decreased ADL status; Decreased strength;Decreased balance;Decreased functional mobility   Assessment: Pt tolerated session well this date - continues to be limited by decreased strength and activity tolerance. Pt completed bed mobility with CGA and sit <> stand transfers with CGA. Pt required cues for safety with transfers and 4WW management. Pt tolerated longer ambulation this date however as she fatigued required up to min A to remain safe. Pt completed grooming tasks at the sink, anticipate requiring up to min A for ADL needs. Pt plans to d/c home with  when medically stable. Recommend 24/7 supervision/assist and home health OT.   OT Education: OT Role;Plan of Care  REQUIRES OT FOLLOW UP: Yes  Activity Tolerance  Activity Tolerance: Patient Anticipate pt will be supervision for UE bathing/dressing and min A for toileting and LE bathing/dressing. Bed mobility  Supine to Sit: Contact guard assistance(HOB elevated w/ cues)  Sit to Supine: Unable to assess(Pt in BS chair at end of session)     Transfers  Sit to stand: Contact guard assistance  Stand to sit: Contact guard assistance  Transfer Comments: CGA for sit <> stand from EOB to 4WW and sit to recliner chair, cues for safe hand placement - did not reorient to recliner before attempting to sit     Cognition  Overall Cognitive Status: Exceptions  Arousal/Alertness: Appropriate responses to stimuli  Following Commands: Follows one step commands with repetition  Memory: Decreased recall of recent events;Decreased short term memory  Safety Judgement: Decreased awareness of need for assistance  Problem Solving: Assistance required to identify errors made;Assistance required to correct errors made  Insights: Decreased awareness of deficits  Initiation: Requires cues for some  Sequencing: Requires cues for some        Sensation  Overall Sensation Status: [address]  Times per week: 3-5  Current Treatment Recommendations: Strengthening, Functional Mobility Training, Endurance Training, Safety Education & Training, Self-Care / ADL      AM-PAC Score    Cathy Broussard scored a 18/24 on the AM-PAC ADL Inpatient form. Current research shows that an AM-PAC score of 18 or greater is typically associated with a discharge to the patient's home setting. Based on the patient's AM-PAC score, and their current ADL deficits, it is recommended that the patient have 2-3 sessions per week of Occupational Therapy at d/c to increase the patient's independence. At this time, this patient demonstrates the endurance and safety to discharge home with home health OT and a follow up treatment frequency of 2-3x/wk. Please see assessment section for further patient specific details.     If patient discharges

## 2020-07-28 ENCOUNTER — APPOINTMENT (OUTPATIENT)
Dept: CT IMAGING | Age: 80
DRG: 330 | End: 2020-07-28
Attending: OBSTETRICS & GYNECOLOGY
Payer: MEDICARE

## 2020-07-28 LAB
ANION GAP SERPL CALCULATED.3IONS-SCNC: 12 MMOL/L (ref 3–16)
BASOPHILS ABSOLUTE: 0 K/UL (ref 0–0.2)
BASOPHILS RELATIVE PERCENT: 0.7 %
BUN BLDV-MCNC: 19 MG/DL (ref 7–20)
CALCIUM SERPL-MCNC: 8.2 MG/DL (ref 8.3–10.6)
CHLORIDE BLD-SCNC: 93 MMOL/L (ref 99–110)
CO2: 22 MMOL/L (ref 21–32)
CREAT SERPL-MCNC: 0.9 MG/DL (ref 0.6–1.2)
EOSINOPHILS ABSOLUTE: 0.2 K/UL (ref 0–0.6)
EOSINOPHILS RELATIVE PERCENT: 4.7 %
GFR AFRICAN AMERICAN: >60
GFR NON-AFRICAN AMERICAN: >60
GLUCOSE BLD-MCNC: 113 MG/DL (ref 70–99)
GLUCOSE BLD-MCNC: 208 MG/DL (ref 70–99)
GLUCOSE BLD-MCNC: 235 MG/DL (ref 70–99)
GLUCOSE BLD-MCNC: 247 MG/DL (ref 70–99)
GLUCOSE BLD-MCNC: 251 MG/DL (ref 70–99)
GLUCOSE BLD-MCNC: 95 MG/DL (ref 70–99)
HCT VFR BLD CALC: 21.8 % (ref 36–48)
HEMOGLOBIN: 7.6 G/DL (ref 12–16)
LYMPHOCYTES ABSOLUTE: 0.8 K/UL (ref 1–5.1)
LYMPHOCYTES RELATIVE PERCENT: 16.5 %
MAGNESIUM: 1.7 MG/DL (ref 1.8–2.4)
MCH RBC QN AUTO: 32.5 PG (ref 26–34)
MCHC RBC AUTO-ENTMCNC: 34.9 G/DL (ref 31–36)
MCV RBC AUTO: 93.3 FL (ref 80–100)
MONOCYTES ABSOLUTE: 0.4 K/UL (ref 0–1.3)
MONOCYTES RELATIVE PERCENT: 7.8 %
NEUTROPHILS ABSOLUTE: 3.3 K/UL (ref 1.7–7.7)
NEUTROPHILS RELATIVE PERCENT: 70.3 %
PDW BLD-RTO: 17.5 % (ref 12.4–15.4)
PERFORMED ON: ABNORMAL
PERFORMED ON: NORMAL
PHOSPHORUS: 2.6 MG/DL (ref 2.5–4.9)
PLATELET # BLD: 158 K/UL (ref 135–450)
PMV BLD AUTO: 8.9 FL (ref 5–10.5)
POTASSIUM SERPL-SCNC: 3.7 MMOL/L (ref 3.5–5.1)
RBC # BLD: 2.33 M/UL (ref 4–5.2)
SODIUM BLD-SCNC: 127 MMOL/L (ref 136–145)
WBC # BLD: 4.8 K/UL (ref 4–11)

## 2020-07-28 PROCEDURE — 6370000000 HC RX 637 (ALT 250 FOR IP): Performed by: INTERNAL MEDICINE

## 2020-07-28 PROCEDURE — 84100 ASSAY OF PHOSPHORUS: CPT

## 2020-07-28 PROCEDURE — 83735 ASSAY OF MAGNESIUM: CPT

## 2020-07-28 PROCEDURE — 85025 COMPLETE CBC W/AUTO DIFF WBC: CPT

## 2020-07-28 PROCEDURE — 71250 CT THORAX DX C-: CPT

## 2020-07-28 PROCEDURE — 1200000000 HC SEMI PRIVATE

## 2020-07-28 PROCEDURE — 94760 N-INVAS EAR/PLS OXIMETRY 1: CPT

## 2020-07-28 PROCEDURE — 6370000000 HC RX 637 (ALT 250 FOR IP): Performed by: OBSTETRICS & GYNECOLOGY

## 2020-07-28 PROCEDURE — 80048 BASIC METABOLIC PNL TOTAL CA: CPT

## 2020-07-28 PROCEDURE — 6370000000 HC RX 637 (ALT 250 FOR IP): Performed by: HOSPITALIST

## 2020-07-28 PROCEDURE — 93970 EXTREMITY STUDY: CPT

## 2020-07-28 PROCEDURE — 2580000003 HC RX 258: Performed by: OBSTETRICS & GYNECOLOGY

## 2020-07-28 RX ORDER — AMLODIPINE BESYLATE 5 MG/1
5 TABLET ORAL DAILY
Status: DISCONTINUED | OUTPATIENT
Start: 2020-07-28 | End: 2020-07-29

## 2020-07-28 RX ADMIN — ROPINIROLE HYDROCHLORIDE 2 MG: 1 TABLET, FILM COATED ORAL at 22:22

## 2020-07-28 RX ADMIN — PREGABALIN 75 MG: 75 CAPSULE ORAL at 22:22

## 2020-07-28 RX ADMIN — VENLAFAXINE HYDROCHLORIDE 75 MG: 75 CAPSULE, EXTENDED RELEASE ORAL at 09:15

## 2020-07-28 RX ADMIN — ACETAMINOPHEN 500 MG: 500 TABLET, FILM COATED ORAL at 22:20

## 2020-07-28 RX ADMIN — INSULIN LISPRO 3 UNITS: 100 INJECTION, SOLUTION INTRAVENOUS; SUBCUTANEOUS at 09:28

## 2020-07-28 RX ADMIN — ROSUVASTATIN CALCIUM 40 MG: 40 TABLET, FILM COATED ORAL at 17:56

## 2020-07-28 RX ADMIN — ACETAMINOPHEN 500 MG: 500 TABLET, FILM COATED ORAL at 05:51

## 2020-07-28 RX ADMIN — LOSARTAN POTASSIUM 100 MG: 100 TABLET, FILM COATED ORAL at 09:15

## 2020-07-28 RX ADMIN — INSULIN LISPRO 3 UNITS: 100 INJECTION, SOLUTION INTRAVENOUS; SUBCUTANEOUS at 02:15

## 2020-07-28 RX ADMIN — SODIUM CHLORIDE, PRESERVATIVE FREE 10 ML: 5 INJECTION INTRAVENOUS at 09:18

## 2020-07-28 RX ADMIN — INSULIN LISPRO 6 UNITS: 100 INJECTION, SOLUTION INTRAVENOUS; SUBCUTANEOUS at 11:46

## 2020-07-28 RX ADMIN — INSULIN LISPRO 3 UNITS: 100 INJECTION, SOLUTION INTRAVENOUS; SUBCUTANEOUS at 17:57

## 2020-07-28 RX ADMIN — INSULIN GLARGINE 15 UNITS: 100 INJECTION, SOLUTION SUBCUTANEOUS at 02:01

## 2020-07-28 RX ADMIN — METOPROLOL SUCCINATE 100 MG: 50 TABLET, EXTENDED RELEASE ORAL at 09:15

## 2020-07-28 RX ADMIN — ACETAMINOPHEN 500 MG: 500 TABLET, FILM COATED ORAL at 17:56

## 2020-07-28 RX ADMIN — OXYCODONE HYDROCHLORIDE 10 MG: 10 TABLET ORAL at 05:52

## 2020-07-28 RX ADMIN — ACETAMINOPHEN 500 MG: 500 TABLET, FILM COATED ORAL at 02:01

## 2020-07-28 RX ADMIN — INSULIN GLARGINE 5 UNITS: 100 INJECTION, SOLUTION SUBCUTANEOUS at 22:22

## 2020-07-28 RX ADMIN — TORSEMIDE 20 MG: 20 TABLET ORAL at 09:15

## 2020-07-28 RX ADMIN — INSULIN LISPRO 3 UNITS: 100 INJECTION, SOLUTION INTRAVENOUS; SUBCUTANEOUS at 11:47

## 2020-07-28 RX ADMIN — AMLODIPINE BESYLATE 5 MG: 5 TABLET ORAL at 09:27

## 2020-07-28 RX ADMIN — SODIUM CHLORIDE, PRESERVATIVE FREE 10 ML: 5 INJECTION INTRAVENOUS at 22:24

## 2020-07-28 RX ADMIN — ALPRAZOLAM 0.5 MG: 0.5 TABLET ORAL at 22:22

## 2020-07-28 RX ADMIN — PREGABALIN 75 MG: 75 CAPSULE ORAL at 09:15

## 2020-07-28 RX ADMIN — INSULIN LISPRO 9 UNITS: 100 INJECTION, SOLUTION INTRAVENOUS; SUBCUTANEOUS at 09:27

## 2020-07-28 RX ADMIN — ACETAMINOPHEN 500 MG: 500 TABLET, FILM COATED ORAL at 11:45

## 2020-07-28 ASSESSMENT — PAIN DESCRIPTION - PAIN TYPE
TYPE: SURGICAL PAIN

## 2020-07-28 ASSESSMENT — PAIN SCALES - GENERAL
PAINLEVEL_OUTOF10: 0
PAINLEVEL_OUTOF10: 7
PAINLEVEL_OUTOF10: 4
PAINLEVEL_OUTOF10: 7
PAINLEVEL_OUTOF10: 2
PAINLEVEL_OUTOF10: 0

## 2020-07-28 ASSESSMENT — PAIN - FUNCTIONAL ASSESSMENT
PAIN_FUNCTIONAL_ASSESSMENT: PREVENTS OR INTERFERES SOME ACTIVE ACTIVITIES AND ADLS
PAIN_FUNCTIONAL_ASSESSMENT: ACTIVITIES ARE NOT PREVENTED
PAIN_FUNCTIONAL_ASSESSMENT: ACTIVITIES ARE NOT PREVENTED

## 2020-07-28 ASSESSMENT — PAIN DESCRIPTION - FREQUENCY
FREQUENCY: CONTINUOUS

## 2020-07-28 ASSESSMENT — PAIN DESCRIPTION - ORIENTATION
ORIENTATION: MID

## 2020-07-28 ASSESSMENT — PAIN DESCRIPTION - ONSET
ONSET: ON-GOING

## 2020-07-28 ASSESSMENT — PAIN DESCRIPTION - PROGRESSION
CLINICAL_PROGRESSION: NOT CHANGED

## 2020-07-28 ASSESSMENT — PAIN DESCRIPTION - LOCATION
LOCATION: ABDOMEN

## 2020-07-28 ASSESSMENT — PAIN DESCRIPTION - DESCRIPTORS
DESCRIPTORS: ACHING
DESCRIPTORS: DISCOMFORT
DESCRIPTORS: DISCOMFORT

## 2020-07-28 NOTE — PLAN OF CARE
Problem: Nutrition  Goal: Optimal nutrition therapy  7/28/2020 1321 by Erwin Donis RD, LD  Outcome: Ongoing  7/28/2020 7405 by Christiano Foley RN  Outcome: Ongoing  Note: Checking on patient Q2H for nutrition needs, hygiene needs, comfort measures, mobility, fall risk interventions, and safe environment. Nutrition Problem #1: Inadequate oral intake(improving slowly)  Intervention: Food and/or Nutrient Delivery: Continue Current Diet, Modify Oral Nutrition Supplement  Nutritional Goals:  Tolerate most appropriate nutrition therapy

## 2020-07-28 NOTE — PROGRESS NOTES
Patient Name: Symone Chacon                                                    Primary Physician: Lowell Lara MD  Admitting Dx: [facility]                 Inpatient Progress Note                                         Plan for today:     subcutaneous emphysema appears stable. Chest x-ray reviewed. CT chest pending. Likely this happened when she was intubated. Patient ox requirements are stable. Urine output 4300 ml. Creatinine down from 1.5 down to 1.1  Sodium level 128---> 131--> 127, adjust sodium in TPN. .  Off IVfs. BP  high now. On losartan and torsemide    Will hold amlodipine due to pedal edema and need for diuretics. Resumed TPN per hospitalist note. Assessment / Plan:       Acute kidney injury   due to hemodynamic reason due to surgery  +/- hypovolemia poor po intake. expect improvement. got IV fluids. chekk urine Na       Possible CKD   due to diabetic nephropathy. Check urine for protein, creatinine.     Hyponatremia  Related due to surgery/Pain  Some contribution of high Glucose       History of peritoneal carcinomatosis   for which she has lysis of adhesions and also debulking of the tumor. Further management as per team.      Diabetes. Check for proteinuria. Last A1C 7.7 in 2016  Was 8 in 2011     CAD  previous CABG      previous hemicolectomy. Mentioned in records       Please call with any questions or concerns. [phone]. Juanis Salazar        Subjective: Interval hx:   Seen in the room. Feels weak and tired today. Denies any shortness of breath currently but on oxygen. Has puffiness on the right shoulder and the right chest possible subcutaneous emphysema. No fever no chills. Appropriately answers questions. CC: Confusion, hyponatremia, acute kidney injury. HPI: 78-year-old  female whom I am seeing in the room while she was resting.   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 She came in for reported surgery which was done. Following that, her creatinine has gone up from a baseline of 0.7 to current creatinine of 1.5 with reduced urine output. She also has dropped her sodium to 130. PMH:  Past Medical History:   Diagnosis Date    Anxiety     Arthritis     Asthma     poorly controlled    CAD (coronary artery disease)     Cancer of peritoneum (HCC)     ovarian    Chronic low back pain     Depression     Embolism (HCC)     small pulmonary clot    Hx of blood clots     x1    Hyperlipidemia 4/23/2012    Hypertension     Postoperative delirium 05/26/2016    Pulmonary embolism (HCC)     Short-term memory loss     Type II or unspecified type diabetes mellitus without mention of complication, not stated as uncontrolled     Wears dentures     Wears glasses          Objective:     Vitals:   Vitals:    07/28/20 0850 07/28/20 0910 07/28/20 1136 07/28/20 1317   BP:  (!) 192/75 138/64    Pulse:  69 68    Resp:  16 16    Temp:  98.3 °F (36.8 °C) 98.5 °F (36.9 °C)    TempSrc:  Oral Oral    SpO2: 97% 99% 98%    Weight:       Height:    5' 8\" (1.727 m)         PE:  Gen appearance: Alert, appears stated age and cooperative, pale. Eyes: Eyelids,conjunctiva and pupils look normal   ENT: External inspection of the ears and nose are within normal limits             Oral mucosa  Is moist   Neuro: Grossly no focal neurological deficits, normal sensation, grossly cranial nerves intact   Neck:  No JVD, no mass, no thyroid enlargement   Cardio: S1 S2 normal, No added sounds   Resp: Subcutaneous crepitus on the right shoulder and right chest.  Lungs otherwise okay no wheezing.   Coarse crepitations bibasilarly  GI:  Soft, non-tender, BS +          No palpable kidney, no renal angle tenderness   MS: No swollen or tender joints, no cyanosis, clubbing   DERM: no rashes, thickening   EDEMA: 1-2+ pedal edema. I/Os:         Intake/Output Summary (Last 24 hours) at 7/28/2020 1327  Last data filed at 7/28/2020 1032  Gross per 24 hour   Intake 1308.3 ml   Output 5200 ml   Net -3891.7 ml       Meds:     Scheduled Meds:   amLODIPine  5 mg Oral Daily    fat emulsion  250 mL Intravenous Once per day on Mon Thu    losartan  100 mg Oral Daily    insulin lispro  0-18 Units Subcutaneous TID WC    insulin lispro  0-9 Units Subcutaneous Nightly    insulin glargine  15 Units Subcutaneous Nightly    insulin lispro  0-9 Units Subcutaneous Q24H    torsemide  20 mg Oral Daily    insulin lispro  0.05 Units/kg Subcutaneous TID WC    ALPRAZolam  0.5 mg Oral Nightly    metoprolol succinate  100 mg Oral Daily    pregabalin  75 mg Oral BID    rOPINIRole  2 mg Oral Nightly    rosuvastatin  40 mg Oral QPM    venlafaxine  75 mg Oral Daily    sodium chloride flush  10 mL Intravenous 2 times per day    acetaminophen  500 mg Oral Q6H    [Held by provider] enoxaparin  40 mg Subcutaneous Daily     Continuous Infusions:   PN-Adult Premix 5/20 - Standard Electrolytes - Central Line 21 mL/hr at 07/28/20 0924    dextrose       PRN Meds:glucagon (rDNA), dextrose, oxyCODONE **OR** oxyCODONE, HYDROmorphone, albuterol, sodium chloride flush, glucose, dextrose, ondansetron **OR** ondansetron    Diet:  PN-Adult Premix 5/20 - Standard Electrolytes - Central Line  DIET CARB CONTROL;   Dietary Nutrition Supplements: Diabetic Oral Supplement    CBC:   Recent Labs     07/26/20  1245 07/27/20  0521 07/28/20  0620   WBC 5.6 5.6 4.8   HGB 7.3* 7.4* 7.6*   HCT 21.6* 22.2* 21.8*    146 158     BMP:    Recent Labs     07/26/20  0737 07/27/20  0521 07/28/20  0620   * 131* 127*   K 4.0 4.4 3.7    100 93*   CO2 18* 19* 22   BUN 28* 21* 19   CREATININE 1.4* 1.1 0.9   GLUCOSE 249* 233* 235*   MG 1.90 1.90 1.70*   PHOS 3.2 2.4* 2.6     ABGs:   Lab Results   Component Value Date    PHART 7.372 05/30/2016    PO2ART 98.1 05/30/2016    MDI8FEL 21.6 05/30/2016           [facility] [facility]. Off: [phone]  Cell: [phone].  ( until 5 pm)

## 2020-07-28 NOTE — CONSULTS
needed for Constipation 7/6/56 4/3/20 Yes Apolinar Delgado MD   insulin glargine (LANTUS) 100 UNIT/ML injection vial Inject 5 Units into the skin nightly  Patient taking differently: Inject 6 Units into the skin nightly  6/6/16  Yes Le Cali MD   insulin lispro (HUMALOG) 100 UNIT/ML injection vial Inject 0-12 Units into the skin 3 times daily (with meals)  Patient taking differently: Inject 6-16 Units into the skin 3 times daily (before meals)  6/6/16  Yes Le Cali MD   rosuvastatin (CRESTOR) 40 MG tablet Take 40 mg by mouth every evening. Yes Historical Provider, MD   alprazolam Nawaf Brody) 0.5 MG tablet Take 0.5 mg by mouth nightly. Yes Historical Provider, MD        Facility Administered Medications:    amLODIPine  5 mg Oral Daily    fat emulsion  250 mL Intravenous Once per day on Mon Thu    losartan  100 mg Oral Daily    insulin lispro  0-18 Units Subcutaneous TID WC    insulin lispro  0-9 Units Subcutaneous Nightly    insulin glargine  15 Units Subcutaneous Nightly    insulin lispro  0-9 Units Subcutaneous Q24H    torsemide  20 mg Oral Daily    insulin lispro  0.05 Units/kg Subcutaneous TID WC    ALPRAZolam  0.5 mg Oral Nightly    metoprolol succinate  100 mg Oral Daily    pregabalin  75 mg Oral BID    rOPINIRole  2 mg Oral Nightly    rosuvastatin  40 mg Oral QPM    venlafaxine  75 mg Oral Daily    sodium chloride flush  10 mL Intravenous 2 times per day    acetaminophen  500 mg Oral Q6H    [Held by provider] enoxaparin  40 mg Subcutaneous Daily       Allergies:     Allergies   Allergen Reactions    Cephalexin Rash    Pcn [Penicillins] Rash    Cephalosporins      UNABLE TO RECALL REACTION    Cephalexin Rash        Social History:    Working: retired  Caffeine: minimal  Lifestyle:  Sedentary; lives with her   Social History     Socioeconomic History    Marital status:      Spouse name: Not on file    Number of children: Not on file    Years of education: Not on file    Highest education level: Not on file   Occupational History    Not on file   Social Needs    Financial resource strain: Not on file    Food insecurity     Worry: Not on file     Inability: Not on file    Transportation needs     Medical: Not on file     Non-medical: Not on file   Tobacco Use    Smoking status: Former Smoker     Packs/day: 2.00     Years: 10.00     Pack years: 20.00     Last attempt to quit: 2000     Years since quittin.0    Smokeless tobacco: Never Used   Substance and Sexual Activity    Alcohol use: Not Currently     Alcohol/week: 1.0 standard drinks     Types: 1 Glasses of wine per week    Drug use: Never    Sexual activity: Not Currently   Lifestyle    Physical activity     Days per week: Not on file     Minutes per session: Not on file    Stress: Not on file   Relationships    Social connections     Talks on phone: Not on file     Gets together: Not on file     Attends Gnosticism service: Not on file     Active member of club or organization: Not on file     Attends meetings of clubs or organizations: Not on file     Relationship status: Not on file    Intimate partner violence     Fear of current or ex partner: Not on file     Emotionally abused: Not on file     Physically abused: Not on file     Forced sexual activity: Not on file   Other Topics Concern    Not on file   Social History Narrative    Not on file       Family History:      Problem Relation Age of Onset    High Blood Pressure Mother     Diabetes Father     Heart Disease Maternal Grandmother     Diabetes Maternal Uncle        Review of Systems:  Reviewed, negative unless noted  Constitutional: weight change, weakness, impairment of ADLs  Eyes: eyestrain, redness, discharges  ENMT: post nasal drip, sinus pain, discharge   Cardiovascular: faintness, vertigo, color changes in fingers/toes  Respiratory: cough, sputum, hemoptysis  GI: excessive thirst, changes in bowel habits, abdominal swelling  : painful urination, pyuria, incontinence  Musculoskeletal: cramps, swelling, limitation of motor activity  Integumentary: cyanosis, changes in skin, dryness  Neurological: paralysis, tingling, tremors  Psychiatric: restlessness, irritability, mood swings  Endocrine: heat/cold intolerance, excessive sweating, hair loss  Hematologic/lymphatic: swollen glands, anemia, easy bruising/bleeding      Physical Examination:    /64   Pulse 68   Temp 98.5 °F (36.9 °C) (Oral)   Resp 16   Ht 5' 8\" (1.727 m)   Wt 150 lb 5.7 oz (68.2 kg)   SpO2 98%   BMI 22.86 kg/m²        Admission Weight: 150 lb (68 kg)       General appearance: NAD, well nourished  Eyes: anicteric, PERRLA  ENMT: no scars or lesions, no nasal deformity, normal dentition, no cyanosis of oral mucosa  Neck: no masses, no thyroid enlargement, no JVD. Respiratory: effort is unlabored, symmetric, no crackles, wheezes or rubs. No palpable/percussable abnormalities. + crepitus right anterior/lateral chest  Cardiovascular: regular, no murmur. PMI normal, no thrill. No carotid bruits. No edema or varicosities. Abdominal aorta cannot be appreciated given body habitus. Pulses:    carotid brachial radial femoral popliteal DP PT   RIGHT          LEFT          GI: abdomen soft, nondistended, no organomegaly. No masses. Lymphatic: no cervical/supraclavicular adenopathy  Musculoskeletal: strength and tone normal. Full ROM. No scoliosis. Extremities: warm and pink. No clubbing or petechiae. Skin: no dermatitis or ulceration. No nodularity or induration. Neuro: CN grossly intact. Sensation and motor function grossly intact. Psychiatric: oriented, appropriate mood/affect. MEDICAL DECISION MAKING/TESTING  Studies personally reviewed. CXR:   7/27/2020  Right subcutaneous emphysema is noted. No pneumothorax or pneumomediastinum is evident. Left basilar opacity, atelectasis versus pneumonia, associated with chronic elevation of left hemidiaphragm. KUB  7/6/2020  The bowel gas pattern is nonspecific. Gas is noted within the transverse colon and rectosigmoid colon. Previously noted mild dilated loops of small bowel within the pelvis are slightly less prominent in the interval. There is no residual enteric contrast. No discrete evidence for pneumoperitoneum. CT Chest  7/28/2020  Small amount of free air in the upper abdomen.  Small amount of    pneumomediastinum, and diffuse subcutaneous emphysema in the chest    wall-supraclavicular regions, right greater than left.  No clear etiology    noted. .  No pneumothorax noted.  Correlate with any history of recent surgery.         Bilateral pleural effusions are seen with adjacent consolidation lungs,    likely atelectasis in the absence of clinical signs of pneumonia. CT Abdomen/Pelvis  7/3/2020     CHEST:    No evidence of pulmonary embolism or acute intrathoracic abnormality.         Dilation of the ulnar arteries may suggest underlying pulmonary arterial hypertension.         Nonspecific subpleural reticular opacities in both lungs. This may represent a nonspecific interstitial fibrosis.         ABDOMEN AND PELVIS:    Mildly dilated loops of fluid-filled small bowel throughout the abdomen, similar to the prior study, without discrete transition point.         Mild ascites. Given mild scalloping of the liver contour, malignant ascites would be difficult to exclude.         Labs:   CBC:   Recent Labs     07/26/20  1245 07/27/20  0521 07/28/20  0620   WBC 5.6 5.6 4.8   HGB 7.3* 7.4* 7.6*   HCT 21.6* 22.2* 21.8*   MCV 96.0 95.6 93.3    146 158     BMP:   Recent Labs     07/26/20  0737 07/27/20  0521 07/28/20  0620   * 131* 127*   K 4.0 4.4 3.7    100 93*   CO2 18* 19* 22   PHOS 3.2 2.4* 2.6   BUN 28* 21* 19   CREATININE 1.4* 1.1 0.9   CALCIUM 7.9* 8.0* 8.2*   MG 1.90 1.90 1.70*     Cardiac Enzymes: No results for input(s): CKTOTAL, CKMB, CKMBINDEX, TROPONINI in the last 72 hours.  PT/INR: No results for input(s): PROTIME, INR in the last 72 hours. APTT: No results for input(s): APTT in the last 72 hours. Liver Profile:  Lab Results   Component Value Date    AST 28 07/23/2020    ALT 13 07/23/2020    BILIDIR <0.2 07/03/2020    BILITOT 0.7 07/23/2020    ALKPHOS 58 07/23/2020    LABALBU 4.5 07/23/2020     Lab Results   Component Value Date    CHOL 148 05/21/2012    HDL 58 05/21/2012    TRIG 23 07/24/2020     HgbA1c:  Lab Results   Component Value Date    LABA1C 5.1 07/26/2020     UA:   Lab Results   Component Value Date    COLORU YELLOW 07/27/2020    PHUR 5.0 07/27/2020    WBCUA 1 07/27/2020    RBCUA 0 07/27/2020    MUCUS 2+ 07/03/2020    BACTERIA 1+ 07/23/2020    CLARITYU Clear 07/27/2020    SPECGRAV 1.005 07/27/2020    LEUKOCYTESUR Negative 07/27/2020    UROBILINOGEN 0.2 07/27/2020    BILIRUBINUR Negative 07/27/2020    BILIRUBINUR Negative 01/04/2012    BLOODU Negative 07/27/2020    GLUCOSEU Negative 07/27/2020    GLUCOSEU Negative 01/04/2012    AMORPHOUS 2+ 07/03/2020       History obtained: chart, pt    Risk factors: recent surgical procedure    Diagnosis:  Right sided subcutaneous emphysema    Plan:   Patient on RA  Hemodynamically stable  No immediate need for intervention  CT Scan of the chest today  CXR in am      Note reviewed, events of last 24 hours reviewed along with vital signs and I/Os and patient examined. Images reviewed. Assessment & Plans  Clinically patient with crepitus on exam of chest wall. No history of any coughing spells or uncontrollable retching. I suspect recent surgery may be the culprit of these findings. Serial imaging. Thank you for this consultation.      Judah Mirza MD  Cardiothoracic Surgery

## 2020-07-28 NOTE — PLAN OF CARE
monitor. 7/28/2020 0928 by Guilherme Gray RN  Outcome: Ongoing  Note: Fall risk assessment completed . Fall precautions in place, bed/ chair alarm on, side rails 2/4 up, call light in reach, educated pt on calling for assistance when needed, room clear of clutter. Pt verbalized understanding. Goal: Absence of physical injury  Description: Absence of physical injury  7/28/2020 1720 by Nelly Pereira RN  Outcome: Ongoing  7/28/2020 0812 by Guilherme Gray RN  Outcome: Ongoing  Note: Patient remains free from physical injury. Patient educated on safety precautions. Will continue to monitor to ensure patient remains free from physical injury throughout remainder of shift. Problem: Nutrition  Goal: Optimal nutrition therapy  7/28/2020 1720 by Nelly Pereira RN  Outcome: Ongoing  Note: Patient's nutrition is improving, patient had % of breakfast and lunch. Will cont to monitor and reassess. 7/28/2020 1321 by Matias Rashid RD, LD  Outcome: Ongoing  7/28/2020 4214 by Guilherme Gray RN  Outcome: Ongoing  Note: Checking on patient Q2H for nutrition needs, hygiene needs, comfort measures, mobility, fall risk interventions, and safe environment. Problem: Skin Integrity:  Goal: Will show no infection signs and symptoms  Description: Will show no infection signs and symptoms  7/28/2020 1720 by Nelly Pereira RN  Outcome: Ongoing  Note: Assessment of surgical sites complete. No signs of redness, warmth or swelling noted. Afebrile. Education provided on signs and symptoms of surgical site infections.     7/28/2020 6441 by Guilherme Gray RN  Outcome: Ongoing  Goal: Absence of new skin breakdown  Description: Absence of new skin breakdown  7/28/2020 1720 by Nelly Pereira RN  Outcome: Ongoing  7/28/2020 0812 by Guilherme Gray RN  Outcome: Ongoing   Electronically signed by Nelly Pereira RN on 7/28/2020 at 5:20 PM

## 2020-07-28 NOTE — PROGRESS NOTES
Hospitalist Progress Note  7/28/2020 9:02 AM    PCP: Magda Echols MD    [id number]     Date of Admission: 7/23/2020                                                                                                                     HOSPITAL COURSE    Patient demographics:  The patient  Jaye Gutierrez is a 67 y.o. female     Significant past medical history:   Patient Active Problem List   Diagnosis    Hyperlipidemia    Diabetes mellitus, type II ([address]    HTN (hypertension)    Vitamin D deficiency    CAD (coronary artery disease)    Elevated CA-125    Uterine leiomyoma    Abnormal abdominal CT scan    Cancer of peritoneum ([address]    Delirium due to general medical condition    Iron deficiency anemia    Impaired intestinal absorption    Neoplasm related pain    Weight loss    Abdominal pain    Chest pain, atypical    Partial small bowel obstruction ([address]    Malignant neoplasm of ovary ([facility]         Presenting symptoms:      Diagnostic workup:      CONSULTS DURING ADMISSION :   IP CONSULT TO DIETITIAN  IP CONSULT TO HOSPITALIST  IP CONSULT TO PHARMACY  IP CONSULT TO DIETITIAN  IP CONSULT TO NEPHROLOGY  IP CONSULT TO DIABETES EDUCATOR  IP CONSULT TO Lizzy Painting      Patient was diagnosed with:        Treatment while inpatient:  67years old female with medical history significant for type 2 diabetes mellitus hypertension coronary artery disease status post coronary artery bypass grafting in 2007. Patient also has a history of stage IIIc peritoneal cancer with multiple adhesions and small bowel obstruction.   General surgery was consulted and patient underwent small bowel resection and lysis of adhesions 7/24                                                                                  ----------------------------------------------------------      SUBJECTIVE COMPLAINTS-small bowel obstruction    Diet: Dietary Nutrition Supplements: Clear Liquid Oral Supplement  PN-Adult Premix 5/20 - Standard Electrolytes - Central Line  DIET DENTAL SOFT;      OBJECTIVE:   Patient Active Problem List   Diagnosis    Hyperlipidemia    Diabetes mellitus, type II ([facility]    HTN (hypertension)    Vitamin D deficiency    CAD (coronary artery disease)    Elevated CA-125    Uterine leiomyoma    Abnormal abdominal CT scan    Cancer of peritoneum ([facility]    Delirium due to general medical condition    Iron deficiency anemia    Impaired intestinal absorption    Neoplasm related pain    Weight loss    Abdominal pain    Chest pain, atypical    Partial small bowel obstruction (HCC)    Malignant neoplasm of ovary (HCC)       Allergies  Cephalexin; Pcn [penicillins];  Cephalosporins; and Cephalexin    Medications    Scheduled Meds:   fat emulsion  250 mL Intravenous Once per day on Mon Thu    losartan  100 mg Oral Daily    insulin lispro  0-18 Units Subcutaneous TID WC    insulin lispro  0-9 Units Subcutaneous Nightly    insulin glargine  15 Units Subcutaneous Nightly    insulin lispro  0-9 Units Subcutaneous Q24H    torsemide  20 mg Oral Daily    insulin lispro  0.05 Units/kg Subcutaneous TID WC    ALPRAZolam  0.5 mg Oral Nightly    metoprolol succinate  100 mg Oral Daily    pregabalin  75 mg Oral BID    rOPINIRole  2 mg Oral Nightly    rosuvastatin  40 mg Oral QPM    venlafaxine  75 mg Oral Daily    sodium chloride flush  10 mL Intravenous 2 times per day    acetaminophen  500 mg Oral Q6H    [Held by provider] enoxaparin  40 mg Subcutaneous Daily     Continuous Infusions:   PN-Adult Premix 5/20 - Standard Electrolytes - Central Line 42 mL/hr at 07/27/20 1754    dextrose       PRN Meds:  glucagon (rDNA), dextrose, oxyCODONE **OR** oxyCODONE, HYDROmorphone, albuterol, sodium chloride flush, glucose, dextrose, ondansetron **OR** ondansetron    Vitals   Vitals /wt   Patient Vitals for the past 8 hrs:   BP Temp Temp src Pulse Resp SpO2 Weight   07/28/20 0850 -- -- -- -- -- 97 % --   07/28/20 0500 -- -- -- -- -- -- 150 lb 5.7 oz (68.2 kg)   07/28/20 0422 (!) 188/64 98.5 °F (36.9 °C) Oral 74 16 97 % --        72HR INTAKE/OUTPUT:      Intake/Output Summary (Last 24 hours) at 7/28/2020 0902  Last data filed at 7/28/2020 0606  Gross per 24 hour   Intake 1548.3 ml   Output 4300 ml   Net -2751.7 ml       Exam:    Gen:   Alert and oriented ×3   Eyes: PERRL. No sclera icterus. No conjunctival injection. ENT: No discharge. Pharynx clear. External appearance of ears and nose normal.  Neck: Trachea midline. No obvious mass. Resp: No accessory muscle use. No crackles. No wheezes. No rhonchi. CV: Regular rate. Regular rhythm. No murmur or rub. No edema. GI:  abdomen is soft dressing is in place  Skin: Warm, dry, normal texture and turgor. Lymph: No cervical LAD. No supraclavicular LAD. M/S: / Ext. No cyanosis. No clubbing. No joint deformity. Neuro: CN 2-12 are intact,  no neurologic deficits noted. PT/INR: No results for input(s): PROTIME, INR in the last 72 hours. APTT: No results for input(s): APTT in the last 72 hours. CBC:   Recent Labs     07/26/20  1245 07/27/20  0521 07/28/20  0620   WBC 5.6 5.6 4.8   HGB 7.3* 7.4* 7.6*   HCT 21.6* 22.2* 21.8*   MCV 96.0 95.6 93.3    146 158       BMP:   Recent Labs     07/26/20  0737 07/27/20  0521 07/28/20  0620   * 131* 127*   K 4.0 4.4 3.7    100 93*   CO2 18* 19* 22   PHOS 3.2 2.4* 2.6   BUN 28* 21* 19   CREATININE 1.4* 1.1 0.9       LIVER PROFILE: No results for input(s): ALKPHOS, AST, ALT, ALB, BILIDIR, BILITOT, ALKPHOS in the last 72 hours. No results for input(s): AMYLASE in the last 72 hours. No results for input(s): LIPASE in the last 72 hours. UA:  Recent Labs     07/27/20  2225   WBCUA 1   RBCUA 0       TROPONIN: No results for input(s): Amaryllis Goodell in the last 72 hours.     Lab Results   Component Value Date/Time    URRFLXCULT Not Indicated 07/23/2020 01:21 PM       No results for input(s): TSHREFLEX in the last 72 hours. No components found for: [id number]  POC GLUCOSE:    Recent Labs     07/27/20  1132 07/27/20  1618 07/27/20  2044 07/28/20  0210 07/28/20  0627   POCGLU 301* 205* 136* 247* 251*     Recent Labs     07/26/20  0737   LABA1C 5.1      Lab Results   Component Value Date    LABA1C 5.1 07/26/2020         ASSESSMENT AND PLAN    Small bowel obstruction status post surgical intervention, 7/24  Liquid diet  Advance as tolerated   Plan is to discontinue TPN      On right side subcutaneous emphysema  Likely due to   insufflation during laparoscopic/robotic portion of surgery. CT chest does not show any evidence of pneumothorax or pneumomediastinum      Acute kidney injury  Improved      Anemia of acute blood loss on anemia of chronic disease  GYN surgical oncology following    Hyponatremia  Hypomagnesemia  Supplements    Diabetes mellitus  Sliding scale insulin    Anxiety/depression  CAD status post CABG 2007       Continue DVT prophylaxis with Lovenox        Code Status: Full Code        Dispo -cc        The patient and / or the family were informed of the results of any tests, a time was given to answer questions, a plan was proposed and they agreed with plan. Billie Lesches, MD    This note was transcribed using [id number] [employer]. Please disregard any translational errors.

## 2020-07-28 NOTE — PROGRESS NOTES
Physical Therapy Attempt    Attempted to see patient for therapy. Unable to see patient at this time. Pt reports feeling very tired and cold. \"I just got back from a test and I don't think I can do that right now. \" Educated on importance of mobility, however pt continued to politely decline. Will follow up as schedule permits.     [facility],   [facility]

## 2020-07-28 NOTE — PROGRESS NOTES
In to assess pt. A&Ox4. VSS, BP remains slightly elevated. Denies pain and nausea. Abdomen soft, non-distended with active bowel sounds. 5 lap incision sites well approximated with surgical glue. No warmth redness or drainage noted. Midline incision gregory dressing remains D&I, green light flashing. Left chest port and IV site WDL. Call light within reach, safety precautions in place, will continue to monitor.   Electronically signed by King Ash RN on 7/28/2020 at 12:52 PM

## 2020-07-28 NOTE — PLAN OF CARE
Problem: Pain:  Goal: Pain level will decrease  Description: Pain level will decrease  Outcome: Ongoing  Note: Pt educated to attempt non-phagological method of pain control, but it it becomes too strong use PRN analgesics. Pain and discomfort being managed PRN analgesics per MD orders. Pt able to express presence of pain. Problem: Pain:  Goal: Control of acute pain  Description: Control of acute pain  Outcome: Ongoing  Note: Patient educated on acute pain. Taught patient to use call light to ask for pain medication. PRN pain medication given for acute pain. Will continue to monitor pain per unit protocol. Problem: Pain:  Goal: Control of chronic pain  Description: Control of chronic pain  Outcome: Ongoing  Note: Patient educated on chronic pain. Taught patient to use call light to ask for pain medication. PRN pain medication given for chronic pain. Will continue to monitor pain per unit protocol. Problem: Falls - Risk of:  Goal: Will remain free from falls  Description: Will remain free from falls  Outcome: Ongoing  Note: Fall risk assessment completed . Fall precautions in place, bed/ chair alarm on, side rails 2/4 up, call light in reach, educated pt on calling for assistance when needed, room clear of clutter. Pt verbalized understanding. Problem: Falls - Risk of:  Goal: Absence of physical injury  Description: Absence of physical injury  Outcome: Ongoing  Note: Patient remains free from physical injury. Patient educated on safety precautions. Will continue to monitor to ensure patient remains free from physical injury throughout remainder of shift. Problem: Nutrition  Goal: Optimal nutrition therapy  Outcome: Ongoing  Note: Checking on patient Q2H for nutrition needs, hygiene needs, comfort measures, mobility, fall risk interventions, and safe environment.       Problem: Skin Integrity:  Goal: Will show no infection signs and symptoms  Description: Will show no infection signs and symptoms  Outcome: Ongoing     Problem: Skin Integrity:  Goal: Absence of new skin breakdown  Description: Absence of new skin breakdown  Outcome: Ongoing

## 2020-07-28 NOTE — PROGRESS NOTES
[facility]  Gynecologic Oncology   Progress Note        POD5    SUBJECTIVE:  No n/v, no f/c    OBJECTIVE:           Physical Exam  VITALS:  /64   Pulse 68   Temp 98.5 °F (36.9 °C) (Oral)   Resp 16   Ht 5' 8\" (1.727 m)   Wt 150 lb 5.7 oz (68.2 kg)   SpO2 98%   BMI 22.86 kg/m²   24HR INTAKE/OUTPUT:    Intake/Output Summary (Last 24 hours) at 7/28/2020 1445  Last data filed at 7/28/2020 1032  Gross per 24 hour   Intake 1308.3 ml   Output 5200 ml   Net -3891.7 ml     CONSTITUTIONAL:  awake, alert, cooperative, no apparent distress, and appears stated age  ABDOMEN:  Soft, incisions c/d/i  MUSCULOSKELETAL:  BLE edema  R chest wall crepitance    DATA:  CBC with Differential:    Lab Results   Component Value Date    WBC 4.8 07/28/2020    RBC 2.33 07/28/2020    RBC 3.13 06/19/2017    HGB 7.6 07/28/2020    HCT 21.8 07/28/2020     07/28/2020    MCV 93.3 07/28/2020    MCH 32.5 07/28/2020    MCHC 34.9 07/28/2020    RDW 17.5 07/28/2020    SEGSPCT 63 05/21/2012    BANDSPCT 3 06/21/2020    METASPCT 1 06/21/2020    LYMPHOPCT 16.5 07/28/2020    LYMPHOPCT 30.6 06/19/2017    MONOPCT 7.8 07/28/2020    EOSPCT 2 04/23/2012    BASOPCT 0.7 07/28/2020    MONOSABS 0.4 07/28/2020    LYMPHSABS 0.8 07/28/2020    EOSABS 0.2 07/28/2020    BASOSABS 0.0 07/28/2020     CMP:    Lab Results   Component Value Date     07/28/2020    K 3.7 07/28/2020    K 4.2 07/24/2020    CL 93 07/28/2020    CO2 22 07/28/2020    BUN 19 07/28/2020    CREATININE 0.9 07/28/2020    GFRAA >60 07/28/2020    GFRAA 100 04/23/2012    AGRATIO 1.8 07/23/2020    LABGLOM >60 07/28/2020    GLUCOSE 235 07/28/2020    GLUCOSE 212 06/19/2017    PROT 7.0 07/23/2020    PROT 6.0 06/19/2017    LABALBU 4.5 07/23/2020    CALCIUM 8.2 07/28/2020    BILITOT 0.7 07/23/2020    ALKPHOS 58 07/23/2020    AST 28 07/23/2020    ALT 13 07/23/2020     Magnesium:    Lab Results   Component Value Date    MG 1.70 07/28/2020     Phosphorus:    Lab Results   Component Value Date    PHOS 2.6

## 2020-07-28 NOTE — PROGRESS NOTES
Chest CT results messaged to VICENTE Aquino NP. No new orders at this time. Small amount of free air in the upper abdomen.  Small amount of    pneumomediastinum, and diffuse subcutaneous emphysema in the chest    wall-supraclavicular regions, right greater than left.  No clear etiology    noted. .  No pneumothorax noted.  Correlate with any history of recent surgery.         Bilateral pleural effusions are seen with adjacent consolidation lungs,    likely atelectasis in the absence of clinical signs of pneumonia.      Electronically signed by Arnie Cruz RN on 7/28/2020 at 3:34 PM

## 2020-07-28 NOTE — PROGRESS NOTES
Pt ate %% of her breakfast, she had scrambled eggs, a muffin, abd coffee. Tolerated well. Denies nausea, will advance diet as ordered.   Electronically signed by Patric Abel RN on 7/28/2020 at 12:13 PM

## 2020-07-28 NOTE — PROGRESS NOTES
admission      Nutrition Interventions:   Food and/or Nutrient Delivery:  Continue Current Diet, Modify Oral Nutrition Supplement  Nutrition Education/Counseling:  No recommendation at this time   Coordination of Nutrition Care:  Continued Inpatient Monitoring    Goals: Tolerate most appropriate nutrition therapy       Nutrition Monitoring and Evaluation:   Food/Nutrient Intake Outcomes:  Food and Nutrient Intake, Supplement Intake  Physical Signs/Symptoms Outcomes:  Biochemical Data, Constipation, Diarrhea, Nausea or Vomiting, Fluid Status or Edema, Weight, Skin, Nutrition Focused Physical Findings     Discharge Planning:     Too soon to determine     Electronically signed by Andrés Nielsen RD, LD on 7/28/20 at 1:20 PM EDT    Contact: [phone]

## 2020-07-29 ENCOUNTER — APPOINTMENT (OUTPATIENT)
Dept: GENERAL RADIOLOGY | Age: 80
DRG: 330 | End: 2020-07-29
Attending: OBSTETRICS & GYNECOLOGY
Payer: MEDICARE

## 2020-07-29 LAB
ANION GAP SERPL CALCULATED.3IONS-SCNC: 13 MMOL/L (ref 3–16)
BASOPHILS ABSOLUTE: 0 K/UL (ref 0–0.2)
BASOPHILS RELATIVE PERCENT: 0.9 %
BUN BLDV-MCNC: 21 MG/DL (ref 7–20)
CALCIUM SERPL-MCNC: 8 MG/DL (ref 8.3–10.6)
CHLORIDE BLD-SCNC: 98 MMOL/L (ref 99–110)
CO2: 23 MMOL/L (ref 21–32)
CREAT SERPL-MCNC: 1 MG/DL (ref 0.6–1.2)
EOSINOPHILS ABSOLUTE: 0.2 K/UL (ref 0–0.6)
EOSINOPHILS RELATIVE PERCENT: 5.7 %
GFR AFRICAN AMERICAN: >60
GFR NON-AFRICAN AMERICAN: 54
GLUCOSE BLD-MCNC: 103 MG/DL (ref 70–99)
GLUCOSE BLD-MCNC: 107 MG/DL (ref 70–99)
GLUCOSE BLD-MCNC: 110 MG/DL (ref 70–99)
GLUCOSE BLD-MCNC: 114 MG/DL (ref 70–99)
GLUCOSE BLD-MCNC: 156 MG/DL (ref 70–99)
GLUCOSE BLD-MCNC: 210 MG/DL (ref 70–99)
HCT VFR BLD CALC: 20.2 % (ref 36–48)
HEMOGLOBIN: 7 G/DL (ref 12–16)
LYMPHOCYTES ABSOLUTE: 0.9 K/UL (ref 1–5.1)
LYMPHOCYTES RELATIVE PERCENT: 21.6 %
MAGNESIUM: 1.4 MG/DL (ref 1.8–2.4)
MCH RBC QN AUTO: 32.2 PG (ref 26–34)
MCHC RBC AUTO-ENTMCNC: 34.5 G/DL (ref 31–36)
MCV RBC AUTO: 93.2 FL (ref 80–100)
MONOCYTES ABSOLUTE: 0.5 K/UL (ref 0–1.3)
MONOCYTES RELATIVE PERCENT: 10.5 %
NEUTROPHILS ABSOLUTE: 2.6 K/UL (ref 1.7–7.7)
NEUTROPHILS RELATIVE PERCENT: 61.3 %
PDW BLD-RTO: 17.3 % (ref 12.4–15.4)
PERFORMED ON: ABNORMAL
PHOSPHORUS: 3.3 MG/DL (ref 2.5–4.9)
PLATELET # BLD: 160 K/UL (ref 135–450)
PMV BLD AUTO: 8.5 FL (ref 5–10.5)
POTASSIUM SERPL-SCNC: 3.5 MMOL/L (ref 3.5–5.1)
RBC # BLD: 2.17 M/UL (ref 4–5.2)
SODIUM BLD-SCNC: 134 MMOL/L (ref 136–145)
WBC # BLD: 4.3 K/UL (ref 4–11)

## 2020-07-29 PROCEDURE — 94760 N-INVAS EAR/PLS OXIMETRY 1: CPT

## 2020-07-29 PROCEDURE — 71045 X-RAY EXAM CHEST 1 VIEW: CPT

## 2020-07-29 PROCEDURE — 80048 BASIC METABOLIC PNL TOTAL CA: CPT

## 2020-07-29 PROCEDURE — 97110 THERAPEUTIC EXERCISES: CPT

## 2020-07-29 PROCEDURE — 2580000003 HC RX 258: Performed by: OBSTETRICS & GYNECOLOGY

## 2020-07-29 PROCEDURE — 97535 SELF CARE MNGMENT TRAINING: CPT

## 2020-07-29 PROCEDURE — 85025 COMPLETE CBC W/AUTO DIFF WBC: CPT

## 2020-07-29 PROCEDURE — 97116 GAIT TRAINING THERAPY: CPT

## 2020-07-29 PROCEDURE — 84100 ASSAY OF PHOSPHORUS: CPT

## 2020-07-29 PROCEDURE — 6370000000 HC RX 637 (ALT 250 FOR IP): Performed by: INTERNAL MEDICINE

## 2020-07-29 PROCEDURE — 6360000002 HC RX W HCPCS: Performed by: OBSTETRICS & GYNECOLOGY

## 2020-07-29 PROCEDURE — 6370000000 HC RX 637 (ALT 250 FOR IP): Performed by: OBSTETRICS & GYNECOLOGY

## 2020-07-29 PROCEDURE — 1200000000 HC SEMI PRIVATE

## 2020-07-29 PROCEDURE — 97530 THERAPEUTIC ACTIVITIES: CPT

## 2020-07-29 PROCEDURE — 83735 ASSAY OF MAGNESIUM: CPT

## 2020-07-29 PROCEDURE — 6370000000 HC RX 637 (ALT 250 FOR IP): Performed by: HOSPITALIST

## 2020-07-29 RX ORDER — TORSEMIDE 20 MG/1
20 TABLET ORAL 2 TIMES DAILY
Status: DISCONTINUED | OUTPATIENT
Start: 2020-07-29 | End: 2020-07-30 | Stop reason: HOSPADM

## 2020-07-29 RX ORDER — INSULIN GLARGINE 100 [IU]/ML
5 INJECTION, SOLUTION SUBCUTANEOUS NIGHTLY
Status: DISCONTINUED | OUTPATIENT
Start: 2020-07-29 | End: 2020-07-30 | Stop reason: HOSPADM

## 2020-07-29 RX ORDER — MAGNESIUM SULFATE IN WATER 40 MG/ML
2 INJECTION, SOLUTION INTRAVENOUS ONCE
Status: COMPLETED | OUTPATIENT
Start: 2020-07-29 | End: 2020-07-29

## 2020-07-29 RX ADMIN — SODIUM CHLORIDE, PRESERVATIVE FREE 10 ML: 5 INJECTION INTRAVENOUS at 08:39

## 2020-07-29 RX ADMIN — ACETAMINOPHEN 500 MG: 500 TABLET, FILM COATED ORAL at 22:18

## 2020-07-29 RX ADMIN — INSULIN GLARGINE 5 UNITS: 100 INJECTION, SOLUTION SUBCUTANEOUS at 22:19

## 2020-07-29 RX ADMIN — INSULIN LISPRO 3 UNITS: 100 INJECTION, SOLUTION INTRAVENOUS; SUBCUTANEOUS at 11:55

## 2020-07-29 RX ADMIN — METOPROLOL SUCCINATE 100 MG: 50 TABLET, EXTENDED RELEASE ORAL at 08:39

## 2020-07-29 RX ADMIN — PREGABALIN 75 MG: 75 CAPSULE ORAL at 22:19

## 2020-07-29 RX ADMIN — IRON SUCROSE 200 MG: 20 INJECTION, SOLUTION INTRAVENOUS at 15:42

## 2020-07-29 RX ADMIN — INSULIN LISPRO 3 UNITS: 100 INJECTION, SOLUTION INTRAVENOUS; SUBCUTANEOUS at 08:35

## 2020-07-29 RX ADMIN — VENLAFAXINE HYDROCHLORIDE 75 MG: 75 CAPSULE, EXTENDED RELEASE ORAL at 08:39

## 2020-07-29 RX ADMIN — ACETAMINOPHEN 500 MG: 500 TABLET, FILM COATED ORAL at 11:55

## 2020-07-29 RX ADMIN — INSULIN LISPRO 3 UNITS: 100 INJECTION, SOLUTION INTRAVENOUS; SUBCUTANEOUS at 17:54

## 2020-07-29 RX ADMIN — OXYCODONE HYDROCHLORIDE 10 MG: 10 TABLET ORAL at 22:19

## 2020-07-29 RX ADMIN — OXYCODONE 5 MG: 5 TABLET ORAL at 10:05

## 2020-07-29 RX ADMIN — AMLODIPINE BESYLATE 5 MG: 5 TABLET ORAL at 08:39

## 2020-07-29 RX ADMIN — ROSUVASTATIN CALCIUM 40 MG: 40 TABLET, FILM COATED ORAL at 17:54

## 2020-07-29 RX ADMIN — ALPRAZOLAM 0.5 MG: 0.5 TABLET ORAL at 22:19

## 2020-07-29 RX ADMIN — ACETAMINOPHEN 500 MG: 500 TABLET, FILM COATED ORAL at 17:54

## 2020-07-29 RX ADMIN — MAGNESIUM SULFATE IN WATER 2 G: 40 INJECTION, SOLUTION INTRAVENOUS at 15:20

## 2020-07-29 RX ADMIN — ROPINIROLE HYDROCHLORIDE 2 MG: 1 TABLET, FILM COATED ORAL at 22:19

## 2020-07-29 RX ADMIN — ACETAMINOPHEN 500 MG: 500 TABLET, FILM COATED ORAL at 06:29

## 2020-07-29 RX ADMIN — PREGABALIN 75 MG: 75 CAPSULE ORAL at 08:39

## 2020-07-29 RX ADMIN — TORSEMIDE 20 MG: 20 TABLET ORAL at 08:39

## 2020-07-29 RX ADMIN — TORSEMIDE 20 MG: 20 TABLET ORAL at 22:19

## 2020-07-29 RX ADMIN — SODIUM CHLORIDE, PRESERVATIVE FREE 10 ML: 5 INJECTION INTRAVENOUS at 22:19

## 2020-07-29 RX ADMIN — LOSARTAN POTASSIUM 100 MG: 100 TABLET, FILM COATED ORAL at 08:39

## 2020-07-29 ASSESSMENT — PAIN DESCRIPTION - DESCRIPTORS: DESCRIPTORS: ACHING

## 2020-07-29 ASSESSMENT — PAIN SCALES - GENERAL
PAINLEVEL_OUTOF10: 0
PAINLEVEL_OUTOF10: 6
PAINLEVEL_OUTOF10: 7
PAINLEVEL_OUTOF10: 0
PAINLEVEL_OUTOF10: 5
PAINLEVEL_OUTOF10: 0

## 2020-07-29 ASSESSMENT — PAIN DESCRIPTION - FREQUENCY: FREQUENCY: CONTINUOUS

## 2020-07-29 ASSESSMENT — PAIN DESCRIPTION - PROGRESSION: CLINICAL_PROGRESSION: NOT CHANGED

## 2020-07-29 ASSESSMENT — PAIN DESCRIPTION - ORIENTATION: ORIENTATION: MID

## 2020-07-29 ASSESSMENT — PAIN DESCRIPTION - PAIN TYPE: TYPE: ACUTE PAIN

## 2020-07-29 ASSESSMENT — PAIN DESCRIPTION - ONSET: ONSET: ON-GOING

## 2020-07-29 ASSESSMENT — PAIN DESCRIPTION - LOCATION: LOCATION: BACK

## 2020-07-29 ASSESSMENT — PAIN - FUNCTIONAL ASSESSMENT: PAIN_FUNCTIONAL_ASSESSMENT: PREVENTS OR INTERFERES SOME ACTIVE ACTIVITIES AND ADLS

## 2020-07-29 NOTE — PROGRESS NOTES
Progress Note    S/P robotic laparoscopic converted to open extensive lysis of adhesions, excision of peritoneal nodules, tumor debulking, small bowel resection with anastomosis, flexible sigmoidoscopy  7/23/2020    Right sided subcutaneous emphysema    Vital Signs:                                                 BP (!) 173/71   Pulse 80   Temp 98.5 °F (36.9 °C) (Oral)   Resp 16   Ht 5' 8\" (1.727 m)   Wt 150 lb 5.7 oz (68.2 kg)   SpO2 98%   BMI 22.86 kg/m²  O2 Flow Rate (L/min): 1 L/min        Admission Weight: 150 lb (68 kg)      Vent Settings:  Vent Information  SpO2: 98 %       I/O:      Intake/Output Summary (Last 24 hours) at 7/29/2020 0901  Last data filed at 7/28/2020 2206  Gross per 24 hour   Intake 780 ml   Output 900 ml   Net -120 ml     General: awake, alert, no complaints  CV: regular  Pulm: decreased, + right anterior/lateral and neck crepitus  Abd: soft  Ext: warm and dry  Neuro: moves all extremities with equal but weak strength bilat    Data Review:  CBC:   Recent Labs     07/27/20  0521 07/28/20  0620 07/29/20  0550   WBC 5.6 4.8 4.3   HGB 7.4* 7.6* 7.0*   HCT 22.2* 21.8* 20.2*   MCV 95.6 93.3 93.2    158 160     BMP:   Recent Labs     07/27/20  0521 07/28/20  0620 07/29/20  0550   * 127* 134*   K 4.4 3.7 3.5    93* 98*   CO2 19* 22 23   PHOS 2.4* 2.6 3.3   BUN 21* 19 21*   CREATININE 1.1 0.9 1.0   CALCIUM 8.0* 8.2* 8.0*   MG 1.90 1.70* 1.40*     Cardiac Enzymes: No results for input(s): CKTOTAL, CKMB, CKMBINDEX, TROPONINI in the last 72 hours. PT/INR: No results for input(s): PROTIME, INR in the last 72 hours. APTT: No results for input(s): APTT in the last 72 hours. CXR  7/29/2020  Subcutaneous emphysema in the right chest wall and neck unchanged; no definite pneumothorax. Bibasilar atelectasis.      Assessment/Plan:    No pneumothorax per CXR  No intervention required at this time  Please re-call CVTS for further patient needs      DANIAL Urias - CNP  7/29/2020  9:01 AM

## 2020-07-29 NOTE — PROGRESS NOTES
In to assess pt. A&Ox4. VSS. Denies pain and nausea at this time. Abdomen soft, non-distende with active bowel sounds. Pt is passing flatus and had a small formed bm this morning. 5 stab incision sites well approximated with surgical glue. No warmth, redness or drainage noted. DESTIN dressing remains in place to midline incision, clean, dry, intact, green light flashing. BLE edema has decreased slightly. Left chest port and rt fa IV WDL.    Electronically signed by Efrain Vale RN on 7/29/2020 at 9:02 AM

## 2020-07-29 NOTE — PROGRESS NOTES
[facility]  Gynecologic Oncology   Progress Note        POD6    SUBJECTIVE:  No n/v, no f/c, rios PO    OBJECTIVE:           Physical Exam  VITALS:  BP (!) 141/62   Pulse 69   Temp 97.9 °F (36.6 °C) (Oral)   Resp 16   Ht 5' 8\" (1.727 m)   Wt 150 lb 5.7 oz (68.2 kg)   SpO2 98%   BMI 22.86 kg/m²   24HR INTAKE/OUTPUT:      Intake/Output Summary (Last 24 hours) at 7/29/2020 1509  Last data filed at 7/29/2020 1443  Gross per 24 hour   Intake 780 ml   Output 900 ml   Net -120 ml     CONSTITUTIONAL:  awake, alert, cooperative, no apparent distress, and appears stated age  ABDOMEN:  Soft, incisions c/d/i  MUSCULOSKELETAL:  BLE edema  R chest wall crepitance    DATA:  CBC with Differential:    Lab Results   Component Value Date    WBC 4.3 07/29/2020    RBC 2.17 07/29/2020    RBC 3.13 06/19/2017    HGB 7.0 07/29/2020    HCT 20.2 07/29/2020     07/29/2020    MCV 93.2 07/29/2020    MCH 32.2 07/29/2020    MCHC 34.5 07/29/2020    RDW 17.3 07/29/2020    SEGSPCT 63 05/21/2012    BANDSPCT 3 06/21/2020    METASPCT 1 06/21/2020    LYMPHOPCT 21.6 07/29/2020    LYMPHOPCT 30.6 06/19/2017    MONOPCT 10.5 07/29/2020    EOSPCT 2 04/23/2012    BASOPCT 0.9 07/29/2020    MONOSABS 0.5 07/29/2020    LYMPHSABS 0.9 07/29/2020    EOSABS 0.2 07/29/2020    BASOSABS 0.0 07/29/2020     CMP:    Lab Results   Component Value Date     07/29/2020    K 3.5 07/29/2020    K 4.2 07/24/2020    CL 98 07/29/2020    CO2 23 07/29/2020    BUN 21 07/29/2020    CREATININE 1.0 07/29/2020    GFRAA >60 07/29/2020    GFRAA 100 04/23/2012    AGRATIO 1.8 07/23/2020    LABGLOM 54 07/29/2020    GLUCOSE 107 07/29/2020    GLUCOSE 212 06/19/2017    PROT 7.0 07/23/2020    PROT 6.0 06/19/2017    LABALBU 4.5 07/23/2020    CALCIUM 8.0 07/29/2020    BILITOT 0.7 07/23/2020    ALKPHOS 58 07/23/2020    AST 28 07/23/2020    ALT 13 07/23/2020     Magnesium:    Lab Results   Component Value Date    MG 1.40 07/29/2020     Phosphorus:    Lab Results   Component Value Date PHOS 3.3 07/29/2020       ASSESSMENT AND PLAN:    1. Tolerating po, TPN has been stopped. 2. Ambulate TID, PT recommending home PT 2-3 x per week. 3. ONC-recurrent cancer noted. Planning tx outpatient. 4. DM-need blood sugars to be less than 200 for surgical healing. 5. subcutaneous emphysema-unclear cause, perhaps surgery? 8. Known PE on anticoagulation will move to therapeutic once 7 days post op. 9. Discharge Planning. 10. Anemia-chronic    VladislaveMichele Maldonado, [address] [place] [address] Oncology  [phone] ([phone])

## 2020-07-29 NOTE — PLAN OF CARE
Problem: Pain:  Goal: Pain level will decrease  Description: Pain level will decrease  7/29/2020 1903 by Aldo Peoples RN  Outcome: Ongoing  Note: Pt assessed for pain. Pt in pain and assessed with 0-10 pain rating scale. Pt given prescribed analgesic for pain. (See eMar) Pt satisfied with pain relief thus far. Will reassess and continue to monitor. 7/29/2020 1050 by Hebert Jane RN  Outcome: Ongoing  Note: Pt assessed for pain. Pt in pain and assessed with 0-10 pain rating scale. Pt given prescribed analgesic for pain. (See eMar) Pt satisfied with pain relief thus far. Will reassess and continue to monitor. Goal: Control of acute pain  Description: Control of acute pain  7/29/2020 1903 by Aldo Peoples RN  Outcome: Ongoing  7/29/2020 1050 by Hebert Jane RN  Outcome: Ongoing  Goal: Control of chronic pain  Description: Control of chronic pain  7/29/2020 1903 by Aldo Peoples RN  Outcome: Ongoing  7/29/2020 1050 by Hebert Jane RN  Outcome: Ongoing     Problem: Falls - Risk of:  Goal: Will remain free from falls  Description: Will remain free from falls  7/29/2020 1903 by Aldo Peoples RN  Outcome: Ongoing  Note: Fall risk assessment completed. Fall precautions in place. Call light within reach. Pt educated on calling for assistance before getting up. Walkway free of clutter. Will continue to monitor. 7/29/2020 1050 by Hebert Jane RN  Outcome: Ongoing  Note: Fall risk assessment completed. Fall precautions in place. Bed in lowest position, wheels locked, bed/chair exit alarm in place, call light within reach, and non skid footwear on. Walkway free of clutter. Pt alert and oriented and able to make needs known. Pt educated to use call light when needing to get up, and pt utilizes call light to make needs known. Will continue to monitor.      Goal: Absence of physical injury  Description: Absence of physical injury  7/29/2020 1903 by Aldo Peoples RN  Outcome: Ongoing  7/29/2020 1050 by Kev Lujan RN  Outcome: Ongoing     Problem: Nutrition  Goal: Optimal nutrition therapy  7/29/2020 1903 by Faraz Muse RN  Outcome: Ongoing  Note: Will encourage nutritional intake. 7/29/2020 1050 by Kev Lujan RN  Outcome: Ongoing  Note: Patient's nutrition is improving, patient had % of breakfast. Will cont to monitor and reassess. Problem: Skin Integrity:  Goal: Will show no infection signs and symptoms  Description: Will show no infection signs and symptoms  7/29/2020 1903 by Faraz Muse RN  Outcome: Ongoing  Note: Will monitor for infection. 7/29/2020 1050 by Kev Lujan RN  Outcome: Ongoing  Note: Assessment of surgical site complete. No signs of redness, warmth or swelling noted. Afebrile. Education provided on signs and symptoms of surgical site infections.     Goal: Absence of new skin breakdown  Description: Absence of new skin breakdown  7/29/2020 1903 by Faraz Muse RN  Outcome: Ongoing  7/29/2020 1050 by Kev Lujan RN  Outcome: Ongoing

## 2020-07-29 NOTE — PROGRESS NOTES
Physical Therapy    Facility/Department: [address] ORTHOPEDICS  Daily Treatment Note    This note serves as patient discharge summary if pt discharges prior to next PT visit      NAME: Alex Keen  : 1940  MRN: [id number]    Date of Service: 2020    Discharge Recommendations:  Home with Home health PT, 24 hour supervision or assist, S Level [address] scored a 18/24 on the AM-PAC short mobility form. Current research shows that an AM-PAC score of 18 or greater is typically associated with a discharge to the patient's home setting. Based on the patient's AM-PAC score and their current functional mobility deficits, it is recommended that the patient have 2-3 sessions per week of Physical Therapy at d/c to increase the patient's independence. At this time, this patient demonstrates the endurance and safety to discharge home with home therapy services and a follow up treatment frequency of 2-3x/wk. Please see assessment section for further patient specific details. PT Equipment Recommendations  Equipment Needed: No  Other: Pt has Rollator Walker. Assessment   Body structures, Functions, Activity limitations: Decreased functional mobility ; Decreased cognition;Decreased endurance  Assessment: Pt is a 67 y.o. female admitted 2020 w/ peritoneal cancer, bowel obstruction. Pt underwent robotic laparoscopic converted to open extensive lysis of adhesions, excision of peritoneal nodules, tumor debulking, small bowel resection with anastamosis, flexible sigmoidoscopy. Pt lives with  in 2 story home with a few AR[place],  assists and supports pt when needed but indep other than that. 2020 status: Transfers SBA w/ cues for hand placement. Pt amb 150' SBA w/ rollator. Rollator quality ambulation improved this date as compared to last therapy session. Trial of ambulation without device, x30', with CGA not as stable as gait with rollator.   Patient seems to be limited primarily by 7/23/2020). Restrictions  Restrictions/Precautions  Restrictions/Precautions: Fall Risk  Position Activity Restriction  Other position/activity restrictions: Diet : Carb control. Recent Abdominal Surg. DESTIN drain        Subjective  General  Chart Reviewed: Yes  Additional Pertinent Hx: 66 y/o female admit 7/23/2020 with Peritoneal Ca, Bowel Obstruction. 7/23/2020 S/P ROBOTIC LAPAROSCOPIC CONVERTED TO OPEN EXTENSIVE LYSIS OF ADHESIONS, EXCISION OF PERITONEAL NODULES, TUMOR DEBULKING, SMALL BOWEL RESECTION WITH ANASTAMOSIS, FLEXIBLE SIGMOIDOSCOPY. PMH as noted including CAD, CABG, Ovarian Ca, B TKR. Response To Previous Treatment: Patient with no complaints from previous session. Family / Caregiver Present: No  Referring Practitioner: Ping Smith MD  Subjective  Subjective: Patient in [facility] chair sleeping. Awakens easily. Agreeable to therapy. Denies pain. Pain Screening  Patient Currently in Pain: Yes    Orientation  Orientation  Overall Orientation Status: Impaired  Orientation Level: Oriented to place;Oriented to person;Disoriented to time     Social/Functional History  Social/Functional History  Lives With: Spouse  Type of Home: House  Home Layout: Two level, Bed/Bath upstairs, 1/2 bath on main level  Home Access: Stairs to enter without rails  Entrance Stairs - Number of Steps: 2 steps to enter. Bathroom Shower/Tub: Walk-in shower  Bathroom Toilet: Standard  Bathroom Accessibility: Accessible  Home Equipment: 4 wheeled walker  ADL Assistance: Independent  Homemaking Assistance: ( assist as needed.)  Ambulation Assistance: Independent(Occassional use of Walker.)  Transfer Assistance: Independent  Active : No( usually drives.)  Additional Comments:  very supportive and able to provide assist/support upon d/c. Cognition   Cognition  Overall Cognitive Status: Exceptions  Arousal/Alertness: Appropriate responses to stimuli  Following Commands:  Follows multistep commands Left in chair, Nurse notified    AM-PAC Score  AM-PAC Inpatient Mobility Raw Score : 18 (07/29/20 1214)  AM-PAC Inpatient T-Scale Score : 43.63 (07/29/20 1214)  Mobility Inpatient CMS 0-100% Score: 46.58 (07/29/20 1214)  Mobility Inpatient CMS G-Code Modifier : CK (07/29/20 1214)     Goals  Short term goals  Time Frame for Short term goals: Upon d/c acute care setting. All goals ongoing 7/27/2020  Short term goal 1: Bed Mob SBA. Short term goal 2: Transfers with/without assist device SBA/CGA. 7- goal met  Short term goal 3: Amb with/without assist device 100' SBA/CGA. 7- goal met  Short term goal 4: Stair Negotiation as approp. Patient Goals   Patient goals : Return home with family.        Therapy Time   Individual Concurrent Group Co-treatment   Time In 3611         Time Out 1073         Minutes 27            Electronically signed by Rocky Schmitt, PT [id number] on 7/29/2020 at 12:46 PM

## 2020-07-29 NOTE — PLAN OF CARE
monitor. 7/29/2020 0113 by Eliseo Fierro RN  Outcome: Ongoing  Note: Fall risk assessment completed . Fall precautions in place, bed/ chair alarm on, side rails 2/4 up, call light in reach, educated pt on calling for assistance when needed, room clear of clutter. Pt verbalized understanding. Goal: Absence of physical injury  Description: Absence of physical injury  7/29/2020 1050 by Marie Nicole RN  Outcome: Ongoing  7/29/2020 0113 by Eliseo Fierro RN  Outcome: Ongoing  Note: Patient remains free from physical injury. Patient educated on safety precautions. Will continue to monitor to ensure patient remains free from physical injury throughout remainder of shift. Problem: Nutrition  Goal: Optimal nutrition therapy  7/29/2020 1050 by Marie Nicole RN  Outcome: Ongoing  Note: Patient's nutrition is improving, patient had % of breakfast. Will cont to monitor and reassess. 7/29/2020 0113 by Eliseo Fierro RN  Outcome: Ongoing  Note: Patient educated on proper nutrition. Patients intake monitored and patient assessed to make sure no assistance with eating was required. Patient encouraged to eat a well balanced diet. Problem: Skin Integrity:  Goal: Will show no infection signs and symptoms  Description: Will show no infection signs and symptoms  7/29/2020 1050 by Marie Nicole RN  Outcome: Ongoing  Note: Assessment of surgical site complete. No signs of redness, warmth or swelling noted. Afebrile. Education provided on signs and symptoms of surgical site infections. 7/29/2020 0113 by Eliseo Fierro RN  Outcome: Ongoing  Note: Assessment of surgical site complete. No signs of redness, warmth or swelling noted. Afebrile. Education provided on signs and symptoms of surgical site infections.    Goal: Absence of new skin breakdown  Description: Absence of new skin breakdown  7/29/2020 1050 by Marie Nicole RN  Outcome: Ongoing  7/29/2020 0113 by Eliseo Fierro RN  Outcome: Ongoing  Note: Skin assessment complete. No new signs of skin breakdown noted. Repositioning patient with pillows at two hour intervals. Heels elevated off bed.     Electronically signed by Nelly Pereira RN on 7/29/2020 at 10:50 AM

## 2020-07-29 NOTE — PROGRESS NOTES
Hospitalist Progress Note  7/29/2020 9:15 AM    PCP: Clem Mendoza MD    [phone]     Date of Admission: 7/23/2020                                                                                                                     HOSPITAL COURSE    Patient demographics:  The patient  Jay Larsen is a 67 y.o. female     Significant past medical history:   Patient Active Problem List   Diagnosis    Hyperlipidemia    Diabetes mellitus, type II ([address]    HTN (hypertension)    Vitamin D deficiency    CAD (coronary artery disease)    Elevated CA-125    Uterine leiomyoma    Abnormal abdominal CT scan    Cancer of peritoneum ([address]    Delirium due to general medical condition    Iron deficiency anemia    Impaired intestinal absorption    Neoplasm related pain    Weight loss    Abdominal pain    Chest pain, atypical    Partial small bowel obstruction ([address]    Malignant neoplasm of ovary ([address]         Presenting symptoms:      Diagnostic workup:      CONSULTS DURING ADMISSION :   IP CONSULT TO DIETITIAN  IP CONSULT TO HOSPITALIST  IP CONSULT TO DIETITIAN  IP CONSULT TO NEPHROLOGY  IP CONSULT TO DIABETES EDUCATOR  IP CONSULT TO [address] [place] [address]  IP CONSULT TO CASE MANAGEMENT      Patient was diagnosed with:        Treatment while inpatient:  67years old female with medical history significant for type 2 diabetes mellitus hypertension coronary artery disease status post coronary artery bypass grafting in 2007. Patient also has a history of stage IIIc peritoneal cancer with multiple adhesions and small bowel obstruction. General surgery was consulted and patient underwent small bowel resection and lysis of adhesions 7/24.                      Patient's hospital course was complicated with development of subcutaneous emphysema For which CT scan of the chest was done and according to cardiothoracic surgery no intervention is needed  Lower extremity swelling is part of anasarca that is multifactorial. Patient was ruled out for DVT                                                           ----------------------------------------------------------      SUBJECTIVE COMPLAINTS-small bowel obstruction    Diet: DIET CARB CONTROL; Dietary Nutrition Supplements: Diabetic Oral Supplement      OBJECTIVE:   Patient Active Problem List   Diagnosis    Hyperlipidemia    Diabetes mellitus, type II ([email or web address]    HTN (hypertension)    Vitamin D deficiency    CAD (coronary artery disease)    Elevated CA-125    Uterine leiomyoma    Abnormal abdominal CT scan    Cancer of peritoneum ([email or web address]    Delirium due to general medical condition    Iron deficiency anemia    Impaired intestinal absorption    Neoplasm related pain    Weight loss    Abdominal pain    Chest pain, atypical    Partial small bowel obstruction (HCC)    Malignant neoplasm of ovary (HCC)       Allergies  Cephalexin; Pcn [penicillins];  Cephalosporins; and Cephalexin    Medications    Scheduled Meds:   amLODIPine  5 mg Oral Daily    losartan  100 mg Oral Daily    insulin lispro  0-18 Units Subcutaneous TID WC    insulin lispro  0-9 Units Subcutaneous Nightly    insulin glargine  15 Units Subcutaneous Nightly    insulin lispro  0-9 Units Subcutaneous Q24H    torsemide  20 mg Oral Daily    insulin lispro  0.05 Units/kg Subcutaneous TID WC    ALPRAZolam  0.5 mg Oral Nightly    metoprolol succinate  100 mg Oral Daily    pregabalin  75 mg Oral BID    rOPINIRole  2 mg Oral Nightly    rosuvastatin  40 mg Oral QPM    venlafaxine  75 mg Oral Daily    sodium chloride flush  10 mL Intravenous 2 times per day    acetaminophen  500 mg Oral Q6H    [Held by provider] enoxaparin  40 mg Subcutaneous Daily     Continuous Infusions:   dextrose       PRN Meds:  glucagon (rDNA), dextrose, oxyCODONE **OR** oxyCODONE, HYDROmorphone, albuterol, sodium chloride flush, glucose, dextrose, ondansetron **OR** ondansetron    Vitals   Vitals /wt   Patient Vitals for the past 8 hrs:   BP Temp Temp src Pulse Resp SpO2   07/29/20 0838 (!) 173/71 98.5 °F (36.9 °C) Oral 80 16 98 %   07/29/20 0421 (!) 172/63 98.6 °F (37 °C) Axillary 73 18 95 %        72HR INTAKE/OUTPUT:      Intake/Output Summary (Last 24 hours) at 7/29/2020 0915  Last data filed at 7/28/2020 2206  Gross per 24 hour   Intake 780 ml   Output 900 ml   Net -120 ml       Exam:    Gen:   Alert and oriented ×3   Eyes: PERRL. No sclera icterus. No conjunctival injection. ENT: No discharge. Pharynx clear. External appearance of ears and nose normal.  Neck: Trachea midline. No obvious mass. Resp: No accessory muscle use. No crackles. No wheezes. No rhonchi. CV: Regular rate. Regular rhythm. No murmur or rub. No edema. GI:  abdomen is soft dressing is in place  Skin: Warm, dry, normal texture and turgor. Lymph: No cervical LAD. No supraclavicular LAD. M/S: / Ext. No cyanosis. No clubbing. No joint deformity. Neuro: CN 2-12 are intact,  no neurologic deficits noted. PT/INR: No results for input(s): PROTIME, INR in the last 72 hours. APTT: No results for input(s): APTT in the last 72 hours. CBC:   Recent Labs     07/26/20  1245 07/27/20  0521 07/28/20  0620 07/29/20  0550   WBC 5.6 5.6 4.8 4.3   HGB 7.3* 7.4* 7.6* 7.0*   HCT 21.6* 22.2* 21.8* 20.2*   MCV 96.0 95.6 93.3 93.2    146 158 160       BMP:   Recent Labs     07/27/20  0521 07/28/20  0620 07/29/20  0550   * 127* 134*   K 4.4 3.7 3.5    93* 98*   CO2 19* 22 23   PHOS 2.4* 2.6 3.3   BUN 21* 19 21*   CREATININE 1.1 0.9 1.0       LIVER PROFILE: No results for input(s): ALKPHOS, AST, ALT, ALB, BILIDIR, BILITOT, ALKPHOS in the last 72 hours. No results for input(s): AMYLASE in the last 72 hours. No results for input(s): LIPASE in the last 72 hours. UA:  Recent Labs     07/27/20  2225   WBCUA 1   RBCUA 0       TROPONIN: No results for input(s): [place] in the last 72 hours.     Lab Results   Component Value

## 2020-07-29 NOTE — PLAN OF CARE
Problem: Pain:  Goal: Pain level will decrease  Description: Pain level will decrease  7/29/2020 0113 by Michael Chapa RN  Outcome: Ongoing  Note: Pt educated to attempt non-phagological method of pain control, but it it becomes too strong use PRN analgesics. Pain and discomfort being managed PRN analgesics per MD orders. Pt able to express presence of pain. Problem: Pain:  Goal: Control of acute pain  Description: Control of acute pain  7/29/2020 0113 by Michael Chapa RN  Outcome: Ongoing  Note: Patient educated on acute pain. Taught patient to use call light to ask for pain medication. PRN pain medication given for acute pain. Will continue to monitor pain per unit protocol. Problem: Pain:  Goal: Control of chronic pain  Description: Control of chronic pain  7/29/2020 0113 by Michael Chapa RN  Outcome: Ongoing  Note: Patient educated on chronic pain. Taught patient to use call light to ask for pain medication. PRN pain medication given for chronic pain. Will continue to monitor pain per unit protocol. Problem: Falls - Risk of:  Goal: Will remain free from falls  Description: Will remain free from falls  7/29/2020 0113 by Michael Chapa RN  Outcome: Ongoing  Note: Fall risk assessment completed . Fall precautions in place, bed/ chair alarm on, side rails 2/4 up, call light in reach, educated pt on calling for assistance when needed, room clear of clutter. Pt verbalized understanding. Problem: Falls - Risk of:  Goal: Absence of physical injury  Description: Absence of physical injury  7/29/2020 0113 by Michael Chapa RN  Outcome: Ongoing  Note: Patient remains free from physical injury. Patient educated on safety precautions. Will continue to monitor to ensure patient remains free from physical injury throughout remainder of shift.        Problem: Nutrition  Goal: Optimal nutrition therapy  7/29/2020 0113 by Michael Chapa RN  Outcome: Ongoing  Note: Patient educated on proper nutrition. Patients intake monitored and patient assessed to make sure no assistance with eating was required. Patient encouraged to eat a well balanced diet. Problem: Skin Integrity:  Goal: Will show no infection signs and symptoms  Description: Will show no infection signs and symptoms  7/29/2020 0113 by Kim Burnett RN  Outcome: Ongoing  Note: Assessment of surgical site complete. No signs of redness, warmth or swelling noted. Afebrile. Education provided on signs and symptoms of surgical site infections. Problem: Skin Integrity:  Goal: Absence of new skin breakdown  Description: Absence of new skin breakdown  7/29/2020 0113 by Kim Burnett RN  Outcome: Ongoing  Note: Skin assessment complete. No new signs of skin breakdown noted. Repositioning patient with pillows at two hour intervals. Heels elevated off bed.

## 2020-07-29 NOTE — PROGRESS NOTES
Patient resting in bed denies needs at this time. Bed alarm activated. Fall precautions in place,call light and belongings in reach. Abdominal gregory dressing is CD&I. Assessment done and charted.

## 2020-07-29 NOTE — PROGRESS NOTES
Patient Name: Jay Larsen                                                    Primary Physician: Vignesh Quinn MD  Admitting Dx: [facility]                 Inpatient Progress Note                                         Plan for today:     subcutaneous emphysema appears stable. CT chest with bilateral pleural effusions and dependent atelectasis. Leg edema a bit better. Blood pressure is stable. Off oxygen. Urine output adequate. Creatinine down from 1.5 down to 1.0  Sodium level 128---> 131--> 127-> 134, TPN off. Started to eat. . . Continue losartan and torsemide    Will hold amlodipine due to pedal edema and need for diuretics. Surgery note reviewed had metastatic ovarian cancer on surgical biopsy. Treatment as outpatient. Assessment / Plan:       Acute kidney injury   due to hemodynamic reason due to surgery  +/- hypovolemia poor po intake. expect improvement. got IV fluids. chekk urine Na       Possible CKD   due to diabetic nephropathy. Check urine for protein, creatinine.     Hyponatremia  Related due to surgery/Pain  Some contribution of high Glucose       History of peritoneal carcinomatosis   for which she has lysis of adhesions and also debulking of the tumor. Further management as per team.      Diabetes. Check for proteinuria. Last A1C 7.7 in 2016  Was 8 in 2011     CAD  previous CABG      previous hemicolectomy. Mentioned in records       Please call with any questions or concerns. [phone]. Juanis Salazar        Subjective: Interval hx:   Seen in the room. Feels okay today  Denies any shortness of breath currently but on oxygen. Has puffiness on the right shoulder and the right chest possible subcutaneous emphysema. No fever no chills. Appropriately answers questions. CC: Confusion, hyponatremia, acute kidney injury. HPI: 59-year-old  female whom I am seeing in the room while she was resting.   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 She came in for reported surgery which was done. Following that, her creatinine has gone up from a baseline of 0.7 to current creatinine of 1.5 with reduced urine output. She also has dropped her sodium to 130. PMH:  Past Medical History:   Diagnosis Date    Anxiety     Arthritis     Asthma     poorly controlled    CAD (coronary artery disease)     Cancer of peritoneum ([address]     ovarian    Chronic low back pain     Depression     Embolism (HCC)     small pulmonary clot    Hx of blood clots     x1    Hyperlipidemia 4/23/2012    Hypertension     Postoperative delirium 05/26/2016    Pulmonary embolism (HCC)     Short-term memory loss     Type II or unspecified type diabetes mellitus without mention of complication, not stated as uncontrolled     Wears dentures     Wears glasses          Objective:     Vitals:   Vitals:    07/29/20 0838 07/29/20 1049 07/29/20 1152 07/29/20 1605   BP: (!) 173/71  (!) 141/62 139/60   Pulse: 80  69 68   Resp: 16  16 16   Temp: 98.5 °F (36.9 °C)  97.9 °F (36.6 °C) 98.2 °F (36.8 °C)   TempSrc: Oral  Oral Oral   SpO2: 98% 97% 98% 99%   Weight:       Height:             PE:  Gen appearance: Alert, appears stated age and cooperative, pale. Eyes: Eyelids,conjunctiva and pupils look normal   ENT: External inspection of the ears and nose are within normal limits             Oral mucosa  Is moist   Neuro: Grossly no focal neurological deficits, normal sensation, grossly cranial nerves intact   Neck:  No JVD, no mass, no thyroid enlargement   Cardio: S1 S2 normal, No added sounds   Resp: Subcutaneous crepitus on the right shoulder and right chest.  Lungs otherwise okay no wheezing.   Coarse crepitations bibasilarly  GI:  Soft, non-tender, BS +          No palpable kidney, no renal angle tenderness   MS: No swollen or tender joints, no cyanosis, clubbing   DERM: no rashes, thickening   EDEMA: 1-2+ pedal edema. I/Os:         Intake/Output Summary (Last 24 hours) at 7/29/2020 1831  Last data filed at 7/29/2020 1755  Gross per 24 hour   Intake 1020 ml   Output 900 ml   Net 120 ml       Meds:     Scheduled Meds:   insulin glargine  5 Units Subcutaneous Nightly    iron sucrose  200 mg Intravenous Q24H    amLODIPine  5 mg Oral Daily    losartan  100 mg Oral Daily    insulin lispro  0-18 Units Subcutaneous TID WC    insulin lispro  0-9 Units Subcutaneous Q24H    torsemide  20 mg Oral Daily    insulin lispro  0.05 Units/kg Subcutaneous TID WC    ALPRAZolam  0.5 mg Oral Nightly    metoprolol succinate  100 mg Oral Daily    pregabalin  75 mg Oral BID    rOPINIRole  2 mg Oral Nightly    rosuvastatin  40 mg Oral QPM    venlafaxine  75 mg Oral Daily    sodium chloride flush  10 mL Intravenous 2 times per day    acetaminophen  500 mg Oral Q6H    [Held by provider] enoxaparin  40 mg Subcutaneous Daily     Continuous Infusions:   dextrose       PRN Meds:glucagon (rDNA), dextrose, oxyCODONE **OR** oxyCODONE, HYDROmorphone, albuterol, sodium chloride flush, glucose, dextrose, ondansetron **OR** ondansetron    Diet:  DIET CARB CONTROL; Dietary Nutrition Supplements: Diabetic Oral Supplement    CBC:   Recent Labs     07/27/20  0521 07/28/20  0620 07/29/20  0550   WBC 5.6 4.8 4.3   HGB 7.4* 7.6* 7.0*   HCT 22.2* 21.8* 20.2*    158 160     BMP:    Recent Labs     07/27/20  0521 07/28/20  0620 07/29/20  0550   * 127* 134*   K 4.4 3.7 3.5    93* 98*   CO2 19* 22 23   BUN 21* 19 21*   CREATININE 1.1 0.9 1.0   GLUCOSE 233* 235* 107*   MG 1.90 1.70* 1.40*   PHOS 2.4* 2.6 3.3     ABGs:   Lab Results   Component Value Date    PHART 7.372 05/30/2016    PO2ART 98.1 05/30/2016    RKX9IUA 21.6 05/30/2016           Juanis Salazar  [facility] [address] [facility]. Off: [phone]  Cell: [phone].  ( until 5 pm)

## 2020-07-29 NOTE — PROGRESS NOTES
Occupational Therapy  Facility/Department: [facility] 3 ORTHOPEDICS  Daily Treatment Note  NAME: Micheline Cervantes  : 1940  MRN: [id number]    Date of Service: 2020    Discharge Recommendations:  24 hour supervision or assist, Home with Home health OT, S Level 53Megan Pérez scored a 20/24 on the [address] form. Current research shows that an AM-PAC score of 18 or greater is typically associated with a discharge to the patient's home setting. Based on the patient's AM-PAC score, and their current ADL deficits, it is recommended that the patient have 2-3 sessions per week of Occupational Therapy at d/c to increase the patient's independence. At this time, this patient demonstrates the endurance and safety to discharge home with home (home vs OP services) and a follow up treatment frequency of 2-3x/wk. Please see assessment section for further patient specific details. HOME HEALTH CARE: LEVEL 3 SAFETY        -Initial home health evaluation to occur within 24-48 hours, in patient home    -Home health agency to establish plan of care for patient over 60 day period    -Medication Reconciliation    -PT/OT/Speech evaluations in home within 24-48 hours of discharge; including  -DME and home safety    -Frontload therapy 5 days, then 3x a week    -OT to evaluate if patient has [address] needs for personal care    - evaluation within 24-48 hours, includes evaluation of resources   and insurance to determine [place], [place], LTC, and Medicaid options    -PCP Visit scheduled within three to seven days of discharge       Assessment: Discussed with OTR am pac score is 20. Anticipate that patient will be safe to return home with family support and level 3 home OT. Patient able to complete functional mobility with 4ww with supervision, slow steady gait with no overt LOB noted. Transfers with supervision with cues to lock brakes and for hand placment. Continue with POC.        Patient Diagnosis(es): There were no encounter diagnoses. has a past medical history of Anxiety, Arthritis, Asthma, CAD (coronary artery disease), Cancer of peritoneum ([address] Chronic low back pain, Depression, Embolism ([address] Hx of blood clots, Hyperlipidemia, Hypertension, Postoperative delirium, Pulmonary embolism ([address] Short-term memory loss, Type II or unspecified type diabetes mellitus without mention of complication, not stated as uncontrolled, Wears dentures, and Wears glasses. has a past surgical history that includes Coronary artery bypass graft (2007); Appendectomy; Cataract removal (Bilateral); Dilation and curettage of uterus; Coronary angioplasty with stent (1991); Colonoscopy; Endoscopy, colon, diagnostic; eye surgery; dieter and bso (cervix removed) (05/26/2016); hemicolectomy (05/2016); Hysterectomy; Tunneled venous port placement (Left, 7/27/2016); other surgical history (N/A, 08/17/2018); Breast biopsy (Left, 06/23/2017); joint replacement (Bilateral); Colonoscopy (N/A, 6/6/2019); Colonoscopy (6/6/2019); Cardiac surgery; and laparoscopy (N/A, 7/23/2020). Restrictions  Restrictions/Precautions  Restrictions/Precautions: Fall Risk  Position Activity Restriction  Other position/activity restrictions: Diet : Carb control. Recent Abdominal Surg. DESTIN drain  Subjective   General  Chart Reviewed: Yes  Patient assessed for rehabilitation services?: Yes  Additional Pertinent Hx: 68 y/o female admit 7/23/2020 with Peritoneal Ca, Bowel Obstruction. 7/23/2020 S/P ROBOTIC LAPAROSCOPIC CONVERTED TO OPEN EXTENSIVE LYSIS OF ADHESIONS, EXCISION OF PERITONEAL NODULES, TUMOR DEBULKING, SMALL BOWEL RESECTION WITH ANASTAMOSIS, FLEXIBLE SIGMOIDOSCOPY. PMH as noted including CAD, CABG, Ovarian cancer.   Response to previous treatment: Patient reporting fatigue but able to participate  Family / Caregiver Present: No  Referring Practitioner: Cachorro Healy MD  Subjective  Subjective: Patient seated in recliner chair upon arrival to room. Patient agreeable to therapy with mild encouragement. Reports back pain 6/10 RN informed  General Comment  Comments: Per RN, okay for therapy. Orientation  Orientation  Overall Orientation Status: Within Normal Limits  Objective    ADL  Grooming: Stand by assistance(standing and sitting in front of sink to wash face, hands and brush teeth)  Additional Comments: Declined further ADL tasks        Balance  Sitting Balance: Supervision  Standing Balance: Stand by assistance  Standing Balance  Time: 4 minutes  Activity: Stood for ADL tasks  Functional Mobility  Functional - Mobility Device: 4-Wheeled Walker  Activity: To/from bathroom; Other  Assist Level: Supervision  Functional Mobility Comments: Functioanl mobility with 6ZE with Supervision, slow steady gait with no overt LOB noted  Bed mobility  Sit to Supine: Unable to assess  Scooting: Unable to assess  Comment: up in chair at start and end of session  Transfers  Sit to stand: Supervision  Stand to sit: Supervision  Transfer Comments: Supervision for sit<>stand from recliner chair to 4ww and sit to recliner chair with cues for hand placement and to lock brakes. Cognition  Overall Cognitive Status: Exceptions  Arousal/Alertness: Appropriate responses to stimuli  Following Commands: Follows multistep commands with increased time  Attention Span: Appears intact  Memory: Decreased recall of recent events;Decreased short term memory  Safety Judgement: Decreased awareness of need for assistance  Problem Solving: Assistance required to identify errors made;Assistance required to correct errors made  Insights: Decreased awareness of deficits  Initiation: Requires cues for some  Sequencing: Requires cues for some  Cognition Comment: Requires cues to lock brakes on 4ww as well as for hand placement     Assessment   Performance deficits / Impairments: Decreased endurance;Decreased ADL status; Decreased strength;Decreased balance;Decreased functional mobility   Assessment: Discussed with OTR am pac score is 20. Anticipate that patient will be safe to return home with family support and level 3 home OT. Patient able to complete functional mobility with 4ww with supervision, slow steady gait with no overt LOB noted. Transfers with supervision with cues to lock brakes and for hand placment. Continue with POC. OT Education: OT Role;Plan of Care;Transfer Training;Energy Conservation  REQUIRES OT FOLLOW UP: Yes  Activity Tolerance  Activity Tolerance: Patient Tolerated treatment well  Safety Devices  Type of devices: Left in chair;Call light within reach;Nurse notified; Chair alarm in place;Gait belt;Patient at risk for falls(JASKARAN Chapin)        Plan   Plan  Times per week: 3-5  Current Treatment Recommendations: Strengthening, Functional Mobility Training, Endurance Training, Safety Education & Training, Self-Care / ADL    AM-PAC Score        AM-PAC Inpatient Daily Activity Raw Score: 20 (07/29/20 1017)  AM-PAC Inpatient ADL T-Scale Score : 42.03 (07/29/20 1017)  ADL Inpatient CMS 0-100% Score: 38.32 (07/29/20 1017)  ADL Inpatient CMS G-Code Modifier : Edward Mcdonald (07/29/20 1017)    Goals  Short term goals  Time Frame for Short term goals: Prior to discharge: status of goals ongoing as of 7/29/20  Short term goal 1: Pt will complete toileting independently. Short term goal 2: Pt will complete LE bathing/dressing independently. Short term goal 3: Pt will complete functional mobility with AD independently. Short term goal 4: Pt will complete grooming tasks at sink independently.   Patient Goals   Patient goals : no goals stated       Therapy Time   Individual Concurrent Group Co-treatment   Time In 0935         Time Out 1015         Minutes 40                 Electronically signed by VINCENT [id number] on 7/29/2020 at 10:31 AM    If discharged prior to next OT session please see last daily note for discharge status

## 2020-07-30 VITALS
OXYGEN SATURATION: 99 % | BODY MASS INDEX: 22.59 KG/M2 | RESPIRATION RATE: 16 BRPM | SYSTOLIC BLOOD PRESSURE: 149 MMHG | HEIGHT: 68 IN | HEART RATE: 66 BPM | TEMPERATURE: 98.1 F | DIASTOLIC BLOOD PRESSURE: 61 MMHG | WEIGHT: 149.03 LBS

## 2020-07-30 LAB
ABO/RH: NORMAL
ANION GAP SERPL CALCULATED.3IONS-SCNC: 12 MMOL/L (ref 3–16)
ANTIBODY SCREEN: NORMAL
BASOPHILS ABSOLUTE: 0 K/UL (ref 0–0.2)
BASOPHILS RELATIVE PERCENT: 0.6 %
BLOOD BANK DISPENSE STATUS: NORMAL
BLOOD BANK PRODUCT CODE: NORMAL
BPU ID: NORMAL
BUN BLDV-MCNC: 22 MG/DL (ref 7–20)
CALCIUM SERPL-MCNC: 7.7 MG/DL (ref 8.3–10.6)
CHLORIDE BLD-SCNC: 94 MMOL/L (ref 99–110)
CO2: 25 MMOL/L (ref 21–32)
CREAT SERPL-MCNC: 1.1 MG/DL (ref 0.6–1.2)
DESCRIPTION BLOOD BANK: NORMAL
EOSINOPHILS ABSOLUTE: 0.2 K/UL (ref 0–0.6)
EOSINOPHILS RELATIVE PERCENT: 5 %
GFR AFRICAN AMERICAN: 58
GFR NON-AFRICAN AMERICAN: 48
GLUCOSE BLD-MCNC: 113 MG/DL (ref 70–99)
GLUCOSE BLD-MCNC: 151 MG/DL (ref 70–99)
GLUCOSE BLD-MCNC: 152 MG/DL (ref 70–99)
GLUCOSE BLD-MCNC: 78 MG/DL (ref 70–99)
HCT VFR BLD CALC: 18.8 % (ref 36–48)
HCT VFR BLD CALC: 21.2 % (ref 36–48)
HEMOGLOBIN: 6.4 G/DL (ref 12–16)
HEMOGLOBIN: 7.3 G/DL (ref 12–16)
LYMPHOCYTES ABSOLUTE: 0.9 K/UL (ref 1–5.1)
LYMPHOCYTES RELATIVE PERCENT: 23.3 %
MAGNESIUM: 1.7 MG/DL (ref 1.8–2.4)
MCH RBC QN AUTO: 31.6 PG (ref 26–34)
MCHC RBC AUTO-ENTMCNC: 33.9 G/DL (ref 31–36)
MCV RBC AUTO: 93.2 FL (ref 80–100)
MONOCYTES ABSOLUTE: 0.5 K/UL (ref 0–1.3)
MONOCYTES RELATIVE PERCENT: 11.3 %
NEUTROPHILS ABSOLUTE: 2.4 K/UL (ref 1.7–7.7)
NEUTROPHILS RELATIVE PERCENT: 59.8 %
PDW BLD-RTO: 17.1 % (ref 12.4–15.4)
PERFORMED ON: ABNORMAL
PERFORMED ON: ABNORMAL
PERFORMED ON: NORMAL
PHOSPHORUS: 4.2 MG/DL (ref 2.5–4.9)
PLATELET # BLD: 149 K/UL (ref 135–450)
PMV BLD AUTO: 8.1 FL (ref 5–10.5)
POTASSIUM SERPL-SCNC: 3.4 MMOL/L (ref 3.5–5.1)
RBC # BLD: 2.02 M/UL (ref 4–5.2)
SODIUM BLD-SCNC: 131 MMOL/L (ref 136–145)
TRIGL SERPL-MCNC: 101 MG/DL (ref 0–150)
WBC # BLD: 4 K/UL (ref 4–11)

## 2020-07-30 PROCEDURE — 6360000002 HC RX W HCPCS: Performed by: NURSE PRACTITIONER

## 2020-07-30 PROCEDURE — 6370000000 HC RX 637 (ALT 250 FOR IP): Performed by: INTERNAL MEDICINE

## 2020-07-30 PROCEDURE — 85018 HEMOGLOBIN: CPT

## 2020-07-30 PROCEDURE — 2580000003 HC RX 258: Performed by: OBSTETRICS & GYNECOLOGY

## 2020-07-30 PROCEDURE — 6370000000 HC RX 637 (ALT 250 FOR IP): Performed by: OBSTETRICS & GYNECOLOGY

## 2020-07-30 PROCEDURE — 2580000003 HC RX 258: Performed by: NURSE PRACTITIONER

## 2020-07-30 PROCEDURE — 85014 HEMATOCRIT: CPT

## 2020-07-30 PROCEDURE — 84478 ASSAY OF TRIGLYCERIDES: CPT

## 2020-07-30 PROCEDURE — 86900 BLOOD TYPING SEROLOGIC ABO: CPT

## 2020-07-30 PROCEDURE — 86923 COMPATIBILITY TEST ELECTRIC: CPT

## 2020-07-30 PROCEDURE — 85025 COMPLETE CBC W/AUTO DIFF WBC: CPT

## 2020-07-30 PROCEDURE — 83735 ASSAY OF MAGNESIUM: CPT

## 2020-07-30 PROCEDURE — 86850 RBC ANTIBODY SCREEN: CPT

## 2020-07-30 PROCEDURE — 86901 BLOOD TYPING SEROLOGIC RH(D): CPT

## 2020-07-30 PROCEDURE — 6370000000 HC RX 637 (ALT 250 FOR IP): Performed by: HOSPITALIST

## 2020-07-30 PROCEDURE — 36430 TRANSFUSION BLD/BLD COMPNT: CPT

## 2020-07-30 PROCEDURE — 84100 ASSAY OF PHOSPHORUS: CPT

## 2020-07-30 PROCEDURE — 6370000000 HC RX 637 (ALT 250 FOR IP): Performed by: NURSE PRACTITIONER

## 2020-07-30 PROCEDURE — 80048 BASIC METABOLIC PNL TOTAL CA: CPT

## 2020-07-30 PROCEDURE — P9016 RBC LEUKOCYTES REDUCED: HCPCS

## 2020-07-30 PROCEDURE — 94760 N-INVAS EAR/PLS OXIMETRY 1: CPT

## 2020-07-30 RX ORDER — 0.9 % SODIUM CHLORIDE 0.9 %
20 INTRAVENOUS SOLUTION INTRAVENOUS ONCE
Status: COMPLETED | OUTPATIENT
Start: 2020-07-30 | End: 2020-07-30

## 2020-07-30 RX ORDER — PREGABALIN 75 MG/1
75 CAPSULE ORAL 2 TIMES DAILY
Qty: 240 CAPSULE | Refills: 0
Start: 2020-07-30 | End: 2020-11-27

## 2020-07-30 RX ORDER — POTASSIUM CHLORIDE 20 MEQ/1
40 TABLET, EXTENDED RELEASE ORAL ONCE
Status: COMPLETED | OUTPATIENT
Start: 2020-07-30 | End: 2020-07-30

## 2020-07-30 RX ORDER — MAGNESIUM SULFATE IN WATER 40 MG/ML
2 INJECTION, SOLUTION INTRAVENOUS ONCE
Status: COMPLETED | OUTPATIENT
Start: 2020-07-30 | End: 2020-07-30

## 2020-07-30 RX ORDER — FUROSEMIDE 20 MG/1
20 TABLET ORAL DAILY
Qty: 60 TABLET | Refills: 3 | Status: ON HOLD | OUTPATIENT
Start: 2020-07-30 | End: 2020-09-18

## 2020-07-30 RX ADMIN — TORSEMIDE 20 MG: 20 TABLET ORAL at 11:30

## 2020-07-30 RX ADMIN — PREGABALIN 75 MG: 75 CAPSULE ORAL at 11:30

## 2020-07-30 RX ADMIN — SODIUM CHLORIDE, PRESERVATIVE FREE 10 ML: 5 INJECTION INTRAVENOUS at 11:31

## 2020-07-30 RX ADMIN — INSULIN LISPRO 3 UNITS: 100 INJECTION, SOLUTION INTRAVENOUS; SUBCUTANEOUS at 14:19

## 2020-07-30 RX ADMIN — INSULIN LISPRO 3 UNITS: 100 INJECTION, SOLUTION INTRAVENOUS; SUBCUTANEOUS at 18:31

## 2020-07-30 RX ADMIN — INSULIN LISPRO 3 UNITS: 100 INJECTION, SOLUTION INTRAVENOUS; SUBCUTANEOUS at 14:20

## 2020-07-30 RX ADMIN — SODIUM CHLORIDE 20 ML: 9 INJECTION, SOLUTION INTRAVENOUS at 11:46

## 2020-07-30 RX ADMIN — VENLAFAXINE HYDROCHLORIDE 75 MG: 75 CAPSULE, EXTENDED RELEASE ORAL at 11:30

## 2020-07-30 RX ADMIN — ACETAMINOPHEN 500 MG: 500 TABLET, FILM COATED ORAL at 06:34

## 2020-07-30 RX ADMIN — OXYCODONE HYDROCHLORIDE 10 MG: 10 TABLET ORAL at 11:45

## 2020-07-30 RX ADMIN — LOSARTAN POTASSIUM 100 MG: 100 TABLET, FILM COATED ORAL at 11:30

## 2020-07-30 RX ADMIN — ROSUVASTATIN CALCIUM 40 MG: 40 TABLET, FILM COATED ORAL at 18:27

## 2020-07-30 RX ADMIN — ACETAMINOPHEN 500 MG: 500 TABLET, FILM COATED ORAL at 11:30

## 2020-07-30 RX ADMIN — TORSEMIDE 20 MG: 20 TABLET ORAL at 18:27

## 2020-07-30 RX ADMIN — METOPROLOL SUCCINATE 100 MG: 50 TABLET, EXTENDED RELEASE ORAL at 11:29

## 2020-07-30 RX ADMIN — POTASSIUM CHLORIDE 40 MEQ: 1500 TABLET, EXTENDED RELEASE ORAL at 11:29

## 2020-07-30 RX ADMIN — MAGNESIUM SULFATE HEPTAHYDRATE 2 G: 40 INJECTION, SOLUTION INTRAVENOUS at 11:45

## 2020-07-30 RX ADMIN — ACETAMINOPHEN 500 MG: 500 TABLET, FILM COATED ORAL at 18:27

## 2020-07-30 ASSESSMENT — PAIN DESCRIPTION - PAIN TYPE
TYPE: ACUTE PAIN

## 2020-07-30 ASSESSMENT — PAIN DESCRIPTION - ONSET
ONSET: ON-GOING

## 2020-07-30 ASSESSMENT — PAIN DESCRIPTION - LOCATION
LOCATION: ABDOMEN

## 2020-07-30 ASSESSMENT — PAIN DESCRIPTION - DESCRIPTORS
DESCRIPTORS: ACHING

## 2020-07-30 ASSESSMENT — PAIN DESCRIPTION - FREQUENCY
FREQUENCY: CONTINUOUS

## 2020-07-30 ASSESSMENT — PAIN - FUNCTIONAL ASSESSMENT
PAIN_FUNCTIONAL_ASSESSMENT: PREVENTS OR INTERFERES SOME ACTIVE ACTIVITIES AND ADLS

## 2020-07-30 ASSESSMENT — PAIN DESCRIPTION - ORIENTATION
ORIENTATION: MID

## 2020-07-30 ASSESSMENT — PAIN DESCRIPTION - PROGRESSION
CLINICAL_PROGRESSION: GRADUALLY IMPROVING
CLINICAL_PROGRESSION: GRADUALLY WORSENING
CLINICAL_PROGRESSION: GRADUALLY WORSENING

## 2020-07-30 ASSESSMENT — PAIN SCALES - GENERAL
PAINLEVEL_OUTOF10: 8
PAINLEVEL_OUTOF10: 5
PAINLEVEL_OUTOF10: 5

## 2020-07-30 NOTE — PROGRESS NOTES
Hgb 7.3 and Hmt 21.2 post transfusion. Harriett Whitwell, NP made aware. Patient OK to discharge.  Wants patient to follow-up on Monday to recheck H&H.

## 2020-07-30 NOTE — PROGRESS NOTES
Data- discharge order received, pt verbalized agreement to discharge, disposition to previous residence, no needs for HHC/DME. Action- discharge instructions prepared and given to patient, pt verbalized understanding. Medication information packet given r/t NEW and/or CHANGED prescriptions emphasizing name/purpose/side effects, pt verbalized understanding. Discharge instruction summary: Diet- carb control, Activity- as tolerated, Primary Care Physician as follows: Flex Cordova [phone] f/u appointment scheduled by patient, immunizations reviewed and up to date, prescription medications filled at retail pharmacy. Response- Pt belongings gathered, IV removed. Disposition is home (no HHC/DME needs), transported with belongings, taken to lobby via w/c w/ PCA, no complications.

## 2020-07-30 NOTE — PROGRESS NOTES
[facility] Surgery [phone]                                     Daily Progress Note                                                         Pt Name: [address] Record Number: [id number]  Date of Birth 1940   Today's Date: 7/30/2020  No chief complaint on file. 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 CXR without pneumothorax or pneumomediastinum. Improving. -DESTIN dressing in place functioning well. Remove prior to DC.   -Hg 6.4.   -patholody recurrent serous carcinoma         Discharge Planning: per primary team      Priti Gerardo has improved from yesterday. Pain is well controlled. She has no nausea and no vomiting. She has passed flatus and has had a bowel movement. She is tolerating regular diet. OBJECTIVE  VITALS:  height is 5' 8\" (1.727 m) and weight is 149 lb 0.5 oz (67.6 kg). Her oral temperature is 97.9 °F (36.6 °C). Her blood pressure is 136/64 and her pulse is 68. Her respiration is 16 and oxygen saturation is 100%. INTAKE/OUTPUT:      Intake/Output Summary (Last 24 hours) at 7/30/2020 1246  Last data filed at 7/29/2020 1755  Gross per 24 hour   Intake 480 ml   Output 900 ml   Net -420 ml     GENERAL: alert, no distress  LUNGS: clear to ausculation, without wheezes, rales or rhonci  HEART: normal rate and regular rhythm  ABDOMEN: soft, appropriately tender, DESTIN incisional dressing in place. EXTREMITY: no cyanosis, clubbing or edema    I/O last 3 completed shifts:   In: 720 [P.O.:720]  Out: 900 [Urine:900]      LABS  Recent Labs     07/27/20  2225  07/30/20  0750   WBC  --    < > 4.0   HGB  --    < > 6.4*   HCT  --    < > 18.8*   PLT  --    < > 149   NA  --    < > 131*   K  --    < > 3.4* CL  --    < > 94*   CO2  --    < > 25   BUN  --    < > 22*   CREATININE  --    < > 1.1   MG  --    < > 1.70*   PHOS  --    < > 4.2   CALCIUM  --    < > 7.7*   NITRU Negative  --   --    COLORU YELLOW  --   --     < > = values in this interval not displayed.        EDUCATION  Patient educated about Disease Process, Medications, Smoking Cessation, Oxygenation, Incentive Spirometry and Deep Breath and Cough, Diabetes, Hyperlipidemia, Smoking Cessation, Nutrition, Exercise and Hypertension    Electronically signed by DANIAL Jean CNP on 7/30/2020 at 12:46 PM      [facility]   [phone] Office  [phone] Cell

## 2020-07-30 NOTE — PROGRESS NOTES
DESTIN dressing remover per order and left ROSIE. Surgical incision dry and intact. Minimal drainage.

## 2020-07-30 NOTE — CARE COORDINATION
Patient discharged with ome care orders. Notified [facility] liaison who will follow up.    Electronically signed by Phillip Barber on 7/30/2020 at 2:32 PM

## 2020-07-30 NOTE — DISCHARGE SUMMARY
Hospital Medicine Discharge Summary    Patient ID: Fabienne Keyes      Patient's PCP: Julieta Gomez MD    Admit Date: 7/23/2020     Discharge Date:   7/30/2020    Admitting Physician: Tariq Hanson MD     Discharge Physician: DANIAL Velasquez - CNP     Discharge Diagnoses: Active Hospital Problems    Diagnosis Date Noted    Malignant neoplasm of ovary ([facility] [C56.9]     Partial small bowel obstruction ([facility] [K56.600] 07/23/2020       The patient was seen and examined on day of discharge and this discharge summary is in conjunction with any daily progress note from day of discharge. Hospital Course:   67years old female with medical history significant for type 2 diabetes mellitus hypertension coronary artery disease status post coronary artery bypass grafting in 2007. Patient also has a history of stage IIIc peritoneal cancer with multiple adhesions and small bowel obstruction. General surgery was consulted and patient underwent small bowel resection and lysis of adhesions 7/24. Patient's hospital course was complicated with development of subcutaneous emphysema For which CT scan of the chest was done and according to cardiothoracic surgery no intervention is needed  Lower extremity swelling is part of anasarca that is multifactorial. Patient was ruled out for DVT    On day of discharge patient has improved tremendously. Her pain is well controlled she has had no nausea or vomiting.     Small bowel obstruction status post surgical intervention, 7/24  Small bowel obstruction due to peritoneal carcinomatosis  Oral intake has improved  Discontinued TPN     Peritoneal carcinomatosis  GYN surgical oncology following  Plan for outpatient treatment     Acute blood loss anemia  Also has chronic disease anemia  Hemoglobin today 6.4 transfuse with 1 unit of packed red blood cells repeat CBC pending     right side subcutaneous emphysema  Cardiothoracic surgery does not recommend abdomen. Small amount of   pneumomediastinum, and diffuse subcutaneous emphysema in the chest   wall-supraclavicular regions, right greater than left. No clear etiology   noted. Sirena [place] No pneumothorax noted. Correlate with any history of recent surgery. Bilateral pleural effusions are seen with adjacent consolidation lungs,   likely atelectasis in the absence of clinical signs of pneumonia. VL Extremity Venous Bilateral   Final Result      XR CHEST PORTABLE   Final Result   Right subcutaneous emphysema is noted. No pneumothorax or pneumomediastinum is evident. Left basilar opacity, atelectasis versus pneumonia, associated with chronic   elevation of the left hemidiaphragm. Disposition: Home with home care    Condition at Discharge: Stable    Discharge Instructions/Follow-up: Follow-up with your PCP in 1 week, follow-up with nephrology in 3 weeks, follow-up with gynecology next week  CBC and BMP on Monday to evaluate H&H and potassium, results to Dr. Carmela Jimenez    Code Status:  Full Code     Activity: activity as tolerated    Diet: cardiac diet      Labs: For convenience and continuity at follow-up the following most recent labs are provided:      CBC:    Lab Results   Component Value Date    WBC 4.0 07/30/2020    HGB 6.4 07/30/2020    HCT 18.8 07/30/2020     07/30/2020       Renal:    Lab Results   Component Value Date     07/30/2020    K 3.4 07/30/2020    K 4.2 07/24/2020    CL 94 07/30/2020    CO2 25 07/30/2020    BUN 22 07/30/2020    CREATININE 1.1 07/30/2020    CALCIUM 7.7 07/30/2020    PHOS 4.2 07/30/2020       Discharge Medications:     Current Discharge Medication List           Details   pregabalin (LYRICA) 75 MG capsule Take 1 capsule by mouth 2 times daily for 120 days.   Qty: 240 capsule, Refills: 0    Associated Diagnoses: Type 2 diabetes mellitus with hyperosmolarity without coma, without long-term current use of insulin (HCC)              Details   rOPINIRole

## 2020-07-30 NOTE — PROGRESS NOTES
Occupational Therapy  Saw patient bedside, sitting up in recliner with lunch. Pt to receive blood later this AM as well. Deferred OT session after conversation with patient as food intake and receiving blood take precedence at this time. Pt stated she is sitting up often and recognizes the benefit of this. Will attempt later as schedule allows. She is hopeful to walk in the hallway soon.  Eleuterio Chung, OTR/L #[id number] 7/30/2020 12:56 PM

## 2020-07-30 NOTE — DISCHARGE INSTR - COC
Continuity of Care Form    Patient Name: Grisel Donahue   :  1940  MRN:  [id number]    Admit date:  2020  Discharge date:  20      Code Status Order: Full Code   Advance Directives:   [facility] Documentation     Date/Time Healthcare Directive Type of Healthcare Directive Copy in [address] Agent's Name Healthcare Agent's Phone Number    [phone]  Yes, patient has an advance directive for healthcare treatment  --  --  --  --  --          Admitting Physician:  Manoj Reeves MD  PCP: Kathya Bryant MD    Discharging Nurse: Mario Geiger 23 Unit/Room#: [address] [facility] Unit Phone Number: [phone]    Emergency Contact:   Extended Emergency Contact Information  Primary Emergency Contact: Chanda Garvey  Address: [address] Phone: [phone]  Mobile Phone: [phone]  Relation: Spouse  Secondary Emergency Contact: Martin Nails  Address:                 Home Phone: [phone]  Relation: Child    Past Surgical History:  Past Surgical History:   Procedure Laterality Date    APPENDECTOMY      BREAST BIOPSY Left 2017    Fibroadenomatous type fibrocystic change    CARDIAC SURGERY      quadruple bypass    CATARACT REMOVAL Bilateral     COLONOSCOPY      COLONOSCOPY N/A 2019    COLONOSCOPY WITH BIOPSY performed by Octavio Chavarria MD at [address] [facility]  2019    COLONOSCOPY CONTROL HEMORRHAGE performed by Octavio Chavarria MD at [facility] CORONARY ARTERY BYPASS GRAFT  2007    DILATION AND CURETTAGE OF UTERUS      Miscarriage    ENDOSCOPY, COLON, DIAGNOSTIC      EYE SURGERY      HEMICOLECTOMY  2016    HYSTERECTOMY      JOINT REPLACEMENT Bilateral     bilat knee    LAPAROSCOPY N/A 2020    ROBOTIC LAPAROSCOPIC CONVERTED TO OPEN EXTENSIVE LYSIS OF ADHESIONS, EXCISION OF PERITONEAL NODULES, TUMOR DEBULKING, SMALL BOWEL RESECTION WITH ANASTAMOSIS, FLEXIBLE SIGMOIDOSCOPY performed by Gayla Mcallister MD at [address] N/A 08/17/2018    [address] AND GENERATOR    FRACISCO AND BSO  05/26/2016    TUNNELED VENOUS PORT PLACEMENT Left 7/27/2016    PORT-A-CATH PLACEMENT,LEFT        Immunization History:   Immunization History   Administered Date(s) Administered    Influenza, High Dose (Fluzone 65 yrs and older) 10/04/2016    Pneumococcal Conjugate 13-valent (Griselda Maroon) 10/04/2016       Active Problems:  Patient Active Problem List   Diagnosis Code    Hyperlipidemia E78.5    Diabetes mellitus, type II ([facility] E11.9    HTN (hypertension) I10    Vitamin D deficiency E55.9    CAD (coronary artery disease) I25.10    Elevated CA-125 R97.1    Uterine leiomyoma D25.9    Abnormal abdominal CT scan R93.5    Cancer of peritoneum ([facility] C48.2    Delirium due to general medical condition F05    Iron deficiency anemia D50.9    Impaired intestinal absorption K90.9    Neoplasm related pain G89.3    Weight loss R63.4    Abdominal pain R10.9    Chest pain, atypical R07.89    Partial small bowel obstruction (HCC) K56.600    Malignant neoplasm of ovary (HCC) C56.9       Isolation/Infection:   Isolation          No Isolation        Patient Infection Status     None to display          Nurse Assessment:  Last Vital Signs: /60   Pulse 76   Temp 98.3 °F (36.8 °C) (Oral)   Resp 16   Ht 5' 8\" (1.727 m)   Wt 149 lb 0.5 oz (67.6 kg)   SpO2 96%   BMI 22.66 kg/m²     Last documented pain score (0-10 scale): Pain Level: 8  Last Weight:   Wt Readings from Last 1 Encounters:   07/30/20 149 lb 0.5 oz (67.6 kg)     Mental Status:  oriented and alert    IV Access:  - None    Nursing Mobility/ADLs:  Walking   Assisted  Transfer  Independent  Bathing  Independent  Dressing  Assisted  Toileting  Assisted  Feeding Independent  Med Admin  Independent  Med Delivery   whole    Wound Care Documentation and Therapy:  Incision 06/27/16 Chest Left;Upper (Active)   Number of days: 0331       Incision 08/17/18 Back Mid;Left (Active)   Number of days: 712       Incision 08/17/18 Back Lower;Right (Active)   Number of days: 712        Elimination:  Continence:   · Bowel: Yes  · Bladder: Yes  Urinary Catheter: None   Colostomy/Ileostomy/Ileal Conduit: No       Date of Last BM: 7/30/20     Intake/Output Summary (Last 24 hours) at 7/30/2020 1204  Last data filed at 7/29/2020 1755  Gross per 24 hour   Intake 480 ml   Output 900 ml   Net -420 ml     I/O last 3 completed shifts: In: 5 [P.O.:720]  Out: 900 [Urine:900]    Safety Concerns: At Risk for Falls    Impairments/Disabilities:      None    Nutrition Therapy:  Current Nutrition Therapy:   - Oral Diet:  Carb Control 4 carbs/meal (1800kcals/day)    Routes of Feeding: Oral  Liquids: No Restrictions  Daily Fluid Restriction: no  Last Modified Barium Swallow with Video (Video Swallowing Test): not done    Treatments at the Time of Hospital Discharge:   Respiratory Treatments: N/A  Oxygen Therapy:  is not on home oxygen therapy.   Ventilator:    - No ventilator support    Rehab Therapies: Physical Therapy, Occupational Therapy and Nurse  Weight Bearing Status/Restrictions: No weight bearing restirctions  Other Medical Equipment (for information only, NOT a DME order):  walker  Other Treatments: N/A    Patient's personal belongings (please select all that are sent with patient):  None    RN SIGNATURE:  Electronically signed by Sophia Nielson RN on 7/30/20 at 6:17 PM EDT    CASE MANAGEMENT/SOCIAL WORK SECTION    Inpatient Status Date: ***    Readmission Risk Assessment Score:  35    Discharging to Facility/ Agency   Name:  [facility] care    Address: [address] [place] [address], [facility] [place] [address]  Phone: [phone]  Fax: [phone]    / signature: {Esignature:[id number]}    PHYSICIAN SECTION    Prognosis: Fair    Condition at Discharge: Stable    Rehab Potential (if transferring to Rehab): Fair    Recommended Labs or Other Treatments After Discharge: PT/OT/RN   Please get cbc 7/31 results to Dr Cory Almazan    Physician Certification: I certify the above information and transfer of Grisel Donahue  is necessary for the continuing treatment of the diagnosis listed and that she requires Home Care for less 30 days.      Update Admission H&P: No change in H&P    PHYSICIAN SIGNATURE:  Electronically signed by Manoj Reeves MD on 7/30/20 at 12:05 PM EDT

## 2020-07-30 NOTE — PROGRESS NOTES
Blood infusing per order at 250 ml/hr into left chest port. VSS. Patient declines any discomfort. No complications at this time. Will continue to monitor.

## 2020-07-30 NOTE — PROGRESS NOTES
Lab notified this RN of critical labs. Hgb 6.4 and Hmt 18.8. Ernestina Davis NP and Dr. Yarelis Shafer notified.

## 2020-08-04 NOTE — OP NOTE
antibiotics were infused, and general endotracheal anesthesia was induced without difficulty. Legs were then elevated into Alonzo stirrups. Arms were tucked, and we tested the patient. She was able to tolerate steep Trendelenburg. We prepped and draped the patient in the usual sterile fashion and placed a catheter. Attention was turned to the abdomen. A supraumbilical skin incision and a Veress needle was inserted. The abdomen was insufflated to a patient pressure of 15 mmHg. A trocar was inserted followed by insertion of the robotic scope noting good placement and a normal upper abdomen. Four additional trocars were placed. The robot was then docked. Washings were collected. The patient has extensive adhesions secondary to small volume tumor . Approximately 90 minutes of operative time used in attempt to restore normal anatomy. As planned Dr. Marcos Hurley is present to also assist in the dissection. After additional attempts to restore anatomy in a minimally invasive technique we opted to transition to an open procedure. Copious irrigation was performed. Good hemostasis was noted. Evicel was placed. The CO2 gas was desufflated from the patients abdomen. All trocars were removed. The midline camera port site was closed with 0 Vicryl suture followed by closure of all skin sites with 4-0 Monocryl suture and Dermabond glue. A vertical midline skin incision was made and carried down to underlying layer of fascia with the Bovie. The fascia was excised and the peritoneal cavity was entered. A Book-James retractor was placed. Several hours spent restoring normal anatomy and delineating site of obstruction. Initially site was thought to be the left pelvic sidewall however this area does not appear to be the site. There is a small bowel lesion that is identified and resected by Dr. Marcos Hurley. The bowel was run and multiple sites of tumor were removed.      Irrigation was performed and all laparotomy sponges were removed and the Book-James was removed. The fascia was closed with #1 looped PDS suture in a running fashion followed by closure   of the subdermal layer with 3-0 Vicryl suture and the skin with 3-0 Monocryl   suture and dermabond glue. The patient tolerated the procedure well. All counts   were correct x2. She was taken to the PACU in stable condition.        Electronically signed by Manoj Reeves MD on 8/4/2020 at 7:14 PM

## 2020-09-01 ENCOUNTER — HOSPITAL ENCOUNTER (OUTPATIENT)
Dept: NON INVASIVE DIAGNOSTICS | Age: 80
Discharge: HOME | End: 2020-09-01
Payer: MEDICARE

## 2020-09-01 LAB
LEFT VENTRICULAR EJECTION FRACTION HIGH VALUE: 60 %
LEFT VENTRICULAR EJECTION FRACTION MODE: NORMAL
LV EF: 55 %
LV EF: 58 %
LVEF MODALITY: NORMAL

## 2020-09-01 PROCEDURE — 93356 MYOCRD STRAIN IMG SPCKL TRCK: CPT

## 2020-09-01 PROCEDURE — 93306 TTE W/DOPPLER COMPLETE: CPT

## 2020-09-17 ENCOUNTER — APPOINTMENT (OUTPATIENT)
Dept: GENERAL RADIOLOGY | Age: 80
DRG: 304 | End: 2020-09-17
Payer: MEDICARE

## 2020-09-17 ENCOUNTER — APPOINTMENT (OUTPATIENT)
Dept: CT IMAGING | Age: 80
DRG: 304 | End: 2020-09-17
Payer: MEDICARE

## 2020-09-17 ENCOUNTER — HOSPITAL ENCOUNTER (INPATIENT)
Age: 80
LOS: 4 days | Discharge: HOME | DRG: 304 | End: 2020-09-21
Attending: EMERGENCY MEDICINE | Admitting: INTERNAL MEDICINE
Payer: MEDICARE

## 2020-09-17 PROBLEM — R41.82 ALTERED MENTAL STATE: Status: ACTIVE | Noted: 2020-09-17

## 2020-09-17 LAB
A/G RATIO: 1.6 (ref 1.1–2.2)
ALBUMIN SERPL-MCNC: 4.1 G/DL (ref 3.4–5)
ALP BLD-CCNC: 64 U/L (ref 40–129)
ALT SERPL-CCNC: 6 U/L (ref 10–40)
ANION GAP SERPL CALCULATED.3IONS-SCNC: 14 MMOL/L (ref 3–16)
AST SERPL-CCNC: 18 U/L (ref 15–37)
BACTERIA: ABNORMAL /HPF
BASOPHILS ABSOLUTE: 0 K/UL (ref 0–0.2)
BASOPHILS RELATIVE PERCENT: 0.6 %
BILIRUB SERPL-MCNC: 0.5 MG/DL (ref 0–1)
BILIRUBIN URINE: NEGATIVE
BLOOD, URINE: ABNORMAL
BUN BLDV-MCNC: 23 MG/DL (ref 7–20)
CALCIUM SERPL-MCNC: 9.3 MG/DL (ref 8.3–10.6)
CHLORIDE BLD-SCNC: 91 MMOL/L (ref 99–110)
CLARITY: CLEAR
CO2: 23 MMOL/L (ref 21–32)
COLOR: YELLOW
CREAT SERPL-MCNC: 0.9 MG/DL (ref 0.6–1.2)
EOSINOPHILS ABSOLUTE: 0 K/UL (ref 0–0.6)
EOSINOPHILS RELATIVE PERCENT: 0.6 %
EPITHELIAL CELLS, UA: ABNORMAL /HPF (ref 0–5)
GFR AFRICAN AMERICAN: >60
GFR NON-AFRICAN AMERICAN: >60
GLOBULIN: 2.6 G/DL
GLUCOSE BLD-MCNC: 196 MG/DL (ref 70–99)
GLUCOSE URINE: 100 MG/DL
HCT VFR BLD CALC: 35.4 % (ref 36–48)
HEMOGLOBIN: 11.7 G/DL (ref 12–16)
KETONES, URINE: 15 MG/DL
LEUKOCYTE ESTERASE, URINE: NEGATIVE
LYMPHOCYTES ABSOLUTE: 0.4 K/UL (ref 1–5.1)
LYMPHOCYTES RELATIVE PERCENT: 10.1 %
MCH RBC QN AUTO: 29 PG (ref 26–34)
MCHC RBC AUTO-ENTMCNC: 33 G/DL (ref 31–36)
MCV RBC AUTO: 87.7 FL (ref 80–100)
MICROSCOPIC EXAMINATION: YES
MONOCYTES ABSOLUTE: 0.3 K/UL (ref 0–1.3)
MONOCYTES RELATIVE PERCENT: 7.2 %
NEUTROPHILS ABSOLUTE: 3.6 K/UL (ref 1.7–7.7)
NEUTROPHILS RELATIVE PERCENT: 81.5 %
NITRITE, URINE: NEGATIVE
PDW BLD-RTO: 17.1 % (ref 12.4–15.4)
PH UA: 7
PH UA: 7 (ref 5–8)
PLATELET # BLD: 150 K/UL (ref 135–450)
PMV BLD AUTO: 7.5 FL (ref 5–10.5)
POTASSIUM REFLEX MAGNESIUM: 4.5 MMOL/L (ref 3.5–5.1)
PROTEIN UA: >=300 MG/DL
RBC # BLD: 4.04 M/UL (ref 4–5.2)
RBC UA: ABNORMAL /HPF (ref 0–4)
SODIUM BLD-SCNC: 128 MMOL/L (ref 136–145)
SPECIFIC GRAVITY UA: 1.02 (ref 1–1.03)
TOTAL PROTEIN: 6.7 G/DL (ref 6.4–8.2)
TROPONIN: 0.03 NG/ML
TROPONIN: 0.04 NG/ML
URINE REFLEX TO CULTURE: ABNORMAL
URINE TYPE: ABNORMAL
UROBILINOGEN, URINE: 0.2 E.U./DL
WBC # BLD: 4.4 K/UL (ref 4–11)
WBC UA: ABNORMAL /HPF (ref 0–5)

## 2020-09-17 PROCEDURE — 96376 TX/PRO/DX INJ SAME DRUG ADON: CPT

## 2020-09-17 PROCEDURE — 36415 COLL VENOUS BLD VENIPUNCTURE: CPT

## 2020-09-17 PROCEDURE — 71046 X-RAY EXAM CHEST 2 VIEWS: CPT

## 2020-09-17 PROCEDURE — 80053 COMPREHEN METABOLIC PANEL: CPT

## 2020-09-17 PROCEDURE — 6360000002 HC RX W HCPCS: Performed by: STUDENT IN AN ORGANIZED HEALTH CARE EDUCATION/TRAINING PROGRAM

## 2020-09-17 PROCEDURE — 2580000003 HC RX 258: Performed by: STUDENT IN AN ORGANIZED HEALTH CARE EDUCATION/TRAINING PROGRAM

## 2020-09-17 PROCEDURE — 2500000003 HC RX 250 WO HCPCS: Performed by: STUDENT IN AN ORGANIZED HEALTH CARE EDUCATION/TRAINING PROGRAM

## 2020-09-17 PROCEDURE — 96374 THER/PROPH/DIAG INJ IV PUSH: CPT

## 2020-09-17 PROCEDURE — 1200000000 HC SEMI PRIVATE

## 2020-09-17 PROCEDURE — 93005 ELECTROCARDIOGRAM TRACING: CPT | Performed by: STUDENT IN AN ORGANIZED HEALTH CARE EDUCATION/TRAINING PROGRAM

## 2020-09-17 PROCEDURE — 81001 URINALYSIS AUTO W/SCOPE: CPT

## 2020-09-17 PROCEDURE — 85025 COMPLETE CBC W/AUTO DIFF WBC: CPT

## 2020-09-17 PROCEDURE — 70450 CT HEAD/BRAIN W/O DYE: CPT

## 2020-09-17 PROCEDURE — 96375 TX/PRO/DX INJ NEW DRUG ADDON: CPT

## 2020-09-17 PROCEDURE — 99285 EMERGENCY DEPT VISIT HI MDM: CPT

## 2020-09-17 PROCEDURE — 6370000000 HC RX 637 (ALT 250 FOR IP): Performed by: STUDENT IN AN ORGANIZED HEALTH CARE EDUCATION/TRAINING PROGRAM

## 2020-09-17 PROCEDURE — 84484 ASSAY OF TROPONIN QUANT: CPT

## 2020-09-17 PROCEDURE — 74176 CT ABD & PELVIS W/O CONTRAST: CPT

## 2020-09-17 RX ORDER — HYDRALAZINE HYDROCHLORIDE 20 MG/ML
10 INJECTION INTRAMUSCULAR; INTRAVENOUS ONCE
Status: COMPLETED | OUTPATIENT
Start: 2020-09-17 | End: 2020-09-17

## 2020-09-17 RX ORDER — LABETALOL HYDROCHLORIDE 5 MG/ML
5 INJECTION, SOLUTION INTRAVENOUS ONCE
Status: COMPLETED | OUTPATIENT
Start: 2020-09-17 | End: 2020-09-17

## 2020-09-17 RX ORDER — HYDRALAZINE HYDROCHLORIDE 20 MG/ML
5 INJECTION INTRAMUSCULAR; INTRAVENOUS ONCE
Status: COMPLETED | OUTPATIENT
Start: 2020-09-17 | End: 2020-09-17

## 2020-09-17 RX ORDER — OXYCODONE AND ACETAMINOPHEN 7.5; 325 MG/1; MG/1
1 TABLET ORAL ONCE
Status: COMPLETED | OUTPATIENT
Start: 2020-09-17 | End: 2020-09-17

## 2020-09-17 RX ORDER — 0.9 % SODIUM CHLORIDE 0.9 %
1000 INTRAVENOUS SOLUTION INTRAVENOUS ONCE
Status: COMPLETED | OUTPATIENT
Start: 2020-09-17 | End: 2020-09-17

## 2020-09-17 RX ADMIN — OXYCODONE HYDROCHLORIDE AND ACETAMINOPHEN 1 TABLET: 7.5; 325 TABLET ORAL at 22:27

## 2020-09-17 RX ADMIN — SODIUM CHLORIDE 1000 ML: 9 INJECTION, SOLUTION INTRAVENOUS at 21:49

## 2020-09-17 RX ADMIN — LABETALOL HYDROCHLORIDE 5 MG: 5 INJECTION, SOLUTION INTRAVENOUS at 20:24

## 2020-09-17 RX ADMIN — LABETALOL HYDROCHLORIDE 5 MG: 5 INJECTION, SOLUTION INTRAVENOUS at 21:46

## 2020-09-17 RX ADMIN — HYDRALAZINE HYDROCHLORIDE 10 MG: 20 INJECTION, SOLUTION INTRAMUSCULAR; INTRAVENOUS at 23:04

## 2020-09-17 RX ADMIN — HYDRALAZINE HYDROCHLORIDE 5 MG: 20 INJECTION, SOLUTION INTRAMUSCULAR; INTRAVENOUS at 23:43

## 2020-09-17 ASSESSMENT — PAIN SCALES - GENERAL
PAINLEVEL_OUTOF10: 9
PAINLEVEL_OUTOF10: 9

## 2020-09-17 ASSESSMENT — PAIN DESCRIPTION - DESCRIPTORS: DESCRIPTORS: THROBBING

## 2020-09-17 ASSESSMENT — PAIN DESCRIPTION - LOCATION: LOCATION: EYE

## 2020-09-17 ASSESSMENT — PAIN DESCRIPTION - ORIENTATION: ORIENTATION: RIGHT;LEFT

## 2020-09-17 ASSESSMENT — PAIN SCALES - WONG BAKER: WONGBAKER_NUMERICALRESPONSE: 8;10

## 2020-09-17 ASSESSMENT — PAIN DESCRIPTION - PAIN TYPE: TYPE: ACUTE PAIN

## 2020-09-17 NOTE — ED TRIAGE NOTES
Vomiting and pain behind the eyes. Has history of bowel obstruction.  states she has been having bouts of what he calls dementia. /81. MD at bedside during triage.

## 2020-09-18 PROBLEM — E44.0 MODERATE MALNUTRITION (HCC): Chronic | Status: ACTIVE | Noted: 2020-09-18

## 2020-09-18 LAB
AMMONIA: 18 UMOL/L (ref 11–51)
AMPHETAMINE SCREEN, URINE: ABNORMAL
ANION GAP SERPL CALCULATED.3IONS-SCNC: 15 MMOL/L (ref 3–16)
BARBITURATE SCREEN URINE: ABNORMAL
BENZODIAZEPINE SCREEN, URINE: ABNORMAL
BUN BLDV-MCNC: 22 MG/DL (ref 7–20)
CALCIUM SERPL-MCNC: 8.9 MG/DL (ref 8.3–10.6)
CANNABINOID SCREEN URINE: ABNORMAL
CHLORIDE BLD-SCNC: 92 MMOL/L (ref 99–110)
CO2: 21 MMOL/L (ref 21–32)
COCAINE METABOLITE SCREEN URINE: ABNORMAL
CREAT SERPL-MCNC: 0.9 MG/DL (ref 0.6–1.2)
EKG ATRIAL RATE: 77 BPM
EKG DIAGNOSIS: NORMAL
EKG P AXIS: 72 DEGREES
EKG P-R INTERVAL: 276 MS
EKG Q-T INTERVAL: 392 MS
EKG QRS DURATION: 98 MS
EKG QTC CALCULATION (BAZETT): 443 MS
EKG R AXIS: 38 DEGREES
EKG T AXIS: 70 DEGREES
EKG VENTRICULAR RATE: 77 BPM
GFR AFRICAN AMERICAN: >60
GFR NON-AFRICAN AMERICAN: >60
GLUCOSE BLD-MCNC: 215 MG/DL (ref 70–99)
GLUCOSE BLD-MCNC: 228 MG/DL (ref 70–99)
GLUCOSE BLD-MCNC: 243 MG/DL (ref 70–99)
GLUCOSE BLD-MCNC: 249 MG/DL (ref 70–99)
GLUCOSE BLD-MCNC: 254 MG/DL (ref 70–99)
GLUCOSE BLD-MCNC: 275 MG/DL (ref 70–99)
LACTIC ACID: 1.2 MMOL/L (ref 0.4–2)
Lab: ABNORMAL
METHADONE SCREEN, URINE: ABNORMAL
OPIATE SCREEN URINE: ABNORMAL
OXYCODONE URINE: POSITIVE
PERFORMED ON: ABNORMAL
PH UA: 5
PHENCYCLIDINE SCREEN URINE: ABNORMAL
POTASSIUM REFLEX MAGNESIUM: 4 MMOL/L (ref 3.5–5.1)
PROPOXYPHENE SCREEN: ABNORMAL
SODIUM BLD-SCNC: 128 MMOL/L (ref 136–145)
TROPONIN: 0.04 NG/ML
TROPONIN: 0.05 NG/ML

## 2020-09-18 PROCEDURE — 2500000003 HC RX 250 WO HCPCS: Performed by: STUDENT IN AN ORGANIZED HEALTH CARE EDUCATION/TRAINING PROGRAM

## 2020-09-18 PROCEDURE — 6360000002 HC RX W HCPCS: Performed by: INTERNAL MEDICINE

## 2020-09-18 PROCEDURE — 2500000003 HC RX 250 WO HCPCS: Performed by: INTERNAL MEDICINE

## 2020-09-18 PROCEDURE — 6370000000 HC RX 637 (ALT 250 FOR IP): Performed by: INTERNAL MEDICINE

## 2020-09-18 PROCEDURE — 2000000000 HC ICU R&B

## 2020-09-18 PROCEDURE — 6370000000 HC RX 637 (ALT 250 FOR IP): Performed by: STUDENT IN AN ORGANIZED HEALTH CARE EDUCATION/TRAINING PROGRAM

## 2020-09-18 PROCEDURE — 80048 BASIC METABOLIC PNL TOTAL CA: CPT

## 2020-09-18 PROCEDURE — 2580000003 HC RX 258: Performed by: STUDENT IN AN ORGANIZED HEALTH CARE EDUCATION/TRAINING PROGRAM

## 2020-09-18 PROCEDURE — 83605 ASSAY OF LACTIC ACID: CPT

## 2020-09-18 PROCEDURE — 84484 ASSAY OF TROPONIN QUANT: CPT

## 2020-09-18 PROCEDURE — 2580000003 HC RX 258: Performed by: INTERNAL MEDICINE

## 2020-09-18 PROCEDURE — 80307 DRUG TEST PRSMV CHEM ANLYZR: CPT

## 2020-09-18 PROCEDURE — 36415 COLL VENOUS BLD VENIPUNCTURE: CPT

## 2020-09-18 PROCEDURE — 82140 ASSAY OF AMMONIA: CPT

## 2020-09-18 RX ORDER — AMLODIPINE BESYLATE 10 MG/1
10 TABLET ORAL DAILY
Status: DISCONTINUED | OUTPATIENT
Start: 2020-09-19 | End: 2020-09-21 | Stop reason: HOSPADM

## 2020-09-18 RX ORDER — ACETAMINOPHEN 650 MG/1
650 SUPPOSITORY RECTAL EVERY 6 HOURS PRN
Status: DISCONTINUED | OUTPATIENT
Start: 2020-09-18 | End: 2020-09-21 | Stop reason: HOSPADM

## 2020-09-18 RX ORDER — LOSARTAN POTASSIUM 100 MG/1
100 TABLET ORAL DAILY
Status: DISCONTINUED | OUTPATIENT
Start: 2020-09-18 | End: 2020-09-21 | Stop reason: HOSPADM

## 2020-09-18 RX ORDER — POLYETHYLENE GLYCOL 3350 17 G/17G
17 POWDER, FOR SOLUTION ORAL DAILY
Status: DISCONTINUED | OUTPATIENT
Start: 2020-09-18 | End: 2020-09-21 | Stop reason: HOSPADM

## 2020-09-18 RX ORDER — OXYCODONE AND ACETAMINOPHEN 10; 325 MG/1; MG/1
1 TABLET ORAL 4 TIMES DAILY PRN
Status: DISCONTINUED | OUTPATIENT
Start: 2020-09-18 | End: 2020-09-21 | Stop reason: HOSPADM

## 2020-09-18 RX ORDER — ONDANSETRON 2 MG/ML
4 INJECTION INTRAMUSCULAR; INTRAVENOUS EVERY 6 HOURS PRN
Status: DISCONTINUED | OUTPATIENT
Start: 2020-09-18 | End: 2020-09-18

## 2020-09-18 RX ORDER — METOPROLOL SUCCINATE 100 MG/1
100 TABLET, EXTENDED RELEASE ORAL DAILY
Status: DISCONTINUED | OUTPATIENT
Start: 2020-09-18 | End: 2020-09-18

## 2020-09-18 RX ORDER — DEXTROSE MONOHYDRATE 25 G/50ML
12.5 INJECTION, SOLUTION INTRAVENOUS PRN
Status: DISCONTINUED | OUTPATIENT
Start: 2020-09-18 | End: 2020-09-21 | Stop reason: HOSPADM

## 2020-09-18 RX ORDER — DEXTROSE MONOHYDRATE 50 MG/ML
100 INJECTION, SOLUTION INTRAVENOUS PRN
Status: DISCONTINUED | OUTPATIENT
Start: 2020-09-18 | End: 2020-09-21 | Stop reason: HOSPADM

## 2020-09-18 RX ORDER — CHLORTHALIDONE 25 MG/1
25 TABLET ORAL DAILY
Status: DISCONTINUED | OUTPATIENT
Start: 2020-09-18 | End: 2020-09-20

## 2020-09-18 RX ORDER — METOPROLOL SUCCINATE 100 MG/1
100 TABLET, EXTENDED RELEASE ORAL DAILY
Status: DISCONTINUED | OUTPATIENT
Start: 2020-09-18 | End: 2020-09-21 | Stop reason: HOSPADM

## 2020-09-18 RX ORDER — SODIUM CHLORIDE 9 MG/ML
INJECTION, SOLUTION INTRAVENOUS CONTINUOUS
Status: ACTIVE | OUTPATIENT
Start: 2020-09-18 | End: 2020-09-18

## 2020-09-18 RX ORDER — HYDRALAZINE HYDROCHLORIDE 20 MG/ML
10 INJECTION INTRAMUSCULAR; INTRAVENOUS EVERY 6 HOURS PRN
Status: DISCONTINUED | OUTPATIENT
Start: 2020-09-18 | End: 2020-09-19

## 2020-09-18 RX ORDER — ACETAMINOPHEN 325 MG/1
650 TABLET ORAL EVERY 6 HOURS PRN
Status: DISCONTINUED | OUTPATIENT
Start: 2020-09-18 | End: 2020-09-21 | Stop reason: HOSPADM

## 2020-09-18 RX ORDER — PROCHLORPERAZINE EDISYLATE 5 MG/ML
10 INJECTION INTRAMUSCULAR; INTRAVENOUS ONCE
Status: COMPLETED | OUTPATIENT
Start: 2020-09-18 | End: 2020-09-18

## 2020-09-18 RX ORDER — ALPRAZOLAM 0.5 MG/1
0.5 TABLET ORAL NIGHTLY
Status: DISCONTINUED | OUTPATIENT
Start: 2020-09-18 | End: 2020-09-21 | Stop reason: HOSPADM

## 2020-09-18 RX ORDER — VENLAFAXINE HYDROCHLORIDE 75 MG/1
75 CAPSULE, EXTENDED RELEASE ORAL 2 TIMES DAILY
Status: DISCONTINUED | OUTPATIENT
Start: 2020-09-18 | End: 2020-09-21 | Stop reason: HOSPADM

## 2020-09-18 RX ORDER — ROSUVASTATIN CALCIUM 20 MG/1
40 TABLET, COATED ORAL EVERY EVENING
Status: DISCONTINUED | OUTPATIENT
Start: 2020-09-18 | End: 2020-09-21 | Stop reason: HOSPADM

## 2020-09-18 RX ORDER — SODIUM CHLORIDE 0.9 % (FLUSH) 0.9 %
10 SYRINGE (ML) INJECTION PRN
Status: DISCONTINUED | OUTPATIENT
Start: 2020-09-18 | End: 2020-09-21 | Stop reason: HOSPADM

## 2020-09-18 RX ORDER — DOCUSATE SODIUM 100 MG/1
100 CAPSULE, LIQUID FILLED ORAL 2 TIMES DAILY
Status: DISCONTINUED | OUTPATIENT
Start: 2020-09-18 | End: 2020-09-21 | Stop reason: HOSPADM

## 2020-09-18 RX ORDER — AMLODIPINE BESYLATE 5 MG/1
5 TABLET ORAL DAILY
Status: ON HOLD | COMMUNITY
End: 2020-09-21 | Stop reason: HOSPADM

## 2020-09-18 RX ORDER — PREGABALIN 75 MG/1
75 CAPSULE ORAL 2 TIMES DAILY
Status: DISCONTINUED | OUTPATIENT
Start: 2020-09-18 | End: 2020-09-21 | Stop reason: HOSPADM

## 2020-09-18 RX ORDER — PROMETHAZINE HYDROCHLORIDE 25 MG/1
12.5 TABLET ORAL EVERY 6 HOURS PRN
Status: DISCONTINUED | OUTPATIENT
Start: 2020-09-18 | End: 2020-09-18

## 2020-09-18 RX ORDER — AMLODIPINE BESYLATE 5 MG/1
5 TABLET ORAL DAILY
Status: DISCONTINUED | OUTPATIENT
Start: 2020-09-18 | End: 2020-09-18

## 2020-09-18 RX ORDER — SODIUM CHLORIDE 9 MG/ML
INJECTION, SOLUTION INTRAVENOUS CONTINUOUS
Status: DISCONTINUED | OUTPATIENT
Start: 2020-09-18 | End: 2020-09-19

## 2020-09-18 RX ORDER — SODIUM CHLORIDE 0.9 % (FLUSH) 0.9 %
10 SYRINGE (ML) INJECTION EVERY 12 HOURS SCHEDULED
Status: DISCONTINUED | OUTPATIENT
Start: 2020-09-18 | End: 2020-09-21 | Stop reason: HOSPADM

## 2020-09-18 RX ORDER — LABETALOL HYDROCHLORIDE 5 MG/ML
10 INJECTION, SOLUTION INTRAVENOUS EVERY 10 MIN PRN
Status: DISCONTINUED | OUTPATIENT
Start: 2020-09-18 | End: 2020-09-21 | Stop reason: HOSPADM

## 2020-09-18 RX ORDER — LORAZEPAM 2 MG/ML
1 INJECTION INTRAMUSCULAR
Status: DISCONTINUED | OUTPATIENT
Start: 2020-09-18 | End: 2020-09-18

## 2020-09-18 RX ORDER — VALSARTAN 80 MG/1
80 TABLET ORAL DAILY
Status: ON HOLD | COMMUNITY
End: 2020-09-21 | Stop reason: HOSPADM

## 2020-09-18 RX ORDER — ROPINIROLE 2 MG/1
2 TABLET, FILM COATED ORAL 3 TIMES DAILY PRN
Status: DISCONTINUED | OUTPATIENT
Start: 2020-09-18 | End: 2020-09-21 | Stop reason: HOSPADM

## 2020-09-18 RX ORDER — NICOTINE POLACRILEX 4 MG
15 LOZENGE BUCCAL PRN
Status: DISCONTINUED | OUTPATIENT
Start: 2020-09-18 | End: 2020-09-21 | Stop reason: HOSPADM

## 2020-09-18 RX ORDER — PROCHLORPERAZINE EDISYLATE 5 MG/ML
10 INJECTION INTRAMUSCULAR; INTRAVENOUS EVERY 4 HOURS PRN
Status: DISCONTINUED | OUTPATIENT
Start: 2020-09-18 | End: 2020-09-21 | Stop reason: HOSPADM

## 2020-09-18 RX ORDER — AMLODIPINE BESYLATE 5 MG/1
5 TABLET ORAL ONCE
Status: COMPLETED | OUTPATIENT
Start: 2020-09-18 | End: 2020-09-18

## 2020-09-18 RX ADMIN — CHLORTHALIDONE 25 MG: 25 TABLET ORAL at 16:00

## 2020-09-18 RX ADMIN — AMLODIPINE BESYLATE 5 MG: 5 TABLET ORAL at 16:01

## 2020-09-18 RX ADMIN — HYDRALAZINE HYDROCHLORIDE 10 MG: 20 INJECTION INTRAMUSCULAR; INTRAVENOUS at 16:00

## 2020-09-18 RX ADMIN — VENLAFAXINE HYDROCHLORIDE 75 MG: 75 CAPSULE, EXTENDED RELEASE ORAL at 09:18

## 2020-09-18 RX ADMIN — OXYCODONE AND ACETAMINOPHEN 1 TABLET: 10; 325 TABLET ORAL at 05:36

## 2020-09-18 RX ADMIN — METOPROLOL SUCCINATE 100 MG: 100 TABLET, EXTENDED RELEASE ORAL at 02:36

## 2020-09-18 RX ADMIN — LABETALOL HYDROCHLORIDE 10 MG: 5 INJECTION, SOLUTION INTRAVENOUS at 05:31

## 2020-09-18 RX ADMIN — DOCUSATE SODIUM 100 MG: 100 CAPSULE, LIQUID FILLED ORAL at 11:27

## 2020-09-18 RX ADMIN — PREGABALIN 75 MG: 75 CAPSULE ORAL at 02:05

## 2020-09-18 RX ADMIN — SODIUM CHLORIDE 5 MG/HR: 9 INJECTION, SOLUTION INTRAVENOUS at 21:01

## 2020-09-18 RX ADMIN — LOSARTAN POTASSIUM 100 MG: 100 TABLET ORAL at 09:18

## 2020-09-18 RX ADMIN — PREGABALIN 75 MG: 75 CAPSULE ORAL at 20:30

## 2020-09-18 RX ADMIN — HYDRALAZINE HYDROCHLORIDE 10 MG: 20 INJECTION INTRAMUSCULAR; INTRAVENOUS at 08:51

## 2020-09-18 RX ADMIN — Medication 10 ML: at 09:18

## 2020-09-18 RX ADMIN — ALPRAZOLAM 0.5 MG: 0.5 TABLET ORAL at 02:05

## 2020-09-18 RX ADMIN — SODIUM CHLORIDE: 9 INJECTION, SOLUTION INTRAVENOUS at 02:19

## 2020-09-18 RX ADMIN — INSULIN GLARGINE 5 UNITS: 100 INJECTION, SOLUTION SUBCUTANEOUS at 02:29

## 2020-09-18 RX ADMIN — PROCHLORPERAZINE EDISYLATE 10 MG: 5 INJECTION INTRAMUSCULAR; INTRAVENOUS at 02:29

## 2020-09-18 RX ADMIN — Medication 10 ML: at 20:00

## 2020-09-18 RX ADMIN — INSULIN GLARGINE 5 UNITS: 100 INJECTION, SOLUTION SUBCUTANEOUS at 21:08

## 2020-09-18 RX ADMIN — VENLAFAXINE HYDROCHLORIDE 75 MG: 75 CAPSULE, EXTENDED RELEASE ORAL at 02:05

## 2020-09-18 RX ADMIN — POLYETHYLENE GLYCOL 3350 17 G: 17 POWDER, FOR SOLUTION ORAL at 11:27

## 2020-09-18 RX ADMIN — VENLAFAXINE HYDROCHLORIDE 75 MG: 75 CAPSULE, EXTENDED RELEASE ORAL at 20:30

## 2020-09-18 RX ADMIN — LABETALOL HYDROCHLORIDE 10 MG: 5 INJECTION, SOLUTION INTRAVENOUS at 05:06

## 2020-09-18 RX ADMIN — ENOXAPARIN SODIUM 40 MG: 40 INJECTION SUBCUTANEOUS at 09:17

## 2020-09-18 RX ADMIN — PREGABALIN 75 MG: 75 CAPSULE ORAL at 09:18

## 2020-09-18 RX ADMIN — ONDANSETRON 4 MG: 2 INJECTION INTRAMUSCULAR; INTRAVENOUS at 00:47

## 2020-09-18 RX ADMIN — PROCHLORPERAZINE EDISYLATE 10 MG: 5 INJECTION INTRAMUSCULAR; INTRAVENOUS at 12:05

## 2020-09-18 RX ADMIN — ALPRAZOLAM 0.5 MG: 0.5 TABLET ORAL at 20:30

## 2020-09-18 RX ADMIN — SODIUM CHLORIDE: 9 INJECTION, SOLUTION INTRAVENOUS at 16:01

## 2020-09-18 RX ADMIN — DOCUSATE SODIUM 100 MG: 100 CAPSULE, LIQUID FILLED ORAL at 20:30

## 2020-09-18 RX ADMIN — HYDRALAZINE HYDROCHLORIDE 10 MG: 20 INJECTION INTRAMUSCULAR; INTRAVENOUS at 02:03

## 2020-09-18 RX ADMIN — ROSUVASTATIN CALCIUM 40 MG: 20 TABLET, COATED ORAL at 19:00

## 2020-09-18 RX ADMIN — AMLODIPINE BESYLATE 5 MG: 5 TABLET ORAL at 06:03

## 2020-09-18 ASSESSMENT — PAIN DESCRIPTION - PAIN TYPE
TYPE: ACUTE PAIN

## 2020-09-18 ASSESSMENT — PAIN DESCRIPTION - DESCRIPTORS
DESCRIPTORS: HEADACHE

## 2020-09-18 ASSESSMENT — PAIN DESCRIPTION - ONSET
ONSET: ON-GOING

## 2020-09-18 ASSESSMENT — PAIN - FUNCTIONAL ASSESSMENT
PAIN_FUNCTIONAL_ASSESSMENT: PREVENTS OR INTERFERES SOME ACTIVE ACTIVITIES AND ADLS

## 2020-09-18 ASSESSMENT — PAIN DESCRIPTION - PROGRESSION
CLINICAL_PROGRESSION: NOT CHANGED
CLINICAL_PROGRESSION: GRADUALLY WORSENING
CLINICAL_PROGRESSION: NOT CHANGED
CLINICAL_PROGRESSION: GRADUALLY WORSENING

## 2020-09-18 ASSESSMENT — PAIN DESCRIPTION - ORIENTATION: ORIENTATION: ANTERIOR

## 2020-09-18 ASSESSMENT — PAIN DESCRIPTION - DIRECTION
RADIATING_TOWARDS: EYES

## 2020-09-18 ASSESSMENT — PAIN DESCRIPTION - LOCATION
LOCATION: HEAD

## 2020-09-18 ASSESSMENT — PAIN SCALES - GENERAL
PAINLEVEL_OUTOF10: 0
PAINLEVEL_OUTOF10: 7
PAINLEVEL_OUTOF10: 0
PAINLEVEL_OUTOF10: 7
PAINLEVEL_OUTOF10: 10
PAINLEVEL_OUTOF10: 0
PAINLEVEL_OUTOF10: 0
PAINLEVEL_OUTOF10: 10

## 2020-09-18 ASSESSMENT — PAIN DESCRIPTION - FREQUENCY
FREQUENCY: CONTINUOUS

## 2020-09-18 NOTE — PLAN OF CARE
Nutrition Problem #1: Inadequate oral intake  Intervention: Food and/or Nutrient Delivery: Continue Current Diet  Nutritional Goals: Pt will consume >/=50% of meals this admission

## 2020-09-18 NOTE — H&P
glasses        Past Surgical History:          Procedure Laterality Date    APPENDECTOMY      BREAST BIOPSY Left 06/23/2017    Fibroadenomatous type fibrocystic change    CARDIAC SURGERY      quadruple bypass    CATARACT REMOVAL Bilateral     COLONOSCOPY      COLONOSCOPY N/A 6/6/2019    COLONOSCOPY WITH BIOPSY performed by Blue Souza MD at [address]  6/6/2019    COLONOSCOPY CONTROL HEMORRHAGE performed by Blue Souza MD at [facility] CORONARY ARTERY BYPASS GRAFT  2007    DILATION AND CURETTAGE OF UTERUS      Miscarriage    ENDOSCOPY, COLON, DIAGNOSTIC      EYE SURGERY      HEMICOLECTOMY  05/2016    HYSTERECTOMY      JOINT REPLACEMENT Bilateral     bilat knee    LAPAROSCOPY N/A 7/23/2020    ROBOTIC LAPAROSCOPIC CONVERTED TO OPEN EXTENSIVE LYSIS OF ADHESIONS, EXCISION OF PERITONEAL NODULES, TUMOR DEBULKING, SMALL BOWEL RESECTION WITH ANASTAMOSIS, FLEXIBLE SIGMOIDOSCOPY performed by Champ Arias MD at [address] [facility] 08/17/2018    SPINAL CORD STIMULATOR LEAD REVISION AND GENERATOR    FRACISCO AND BSO  05/26/2016    TUNNELED VENOUS PORT PLACEMENT Left 7/27/2016    PORT-A-CATH PLACEMENT,LEFT        Medications Prior to Admission:      Prior to Admission medications    Medication Sig Start Date End Date Taking? Authorizing Provider   pregabalin (LYRICA) 75 MG capsule Take 1 capsule by mouth 2 times daily for 120 days. 7/30/20 11/27/20 Yes DANIAL Gilbert - CNP   rOPINIRole (REQUIP) 2 MG tablet Take 2 mg by mouth 3 times daily as needed (RLS)   Yes Historical Provider, MD   oxyCODONE-acetaminophen (PERCOCET)  MG per tablet Take 1 tablet by mouth 4 times daily as needed for Pain.    Yes Historical Provider, MD   venlafaxine (EFFEXOR XR) 75 MG extended release capsule Take 75 mg by mouth 2 times daily   Yes Historical Provider, MD   metoprolol succinate (TOPROL XL) 100 MG extended release tablet Take 1 tablet by mouth daily 3/9/34  Yes Maureen Castelan MD   rosuvastatin (CRESTOR) 40 MG tablet Take 40 mg by mouth every evening. Yes Historical Provider, MD   alprazolam Tigre Lat) 0.5 MG tablet Take 0.5 mg by mouth nightly. Yes Historical Provider, MD   furosemide (LASIX) 20 MG tablet Take 1 tablet by mouth daily 7/30/20   Valentine Knowles APRN - CNP   albuterol sulfate HFA (VENTOLIN HFA) 108 (90 Base) MCG/ACT inhaler Inhale 2 puffs into the lungs every 4 hours as needed for Wheezing    Historical Provider, MD   losartan (COZAAR) 100 MG tablet Take 1 tablet by mouth daily Pt. takes 100mg of Cozaar daily. 8/2/22 3/2/71  Maureen Castelan MD   insulin glargine (LANTUS) 100 UNIT/ML injection vial Inject 5 Units into the skin nightly  Patient taking differently: Inject 6 Units into the skin nightly  6/6/16   Jason Suazo MD   insulin lispro (HUMALOG) 100 UNIT/ML injection vial Inject 0-12 Units into the skin 3 times daily (with meals)  Patient taking differently: Inject 6-16 Units into the skin 3 times daily (before meals)  6/6/16   Jason Suazo MD       Allergies:  Cephalexin; Pcn [penicillins]; Cephalosporins; and Cephalexin    Social History:    TOBACCO:   reports that she quit smoking about 20 years ago. She has a 20.00 pack-year smoking history. She has never used smokeless tobacco.  ETOH:   reports previous alcohol use of about 1.0 standard drinks of alcohol per week. E-Cigarettes Vaping or Juuling     Questions Responses    Vaping Use Never User    Start Date     Does device contain nicotine? Quit Date     Vaping Type         Family History:    Reviewed in detail and negative for DM, CAD, Cancer, CVA. Positive as follows:        Problem Relation Age of Onset    High Blood Pressure Mother     Diabetes Father     Heart Disease Maternal Grandmother     Diabetes Maternal Uncle        REVIEW OF SYSTEMS:   Pertinent positives as noted in the HPI.  All other systems reviewed and negative. PHYSICAL EXAM PERFORMED:    BP (!) 187/69   Pulse 79   Temp 98.4 °F (36.9 °C) (Oral)   Resp 18   Wt 144 lb (65.3 kg)   SpO2 100%   BMI 21.90 kg/m²     General appearance:  No apparent distress, appears stated age and cooperative. Frail elderly female  HEENT:  Normal cephalic, atraumatic without obvious deformity. Pupils equal, round, and reactive to light. Extra ocular muscles intact. Conjunctivae/corneas clear. Neck: Supple, with full range of motion. No jugular venous distention. Trachea midline. Respiratory:  Normal respiratory effort. Clear to auscultation, bilaterally without Rales/Wheezes/Rhonchi. Cardiovascular:  Regular rate and rhythm with normal S1/S2 without murmurs, rubs or gallops. Abdomen: Soft, non-tender, non-distended with absent bowel sounds. Musculoskeletal:  No clubbing, cyanosis or edema bilaterally. Full range of motion without deformity. Skin: Skin color, texture, turgor normal.  No rashes or lesions. Neurologic:  Neurovascularly intact without any focal sensory/motor deficits. Cranial nerves: II-XII intact, grossly non-focal.  Very slow to respond. Speech clear. Seems very forgetful. Alert and awake oriented to self. Psychiatric:  Alert and oriented, thought content appropriate, normal insight  Capillary Refill: Brisk,< 3 seconds   Peripheral Pulses: +2 palpable, equal bilaterally       Labs:     Recent Labs     09/17/20 2031   WBC 4.4   HGB 11.7*   HCT 35.4*        Recent Labs     09/17/20 2031   *   K 4.5   CL 91*   CO2 23   BUN 23*   CREATININE 0.9   CALCIUM 9.3     Recent Labs     09/17/20 2031   AST 18   ALT 6*   BILITOT 0.5   ALKPHOS 64     No results for input(s): INR in the last 72 hours.   Recent Labs     09/17/20 2031 09/17/20  2233   TROPONINI 0.03* 0.04*       Urinalysis:      Lab Results   Component Value Date    NITRU Negative 09/17/2020    WBCUA 0-2 09/17/2020    BACTERIA Rare 09/17/2020    RBCUA 3-4 09/17/2020    BLOODU SMALL Code    PT/OT Eval Status: As tolerated with assistance and fall precautions    Dispo -GMF with telemetry       Juwan Joy MD    Thank you Melissa Patel MD for the opportunity to be involved in this patient's care. If you have any questions or concerns please feel free to contact me at [phone].

## 2020-09-18 NOTE — FLOWSHEET NOTE
09/18/20 1009   Encounter Summary   Services provided to: Patient   Referral/Consult From: Rounding   Continue Visiting   (es 9/18)   Complexity of Encounter Low   Length of Encounter 15 minutes   Routine   Type Initial   Assessment Calm; Approachable   Intervention Active listening;Ritual  (delivery of Jain New Year bag)   Outcome Engaged in conversation;Receptive

## 2020-09-18 NOTE — PROGRESS NOTES
Hospitalist Progress Note      PCP: Caroline Nagy MD    Date of Admission: 9/17/2020    Chief Complaint: Vomiting and confusion per spouse        History Of Present Illness:       \"71 y.o. female who presents with confusion, headache behind her eyes and vomiting. Patient is very forgetful and is unable to provide any reliable history. 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 Last chemotherapy was on 9/14/2020. The next morning she woke up confused, her speech did not make any sense and has been vomiting for the past 24 hours. Her oral intake has been very little over the last several days. Patient is unable to tell me when she had her last BM, states that she is not passing any gas, denies any abdominal pain. Her blood pressure was extremely elevated upon presentation to the emergency room with systolic blood pressure more than 200 mmHg     Patient has history of bowel obstruction has undergone lysis of adhesions and debulking of her tumor in July 2020. \"       Subjective: Patient is oriented x 3 but does not know what brought her to the hospital, denies complaining of headache earlier today or yesterday however this was one of her presenting symptoms. No chest pain, sob or abdominal pain. Per nurse had nausea earlier but no vomiting. Seemed to do well with compazine.      Medications:  Reviewed    Infusion Medications    sodium chloride 50 mL/hr at 09/18/20 0219    dextrose       Scheduled Medications    ALPRAZolam  0.5 mg Oral Nightly    insulin glargine  5 Units Subcutaneous Nightly    losartan  100 mg Oral Daily    pregabalin  75 mg Oral BID    rosuvastatin  40 mg Oral QPM    venlafaxine  75 mg Oral BID    sodium chloride flush  10 mL Intravenous 2 times per day    enoxaparin  40 mg Subcutaneous Daily    metoprolol succinate  100 mg Oral Daily    amLODIPine  5 mg Oral Daily     PRN Meds: oxyCODONE-acetaminophen, rOPINIRole, sodium chloride flush, acetaminophen **OR** acetaminophen, promethazine **OR** ondansetron, hydrALAZINE, labetalol, glucose, dextrose, glucagon (rDNA), dextrose      Intake/Output Summary (Last 24 hours) at 9/18/2020 0846  Last data filed at 9/18/2020 0457  Gross per 24 hour   Intake 496 ml   Output 500 ml   Net -4 ml       Exam:    BP (!) 199/75   Pulse 80   Temp 97.4 °F (36.3 °C) (Oral)   Resp 20   Ht 5' 8\" (1.727 m)   Wt 143 lb 11.8 oz (65.2 kg)   SpO2 97%   BMI 21.86 kg/m²     General appearance: No apparent distress, appears stated age and cooperative. HEENT: Pupils equal, round, and reactive to light. Conjunctivae/corneas clear. Neck: Supple, with full range of motion. No jugular venous distention. Trachea midline. Respiratory:  Normal respiratory effort. Clear to auscultation, bilaterally without Rales/Wheezes/Rhonchi. Cardiovascular: Regular rate and rhythm with normal S1/S2 without murmurs, rubs or gallops. Abdomen: Soft, non-tender, non-distended with normal bowel sounds. Musculoskelatal: No clubbing, cyanosis or edema bilaterally. Skin: Skin color, texture, turgor normal.  No rashes or lesions. Neurologic:  Cranial nerves: II-XII intact, grossly non-focal.  Psychiatric: Alert and oriented x 3, not to situation    Labs:   Recent Labs     09/17/20 2031   WBC 4.4   HGB 11.7*   HCT 35.4*        Recent Labs     09/17/20 2031 09/18/20  0437   * 128*   K 4.5 4.0   CL 91* 92*   CO2 23 21   BUN 23* 22*   CREATININE 0.9 0.9   CALCIUM 9.3 8.9     Recent Labs     09/17/20 2031   AST 18   ALT 6*   BILITOT 0.5   ALKPHOS 64     No results for input(s): INR in the last 72 hours. Recent Labs     09/17/20  2233 09/18/20  0149 09/18/20  0437   TROPONINI 0.04* 0.04* 0.05*       Studies:  CT ABDOMEN PELVIS WO CONTRAST Additional Contrast? None   Final Result      1. No evidence of bowel obstruction. Prior right hemicolectomy.    2.  Noncontrast CT demonstrating stable right upper quadrant peritoneal thickening, stable perisplenic nodules, and mild to moderate ascites likely representing stable peritoneal metastases. Recommend correlation with outside hospital records. 3.  Small left pleural effusion. CT HEAD WO CONTRAST   Final Result      1. No acute intracranial process. XR CHEST (2 VW)   Final Result      Small left pleural effusion. Assessment/Plan:    Encephalopathy, unclear etiology, Hypertensive cause vs recent stroke although not seen on CT and symptoms started several days ago, vs malignancy or other cause. No focal deficits. ROS unreliable. -MRI brain w w/o contrast    Vomiting likely secondary to chemotherapeutic agent effects. No SBO on CT however she does have stool burden and history of SBO, on narcotics. -Start bowel regimen     Hyponatremia-likely secondary to dehydration  -IV fluids until can take PO    Mild elevation in troponin-likely secondary to ACS type II/hypertensive urgency  Primary peritoneal carcinomatosis, no evidence of bowel obstruction on CT or clinically    Accelerated HTN likely due to not being able to ingest her antihypertensives because of vomiting, complicated by pain also   -Still hypertensive this am although she had stopped vomiting but our most recent check it is starting to come down with the PRN medications in addition to her home regimen    Headache, migraine like symptoms, may be due to HTN vs brain pathology   -given risk factors and encephalopathy will obtain MRI as above  -compazine PRN, also for n/v. Seems to have had some relief with this. Addendum:   Patient BP still very high, have ordered additional oral meds but she may need ICU for hypertensive emergency.  Discussed with ICU team.     DVT Prophylaxis: SCDs for now until blood pressure is better controlled  Diet: DIET GENERAL;  Code Status: Full Code     PT/OT Eval Status: As tolerated with assistance and fall precautions     Dispo -PCU with telemetry      [address]

## 2020-09-18 NOTE — PROGRESS NOTES
Pt BP elevated >200 on admission to unit. Pt had episode of emesis as well after receiving Zofran in ED. Pt reports ongoing retro-orbital headache. Pt given PRN hydralazine as well as P.O Lopressor and Compazine per Hospitalist. Pt has already received Labetalol in ED. Patients BP still >426 systolic manually after interventions. See MAR for further implementation.

## 2020-09-18 NOTE — ED NOTES
Mint Green, Lavender, Blue, Yellow/Orange blood tubes collected and sent to lab.         Carla Zelaya RN  09/17/20 203

## 2020-09-18 NOTE — PROGRESS NOTES
4 Eyes Admission Assessment     I agree as the admission nurse that 2 RN's have performed a thorough Head to Toe Skin Assessment on the patient. ALL assessment sites listed below have been assessed on admission. Areas assessed by both nurses:   [x]   Head, Face, and Ears   [x]   Shoulders, Back, and Chest  [x]   Arms, Elbows, and Hands   [x]   Coccyx, Sacrum, and Ischium  [x]   Legs, Feet, and Heels        Does the Patient have Skin Breakdown?   No         Librado Prevention initiated:  NA   Wound Care Orders initiated:  NA      WO nurse consulted for Pressure Injury (Stage 3,4, Unstageable, DTI, NWPT, and Complex wounds) or Librado score 18 or lower:  NA      Nurse 1 eSignature: Electronically signed by Marline Negron RN on 9/18/20 at 1:53 AM EDT    **SHARE this note so that the co-signing nurse is able to place an eSignature**    Nurse 2 eSignature: Electronically signed by Randy Cottrell RN on 9/18/20 at 3:32 AM EDT

## 2020-09-18 NOTE — ED PROVIDER NOTES
ED Attending Attestation Note     Date of evaluation: 9/17/2020    This patient was seen by the advance practice provider. I have seen and examined the patient, agree with the workup, evaluation, management and diagnosis. The care plan has been discussed. I have reviewed the ECG and concur with the BIJU's interpretation. My assessment reveals patient here for headache nausea and vomiting. According to patient's  she received chemotherapy 3 days ago and then the next day she started with confusion. Patient's had a past history of ovarian cancer. Today had a headache mostly frontal in nature. Also since 5:30 PM has had 3-4 episodes of vomiting. The  states the last time she vomited like this she had a bowel obstruction. She did have to undergo surgery at [facility] for a bowel obstruction in the past.  Most of the history has to be obtained by the  because the patient is confused and having a hard time providing an accurate history. She has no history of dementia. On exam patient appears to be in no acute distress and vitals do reveal her blood pressure 234/81. Her speech is clear and she has no focal neurologic deficits noted. She cannot tell me her age but can tell me her birthday. She cannot tell me the date or the year but can tell me who the president states is. Abdomen soft nontender.          Urmila Mclain MD  09/17/20 211       Urmila Mclain MD  11/07/20 2233

## 2020-09-18 NOTE — CONSULTS
Clinical Pharmacy Progress Note  Medication History     Admit Date: 9/17/2020    List of of current medications patient is taking is complete. Home Medication list in EPIC updated to reflect changes noted below. Source of information: Chart review, interview with patient's , Jennifer Rowan ([phone])    Changes made to medication list:   Medications removed:    Furosemide  Medications added:    Apixaban    Valsartan   Fluocinonide cream   Medication doses adjusted:    Venlafaxine dose taken is 75 mg ER daily. Note - pt was recently prescribed venlafaxine 150 mg ER daily (2 capsules) by PCP, but  states she takes only 1 capsule daily    Per , lantus dose is \"7 or 8\" units nightly    Humalog sliding scale clarified as 0-14 units BID with meals  o <120 = no parameters; assume no insulin is given  o 121-160 = 6 units   o 161-200 = 8 units   o 201-240 = 10 units   o 241-280 = 12 units   o 281-320= 14 units  Other notes:    Pt usually takes Oxycodone-APAP 10/325 mg BID     Please call with any questions.   Milady Doran, PharmD, BCPS  9/18/2020 1:25 PM  Wireless: [phone] or [phone]

## 2020-09-18 NOTE — ED NOTES
Patient going to Xray and CT on ED stretcher with [employer].         Leopoldo Guerrier RN  09/17/20 2039

## 2020-09-18 NOTE — ED PROVIDER NOTES
[address] [facility] RESIDENT NOTE       Date of evaluation: 9/17/2020    Chief Complaint     Emesis    History of Present Illness     Jr Hassan is a 67 y.o. female with a past medical history of primary peritoneal carcinomatosis, hypertension, type 2 diabetes mellitus who presents with altered mental status, retro-orbital headache, and vomiting. Patient's  states that when she returned from a chemotherapy session on 9/14/2020 she was doing fine but when she woke up the next morning, she seemed confused, and was not making sense in which she said. Patient's  states that she is also had little oral intake over the last several days, has been reporting a retro-orbital headache since the day prior to admission, and has had several episodes of vomiting on the day of admission. Of note, she has had a history of bowel obstruction and underwent lysis of adhesions and debulking of her tumor in 7/2020. Patient denies fevers, chills, cough, shortness of breath, palpitations or lightheadedness. She denies abdominal pain or diarrhea. She reports her last bowel movement was 4 days ago, but she also has not had much oral intake over the last 2 days. Review of Systems     Review of Systems as above    Past Medical, Surgical, Family, and Social History     She has a past medical history of Anxiety, Arthritis, Asthma, CAD (coronary artery disease), Cancer of peritoneum ([address] Chronic low back pain, Depression, Embolism ([address] Hx of blood clots, Hyperlipidemia, Hypertension, Postoperative delirium, Pulmonary embolism ([address] Short-term memory loss, Type II or unspecified type diabetes mellitus without mention of complication, not stated as uncontrolled, Wears dentures, and Wears glasses. She has a past surgical history that includes Coronary artery bypass graft (2007); Appendectomy; Cataract removal (Bilateral);  Dilation and curettage of uterus; Coronary angioplasty with stent (1991); Colonoscopy; Endoscopy, colon, diagnostic; eye surgery; dieter and bso (cervix removed) (05/26/2016); hemicolectomy (05/2016); Hysterectomy; Tunneled venous port placement (Left, 7/27/2016); other surgical history (N/A, 08/17/2018); Breast biopsy (Left, 06/23/2017); joint replacement (Bilateral); Colonoscopy (N/A, 6/6/2019); Colonoscopy (6/6/2019); Cardiac surgery; and laparoscopy (N/A, 7/23/2020). Her family history includes Diabetes in her father and maternal uncle; Heart Disease in her maternal grandmother; High Blood Pressure in her mother. She reports that she quit smoking about 20 years ago. She has a 20.00 pack-year smoking history. She has never used smokeless tobacco. She reports previous alcohol use of about 1.0 standard drinks of alcohol per week. She reports that she does not use drugs. Medications     Previous Medications    ALBUTEROL SULFATE HFA (VENTOLIN HFA) 108 (90 BASE) MCG/ACT INHALER    Inhale 2 puffs into the lungs every 4 hours as needed for Wheezing    ALPRAZOLAM (XANAX) 0.5 MG TABLET    Take 0.5 mg by mouth nightly. FUROSEMIDE (LASIX) 20 MG TABLET    Take 1 tablet by mouth daily    INSULIN GLARGINE (LANTUS) 100 UNIT/ML INJECTION VIAL    Inject 5 Units into the skin nightly    INSULIN LISPRO (HUMALOG) 100 UNIT/ML INJECTION VIAL    Inject 0-12 Units into the skin 3 times daily (with meals)    LOSARTAN (COZAAR) 100 MG TABLET    Take 1 tablet by mouth daily Pt. takes 100mg of Cozaar daily. METOPROLOL SUCCINATE (TOPROL XL) 100 MG EXTENDED RELEASE TABLET    Take 1 tablet by mouth daily    OXYCODONE-ACETAMINOPHEN (PERCOCET)  MG PER TABLET    Take 1 tablet by mouth 4 times daily as needed for Pain. PREGABALIN (LYRICA) 75 MG CAPSULE    Take 1 capsule by mouth 2 times daily for 120 days. ROPINIROLE (REQUIP) 2 MG TABLET    Take 2 mg by mouth 3 times daily as needed (RLS)    ROSUVASTATIN (CRESTOR) 40 MG TABLET    Take 40 mg by mouth every evening. VENLAFAXINE (EFFEXOR XR) 75 MG EXTENDED RELEASE CAPSULE    Take 75 mg by mouth 2 times daily       Allergies     She is allergic to cephalexin; pcn [penicillins]; cephalosporins; and cephalexin. Physical Exam     INITIAL VITALS: BP: (!) 234/81, Temp: 98.4 °F (36.9 °C), Pulse: 79, Resp: 18, SpO2: 100 %   Physical Exam  Vitals signs reviewed. Constitutional:       Appearance: Normal appearance. Cardiovascular:      Rate and Rhythm: Normal rate and regular rhythm. Heart sounds: No murmur. No friction rub. No gallop. Pulmonary:      Effort: Pulmonary effort is normal.      Breath sounds: Normal breath sounds. Abdominal:      General: Bowel sounds are normal.      Palpations: Abdomen is soft. Neurological:      General: No focal deficit present. Mental Status: She is alert and oriented to person, place, and time. Cranial Nerves: No cranial nerve deficit. Sensory: No sensory deficit. Motor: No weakness. Diagnostic Results     EKG   Interpreted inconjunction with emergency department physician Susan Aldridge MD  Normal sinus rhythm    RADIOLOGY:  CT ABDOMEN PELVIS WO CONTRAST Additional Contrast? None   Final Result      1. No evidence of bowel obstruction. Prior right hemicolectomy. 2.  Noncontrast CT demonstrating stable right upper quadrant peritoneal thickening, stable perisplenic nodules, and mild to moderate ascites likely representing stable peritoneal metastases. Recommend correlation with outside hospital records. 3.  Small left pleural effusion. CT HEAD WO CONTRAST   Final Result      1. No acute intracranial process. XR CHEST (2 VW)   Final Result      Small left pleural effusion.              LABS:   Results for orders placed or performed during the hospital encounter of 09/17/20   CBC auto differential   Result Value Ref Range    WBC 4.4 4.0 - 11.0 K/uL    RBC 4.04 4.00 - 5.20 M/uL    Hemoglobin 11.7 (L) 12.0 - 16.0 g/dL    Hematocrit 35.4 (L) 36.0 - 48.0 %    MCV 87.7 80.0 - 100.0 fL    MCH 29.0 26.0 - 34.0 pg    MCHC 33.0 31.0 - 36.0 g/dL    RDW 17.1 (H) 12.4 - 15.4 %    Platelets 227 122 - 317 K/uL    MPV 7.5 5.0 - 10.5 fL    Neutrophils % 81.5 %    Lymphocytes % 10.1 %    Monocytes % 7.2 %    Eosinophils % 0.6 %    Basophils % 0.6 %    Neutrophils Absolute 3.6 1.7 - 7.7 K/uL    Lymphocytes Absolute 0.4 (L) 1.0 - 5.1 K/uL    Monocytes Absolute 0.3 0.0 - 1.3 K/uL    Eosinophils Absolute 0.0 0.0 - 0.6 K/uL    Basophils Absolute 0.0 0.0 - 0.2 K/uL   Comprehensive Metabolic Panel w/ Reflex to MG   Result Value Ref Range    Sodium 128 (L) 136 - 145 mmol/L    Potassium reflex Magnesium 4.5 3.5 - 5.1 mmol/L    Chloride 91 (L) 99 - 110 mmol/L    CO2 23 21 - 32 mmol/L    Anion Gap 14 3 - 16    Glucose 196 (H) 70 - 99 mg/dL    BUN 23 (H) 7 - 20 mg/dL    CREATININE 0.9 0.6 - 1.2 mg/dL    GFR Non-African American >60 >60    GFR African American >60 >60    Calcium 9.3 8.3 - 10.6 mg/dL    Total Protein 6.7 6.4 - 8.2 g/dL    Alb 4.1 3.4 - 5.0 g/dL    Albumin/Globulin Ratio 1.6 1.1 - 2.2    Total Bilirubin 0.5 0.0 - 1.0 mg/dL    Alkaline Phosphatase 64 40 - 129 U/L    ALT 6 (L) 10 - 40 U/L    AST 18 15 - 37 U/L    Globulin 2.6 g/dL   Troponin   Result Value Ref Range    Troponin 0.03 (H) <0.01 ng/mL     RECENT VITALS:  BP: (!) 208/70, Temp: 98.4 °F (36.9 °C),Pulse: 79, Resp: 18, SpO2: 100 %     Procedures     None    ED Course     Nursing Notes, Past Medical Hx, Past Surgical Hx, Social Hx, Allergies, and FamilyHx were reviewed.     The patient was giventhe following medications:  Orders Placed This Encounter   Medications    labetalol (NORMODYNE;TRANDATE) injection 5 mg    labetalol (NORMODYNE;TRANDATE) injection 5 mg    0.9 % sodium chloride bolus    oxyCODONE-acetaminophen (PERCOCET) 7.5-325 MG per tablet 1 tablet       CONSULTS:  IP CONSULT TO Vika / ASSESSMENT / PLAN     Jayleen Faulkner is a 67 y.o. female past medical history of primary peritoneal carcinomatosis, history of small bowel obstruction who presents with change in mental status starting 9/14/2020, to the point where she is only oriented to self and not to place or time. Patient is also had multiple episodes of vomiting today and has had little oral intake over the last few days. On arrival patient's blood pressure was markedly elevated in the [address] systolic. She had reportedly not been taking her blood pressure medication for the last day and a half as she did not want to take it and did not have an appetite for food either. Given patient's elevated blood pressure and change in mental status, CT head without contrast was performed that it was unremarkable for any acute intracranial abnormality. Given her history of bowel obstruction and vomiting, CT abdomen pelvis without contrast was also performed that was unremarkable for acute changes, shows chronic peritoneal carcinomatosis. CBC was remarkable for anemia that is chronic but improved from the prior CBC. It also shows lymphopenia to 0.4. CMP was remarkable for hyponatremia to 128, patient's sodium was between 131-134 previously in 7/2020. Patient's hyponatremia likely from malnourishment may partly explain her change in mental status. He likely also has a component of hypertensive encephalopathy. Patient's troponin is also elevated at 0.03, likely from hypertensive emergency and a repeat troponin has been ordered. Initial EKG was normal.  Given patient's failure to thrive, hyponatremia and altered mental status, as well as hypertensive emergency with elevated troponins, consult has been placed for admission. Discussed with oncology (Dr. Markell Alanis), who recommended admission to hospitalist for hypertensive emergency. This patient was also evaluated by the attending physician. All care plans were discussed and agreed upon. Clinical Impression     1. General weakness    2. Hyponatremia    3.

## 2020-09-18 NOTE — CONSULTS
ICU History and Physical  PGY-1    PCP: Sarah Mortensen MD    Code:Full Code  Admit Date: 9/17/2020  Vent Settings:    / / /   IV Access:   Central Line: no  Duration:    IV Fluids: 0.9 NaCL 50 ml/hr   CVP:    Vasopressors:    Antibiotics:  Indication:   Duration:  Stop Date:     Begum Catheter: Indication:   Duration:     Diet: DIET GENERAL;      History of present illness:      CC: Vomiting and confusion per spouse    HPI:  \"71 y. o. female with PMHx of diabetes,hypertension, previous CABG in 2007, who presents with confusion, headache behind her eyes and vomiting.  Patient is very forgetful and is unable to provide any reliable history.  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  Last chemotherapy was on 9/14/2020. Shellie Kinsey next morning she woke up confused, her speech did not make any sense and has been vomiting for the past 24 hours.  Her oral intake has been very little over the last several days. Patient is unable to tell me when she had her last BM, states that she is not passing any gas, denies any abdominal pain.  Her blood pressure was extremely elevated upon presentation to the emergency room with systolic blood pressure more than 200 mmHg     Patient has history of bowel obstruction has undergone lysis of adhesions and debulking of her tumor in July 2020. \"    ROS: Review of Systems:  General appearance: No apparent distress, appears stated age and cooperative.   Respiratory:  denies dyspnea, wheezing, stridor  Cardiovascular: Denies chest pain, orthopnea, PND  Abdomen: Denies epigastric pain,vomiting, nausea  Neuro: Denies headaches     Past Medical / Surgical History:    Past Medical History:   Diagnosis Date    Anxiety     Arthritis     Asthma     poorly controlled    CAD (coronary artery disease)     Cancer of peritoneum (HCC)     ovarian    Chronic low back pain     Depression     Embolism (HCC)     small pulmonary clot  Hx of blood clots     x1    Hyperlipidemia 4/23/2012    Hypertension     Postoperative delirium 05/26/2016    Pulmonary embolism (HCC)     Short-term memory loss     Type II or unspecified type diabetes mellitus without mention of complication, not stated as uncontrolled     Wears dentures     Wears glasses      Past Surgical History:   Procedure Laterality Date    APPENDECTOMY      BREAST BIOPSY Left 06/23/2017    Fibroadenomatous type fibrocystic change    CARDIAC SURGERY      quadruple bypass    CATARACT REMOVAL Bilateral     COLONOSCOPY      COLONOSCOPY N/A 6/6/2019    COLONOSCOPY WITH BIOPSY performed by Fuentes Hawk MD at [address] [facility]  6/6/2019    COLONOSCOPY CONTROL HEMORRHAGE performed by Fuentes Hawk MD at [address] [facility] CORONARY ARTERY BYPASS GRAFT  2007    DILATION AND CURETTAGE OF UTERUS      Miscarriage    ENDOSCOPY, COLON, DIAGNOSTIC      EYE SURGERY      HEMICOLECTOMY  05/2016    HYSTERECTOMY      JOINT REPLACEMENT Bilateral     bilat knee    LAPAROSCOPY N/A 7/23/2020    ROBOTIC LAPAROSCOPIC CONVERTED TO OPEN EXTENSIVE LYSIS OF ADHESIONS, EXCISION OF PERITONEAL NODULES, TUMOR DEBULKING, SMALL BOWEL RESECTION WITH ANASTAMOSIS, FLEXIBLE SIGMOIDOSCOPY performed by Karlos Summers MD at [address] 08/17/2018    [address]    [facility]  05/26/2016    TUNNELED VENOUS PORT PLACEMENT Left 7/27/2016    PORT-A-CATH PLACEMENT,LEFT        Medications Prior to Admission:    No current facility-administered medications on file prior to encounter.       Current Outpatient Medications on File Prior to Encounter   Medication Sig Dispense Refill    amLODIPine (NORVASC) 5 MG tablet Take 5 mg by mouth daily      valsartan (DIOVAN) 80 MG tablet Take 80 mg by mouth daily      apixaban (ELIQUIS) 5 MG TABS tablet Take by mouth 2 times examination:   VS:  BP (!) 190/74   Pulse 74   Temp 97.6 °F (36.4 °C) (Oral)   Resp 16   Ht 5' 8\" (1.727 m)   Wt 143 lb 11.8 oz (65.2 kg)   SpO2 98%   BMI 21.86 kg/m²     I/O:    Intake/Output Summary (Last 24 hours) at 9/18/2020 1754  Last data filed at 9/18/2020 1306  Gross per 24 hour   Intake 496 ml   Output 925 ml   Net -429 ml       Physical Exam  :  General appearance: No apparent distress, appears stated age and cooperative. HEENT: Pupils equal, round, and reactive to light. Neck: Supple, with full range of motion. No jugular venous distention. Trachea midline. Respiratory:  Normal respiratory effort. Clear to auscultation, bilaterally without Rales/Wheezes/Rhonchi. Cardiovascular: Regular rate and rhythm with normal S1/S2 without murmurs, rubs or gallops. Abdomen: Soft, non-tender, non-distended with normal bowel sounds. Musculoskelatal: No clubbing, cyanosis or edema bilaterally. Skin: Skin color, texture,normal, skin warm.   Psychiatric: Alert and oriented x 3, but not to situation    Labs & Imaging:   LABS:  CBC:   Recent Labs     09/17/20 2031   WBC 4.4   HGB 11.7*   HCT 35.4*      MCV 87.7                            Renal:   Recent Labs     09/17/20 2031 09/18/20  0437   * 128*   K 4.5 4.0   CL 91* 92*   CO2 23 21   BUN 23* 22*   CREATININE 0.9 0.9   GLUCOSE 196* 228*   ANIONGAP 14 15     Hepatic:   Recent Labs     09/17/20 2031   AST 18   ALT 6*   BILITOT 0.5   ALKPHOS 64     Troponin:   Recent Labs     09/17/20 2233 09/18/20  0149 09/18/20  0437   TROPONINI 0.04* 0.04* 0.05*       Recent Labs     09/17/20 2223 09/18/20  0605   COLORU Yellow  --    PHUR 7.0  7.0 5.0   WBCUA 0-2  --    RBCUA 3-4  --    BACTERIA Rare*  --    CLARITYU Clear  --    SPECGRAV 1.025  --    LEUKOCYTESUR Negative  --    UROBILINOGEN 0.2  --    BILIRUBINUR Negative  --    BLOODU SMALL*  --    GLUCOSEU 100*  --         IMAGING:  CT ABDOMEN PELVIS WO CONTRAST Additional Contrast? None   Final Result      1. No evidence of bowel obstruction. Prior right hemicolectomy. 2.  Noncontrast CT demonstrating stable right upper quadrant peritoneal thickening, stable perisplenic nodules, and mild to moderate ascites likely representing stable peritoneal metastases. Recommend correlation with outside hospital records. 3.  Small left pleural effusion. CT HEAD WO CONTRAST   Final Result      1. No acute intracranial process. XR CHEST (2 VW)   Final Result      Small left pleural effusion. MRI BRAIN W WO CONTRAST    (Results Pending)       Assessment & Plan:      Hypertensive Urgency:  -Pt's systolic pressure continues to be around the 200s despite being on amlodipine, Chlorthalidone, losartan, metoprolol  -BP needs to be brought down to prevent complications like stroke.  -Last known normal blood pressure on progress note from general and vascular surgery and nephrology visit on 07/30/2020 was 136/64 .  -Current Blood pressure 190/74 ()   -Initiate treatment with Cardene gtt with a goal titration to decrease MAP by 25 percent. Based on that Goal MAP would be 85.  -At discharge aim for Blood pressure < 140/90 based on patient's age and hx of DM.  -Increase home Amlodipine from 5 mg to 10 mg  -Continue Losartan 100 mg, metoprolol 100 mg, Chlorthalidone 25 mg  -Monitor for any kind of change in patient's symptoms  -CT head 09/17/2020 shows No acute intracranial process. -CT abd pelvis 09/172020 did not show any renal pathology.  -Follow up MRI brain w wo contrast  -Follow up renin, aldosterone, TSH and PTH to assess for secondary causes of hypertension.  -Will do a STOP-Bang questionnaire to assess for sleep apnea.     Diabetes Mellitus :  -Initiate 5 units lantus  which is her home dose.  -monitor glucose  -Adjust insulin accordingly      Code Status: Full Code  FEN:; DIET GENERAL;  PPX: Lovenox  DISPO: ICU      This patient will be discussed with attending, Dr. Camacho Julian MD, PGY- 1  9/18/2020,  5:54 PM     Pulm/CC    Patient seen and examined. I agree with 's history, physical, lab findings, assessment and plan. BP much better controlled and off nicardipine gtt since ~ 8AM. Current BP is 124/62. Would shoot for SBP < 180 for next 24 hrs given presenting SBP > 200. Norvasc has been increased to 10 mg and low dose hydralazine added for BP control in addition to her other meds. MRI shows no acute change to account for HTN. Screening for CHRISTIANO is a good idea. Oncology mentions avastin may be contributing as well    Can TXF to Tele.  Will sign off

## 2020-09-18 NOTE — PLAN OF CARE
Problem: Pain:  Goal: Pain level will decrease  Description: Pain level will decrease  Outcome: Ongoing  Note: Patient c/o acute pain in her head. Pain radiates behind eyes. Pain rated from 4-7. Patient BP elevated and headache worse with elevated BP. Given prn BP meds to help control pain. Problem: Nausea/Vomiting:  Goal: Absence of nausea/vomiting  Description: Absence of nausea/vomiting  Outcome: Ongoing  Note: Patient c/o nausea but no vomiting today. Given prn compazine with some relief. Problem: Nausea/Vomiting:  Goal: Able to drink  Description: Able to drink  Outcome: Ongoing  Note: Patient able to drink fluids with encouragement. IVF stopped. Problem: Nausea/Vomiting:  Goal: Able to eat  Description: Able to eat  Outcome: Ongoing  Note: Patient unable to eat any food yet today. Patient has no appetite.       Problem: Nausea/Vomiting:  Goal: Ability to achieve adequate nutritional intake will improve  Description: Ability to achieve adequate nutritional intake will improve  Outcome: Ongoing

## 2020-09-18 NOTE — PROGRESS NOTES
Patients BP >336 systolic on admission + N/V and complaints of ongoing retro-orbital headache. Pt given BP medication and Zofran in ED with no improvement. On arrival to unit patients BP still >200. Maintenance fluids ordered for pt: RN paged Hospitalist for clarification in regards to pt's BP. Fluids decreased to 50 ml/hr. Pt given PRN Hydralazine, P.O Lopressor, and Labetalol x   with minimal improvement. Pt also given Compazine for N/V. Hospitalist paged for further intervention. Pt hesitant to take PRN pain medication stating that it makes her sick. RN educated pt on use of Zofran. See MAR for intervention. Pt reports improvement with nausea. Will continue to monitor and assess.

## 2020-09-18 NOTE — CONSULTS
[facility]    Consult Note        PRIMARY PROVIDER: Caroline Nagy MD      HISTORY OF PRESENT ILLNESS:      Patient is a 43-year-old female who is followed by Dr. Kaden Solomon for ovarian cancer who was admitted to the hospital with retro-orbital headache, severe hypertension confusion and vomiting. Her systolic blood pressure on presentation was greater than 230. Her  also reports that she is increasingly forgetful. She was last seen by Dr. Wilma Ramirez on 9/14/2020. Complicating her cancer care was a recent bowel obstruction in July 2020, bilateral pulmonary emboli for which she has been treated with Eliquis, iron deficiency anemia status post IV iron and now on Procrit, diabetes which is exacerbated by the use of steroids. At that time she received cycle 7 carboplatinum, liposomal doxorubicin, and Avastin.  At that time her blood pressure was 130/80:    Primary Oncologist: Vicente Steen (Gynecological/Oncology), [facility]ja Lazo  Referring Physician: Jesus Manuel Holguin (Gastroenterology), [facility], Ajay Steven MD (Urology), Kandace Massey MD (Gynecological/Oncology), [facility]    Date of Service: 09/14/2020      Chief Complaint  Anemia caused by chemotherapy (disorder)  Epistaxis  Normocytic anemia  Peritoneal carcinomatosis ( Date of Dx:05/17/2016 )  Pulmonary embolism (disorder)    Problem List  Anemia caused by chemotherapy (disorder)  Epistaxis  Normocytic anemia  Peritoneal carcinomatosis ( Date of Dx:05/17/2016 )  Pulmonary embolism (disorder)  Abnormal intestinal absorption (finding)  Agranulocytosis due to cancer chemotherapy  Anemia  Anorexia  Back pain  Computed tomography of abdomen abnormal (finding)  Coronary arteriosclerosis (disorder)  Delirium (disorder)  Diabetes mellitus type 1 (disorder)  Diabetes mellitus type 2 (disorder)  Diarrhea  Fatigue  Finding of serum tumor marker level (finding)  Hip pain (finding)  Hyperlipidemia (disorder)  Hypertensive disorder, systemic arterial (disorder)  Increased cancer antigen 125 (finding)  Iron deficiency anemia (disorder)  Left hip pain  Malabsorption - iron (disorder)  Malignant tumor of peritoneum (disorder)  Pain from metastases (finding)  Patient encounter status (finding)  Primary peritoneal cancer ( Stage Date: 2016, Stage FIGO IIIC  Date of Dx:2016 Treatment Status: Platinum-sensitive (ND or CR with relapse >= 6 months after stopping chemo); BRCA2: Unknown; BRCA1: Unknown; Histopathologic Type: Undifferentiated epithelial; MSI (Microsatellite Instability): Stable; MMR (Mismatch Repair): Unknown; TRK gene: Unknown; history of PPC , post treatment 2016 )  Small bowel obstruction  UTI  Uterine leiomyoma (disorder)  Vitamin D deficiency (disorder)  Weight loss (finding)    HPI  Diagnoses:    Papillary serous primary peritoneal carcinoma, stage III C, 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 Rheumatologic disorder. Diabetes     Previous Therapies  1. Optimal debulking by Dr. Magnus Ann on 16. 2.  Carboplatin and Taxol x 4 16 to 16 (stopped due to neuropathy)    3. Carbo/Taxotere x 1, 10/5/16    4. Carbo single agent x 1, 16     Current Therapy  Ferric Carboxymaltose (Injectafer) D1,8 Q14D (Intravenous Iron) Cycle Length: 14 Number Cycles: 1 Start: C1D1 on 2019 Assoc Dx:  Iron deficiency anemia (disorder) LOT: Other 2019 C1 D1  Ferric Carboxymaltose IV, 750 mg  Epoetin Billy Q14D Cycle Length: 14 Number Cycles: 20 Start: Rebeca Duarte on 10/21/2019 Assoc Dx: Normocytic anemia LOT: Other 2020 C19 D1  Retacrit (Epoetin billy-epbx Subcutaneous), 67290 unit q2w  Carboplatin + Liposomal Doxorubicin + Bevacizumab D1,15 Q28D Cycle Length: 28 Number Cycles: 10 Start: Rebeca Duarte on 2020 Assoc Dx: Primary peritoneal cancer LOT: 3rd Line Metastatic Stage: FIGO IIIC 2020 C7 D1  Mvasi (Bevacizumab-awwb IV),  mg  Carboplatin IV, 280 mg (4 AUC)  Doxorubicin Liposomal IV, 54 mg (30 mg/m2)  Pegfilgrastim Cycle Length: 28 Number Cycles: 6 Start: C1D1 on 2020 Assoc Dx: Pulmonary embolism (disorder) LOT: Other 2020 C1 D1  Pegfilgrastim Subcutaneous (via wearable injector),  mg  Pegfilgrastim Cycle Length: 12 Number Cycles: 1 Start: Mei Jewell on 2020 Assoc Dx: Pulmonary embolism (disorder) LOT: Other 2020 C1 D2  Pegfilgrastim Subcutaneous,  mg  OHC Hydration Cycle Length: 1 Number Cycles: 1 Start: Mei Jewell on 2020 Assoc Dx: Pulmonary embolism (disorder) LOT: Toxicity Management 2020 C1 D1  Sodium Chloride IV 0.9 %, .9 % 1000 mL, Dose modified  OHC Hydration Cycle Length: 1 Number Cycles: 1 Start: Mei Jewell on 2020 Assoc Dx: Pulmonary embolism (disorder) LOT: Other 2020 C1 D1        Patient has been treated aggressively with hydralazine Lopressor and labetalol.     PAST MEDICAL HISTORY:      Past Medical History:   Diagnosis Date    Anxiety     Arthritis     Asthma     poorly controlled    CAD (coronary artery disease)     Cancer of peritoneum ([address]     ovarian    Chronic low back pain     Depression     Embolism (HCC)     small pulmonary clot    Hx of blood clots     x1    Hyperlipidemia 2012    Hypertension     Postoperative delirium 2016    Pulmonary embolism (HCC)     Short-term memory loss     Type II or unspecified type diabetes mellitus without mention of complication, not stated as uncontrolled     Wears dentures     Wears glasses        PAST SURGICAL HISTORY:      Past Surgical History:   Procedure Laterality Date    APPENDECTOMY      BREAST BIOPSY Left 2017    Fibroadenomatous type fibrocystic change    CARDIAC SURGERY      quadruple bypass    CATARACT REMOVAL Bilateral     COLONOSCOPY      COLONOSCOPY N/A 2019    COLONOSCOPY WITH BIOPSY performed by Enrique Kovacs MD at [address]  2019    COLONOSCOPY CONTROL HEMORRHAGE performed by Enrique Kovacs MD at [address] [facility] ENDOSCOPY    CORONARY ANGIOPLASTY WITH STENT PLACEMENT  1991    CORONARY ARTERY BYPASS GRAFT  2007    DILATION AND CURETTAGE OF UTERUS      Miscarriage    ENDOSCOPY, COLON, DIAGNOSTIC      EYE SURGERY      HEMICOLECTOMY  05/2016    HYSTERECTOMY      JOINT REPLACEMENT Bilateral     bilat knee    LAPAROSCOPY N/A 7/23/2020    ROBOTIC LAPAROSCOPIC CONVERTED TO OPEN EXTENSIVE LYSIS OF ADHESIONS, EXCISION OF PERITONEAL NODULES, TUMOR DEBULKING, SMALL BOWEL RESECTION WITH ANASTAMOSIS, FLEXIBLE SIGMOIDOSCOPY performed by Vicente Steen MD at [address] 08/17/2018    SPINAL CORD STIMULATOR LEAD REVISION AND GENERATOR    FRACISCO AND BSO  05/26/2016    TUNNELED VENOUS PORT PLACEMENT Left 7/27/2016    PORT-A-CATH PLACEMENT,LEFT        CURRENT MEDICATIONS:    Current Facility-Administered Medications   Medication Dose Route Frequency Provider Last Rate Last Dose    ALPRAZolam (XANAX) tablet 0.5 mg  0.5 mg Oral Nightly Veronica Elaine MD   0.5 mg at 09/18/20 0205    insulin glargine (LANTUS;BASAGLAR) injection pen 5 Units  5 Units Subcutaneous Nightly Veronica Elaine MD   5 Units at 09/18/20 0229    losartan (COZAAR) tablet 100 mg  100 mg Oral Daily Gillian Jung MD        oxyCODONE-acetaminophen (PERCOCET)  MG per tablet 1 tablet  1 tablet Oral 4x Daily PRN Veronica Elaine MD   1 tablet at 09/18/20 0536    pregabalin (LYRICA) capsule 75 mg  75 mg Oral BID Veronica Elaine MD   75 mg at 09/18/20 0205    rOPINIRole (REQUIP) tablet 2 mg  2 mg Oral TID PRN Veronica Elaine MD        rosuvastatin (CRESTOR) tablet 40 mg  40 mg Oral QPM Veronica Elaine MD        venlafaxine (EFFEXOR XR) extended release capsule 75 mg  75 mg Oral BID Veronica Elaine MD   75 mg at 09/18/20 0205    sodium chloride flush 0.9 % injection 10 mL  10 mL Intravenous 2 times per day Veronica Elaine MD        sodium chloride flush 0.9 % injection 10 mL 10 mL Intravenous PRN Brenda Rivera MD        acetaminophen (TYLENOL) tablet 650 mg  650 mg Oral Q6H PRN Brenda Rivera MD        Or    acetaminophen (TYLENOL) suppository 650 mg  650 mg Rectal Q6H PRN Brenda Rivera MD        promethazine (PHENERGAN) tablet 12.5 mg  12.5 mg Oral Q6H PRN Brenda Rivera MD        Or    ondansetron (ZOFRAN) injection 4 mg  4 mg Intravenous Q6H PRN Brenda Rivera MD   4 mg at 09/18/20 0047    enoxaparin (LOVENOX) injection 40 mg  40 mg Subcutaneous Daily Brenda Rivera MD        0.9 % sodium chloride infusion   Intravenous Continuous Brenda Rivera MD 50 mL/hr at 09/18/20 0219      hydrALAZINE (APRESOLINE) injection 10 mg  10 mg Intravenous Q6H PRN Brenda Rivera MD   10 mg at 09/18/20 0203    labetalol (NORMODYNE;TRANDATE) injection 10 mg  10 mg Intravenous Q10 Min PRN Brenda Rivera MD   10 mg at 09/18/20 0531    glucose (GLUTOSE) 40 % oral gel 15 g  15 g Oral PRN Brenda Rivera MD        dextrose 50 % IV solution  12.5 g Intravenous PRN Brenda Rivera MD        glucagon (rDNA) injection 1 mg  1 mg Intramuscular PRN Brenda Rivera MD        dextrose 5 % solution  100 mL/hr Intravenous PRN Brenda Rivera MD        metoprolol succinate (TOPROL XL) extended release tablet 100 mg  100 mg Oral Daily Brenda Rivera MD   100 mg at 09/18/20 0236    amLODIPine (NORVASC) tablet 5 mg  5 mg Oral Daily Brenda Rivera MD   5 mg at 09/18/20 0603     Facility-Administered Medications Ordered in Other Encounters   Medication Dose Route Frequency Provider Last Rate Last Dose    famotidine (PEPCID) injection 20 mg  20 mg Intravenous Daily Todd Seymour MD   20 mg at 11/07/16 1320       ALLERGIES:    Allergies   Allergen Reactions    Cephalexin Rash    Pcn [Penicillins] Rash    Cephalosporins      UNABLE TO RECALL REACTION    Cephalexin Rash       SOCIAL HISTORY:    Social History     Socioeconomic History    Marital status:      Spouse name: None    Number of children: None    Years of education: None    Highest education level: None   Occupational History    None   Social Needs    Financial resource strain: None    Food insecurity     Worry: None     Inability: None    Transportation needs     Medical: None     Non-medical: None   Tobacco Use    Smoking status: Former Smoker     Packs/day: 2.00     Years: 10.00     Pack years: 20.00     Last attempt to quit: 2000     Years since quittin.1    Smokeless tobacco: Never Used   Substance and Sexual Activity    Alcohol use: Not Currently     Alcohol/week: 1.0 standard drinks     Types: 1 Glasses of wine per week    Drug use: Never    Sexual activity: Not Currently   Lifestyle    Physical activity     Days per week: None     Minutes per session: None    Stress: None   Relationships    Social connections     Talks on phone: None     Gets together: None     Attends Religion service: None     Active member of club or organization: None     Attends meetings of clubs or organizations: None     Relationship status: None    Intimate partner violence     Fear of current or ex partner: None     Emotionally abused: None     Physically abused: None     Forced sexual activity: None   Other Topics Concern    None   Social History Narrative    None   :      FAMILY HISTORY:    Family History   Problem Relation Age of Onset    High Blood Pressure Mother     Diabetes Father     Heart Disease Maternal Grandmother     Diabetes Maternal Uncle        REVIEW OF SYSTEMS:      · Confused and unable to give a cogent review of systems    PHYSICAL EXAM:      Vitals:  BP (!) 189/74   Pulse 94   Temp 97 °F (36.1 °C) (Oral)   Resp (!) 94   Ht 5' 8\" (1.727 m)   Wt 143 lb 11.8 oz (65.2 kg)   SpO2 100%   BMI 21.86 kg/m²     CONSTITUTIONAL: Well-appearing, no acute distress.  Confused  EYES: Sclera clear, conjunctiva normal  ENT: Oral pharynx unremarkable with moist mucus membranes  LUNGS: Clear to auscultation. No increased labor with breathing. CARDIOVASCULAR: Regular rate and rhythm, normal S1 and S2, no S3 or S4, and no murmur noted. ABDOMEN: Soft, non-distended, non-tender  MUSCULOSKELETAL: There is no redness, warmth, or swelling of the joints. NEUROLOGIC: Awake, alert. Grossly non-focal.  SKIN: No bruising or bleeding.   PSYCH: Oriented only to first name, grossly non-focal      LAB DATA:    Labs:  General Labs:  CBC with Differential:    Lab Results   Component Value Date    WBC 4.4 09/17/2020    RBC 4.04 09/17/2020    RBC 3.13 06/19/2017    HGB 11.7 09/17/2020    HCT 35.4 09/17/2020     09/17/2020    MCV 87.7 09/17/2020    MCH 29.0 09/17/2020    MCHC 33.0 09/17/2020    RDW 17.1 09/17/2020    SEGSPCT 63 05/21/2012    BANDSPCT 3 06/21/2020    METASPCT 1 06/21/2020    LYMPHOPCT 10.1 09/17/2020    LYMPHOPCT 30.6 06/19/2017    MONOPCT 7.2 09/17/2020    EOSPCT 2 04/23/2012    BASOPCT 0.6 09/17/2020    MONOSABS 0.3 09/17/2020    LYMPHSABS 0.4 09/17/2020    EOSABS 0.0 09/17/2020    BASOSABS 0.0 09/17/2020     CMP:    Lab Results   Component Value Date     09/18/2020    K 4.0 09/18/2020    CL 92 09/18/2020    CO2 21 09/18/2020    BUN 22 09/18/2020    CREATININE 0.9 09/18/2020    GFRAA >60 09/18/2020    GFRAA 100 04/23/2012    AGRATIO 1.6 09/17/2020    LABGLOM >60 09/18/2020    GLUCOSE 228 09/18/2020    GLUCOSE 212 06/19/2017    PROT 6.7 09/17/2020    PROT 6.0 06/19/2017    CALCIUM 8.9 09/18/2020    BILITOT 0.5 09/17/2020    ALKPHOS 64 09/17/2020    AST 18 09/17/2020    ALT 6 09/17/2020     Magnesium:    Lab Results   Component Value Date    MG 1.70 07/30/2020     PT/INR:  No results found for: PTINR  INR:   Lab Results   Component Value Date    INR 1.28 07/03/2020     PTT:    Lab Results   Component Value Date    APTT 35.9 05/17/2016   [APTT  U/A:    Lab Results   Component Value Date    COLORU Yellow 09/17/2020    PHUR 7.0 09/17/2020    PHUR 7.0 09/17/2020    WBCUA 0-2 09/17/2020    RBCUA 3-4 09/17/2020    MUCUS 2+ 07/03/2020    BACTERIA Rare 09/17/2020    CLARITYU Clear 09/17/2020    SPECGRAV 1.025 09/17/2020    LEUKOCYTESUR Negative 09/17/2020    UROBILINOGEN 0.2 09/17/2020    BILIRUBINUR Negative 09/17/2020    BILIRUBINUR Negative 01/04/2012    BLOODU SMALL 09/17/2020    GLUCOSEU 100 09/17/2020    GLUCOSEU Negative 01/04/2012    AMORPHOUS 2+ 07/03/2020       IMPRESSION\PLAN:    The patient presents with severe hypertension and a significant proteinuria on urinalysis. However, I am ordering 24-hour urine protein sample. I suspect that this could be due to her Avastin. This represents has not been problematic in the past. In a meta-analysis of 12,949 patients treated with or without bevacizumab for advanced solid tumors, the RR of developing \"significantly raised\" blood pressure (defined as more than one drug needed for treatment, more intensive treatment needed than used previously, or life-threatening consequences such as hypertensive crisis; or grade 3 or 4 hypertension according to the [facility] [[facility]] Common Terminology Criteria for Adverse Events [CTCAE] (table 1)) among patients receiving bevacizumab was 5.38 (95% CI 3.63-7.97) [40]. Among patients receiving bevacizumab, the overall incidence of all raised blood pressure events was 24 percent (95% CI 20-29 percent), while the incidence of significantly raised blood pressure was 8 percent (95% CI 6-10 percent). Risk was dose dependent; the RRs of severe hypertension at doses of 5 and 2.5 mg/kg per week were 7.17 (95% CI 3.91-13.13) and 4.11 (95% CI 2.49-6.78), respectively.  In a meta-analysis of 12,949 patients treated with or without bevacizumab for advanced solid tumors, the RR of developing \"significantly raised\" blood pressure (defined as more than one drug needed for treatment, more intensive treatment needed than used previously, or life-threatening consequences such as hypertensive crisis; or grade 3 or 4 hypertension according to the [facility] [[facility]] Common Terminology Criteria for Adverse Events [CTCAE] (table 1)) among patients receiving bevacizumab was 5.38 (95% CI 3.63-7.97) [40]. Among patients receiving bevacizumab, the overall incidence of all raised blood pressure events was 24 percent (95% CI 20-29 percent), while the incidence of significantly raised blood pressure was 8 percent (95% CI 6-10 percent). Risk was dose dependent; the RRs of severe hypertension at doses of 5 and 2.5 mg/kg per week were 7.17 (95% CI 3.91-13.13) and 4.11 (95% CI 2.49-6.78), respectively. At the very least, this needs to be discontinued. I am also concerned that the patient is likely not responding to her treatment. On 7/17/2020 her Ca1 25 is 59.7 had risen to 220 x 8 2120 on 9/14/2020 was up to 75.4. She will need to be reimaged and restaged at some point. However, right now her blood pressure and cardiac status is the primary focus. I will notify Dr. Stuart Zamora of the patient's admission in medical status    Thank you very much for allowing me to participate in the care of this patient. I will plan to keep you fully appraised of your patient's progress. Yusuf Deluna. Sanford Rick M.D., MPH  Chair, Department of  Clinical [address]  Office [phone]  Cell [phone]  [email or web address]       \"Participating in a clinical trial is the first step to fighting cancer; not the last.\"    9/18/2020 6:23 AM

## 2020-09-18 NOTE — CONSULTS
Reason for Consult: Altered mental status  Attending Physician:  Margo Craft DO        History Obtained From:  patient, spouse, electronic medical record       HISTORY OF PRESENT ILLNESS:              The patient is a 67 y.o. female with significant past medical history of primary peritoneal carcinomatosis, hypertension, type 2 diabetes mellitus who presents with altered mental status, retro-orbital headache, and vomiting. Patient's  states that when she returned from a chemotherapy session on 9/14/2020 she was doing fine but when she woke up the next morning, she seemed confused, and was not making sense in which she said. Patient's  states that she is also had little oral intake over the last several days, had been reporting a retro-orbital headache on the day prior to admission. She also had several episodes of vomiting on the day of admission. Of note, she has had a history of bowel obstruction and underwent lysis of adhesions and debulking of her tumor in 7/2020. Patient's  reports her mental status has slightly improved since admission. Not back to baseline as she is still slow to answer. She sometimes cannot answer questions correctly. She is able to tell me that she has not been very hungry lately. Patient is interactive and tries to answer questions. She follows commands well.       Past Medical History:    Past Medical History:   Diagnosis Date    Anxiety     Arthritis     Asthma     poorly controlled    CAD (coronary artery disease)     Cancer of peritoneum ([address]     ovarian    Chronic low back pain     Depression     Embolism (HCC)     small pulmonary clot    Hx of blood clots     x1    Hyperlipidemia 4/23/2012    Hypertension     Postoperative delirium 05/26/2016    Pulmonary embolism (HCC)     Short-term memory loss     Type II or unspecified type diabetes mellitus without mention of complication, not stated as uncontrolled     Wears dentures     Wears chloride flush 0.9 % injection 10 mL, 10 mL, Intravenous, PRN  acetaminophen (TYLENOL) tablet 650 mg, 650 mg, Oral, Q6H PRN **OR** acetaminophen (TYLENOL) suppository 650 mg, 650 mg, Rectal, Q6H PRN  enoxaparin (LOVENOX) injection 40 mg, 40 mg, Subcutaneous, Daily  hydrALAZINE (APRESOLINE) injection 10 mg, 10 mg, Intravenous, Q6H PRN  labetalol (NORMODYNE;TRANDATE) injection 10 mg, 10 mg, Intravenous, Q10 Min PRN  glucose (GLUTOSE) 40 % oral gel 15 g, 15 g, Oral, PRN  dextrose 50 % IV solution, 12.5 g, Intravenous, PRN  glucagon (rDNA) injection 1 mg, 1 mg, Intramuscular, PRN  dextrose 5 % solution, 100 mL/hr, Intravenous, PRN  metoprolol succinate (TOPROL XL) extended release tablet 100 mg, 100 mg, Oral, Daily  amLODIPine (NORVASC) tablet 5 mg, 5 mg, Oral, Daily  docusate sodium (COLACE) capsule 100 mg, 100 mg, Oral, BID  polyethylene glycol (GLYCOLAX) packet 17 g, 17 g, Oral, Daily  magnesium citrate solution 296 mL, 296 mL, Oral, Once PRN  LORazepam (ATIVAN) injection 1 mg, 1 mg, Intravenous, Once PRN  prochlorperazine (COMPAZINE) injection 10 mg, 10 mg, Intravenous, Q4H PRN  chlorthalidone (HYGROTON) tablet 25 mg, 25 mg, Oral, Daily  0.9 % sodium chloride infusion, , Intravenous, Continuous   Medications Prior to Admission: amLODIPine (NORVASC) 5 MG tablet, Take 5 mg by mouth daily  valsartan (DIOVAN) 80 MG tablet, Take 80 mg by mouth daily  apixaban (ELIQUIS) 5 MG TABS tablet, Take by mouth 2 times daily  fluocinonide (LIDEX) 0.05 % cream, Apply topically 2 times daily as needed Apply topically 2 times daily. pregabalin (LYRICA) 75 MG capsule, Take 1 capsule by mouth 2 times daily for 120 days. rOPINIRole (REQUIP) 2 MG tablet, Take 2 mg by mouth 3 times daily as needed (RLS)  oxyCODONE-acetaminophen (PERCOCET)  MG per tablet, Take 1 tablet by mouth 4 times daily as needed for Pain.   venlafaxine (EFFEXOR XR) 75 MG extended release capsule, Take 75 mg by mouth daily   albuterol sulfate HFA (VENTOLIN HFA) 108 (90 Base) MCG/ACT inhaler, Inhale 2 puffs into the lungs every 4 hours as needed for Wheezing  metoprolol succinate (TOPROL XL) 100 MG extended release tablet, Take 1 tablet by mouth daily  insulin glargine (LANTUS) 100 UNIT/ML injection vial, Inject 5 Units into the skin nightly (Patient taking differently: Inject 7 Units into the skin nightly )  insulin lispro (HUMALOG) 100 UNIT/ML injection vial, Inject 0-12 Units into the skin 3 times daily (with meals) (Patient taking differently: Inject 0-14 Units into the skin 2 times daily (with meals) 121-160 = 6 units  161-200 = 8 units  201-240 = 10 units  241-280 = 12 units  281-320= 14 units)  rosuvastatin (CRESTOR) 40 MG tablet, Take 40 mg by mouth every evening. alprazolam (XANAX) 0.5 MG tablet, Take 0.5 mg by mouth nightly. [DISCONTINUED] furosemide (LASIX) 20 MG tablet, Take 1 tablet by mouth daily  [DISCONTINUED] losartan (COZAAR) 100 MG tablet, Take 1 tablet by mouth daily Pt. takes 100mg of Cozaar daily. Allergies:  Cephalexin; Pcn [penicillins];  Cephalosporins; and Cephalexin    Social History:  Social History     Socioeconomic History    Marital status:      Spouse name: Not on file    Number of children: Not on file    Years of education: Not on file    Highest education level: Not on file   Occupational History    Not on file   Social Needs    Financial resource strain: Not on file    Food insecurity     Worry: Not on file     Inability: Not on file    Transportation needs     Medical: Not on file     Non-medical: Not on file   Tobacco Use    Smoking status: Former Smoker     Packs/day: 2.00     Years: 10.00     Pack years: 20.00     Last attempt to quit: 2000     Years since quittin.1    Smokeless tobacco: Never Used   Substance and Sexual Activity    Alcohol use: Not Currently     Alcohol/week: 1.0 standard drinks     Types: 1 Glasses of wine per week    Drug use: Never    Sexual activity: Not Currently   Lifestyle  Physical activity     Days per week: Not on file     Minutes per session: Not on file    Stress: Not on file   Relationships    Social connections     Talks on phone: Not on file     Gets together: Not on file     Attends Spiritism service: Not on file     Active member of club or organization: Not on file     Attends meetings of clubs or organizations: Not on file     Relationship status: Not on file    Intimate partner violence     Fear of current or ex partner: Not on file     Emotionally abused: Not on file     Physically abused: Not on file     Forced sexual activity: Not on file   Other Topics Concern    Not on file   Social History Narrative    Not on file     Social History     Tobacco Use   Smoking Status Former Smoker    Packs/day: 2.00    Years: 10.00    Pack years: 20.00    Last attempt to quit: 2000    Years since quittin.1   Smokeless Tobacco Never Used     Social History     Substance and Sexual Activity   Drug Use Never     Social History     Substance and Sexual Activity   Alcohol Use Not Currently    Alcohol/week: 1.0 standard drinks    Types: 1 Glasses of wine per week       Family History:   Family History   Problem Relation Age of Onset    High Blood Pressure Mother     Diabetes Father     Heart Disease Maternal Grandmother     Diabetes Maternal Uncle           ROS:   helped with ROS. Constitutional- +weight loss of about 10 ponds in the last month per . Nofevers  Eyes- No diplopia. No photophobia. Ears/nose/throat- No dysphagia. No Dysarthria  Cardiovascular- No palpitations. No chest pain  Respiratory- No dyspnea. No Cough  Gastrointestinal- No Abdominal pain. No recent vomiting. Genitourinary- No incontinence. No urinary retention  Musculoskeletal- No myalgia. No arthralgia  Skin- No rash. No easy bruising. Psychiatric- No depression. No anxiety  Endocrine- No diabetes. No thyroid issues. Hematologic- No bleeding difficulty. +fatigue  Neurologic- No weakness. No Headache now. Exam:  Blood pressure (!) 201/70, pulse 74, temperature 97.6 °F (36.4 °C), temperature source Oral, resp. rate 16, height 5' 8\" (1.727 m), weight 143 lb 11.8 oz (65.2 kg), SpO2 98 %, not currently breastfeeding. Constitutional    Vital signs: BP, HR, and RR reviewed   General Alert, no distress, very thin. Cardiovascular: pulses symmetric in all 4 extremities. No peripheral edema. Psychiatric: cooperative with examination, no  psychotic behavior noted. Neurologic  Mental status:   orientation to person and place. She knew she was in the hospital \"[place]ross from [place]" but couldn't think of the name. When reminded it was [facility] she said \"of course. \"   General fund of knowledge grossly intact. She knew her street but not the number. She could name her  and children and grandchildren. Attention intact as able to attend well to the exam     Language slow to answer at times, but fluent in conversation   Comprehension intact; follows simple commands  Cranial nerves:   CN2: Visual Fields full w/o extinction on confrontational testing,   CN 3,4,6: extraocular muscles intact,  CN5: facial sensation symmetric   CN7:face symmetric without dysarthria,   CN8: hearing grossly intact  CN9: palate elevated symmetrically  CN11: trap full strength on shoulder shrug  CN12: tongue midline with protrusion  Strength: No prontator drift. Good strength in all 4 extremities   Deep tendon reflexes: normal in all 4 extremities  Sensory: light touch intact in all 4 extremities. No sensory extinction on double simultaneous stimulation  Cerebellar/coordination: finger nose finger normal without ataxia  Tone: normal in all 4 extremities  Gait: deferred for safety.       Studies:  CT Head:   Results for orders placed during the hospital encounter of 09/17/20   CT HEAD WO CONTRAST    Narrative PROCEDURE: CT head without contrast    INDICATION: change in mental status, hx

## 2020-09-18 NOTE — PROGRESS NOTES
Spoke with  Kelly this AM and gave updates. Requested to speak with her physician. I spoke with Dr. Jing Chiang and let her know he wanted to speak with her. Gave her number to call [phone].

## 2020-09-18 NOTE — ED NOTES
Patient is complaining about pain behind eyes and would like something for the pain.       Elfego Rankin RN  09/17/20 [id number]

## 2020-09-18 NOTE — PROGRESS NOTES
NUTRITION ASSESSMENT  Admission Date: 9/17/2020     Type and Reason for Visit: Initial, Positive Nutrition Screen    NUTRITION RECOMMENDATIONS:   1. PO Diet: Continue general diet    2. ONS: Monitor acceptance-pt denied at this time   3. Encourage po intakes     NUTRITION ASSESSMENT:  Pt is nutritionally compromised AEB decreased po intakes and increased nutrient needs. Pt admitted for vomiting and confusion. Hx of DM and peritoneal carcinomatosis for which she undergoes chemotherapy (last tx 9/14/20). Pt refused breakfast this morning. Pt voices no appetite. When discussing ONS pt stated, \" I dont feel as though I need that right now\". RD encouraged po intakes at time of visit. Pt has lost -4% of her wt x 2 months w/+ fluid retention noted in EMR. Will monitor nutritional status this admission. MALNUTRITION ASSESSMENT    Context of Malnutrition: Acute Illness(on chronic )   Malnutrition Status: Moderate malnutrition  Findings of the 6 clinical characteristics of malnutrition (Minimum of 2 out of 6 clinical characteristics is required to make the diagnosis of moderate or severe Protein Calorie Malnutrition based on AND/ASPEN Guidelines):  Energy Intake: Less than/equal to 75% of estimated energy requirements    Energy Intake Time: x3 days    Weight Loss %: 4%   Weight loss Time: x2 months    Body Fat Loss: Unable to Assess   Body Fat Location: Unable to assess    Body Muscle Loss: Moderate Loss  (per visual)  Body Muscle Loss Location: Clavicles  and Thigh   Fluid Accumulation: Moderate (2+)   Fluid Accumulation Location: Extremities    Strength: Not Performed;  Not Measured     NUTRITION DIAGNOSIS   Problem: Problem #1: Inadequate oral intake  Etiology: Insufficient energy/nutrient consumption  Signs & Symptoms: Intake 0-25%, Nausea, Vomiting  and Weight loss     NUTRITION INTERVENTION  Food and/or Nutrient Delivery:Continue Current diet   Nutrition education/counseling/coordination of care: Continue Inpatient Monitoring     NUTRITION MONITORING & EVALUATION:  Evaluation:Goals set   Goals:Goals: Pt will consume >/=50% of meals this admission   Monitoring: Meal Intake , Nausea , Pertinent Labs , Weight  or Vomiting      OBJECTIVE DATA:  · Nutrition-Focused Physical Findings: +2 BLE edema, no BM, KC=710 mg/dL   · Wounds None      Past Medical History:   Diagnosis Date    Anxiety     Arthritis     Asthma     poorly controlled    CAD (coronary artery disease)     Cancer of peritoneum (HCC)     ovarian    Chronic low back pain     Depression     Embolism (HCC)     small pulmonary clot    Hx of blood clots     x1    Hyperlipidemia 4/23/2012    Hypertension     Postoperative delirium 05/26/2016    Pulmonary embolism (HCC)     Short-term memory loss     Type II or unspecified type diabetes mellitus without mention of complication, not stated as uncontrolled     Wears dentures     Wears glasses         ANTHROPOMETRICS  Current Height: 5' 8\" (172.7 cm)  Current Weight: 143 lb 11.8 oz (65.2 kg)    Admission weight: 144 lb (65.3 kg)  Ideal Bodyweight 140 lb  Weight Changes -4%, -6 lb x 2 months        BMI BMI (Calculated): 21.9    Wt Readings from Last 50 Encounters:   09/18/20 143 lb 11.8 oz (65.2 kg)   07/30/20 149 lb 0.5 oz (67.6 kg)   07/03/20 147 lb 14.9 oz (67.1 kg)   06/21/20 149 lb (67.6 kg)   02/08/20 150 lb (68 kg)   01/31/20 150 lb (68 kg)   01/30/20 151 lb (68.5 kg)     COMPARATIVE STANDARDS  Estimated Total Kcals/Day : 25-30  Current Bodyweight (65 kg) 6400-3168 kcal    Estimated Total Protein (g/day) : 1.2-1.4 Current Bodyweight (65 kg) 78-91 g/day  Estimated Daily Total Fluid (ml/day): 1 mL/kcal per day     Food / Nutrition-Related History  Pre-Admission / Home Diet:  Pre-Admission/Home Diet: General   Home Supplements / Herbals:    none noted  Food Restrictions / Cultural Requests:    none noted    Current Nutrition Therapies   DIET GENERAL;     PO Intake: Refused   PO Supplement: None   PO Supplement Intake: None   IVF: none     NUTRITION RISK LEVEL: Risk Level:  Moderate     Wendy Patrick LD  Miko:  [phone]  Office:  [phone]

## 2020-09-19 ENCOUNTER — APPOINTMENT (OUTPATIENT)
Dept: MRI IMAGING | Age: 80
DRG: 304 | End: 2020-09-19
Payer: MEDICARE

## 2020-09-19 LAB
ANION GAP SERPL CALCULATED.3IONS-SCNC: 12 MMOL/L (ref 3–16)
BASOPHILS ABSOLUTE: 0 K/UL (ref 0–0.2)
BASOPHILS RELATIVE PERCENT: 0.8 %
BUN BLDV-MCNC: 27 MG/DL (ref 7–20)
CALCIUM SERPL-MCNC: 8.7 MG/DL (ref 8.3–10.6)
CHLORIDE BLD-SCNC: 93 MMOL/L (ref 99–110)
CO2: 23 MMOL/L (ref 21–32)
CREAT SERPL-MCNC: 1.1 MG/DL (ref 0.6–1.2)
EOSINOPHILS ABSOLUTE: 0 K/UL (ref 0–0.6)
EOSINOPHILS RELATIVE PERCENT: 0.8 %
GFR AFRICAN AMERICAN: 58
GFR NON-AFRICAN AMERICAN: 48
GLUCOSE BLD-MCNC: 155 MG/DL (ref 70–99)
GLUCOSE BLD-MCNC: 186 MG/DL (ref 70–99)
GLUCOSE BLD-MCNC: 193 MG/DL (ref 70–99)
GLUCOSE BLD-MCNC: 246 MG/DL (ref 70–99)
GLUCOSE BLD-MCNC: 250 MG/DL (ref 70–99)
HCT VFR BLD CALC: 32.7 % (ref 36–48)
HEMOGLOBIN: 11 G/DL (ref 12–16)
LYMPHOCYTES ABSOLUTE: 0.5 K/UL (ref 1–5.1)
LYMPHOCYTES RELATIVE PERCENT: 12.7 %
MCH RBC QN AUTO: 28.9 PG (ref 26–34)
MCHC RBC AUTO-ENTMCNC: 33.6 G/DL (ref 31–36)
MCV RBC AUTO: 85.9 FL (ref 80–100)
MONOCYTES ABSOLUTE: 0.3 K/UL (ref 0–1.3)
MONOCYTES RELATIVE PERCENT: 6.8 %
NEUTROPHILS ABSOLUTE: 3.3 K/UL (ref 1.7–7.7)
NEUTROPHILS RELATIVE PERCENT: 78.9 %
PARATHYROID HORMONE INTACT: 81.5 PG/ML (ref 14–72)
PDW BLD-RTO: 17.5 % (ref 12.4–15.4)
PERFORMED ON: ABNORMAL
PLATELET # BLD: 153 K/UL (ref 135–450)
PMV BLD AUTO: 7.7 FL (ref 5–10.5)
POTASSIUM SERPL-SCNC: 3.7 MMOL/L (ref 3.5–5.1)
RBC # BLD: 3.81 M/UL (ref 4–5.2)
SODIUM BLD-SCNC: 128 MMOL/L (ref 136–145)
TSH REFLEX: 1.71 UIU/ML (ref 0.27–4.2)
WBC # BLD: 4.2 K/UL (ref 4–11)

## 2020-09-19 PROCEDURE — 82088 ASSAY OF ALDOSTERONE: CPT

## 2020-09-19 PROCEDURE — 1200000000 HC SEMI PRIVATE

## 2020-09-19 PROCEDURE — 80048 BASIC METABOLIC PNL TOTAL CA: CPT

## 2020-09-19 PROCEDURE — A9579 GAD-BASE MR CONTRAST NOS,1ML: HCPCS | Performed by: INTERNAL MEDICINE

## 2020-09-19 PROCEDURE — 36415 COLL VENOUS BLD VENIPUNCTURE: CPT

## 2020-09-19 PROCEDURE — 6360000002 HC RX W HCPCS: Performed by: STUDENT IN AN ORGANIZED HEALTH CARE EDUCATION/TRAINING PROGRAM

## 2020-09-19 PROCEDURE — 70553 MRI BRAIN STEM W/O & W/DYE: CPT

## 2020-09-19 PROCEDURE — 83970 ASSAY OF PARATHORMONE: CPT

## 2020-09-19 PROCEDURE — 6360000004 HC RX CONTRAST MEDICATION: Performed by: INTERNAL MEDICINE

## 2020-09-19 PROCEDURE — 51798 US URINE CAPACITY MEASURE: CPT

## 2020-09-19 PROCEDURE — 6370000000 HC RX 637 (ALT 250 FOR IP): Performed by: STUDENT IN AN ORGANIZED HEALTH CARE EDUCATION/TRAINING PROGRAM

## 2020-09-19 PROCEDURE — 99222 1ST HOSP IP/OBS MODERATE 55: CPT | Performed by: INTERNAL MEDICINE

## 2020-09-19 PROCEDURE — 2580000003 HC RX 258: Performed by: STUDENT IN AN ORGANIZED HEALTH CARE EDUCATION/TRAINING PROGRAM

## 2020-09-19 PROCEDURE — BW28ZZZ COMPUTERIZED TOMOGRAPHY (CT SCAN) OF HEAD: ICD-10-PCS | Performed by: INTERNAL MEDICINE

## 2020-09-19 PROCEDURE — 84244 ASSAY OF RENIN: CPT

## 2020-09-19 PROCEDURE — 84443 ASSAY THYROID STIM HORMONE: CPT

## 2020-09-19 PROCEDURE — 2500000003 HC RX 250 WO HCPCS: Performed by: STUDENT IN AN ORGANIZED HEALTH CARE EDUCATION/TRAINING PROGRAM

## 2020-09-19 PROCEDURE — 85025 COMPLETE CBC W/AUTO DIFF WBC: CPT

## 2020-09-19 PROCEDURE — 93005 ELECTROCARDIOGRAM TRACING: CPT | Performed by: INTERNAL MEDICINE

## 2020-09-19 RX ORDER — INSULIN LISPRO 100 [IU]/ML
0-3 INJECTION, SOLUTION INTRAVENOUS; SUBCUTANEOUS NIGHTLY
Status: DISCONTINUED | OUTPATIENT
Start: 2020-09-19 | End: 2020-09-19

## 2020-09-19 RX ORDER — HYDRALAZINE HYDROCHLORIDE 10 MG/1
10 TABLET, FILM COATED ORAL EVERY 8 HOURS SCHEDULED
Status: DISCONTINUED | OUTPATIENT
Start: 2020-09-19 | End: 2020-09-19

## 2020-09-19 RX ORDER — HYDRALAZINE HYDROCHLORIDE 50 MG/1
25 TABLET, FILM COATED ORAL EVERY 8 HOURS SCHEDULED
Status: DISCONTINUED | OUTPATIENT
Start: 2020-09-19 | End: 2020-09-19

## 2020-09-19 RX ORDER — INSULIN LISPRO 100 [IU]/ML
0-6 INJECTION, SOLUTION INTRAVENOUS; SUBCUTANEOUS NIGHTLY
Status: DISCONTINUED | OUTPATIENT
Start: 2020-09-19 | End: 2020-09-21 | Stop reason: HOSPADM

## 2020-09-19 RX ORDER — INSULIN LISPRO 100 [IU]/ML
0-12 INJECTION, SOLUTION INTRAVENOUS; SUBCUTANEOUS
Status: DISCONTINUED | OUTPATIENT
Start: 2020-09-19 | End: 2020-09-21 | Stop reason: HOSPADM

## 2020-09-19 RX ORDER — INSULIN LISPRO 100 [IU]/ML
0-6 INJECTION, SOLUTION INTRAVENOUS; SUBCUTANEOUS
Status: DISCONTINUED | OUTPATIENT
Start: 2020-09-19 | End: 2020-09-19

## 2020-09-19 RX ORDER — HYDRALAZINE HYDROCHLORIDE 10 MG/1
10 TABLET, FILM COATED ORAL EVERY 8 HOURS SCHEDULED
Status: DISCONTINUED | OUTPATIENT
Start: 2020-09-19 | End: 2020-09-20

## 2020-09-19 RX ADMIN — ROSUVASTATIN CALCIUM 40 MG: 20 TABLET, COATED ORAL at 18:14

## 2020-09-19 RX ADMIN — OXYCODONE AND ACETAMINOPHEN 1 TABLET: 10; 325 TABLET ORAL at 21:13

## 2020-09-19 RX ADMIN — DOCUSATE SODIUM 100 MG: 100 CAPSULE, LIQUID FILLED ORAL at 08:15

## 2020-09-19 RX ADMIN — ALPRAZOLAM 0.5 MG: 0.5 TABLET ORAL at 20:47

## 2020-09-19 RX ADMIN — POLYETHYLENE GLYCOL 3350 17 G: 17 POWDER, FOR SOLUTION ORAL at 08:16

## 2020-09-19 RX ADMIN — INSULIN GLARGINE 10 UNITS: 100 INJECTION, SOLUTION SUBCUTANEOUS at 20:54

## 2020-09-19 RX ADMIN — INSULIN LISPRO 6 UNITS: 100 INJECTION, SOLUTION INTRAVENOUS; SUBCUTANEOUS at 12:32

## 2020-09-19 RX ADMIN — METOPROLOL SUCCINATE 100 MG: 100 TABLET, EXTENDED RELEASE ORAL at 08:15

## 2020-09-19 RX ADMIN — Medication 10 ML: at 20:00

## 2020-09-19 RX ADMIN — HYDRALAZINE HYDROCHLORIDE 10 MG: 10 TABLET, FILM COATED ORAL at 08:18

## 2020-09-19 RX ADMIN — SODIUM CHLORIDE 5 MG/HR: 9 INJECTION, SOLUTION INTRAVENOUS at 01:37

## 2020-09-19 RX ADMIN — LOSARTAN POTASSIUM 100 MG: 100 TABLET ORAL at 08:16

## 2020-09-19 RX ADMIN — DOCUSATE SODIUM 100 MG: 100 CAPSULE, LIQUID FILLED ORAL at 20:47

## 2020-09-19 RX ADMIN — ENOXAPARIN SODIUM 40 MG: 40 INJECTION SUBCUTANEOUS at 08:15

## 2020-09-19 RX ADMIN — INSULIN LISPRO 2 UNITS: 100 INJECTION, SOLUTION INTRAVENOUS; SUBCUTANEOUS at 08:16

## 2020-09-19 RX ADMIN — SODIUM CHLORIDE: 9 INJECTION, SOLUTION INTRAVENOUS at 03:52

## 2020-09-19 RX ADMIN — INSULIN LISPRO 1 UNITS: 100 INJECTION, SOLUTION INTRAVENOUS; SUBCUTANEOUS at 20:54

## 2020-09-19 RX ADMIN — GADOTERIDOL 14 ML: 279.3 INJECTION, SOLUTION INTRAVENOUS at 11:37

## 2020-09-19 RX ADMIN — HYDRALAZINE HYDROCHLORIDE 10 MG: 10 TABLET, FILM COATED ORAL at 22:18

## 2020-09-19 RX ADMIN — HYDRALAZINE HYDROCHLORIDE 10 MG: 10 TABLET, FILM COATED ORAL at 16:08

## 2020-09-19 RX ADMIN — INSULIN LISPRO 2 UNITS: 100 INJECTION, SOLUTION INTRAVENOUS; SUBCUTANEOUS at 18:14

## 2020-09-19 RX ADMIN — CHLORTHALIDONE 25 MG: 25 TABLET ORAL at 08:16

## 2020-09-19 RX ADMIN — PREGABALIN 75 MG: 75 CAPSULE ORAL at 08:15

## 2020-09-19 RX ADMIN — PREGABALIN 75 MG: 75 CAPSULE ORAL at 20:47

## 2020-09-19 RX ADMIN — VENLAFAXINE HYDROCHLORIDE 75 MG: 75 CAPSULE, EXTENDED RELEASE ORAL at 20:47

## 2020-09-19 RX ADMIN — INSULIN HUMAN 5 UNITS: 100 INJECTION, SOLUTION PARENTERAL at 08:16

## 2020-09-19 RX ADMIN — APIXABAN 5 MG: 5 TABLET, FILM COATED ORAL at 20:47

## 2020-09-19 RX ADMIN — SODIUM CHLORIDE 7 MG/HR: 9 INJECTION, SOLUTION INTRAVENOUS at 06:27

## 2020-09-19 RX ADMIN — VENLAFAXINE HYDROCHLORIDE 75 MG: 75 CAPSULE, EXTENDED RELEASE ORAL at 08:16

## 2020-09-19 RX ADMIN — AMLODIPINE BESYLATE 10 MG: 10 TABLET ORAL at 08:16

## 2020-09-19 ASSESSMENT — PAIN DESCRIPTION - PROGRESSION
CLINICAL_PROGRESSION: RESOLVED
CLINICAL_PROGRESSION: NOT CHANGED

## 2020-09-19 ASSESSMENT — PAIN SCALES - GENERAL
PAINLEVEL_OUTOF10: 0
PAINLEVEL_OUTOF10: 0
PAINLEVEL_OUTOF10: 1
PAINLEVEL_OUTOF10: 0
PAINLEVEL_OUTOF10: 0
PAINLEVEL_OUTOF10: 4
PAINLEVEL_OUTOF10: 0

## 2020-09-19 ASSESSMENT — PAIN DESCRIPTION - ONSET: ONSET: GRADUAL

## 2020-09-19 ASSESSMENT — PAIN DESCRIPTION - DESCRIPTORS: DESCRIPTORS: ACHING

## 2020-09-19 ASSESSMENT — PAIN DESCRIPTION - ORIENTATION: ORIENTATION: MID

## 2020-09-19 ASSESSMENT — PAIN - FUNCTIONAL ASSESSMENT: PAIN_FUNCTIONAL_ASSESSMENT: ACTIVITIES ARE NOT PREVENTED

## 2020-09-19 ASSESSMENT — PAIN DESCRIPTION - FREQUENCY: FREQUENCY: CONTINUOUS

## 2020-09-19 ASSESSMENT — PAIN DESCRIPTION - PAIN TYPE: TYPE: CHRONIC PAIN

## 2020-09-19 ASSESSMENT — PAIN DESCRIPTION - LOCATION: LOCATION: BACK

## 2020-09-19 NOTE — PROGRESS NOTES
Pt resting comfortably in bed, no signs of acute distress noted. BP improving with nicardipine infusion, BP currently 139/60 (83), HR 74 NSR. SpO2 100% on room air. Pt denies any pain at this time. Pt still confused and needs to be reoriented. Safety precautions in place.

## 2020-09-19 NOTE — PROGRESS NOTES
ICU Progress Note  PGY-3    PCP: Andres Elena MD    Code:Full Code  Admit Date: 9/17/2020  IV Access: L chest port and L forearm PIV  IV Fluids: 0.9% NaCL 50 ml/hr     Diet: DIET GENERAL; Interval history:    Patient was transferred to the ICU yesterday for cardene gtt for elevated blood pressures (SBP 200s). She remains on cardene gtt and continues to take prescribed home BP medications. She is AAOx3, however unable to state which hospital she is at and she has delayed speech, confusion and word-finding difficulty. She denies any pain and denies any vomiting yesterday. No episodes of emesis. History of present illness:      CC: Vomiting and confusion per spouse    HPI:  \"71 y. o. 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  Patient is very forgetful and is unable to provide any reliable history.  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  Last chemotherapy was on 9/14/2020. Renetta Cooks next morning she woke up confused, her speech did not make any sense and has been vomiting for the past 24 hours.  Her oral intake has been very little over the last several days. Patient is unable to tell me when she had her last BM, states that she is not passing any gas, denies any abdominal pain.  Her blood pressure was extremely elevated upon presentation to the emergency room with systolic blood pressure more than 200 mmHg. \"    ROS: Review of Systems:  General appearance: No apparent distress, appears stated age and cooperative.   Respiratory:  denies dyspnea, wheezing, stridor  Cardiovascular: Denies chest pain, orthopnea, PND  Abdomen: Denies epigastric pain,vomiting, nausea  Neuro: Denies headaches     Past Medical / Surgical History:    Past Medical History:   Diagnosis Date    Anxiety     Arthritis     Asthma     poorly controlled    CAD (coronary artery disease)     Cancer of peritoneum ([address]     ovarian    Chronic low back pain     Depression     Embolism (HCC)     small pulmonary clot    Hx of blood clots     x1    Hyperlipidemia 4/23/2012    Hypertension     Postoperative delirium 05/26/2016    Pulmonary embolism (HCC)     Short-term memory loss     Type II or unspecified type diabetes mellitus without mention of complication, not stated as uncontrolled     Wears dentures     Wears glasses      Past Surgical History:   Procedure Laterality Date    APPENDECTOMY      BREAST BIOPSY Left 06/23/2017    Fibroadenomatous type fibrocystic change    CARDIAC SURGERY      quadruple bypass    CATARACT REMOVAL Bilateral     COLONOSCOPY      COLONOSCOPY N/A 6/6/2019    COLONOSCOPY WITH BIOPSY performed by Marva Wheeler MD at [facility]  6/6/2019    COLONOSCOPY CONTROL HEMORRHAGE performed by Marva Wheeler MD at [facility] CORONARY ARTERY BYPASS GRAFT  2007    DILATION AND CURETTAGE OF UTERUS      Miscarriage    ENDOSCOPY, COLON, DIAGNOSTIC      EYE SURGERY      HEMICOLECTOMY  05/2016    HYSTERECTOMY      JOINT REPLACEMENT Bilateral     bilat knee    LAPAROSCOPY N/A 7/23/2020    ROBOTIC LAPAROSCOPIC CONVERTED TO OPEN EXTENSIVE LYSIS OF ADHESIONS, EXCISION OF PERITONEAL NODULES, TUMOR DEBULKING, SMALL BOWEL RESECTION WITH ANASTAMOSIS, FLEXIBLE SIGMOIDOSCOPY performed by Yanet Banerjee MD at [facility] 08/17/2018    [address]    FRACISCO AND BSO  05/26/2016    TUNNELED VENOUS PORT PLACEMENT Left 7/27/2016    PORT-A-CATH PLACEMENT,LEFT        Medications Prior to Admission:    No current facility-administered medications on file prior to encounter.       Current Outpatient Medications on File Prior to Encounter Medication Sig Dispense Refill    amLODIPine (NORVASC) 5 MG tablet Take 5 mg by mouth daily      valsartan (DIOVAN) 80 MG tablet Take 80 mg by mouth daily      apixaban (ELIQUIS) 5 MG TABS tablet Take by mouth 2 times daily      fluocinonide (LIDEX) 0.05 % cream Apply topically 2 times daily as needed Apply topically 2 times daily.  pregabalin (LYRICA) 75 MG capsule Take 1 capsule by mouth 2 times daily for 120 days. 240 capsule 0    rOPINIRole (REQUIP) 2 MG tablet Take 2 mg by mouth 3 times daily as needed (RLS)      oxyCODONE-acetaminophen (PERCOCET)  MG per tablet Take 1 tablet by mouth 4 times daily as needed for Pain.  venlafaxine (EFFEXOR XR) 75 MG extended release capsule Take 75 mg by mouth daily       albuterol sulfate HFA (VENTOLIN HFA) 108 (90 Base) MCG/ACT inhaler Inhale 2 puffs into the lungs every 4 hours as needed for Wheezing      metoprolol succinate (TOPROL XL) 100 MG extended release tablet Take 1 tablet by mouth daily 30 tablet 3    insulin glargine (LANTUS) 100 UNIT/ML injection vial Inject 5 Units into the skin nightly (Patient taking differently: Inject 7 Units into the skin nightly ) 1 vial 3    insulin lispro (HUMALOG) 100 UNIT/ML injection vial Inject 0-12 Units into the skin 3 times daily (with meals) (Patient taking differently: Inject 0-14 Units into the skin 2 times daily (with meals) 121-160 = 6 units   161-200 = 8 units   201-240 = 10 units   241-280 = 12 units   281-320= 14 units) 1 vial 3    rosuvastatin (CRESTOR) 40 MG tablet Take 40 mg by mouth every evening.  alprazolam (XANAX) 0.5 MG tablet Take 0.5 mg by mouth nightly. Allergies:  Cephalexin; Pcn [penicillins]; Cephalosporins; and Cephalexin    Social History:   TOBACCO:   reports that she quit smoking about 20 years ago. She has a 20.00 pack-year smoking history.  She has never used smokeless tobacco.       ETOH:   reports previous alcohol use of about 1.0 standard drinks of alcohol per week. Family History:       Problem Relation Age of Onset    High Blood Pressure Mother     Diabetes Father     Heart Disease Maternal Grandmother     Diabetes Maternal Uncle        Vital/I&O/Physical examination:   VS:  /64   Pulse 72   Temp 97.5 °F (36.4 °C) (Oral)   Resp 18   Ht 5' 8\" (1.727 m)   Wt 142 lb 6.7 oz (64.6 kg)   SpO2 98%   BMI 21.65 kg/m²     I/O:    Intake/Output Summary (Last 24 hours) at 9/19/2020 0811  Last data filed at 9/18/2020 2000  Gross per 24 hour   Intake 730 ml   Output 400 ml   Net 330 ml     Physical Exam    General appearance: No apparent distress, appears stated age and cooperative. HEENT: Pupils equal, round, and reactive to light. Neck: Supple, with full range of motion. No jugular venous distention. Trachea midline. Respiratory:  Normal respiratory effort. Clear to auscultation, bilaterally without Rales/Wheezes/Rhonchi. Cardiovascular: Regular rate and rhythm with normal S1/S2. Grade 5/6 holosystolic murmur radiating to carotids. +JVD  Abdomen: Soft, non-tender, non-distended with normal bowel sounds. Musculoskelatal: No clubbing, cyanosis or edema bilaterally. Skin: Skin color, texture,normal, skin warm.   Psychiatric: Alert and oriented x 3, but not to situation, confusion and memory issues     Labs & Imaging:   LABS:  CBC:   Recent Labs     09/17/20 2031 09/19/20  0408   WBC 4.4 4.2   HGB 11.7* 11.0*   HCT 35.4* 32.7*    153   MCV 87.7 85.9                            Renal:   Recent Labs     09/17/20 2031 09/18/20  0437 09/19/20  0408   * 128* 128*   K 4.5 4.0 3.7   CL 91* 92* 93*   CO2 23 21 23   BUN 23* 22* 27*   CREATININE 0.9 0.9 1.1   GLUCOSE 196* 228* 246*   ANIONGAP 14 15 12     Hepatic:   Recent Labs     09/17/20 2031   AST 18   ALT 6*   BILITOT 0.5   ALKPHOS 64     Troponin:   Recent Labs     09/17/20 2233 09/18/20  0149 09/18/20  0437   TROPONINI 0.04* 0.04* 0.05*       Recent Labs     09/17/20  2223 09/18/20  7855 COLORU Yellow  --    PHUR 7.0  7.0 5.0   WBCUA 0-2  --    RBCUA 3-4  --    BACTERIA Rare*  --    CLARITYU Clear  --    SPECGRAV 1.025  --    LEUKOCYTESUR Negative  --    UROBILINOGEN 0.2  --    BILIRUBINUR Negative  --    BLOODU SMALL*  --    GLUCOSEU 100*  --         IMAGING:  CT ABDOMEN PELVIS WO CONTRAST Additional Contrast? None   Final Result      1. No evidence of bowel obstruction. Prior right hemicolectomy. 2.  Noncontrast CT demonstrating stable right upper quadrant peritoneal thickening, stable perisplenic nodules, and mild to moderate ascites likely representing stable peritoneal metastases. Recommend correlation with outside hospital records. 3.  Small left pleural effusion. CT HEAD WO CONTRAST   Final Result      1. No acute intracranial process. XR CHEST (2 VW)   Final Result      Small left pleural effusion. MRI BRAIN W WO CONTRAST    (Results Pending)       Assessment & Plan:      Hypertensive Emergency  -Pt's systolic pressure continues to be around the 200s despite being on amlodipine, Chlorthalidone, losartan, metoprolol. Patient reports improvement in symptoms of headache and nausea since started on cardene gtt. - likely side-effect of Avastin (known common complication)  -Last known normal blood pressure on progress note from general and vascular surgery and nephrology visit on 07/30/2020 was 136/64. She has since been started on Avastin.  -Seen by Dr. Kristal Montoya, recommended discontinuation of Avastin, will require reimaging/restaging per [facility] once BP issues resolved. -Goal SBP < 180 prior to transfer back to floors  -At discharge aim for Blood pressure < 140/90 based on patient's age and hx of DM.  -Increase home Amlodipine from 5 mg to 10 mg  -Continue Losartan 100 mg, metoprolol 100 mg, Chlorthalidone 25 mg  -start hydralazine 10 mg PO TID  -CT head 09/17/2020 shows No acute intracranial process.    -CT abd pelvis 09/172020 did not show any renal pathology.  -Follow up MRI brain w wo contrast r/o PRES from hypertension and/or mets as cause for patient's confusion  -Follow up renin, aldosterone, TSH to assess for secondary causes of hypertension. Diabetes Mellitus  -10 units lantus  which is her home dose.  MDSSI  -monitor glucose  -Adjust insulin accordingly    Code Status: Full Code  FEN:; DIET GENERAL;  PPX: Lovenox  DISPO: ICU, once off cardene gtt can be transferred to floors      This patient will be discussed with attending, Dr. Anna Spaulding MD, PGY- 3  9/19/2020,  8:11 AM

## 2020-09-19 NOTE — PROGRESS NOTES
4 Eyes Skin Assessment     The patient is being assess for  Transfer to New Unit    I agree that 2 RN's have performed a thorough Head to Toe Skin Assessment on the patient. ALL assessment sites listed below have been assessed. Areas assessed by both nurses: Alva Sierra RN  [x]   Head, Face, and Ears   [x]   Shoulders, Back, and Chest  [x]   Arms, Elbows, and Hands   [x]   Coccyx, Sacrum, and IschIum  [x]   Legs, Feet, and Heels        Does the Patient have Skin Breakdown?   No   Bruising noted to bilateral arms      Librado Prevention initiated:  Yes   Wound Care Orders initiated:  N/A      WOC nurse consulted for Pressure Injury (Stage 3,4, Unstageable, DTI, NWPT, and Complex wounds), New and Established Ostomies:  No      Nurse 1 eSignature: Electronically signed by Shweta Noe RN on 9/18/20 at 8:16 PM EDT    **SHARE this note so that the co-signing nurse is able to place an eSignature**    Nurse 2 eSignature: Electronically signed by Travon Muro RN on 9/18/20 at 8:17 PM EDT Discharged

## 2020-09-19 NOTE — PROGRESS NOTES
2 mg, 2 mg, Oral, TID PRN  rosuvastatin (CRESTOR) tablet 40 mg, 40 mg, Oral, QPM  venlafaxine (EFFEXOR XR) extended release capsule 75 mg, 75 mg, Oral, BID  sodium chloride flush 0.9 % injection 10 mL, 10 mL, Intravenous, 2 times per day  sodium chloride flush 0.9 % injection 10 mL, 10 mL, Intravenous, PRN  acetaminophen (TYLENOL) tablet 650 mg, 650 mg, Oral, Q6H PRN **OR** acetaminophen (TYLENOL) suppository 650 mg, 650 mg, Rectal, Q6H PRN  enoxaparin (LOVENOX) injection 40 mg, 40 mg, Subcutaneous, Daily  hydrALAZINE (APRESOLINE) injection 10 mg, 10 mg, Intravenous, Q6H PRN  labetalol (NORMODYNE;TRANDATE) injection 10 mg, 10 mg, Intravenous, Q10 Min PRN  glucose (GLUTOSE) 40 % oral gel 15 g, 15 g, Oral, PRN  dextrose 50 % IV solution, 12.5 g, Intravenous, PRN  glucagon (rDNA) injection 1 mg, 1 mg, Intramuscular, PRN  dextrose 5 % solution, 100 mL/hr, Intravenous, PRN  metoprolol succinate (TOPROL XL) extended release tablet 100 mg, 100 mg, Oral, Daily  docusate sodium (COLACE) capsule 100 mg, 100 mg, Oral, BID  polyethylene glycol (GLYCOLAX) packet 17 g, 17 g, Oral, Daily  prochlorperazine (COMPAZINE) injection 10 mg, 10 mg, Intravenous, Q4H PRN  chlorthalidone (HYGROTON) tablet 25 mg, 25 mg, Oral, Daily  amLODIPine (NORVASC) tablet 10 mg, 10 mg, Oral, Daily  niCARdipine (CARDENE) 25 mg in sodium chloride 0.9 % 250 mL infusion, 5 mg/hr, Intravenous, Continuous    Allergies  Allergies   Allergen Reactions    Cephalexin Rash    Pcn [Penicillins] Rash    Cephalosporins      UNABLE TO RECALL REACTION    Cephalexin Rash       Physical Exam  VITALS:  /60   Pulse 72   Temp 97.5 °F (36.4 °C) (Oral)   Resp 18   Ht 5' 8\" (1.727 m)   Wt 142 lb 6.7 oz (64.6 kg)   SpO2 98%   BMI 21.65 kg/m²   TEMPERATURE:  Current - Temp: 97.5 °F (36.4 °C);  Max - Temp  Av.6 °F (36.4 °C)  Min: 97 °F (36.1 °C)  Max: 97.9 °F (36.6 °C)  PULSE OXIMETRY RANGE: SpO2  Av.6 %  Min: 94 %  Max: 100 %  24HR INTAKE/OUTPUT: Intake/Output Summary (Last 24 hours) at 9/19/2020 1118  Last data filed at 9/18/2020 2000  Gross per 24 hour   Intake 730 ml   Output 400 ml   Net 330 ml       Physical Exam  Constitutional:       General: She is not in acute distress. Appearance: She is ill-appearing. Eyes:      General: No scleral icterus. Cardiovascular:      Rate and Rhythm: Normal rate and regular rhythm. Pulmonary:      Effort: Pulmonary effort is normal.      Breath sounds: Normal breath sounds. Abdominal:      Palpations: Abdomen is soft. Tenderness: There is no abdominal tenderness. Musculoskeletal:         General: No swelling. Lymphadenopathy:      Cervical: No cervical adenopathy. Skin:     General: Skin is warm and dry. Neurological:      Comments: Forgetful. Somnolent but arousable         Labs  Recent Labs     09/17/20 2031 09/19/20  0408   WBC 4.4 4.2   HGB 11.7* 11.0*   HCT 35.4* 32.7*    153   MCV 87.7 85.9       Recent Labs     09/17/20 2031 09/18/20  0437 09/19/20  0408   * 128* 128*   K 4.5 4.0 3.7   CL 91* 92* 93*   CO2 23 21 23   BUN 23* 22* 27*   CREATININE 0.9 0.9 1.1       Recent Labs     09/17/20 2031   AST 18   ALT 6*   BILITOT 0.5   ALKPHOS 64       No results for input(s): MG in the last 72 hours. Radiology  Ct Abdomen Pelvis Wo Contrast Additional Contrast? None    Result Date: 9/17/2020  EXAM: CT ABDOMEN AND PELVIS WITHOUT CONTRAST INDICATION: eval for bowel obstruction, vomiting COMPARISON: 7/3/2020 and 6/21/2020 TECHNIQUE: Axial CT imaging obtained from lung bases through pelvis. Axial images and multiplanar reformatted images are provided for review. Up-to-date CT equipment and radiation dose reduction techniques were employed. IV Contrast: None. Oral Contrast: No. FINDINGS: There is a small left pleural effusion. The heart size is normal without pericardial effusion. There is peritoneal thickening along the right upper quadrant.  3 nodules in the left upper quadrant measuring 0.5 to 1.6 cm in size may represent metastatic disease or splenules. The liver is slightly nodular in contour which may represent hepatic cirrhosis. Two cystic lesion in the right hepatic lobe are unchanged. There is small to moderate volume abdominopelvic ascites. The gallbladder appears normal. Noncontrast images of the spleen, pancreas, and adrenal glands appear normal. No stones are identified in either kidney or along the course of either ureter and no hydronephrosis or hydroureter is seen. The urinary bladder appears normal. The uterus is absent. There has been prior right hemicolectomy. There is no evidence of bowel obstruction. Stomach and small bowel appear normal. There is no evidence of pneumatosis or free air. No lymphadenopathy is seen. Right and left paramedian ventral subcutaneous soft tissue masses are unchanged. The abdominal aorta is severely atherosclerotic. Bone windows demonstrate no suspicious lytic or blastic lesions. There is severe lumbar spondylosis. Thoracic spinal stimulator is noted. 1.  No evidence of bowel obstruction. Prior right hemicolectomy. 2.  Noncontrast CT demonstrating stable right upper quadrant peritoneal thickening, stable perisplenic nodules, and mild to moderate ascites likely representing stable peritoneal metastases. Recommend correlation with outside hospital records. 3.  Small left pleural effusion. Xr Chest (2 Vw)    Result Date: 9/17/2020  EXAM: Chest PA and Lateral views INDICATION: AMS COMPARISON: 7/29/2020 FINDINGS: A left subclavian approach port and thoracic spinal stimulator leads are redemonstrated. There is stable mild left hemidiaphragmatic elevation. There is a small left pleural effusion. There is no focal consolidation or pneumothorax. The cardiomediastinal  silhouette is normal. The visible bony thorax is intact. Small left pleural effusion.      Ct Head Wo Contrast    Result Date: 9/17/2020  PROCEDURE: CT head without contrast Diagnosis    Hyperlipidemia    Diabetes mellitus, type II ([facility] [address]    HTN (hypertension)    Vitamin D deficiency    CAD (coronary artery disease)    Elevated CA-125    Uterine leiomyoma    Abnormal abdominal CT scan    Cancer of peritoneum (HCC)    Delirium due to general medical condition    Iron deficiency anemia    Impaired intestinal absorption    Neoplasm related pain    Weight loss    Abdominal pain    Chest pain, atypical    Partial small bowel obstruction (HCC)    Malignant neoplasm of ovary (HCC)    Altered mental state    Moderate malnutrition (HCC)       Assessment and Plan:     The patient presented with severe hypertension and a significant proteinuria on urinalysis. I suspect that this could be due to her Avastin. This has not been problematic for her in the past. Despite now being normotensive, she remains encephalopathic. With regard to Avastin and risk of hypertension, in a meta-analysis of 12,949 patients treated with or without bevacizumab for advanced solid tumors, the RR of developing \"significantly raised\" blood pressure (defined as more than one drug needed for treatment, more intensive treatment needed than used previously, or life-threatening consequences such as hypertensive crisis; or grade 3 or 4 hypertension according to the [facility] [[facility]] Common Terminology Criteria for Adverse Events [CTCAE] (table 1)) among patients receiving bevacizumab was 5.38 (95% CI 3.63-7.97) [40]. Among patients receiving bevacizumab, the overall incidence of all raised blood pressure events was 24 percent (95% CI 20-29 percent), while the incidence of significantly raised blood pressure was 8 percent (95% CI 6-10 percent). Risk was dose dependent; the RRs of severe hypertension at doses of 5 and 2.5 mg/kg per week were 7.17 (95% CI 3.91-13.13) and 4.11 (95% CI 2.49-6.78), respectively.     At the very least, Avastin needs to be discontinued.   I am also concerned that the patient is likely not responding to her treatment. On 7/17/2020 her  was 59; increased to 220 in 8/2020 and in Sept/2020 was up to 275. She will need to be reimaged and restaged at some point. However, right now her blood pressure and cardiac status is the primary focus.     Jerrica Woodruff MD  9/19/2020

## 2020-09-19 NOTE — PROGRESS NOTES
Nicardipine gtt stopped. PO antihypertensives given (see MAR). Pt up to the bathroom with walker, standby assist; now sitting in chair eating breakfast without issue. Chair alarm on, [employer] camera on. VSS. Will cont to monitor.

## 2020-09-19 NOTE — PROGRESS NOTES
MD Corinna    hydrALAZINE (APRESOLINE) tablet 10 mg, 10 mg, Oral, 3 times per day, Marylin Laird MD, 10 mg at 09/19/20 1608    apixaban (ELIQUIS) tablet 5 mg, 5 mg, Oral, BID, Marylin Laird MD    ALPRAZolam (XANAX) tablet 0.5 mg, 0.5 mg, Oral, Nightly, Marylin Laird MD, 0.5 mg at 09/18/20 2030    losartan (COZAAR) tablet 100 mg, 100 mg, Oral, Daily, Marylin Laird MD, 334 mg at 09/19/20 0816    oxyCODONE-acetaminophen (PERCOCET)  MG per tablet 1 tablet, 1 tablet, Oral, 4x Daily PRN, Marylin Laird MD, 1 tablet at 09/18/20 0536    pregabalin (LYRICA) capsule 75 mg, 75 mg, Oral, BID, Marylin Laird MD, 75 mg at 09/19/20 0815    rOPINIRole (REQUIP) tablet 2 mg, 2 mg, Oral, TID PRN, Marylin Laird MD    rosuvastatin (CRESTOR) tablet 40 mg, 40 mg, Oral, QPM, Marylin Laird MD, 40 mg at 09/19/20 1814    venlafaxine (EFFEXOR XR) extended release capsule 75 mg, 75 mg, Oral, BID, Marylin Laird MD, 75 mg at 09/19/20 0816    sodium chloride flush 0.9 % injection 10 mL, 10 mL, Intravenous, 2 times per day, Marylin Laird MD, 10 mL at 09/18/20 2000    sodium chloride flush 0.9 % injection 10 mL, 10 mL, Intravenous, PRN, Marylin Laird MD    acetaminophen (TYLENOL) tablet 650 mg, 650 mg, Oral, Q6H PRN **OR** acetaminophen (TYLENOL) suppository 650 mg, 650 mg, Rectal, H2C PRN, Marylin Laird MD    labetalol (NORMODYNE;TRANDATE) injection 10 mg, 10 mg, Intravenous, H67 Min PRN, Marylin Laird MD, 10 mg at 09/18/20 0531    glucose (GLUTOSE) 40 % oral gel 15 g, 15 g, Oral, PRN, Marylin Laird MD    dextrose 50 % IV solution, 12.5 g, Intravenous, PRN, Marylin Laird MD    glucagon (rDNA) injection 1 mg, 1 mg, Intramuscular, PRN, Marylin Laird MD    dextrose 5 % solution, 100 mL/hr, Intravenous, PRN, Marylin Laird MD    metoprolol succinate (TOPROL XL) extended release tablet 100 mg, 100 mg, Oral, Daily, Marylin Laird MD, 357 mg at 09/19/20 0815    docusate sodium (COLACE) capsule 100 mg, 100 mg, Oral, BID, Melody Antic, MD, 100 mg at 09/19/20 0815    polyethylene glycol (GLYCOLAX) packet 17 g, 17 g, Oral, Daily, Edvin Vargas MD, 17 g at 09/19/20 0816    prochlorperazine (COMPAZINE) injection 10 mg, 10 mg, Intravenous, W8D PRN, Edvin Vargas MD, 10 mg at 09/18/20 1205    chlorthalidone (HYGROTON) tablet 25 mg, 25 mg, Oral, Daily, Edvin Vargas MD, 25 mg at 09/19/20 0816    amLODIPine (NORVASC) tablet 10 mg, 10 mg, Oral, Daily, Edvin Vargas MD, 10 mg at 09/19/20 0816      ROS -   Constitutional- No weight loss or fevers  Eyes- No diplopia. No photophobia. Ears/nose/throat- No dysphagia. No Dysarthria  Gastrointestinal- No Abdominal pain. No Vomiting. Genitourinary- No incontinence. No urinary retention  Neurologic- No weakness. No Headache.  insulin glargine  10 Units Subcutaneous Nightly    insulin lispro  0-12 Units Subcutaneous TID WC    insulin lispro  0-6 Units Subcutaneous Nightly    hydrALAZINE  10 mg Oral 3 times per day    apixaban  5 mg Oral BID    ALPRAZolam  0.5 mg Oral Nightly    losartan  100 mg Oral Daily    pregabalin  75 mg Oral BID    rosuvastatin  40 mg Oral QPM    venlafaxine  75 mg Oral BID    sodium chloride flush  10 mL Intravenous 2 times per day    metoprolol succinate  100 mg Oral Daily    docusate sodium  100 mg Oral BID    polyethylene glycol  17 g Oral Daily    chlorthalidone  25 mg Oral Daily    amLODIPine  10 mg Oral Daily         dextrose          oxyCODONE-acetaminophen, rOPINIRole, sodium chloride flush, acetaminophen **OR** acetaminophen, labetalol, glucose, dextrose, glucagon (rDNA), dextrose, prochlorperazine     Exam:  Blood pressure (!) 142/66, pulse 68, temperature 98.4 °F (36.9 °C), temperature source Oral, resp. rate 23, height 5' 8\" (1.727 m), weight 142 lb 6.7 oz (64.6 kg), SpO2 98 %, not currently breastfeeding.   Constitutional    Vital signs: BP, HR, and RR reviewed   General Alert, no distress, well-nourished  Eyes: unable to visualize the fundi  Cardiovascular: pulses symmetric in all 4 extremities. No peripheral edema. Psychiatric: cooperative with examination, no  psychotic behavior noted. Neurologic  Mental status:   orientation to person, place, time and situation   General fund of knowledge:  grossly intact   Memory: Short term and long term intact   Attention intact as able to attend well to the exam     Language fluent in conversation   Comprehension intact; follows simple commands  Cranial nerves:   CN2: Visual Fields full w/o extinction on confrontational testing,   CN 3,4,6: extraocular muscles intact,  CN7: upper and lower facial symmetric without dysarthria  CN8: hearing grossly intact to conversation. Strength:   BUE: grossly 5/5 no drift. BLE: grossly 5/5. Cerebellar/coordination: no obvious ataxia as patient feeds herself, puts straw in cup. Tone: normal in all 4 extremities  Gait: deferred for safety. Labs  Na: 128  K: 3.7  BUN: 27  Creatinine: 1.1  Glucose: 246  Ca: 8.7    WBC: 4.2    LFT's unremarkable  Ammonia: 18      Studies  MRI Brain w/ w/o 9/19/20: Reviewed the read. 1. No acute stroke, mass, or hemorrhage. 2. Mild chronic small vessel ischemic white matter disease. 3. Mild atrophy. CT Head w/: Reviewed the read. Nothing acute. Impression  Grisel Donahue is a 67 y.o. female who presented with altered mental status in the setting of , poor PO intake, vomiting, hypertension and known cancer. Exam with improved rate of responses. No focal findings. not encephalopathic. MRI Brain w/ w/o unremarkable. Clinically improving. Recommendations  - Can consider additional work up if felt patient is not at baseline. Patient feels she is at her baseline mentation, no family was present for my encounter otherwise no additional work up at this time.   - No reported seizure like activity so do not feel EEG is needed at this time. Neurology will sign off.  Please do not hesitate to call back with any questions, concerns or if felt patient is not at baseline mental status.      Merrick Peter, SIMONE  [address] [place] [address] [address] Neurology    A copy of this note was provided for Dr Marlee Peñaloza DO

## 2020-09-19 NOTE — PROGRESS NOTES
Bedside report received from [facility] to assume care. Pt sitting in bed, watching television. 50% of meal tray eaten. No signs of acute distress noted. Sinus rhythm on monitor, HR 71. BP within defined limits, 134/66 (84). RR 19. SpO2 98% on room air. Safety precautions in place. Bed exit alarm on, call light within reach.

## 2020-09-19 NOTE — PROGRESS NOTES
Bedside report received from Michael Joy and Elsi Estes RN to assume care. Pt transferred from [facility] for hypertension and to be placed on nicardipine drip. PRN BP medications not treating BP, systolics in the 782J-621U. Pt is alert and oriented to person, place and time, disorientation to situation. Pt also has short term memory loss and is forgetful. Pt is sinus rhythm with HR in 70s. Pt is hypertensive, systolics in the 904C. RR 18, 100% SpO2 on RA.       CHG bath provided to pt, skin assessed with 2 RNs per protocol. No open wounds noted. Scattered bruising noted to bilateral upper extremities. Bed exit alarm on, safety precautions in place.

## 2020-09-19 NOTE — PLAN OF CARE
Problem: Pain:  Goal: Pain level will decrease  Outcome: Ongoing  Note:   RN monitoring pt for pain q4h and more frequently as needed. Pain scale 0-10 educated to pt and CPOT used if critically ill pt is unable to answer. RN educated pt on reporting any new onset of pain or sudden increase. PRN pain medication reviewed with pt. Will continue to monitor for changes. Goal: Control of acute pain  Outcome: Ongoing  Goal: Control of chronic pain  Outcome: Ongoing     Problem: Nausea/Vomiting:  Goal: Absence of nausea/vomiting  Outcome: Met This Shift  Goal: Able to drink  Outcome: Met This Shift  Goal: Able to eat  Outcome: Ongoing  Goal: Ability to achieve adequate nutritional intake will improve  Outcome: Ongoing     Problem: Skin Integrity:  Goal: Will show no infection signs and symptoms  Outcome: Ongoing  Note:    Heels elevated off bed. Sacral heart mepilex intact to protect,  site inspected and intact underneath. Will continue to turn and reposition patient every two hours and as needed. Will continue to keep patient clean and dry, applying skin care cream as needed. Pillows used for repositioning q2hs. Will continue to monitor and assess for skin breakdown.     Goal: Absence of new skin breakdown  Outcome: Ongoing     Problem: Nutrition  Goal: Optimal nutrition therapy  Outcome: Ongoing

## 2020-09-19 NOTE — PROGRESS NOTES
Assumed pt care at 1400. Pt AAOx3 warm dry pink. Pt currently resting in chair. Pt denies any CP or SOB. Pt states no further needs at this time. Call bell within reach. Will continue to monitor.

## 2020-09-19 NOTE — PROGRESS NOTES
Pt resting comfortably in bed, no signs of acute distress noted. Normal sinus rhythm on monitor. Pt remains on nicardipine gtt, see MAR. Pt sitting up in bed watching tv at this time. Safety precautions in place.

## 2020-09-19 NOTE — PROGRESS NOTES
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 Sanford Rick, recommended discontinuation of Avastin, will require reimaging/restaging per [facility] once BP issues resolved. DVT/PE history.  Patient on Eliquis per pharm med/rec  - restarted Eliquis     DM II - patient on 7-8 units Lantus nightly at home, diet has been poor however blood sugars elevated this am  -Lantus 10 units qhs started, sliding scale    DVT Prophylaxis: SCDs for now until blood pressure is better controlled, plan to restart Eliquis  Diet: DIET GENERAL; Diabetic oral supplement  Code Status: Full Code     PT/OT Eval Status: As tolerated with assistance and fall precautions     Dispo - ICU transfer to telemetry when appropriate      Tanja Powers DO

## 2020-09-19 NOTE — PLAN OF CARE
Problem: Pain:  Goal: Pain level will decrease  Description: Pain level will decrease  9/19/2020 1039 by Daniela Guan RN  Outcome: Ongoing  Note: Pt complains of chronic back pain while in bed. Since getting up to the chair, pain has decreased. Will cont to monitor. 9/19/2020 0430 by Renaldo Sheppard RN  Outcome: Ongoing  Note:   RN monitoring pt for pain q4h and more frequently as needed. Pain scale 0-10 educated to pt and CPOT used if critically ill pt is unable to answer. RN educated pt on reporting any new onset of pain or sudden increase. PRN pain medication reviewed with pt. Will continue to monitor for changes.     Goal: Control of acute pain  Description: Control of acute pain  9/19/2020 1039 by Daniela Guan RN  Outcome: Ongoing  9/19/2020 0430 by Renaldo Sheppard RN  Outcome: Ongoing  Goal: Control of chronic pain  Description: Control of chronic pain  9/19/2020 1039 by Daniela Guan RN  Outcome: Ongoing  9/19/2020 0430 by Renaldo Sheppard RN  Outcome: Ongoing     Problem: Nausea/Vomiting:  Goal: Absence of nausea/vomiting  Description: Absence of nausea/vomiting  9/19/2020 1039 by Daniela Guan RN  Outcome: Ongoing  9/19/2020 0430 by Renaldo Sheppard RN  Outcome: Met This Shift  Goal: Able to drink  Description: Able to drink  9/19/2020 1039 by Daniela Guan RN  Outcome: Ongoing  9/19/2020 0430 by Renaldo Sheppard RN  Outcome: Met This Shift  Goal: Able to eat  Description: Able to eat  9/19/2020 1039 by Daniela Guan RN  Outcome: Ongoing  9/19/2020 0430 by Renaldo Sheppard RN  Outcome: Ongoing  Goal: Ability to achieve adequate nutritional intake will improve  Description: Ability to achieve adequate nutritional intake will improve  9/19/2020 1039 by Daniela Guan RN  Outcome: Ongoing  9/19/2020 0430 by Renaldo Sheppard RN  Outcome: Ongoing     Problem: Skin Integrity:  Goal: Will show no infection signs and symptoms  Description: Will show no infection signs and symptoms  9/19/2020 1039 by Daniela Guan RN  Outcome: Ongoing  Note: Pt at risk for skin breakdown. See Librado score. Pt remains on bedrest. Unable to reposition self in bed. Heels elevated off bed. Sacral heart mepilex intact to protect,  site inspected and intact underneath. Will continue to turn and reposition patient every two hours and as needed. Will continue to keep patient clean and dry, applying skin care cream as needed. Pillows used for repositioning q2hs. Will continue to monitor and assess for skin breakdown. 9/19/2020 0430 by Renaldo Sheppard RN  Outcome: Ongoing  Note:    Heels elevated off bed. Sacral heart mepilex intact to protect,  site inspected and intact underneath. Will continue to turn and reposition patient every two hours and as needed. Will continue to keep patient clean and dry, applying skin care cream as needed. Pillows used for repositioning q2hs. Will continue to monitor and assess for skin breakdown.     Goal: Absence of new skin breakdown  Description: Absence of new skin breakdown  9/19/2020 1039 by Daniela Guan RN  Outcome: Ongoing  9/19/2020 0430 by Renaldo Sheppard RN  Outcome: Ongoing     Problem: Nutrition  Goal: Optimal nutrition therapy  9/19/2020 1039 by Daniela Guan RN  Outcome: Ongoing  9/19/2020 0430 by Renaldo Sheppard RN  Outcome: Ongoing

## 2020-09-20 LAB
ANION GAP SERPL CALCULATED.3IONS-SCNC: 7 MMOL/L (ref 3–16)
BASOPHILS ABSOLUTE: 0.1 K/UL (ref 0–0.2)
BASOPHILS RELATIVE PERCENT: 1.2 %
BUN BLDV-MCNC: 28 MG/DL (ref 7–20)
CALCIUM SERPL-MCNC: 8.4 MG/DL (ref 8.3–10.6)
CHLORIDE BLD-SCNC: 94 MMOL/L (ref 99–110)
CO2: 26 MMOL/L (ref 21–32)
CREAT SERPL-MCNC: 1.2 MG/DL (ref 0.6–1.2)
EOSINOPHILS ABSOLUTE: 0.2 K/UL (ref 0–0.6)
EOSINOPHILS RELATIVE PERCENT: 3.5 %
GFR AFRICAN AMERICAN: 52
GFR NON-AFRICAN AMERICAN: 43
GLUCOSE BLD-MCNC: 106 MG/DL (ref 70–99)
GLUCOSE BLD-MCNC: 115 MG/DL (ref 70–99)
GLUCOSE BLD-MCNC: 135 MG/DL (ref 70–99)
GLUCOSE BLD-MCNC: 231 MG/DL (ref 70–99)
GLUCOSE BLD-MCNC: 98 MG/DL (ref 70–99)
HCT VFR BLD CALC: 32.3 % (ref 36–48)
HEMOGLOBIN: 10.8 G/DL (ref 12–16)
LYMPHOCYTES ABSOLUTE: 0.8 K/UL (ref 1–5.1)
LYMPHOCYTES RELATIVE PERCENT: 15.9 %
MCH RBC QN AUTO: 28.9 PG (ref 26–34)
MCHC RBC AUTO-ENTMCNC: 33.6 G/DL (ref 31–36)
MCV RBC AUTO: 86.1 FL (ref 80–100)
MONOCYTES ABSOLUTE: 0.2 K/UL (ref 0–1.3)
MONOCYTES RELATIVE PERCENT: 3.9 %
NEUTROPHILS ABSOLUTE: 3.6 K/UL (ref 1.7–7.7)
NEUTROPHILS RELATIVE PERCENT: 75.5 %
PDW BLD-RTO: 17.1 % (ref 12.4–15.4)
PERFORMED ON: ABNORMAL
PERFORMED ON: NORMAL
PLATELET # BLD: 153 K/UL (ref 135–450)
PMV BLD AUTO: 7.6 FL (ref 5–10.5)
POTASSIUM SERPL-SCNC: 4 MMOL/L (ref 3.5–5.1)
RBC # BLD: 3.75 M/UL (ref 4–5.2)
SODIUM BLD-SCNC: 127 MMOL/L (ref 136–145)
TSH REFLEX: 1.73 UIU/ML (ref 0.27–4.2)
WBC # BLD: 4.8 K/UL (ref 4–11)

## 2020-09-20 PROCEDURE — 85025 COMPLETE CBC W/AUTO DIFF WBC: CPT

## 2020-09-20 PROCEDURE — 80048 BASIC METABOLIC PNL TOTAL CA: CPT

## 2020-09-20 PROCEDURE — 2580000003 HC RX 258: Performed by: STUDENT IN AN ORGANIZED HEALTH CARE EDUCATION/TRAINING PROGRAM

## 2020-09-20 PROCEDURE — 6370000000 HC RX 637 (ALT 250 FOR IP): Performed by: STUDENT IN AN ORGANIZED HEALTH CARE EDUCATION/TRAINING PROGRAM

## 2020-09-20 PROCEDURE — 6370000000 HC RX 637 (ALT 250 FOR IP): Performed by: INTERNAL MEDICINE

## 2020-09-20 PROCEDURE — 1200000000 HC SEMI PRIVATE

## 2020-09-20 PROCEDURE — 2580000003 HC RX 258: Performed by: INTERNAL MEDICINE

## 2020-09-20 PROCEDURE — B030Y0Z MAGNETIC RESONANCE IMAGING (MRI) OF BRAIN USING OTHER CONTRAST, UNENHANCED AND ENHANCED: ICD-10-PCS | Performed by: INTERNAL MEDICINE

## 2020-09-20 RX ORDER — SODIUM CHLORIDE 9 MG/ML
INJECTION, SOLUTION INTRAVENOUS CONTINUOUS
Status: DISCONTINUED | OUTPATIENT
Start: 2020-09-20 | End: 2020-09-21

## 2020-09-20 RX ORDER — HYDRALAZINE HYDROCHLORIDE 50 MG/1
25 TABLET, FILM COATED ORAL EVERY 8 HOURS SCHEDULED
Status: DISCONTINUED | OUTPATIENT
Start: 2020-09-20 | End: 2020-09-21

## 2020-09-20 RX ADMIN — HYDRALAZINE HYDROCHLORIDE 10 MG: 10 TABLET, FILM COATED ORAL at 06:19

## 2020-09-20 RX ADMIN — VENLAFAXINE HYDROCHLORIDE 75 MG: 75 CAPSULE, EXTENDED RELEASE ORAL at 09:35

## 2020-09-20 RX ADMIN — METOPROLOL SUCCINATE 100 MG: 100 TABLET, EXTENDED RELEASE ORAL at 09:35

## 2020-09-20 RX ADMIN — APIXABAN 5 MG: 5 TABLET, FILM COATED ORAL at 20:37

## 2020-09-20 RX ADMIN — SODIUM CHLORIDE: 9 INJECTION, SOLUTION INTRAVENOUS at 14:50

## 2020-09-20 RX ADMIN — INSULIN GLARGINE 10 UNITS: 100 INJECTION, SOLUTION SUBCUTANEOUS at 20:40

## 2020-09-20 RX ADMIN — INSULIN LISPRO 2 UNITS: 100 INJECTION, SOLUTION INTRAVENOUS; SUBCUTANEOUS at 20:39

## 2020-09-20 RX ADMIN — PREGABALIN 75 MG: 75 CAPSULE ORAL at 09:35

## 2020-09-20 RX ADMIN — DOCUSATE SODIUM 100 MG: 100 CAPSULE, LIQUID FILLED ORAL at 20:37

## 2020-09-20 RX ADMIN — AMLODIPINE BESYLATE 10 MG: 10 TABLET ORAL at 09:35

## 2020-09-20 RX ADMIN — ALPRAZOLAM 0.5 MG: 0.5 TABLET ORAL at 20:36

## 2020-09-20 RX ADMIN — PREGABALIN 75 MG: 75 CAPSULE ORAL at 20:37

## 2020-09-20 RX ADMIN — Medication 10 ML: at 09:36

## 2020-09-20 RX ADMIN — VENLAFAXINE HYDROCHLORIDE 75 MG: 75 CAPSULE, EXTENDED RELEASE ORAL at 20:37

## 2020-09-20 RX ADMIN — CHLORTHALIDONE 25 MG: 25 TABLET ORAL at 09:35

## 2020-09-20 RX ADMIN — HYDRALAZINE HYDROCHLORIDE 25 MG: 50 TABLET, FILM COATED ORAL at 13:28

## 2020-09-20 RX ADMIN — OXYCODONE AND ACETAMINOPHEN 1 TABLET: 10; 325 TABLET ORAL at 20:37

## 2020-09-20 RX ADMIN — APIXABAN 5 MG: 5 TABLET, FILM COATED ORAL at 09:35

## 2020-09-20 RX ADMIN — LOSARTAN POTASSIUM 100 MG: 100 TABLET ORAL at 09:35

## 2020-09-20 RX ADMIN — Medication 10 ML: at 20:39

## 2020-09-20 RX ADMIN — ROSUVASTATIN CALCIUM 40 MG: 20 TABLET, COATED ORAL at 18:50

## 2020-09-20 RX ADMIN — HYDRALAZINE HYDROCHLORIDE 25 MG: 50 TABLET, FILM COATED ORAL at 20:36

## 2020-09-20 ASSESSMENT — PAIN SCALES - GENERAL
PAINLEVEL_OUTOF10: 0
PAINLEVEL_OUTOF10: 5
PAINLEVEL_OUTOF10: 0

## 2020-09-20 ASSESSMENT — PAIN - FUNCTIONAL ASSESSMENT: PAIN_FUNCTIONAL_ASSESSMENT: ACTIVITIES ARE NOT PREVENTED

## 2020-09-20 ASSESSMENT — PAIN DESCRIPTION - PROGRESSION
CLINICAL_PROGRESSION: GRADUALLY WORSENING
CLINICAL_PROGRESSION: RESOLVED

## 2020-09-20 ASSESSMENT — ENCOUNTER SYMPTOMS
GASTROINTESTINAL NEGATIVE: 1
EYES NEGATIVE: 1
RESPIRATORY NEGATIVE: 1

## 2020-09-20 ASSESSMENT — PAIN DESCRIPTION - LOCATION: LOCATION: BACK

## 2020-09-20 ASSESSMENT — PAIN DESCRIPTION - ORIENTATION: ORIENTATION: MID;OUTER;LOWER

## 2020-09-20 ASSESSMENT — PAIN DESCRIPTION - DESCRIPTORS: DESCRIPTORS: ACHING

## 2020-09-20 ASSESSMENT — PAIN DESCRIPTION - FREQUENCY: FREQUENCY: CONTINUOUS

## 2020-09-20 ASSESSMENT — PAIN DESCRIPTION - ONSET: ONSET: ON-GOING

## 2020-09-20 ASSESSMENT — PAIN DESCRIPTION - PAIN TYPE: TYPE: ACUTE PAIN

## 2020-09-20 NOTE — PROGRESS NOTES
Hospitalist Progress Note      PCP: Georgette Harvey MD    Date of Admission: 9/17/2020    Chief Complaint: Vomiting and confusion per spouse        History Of Present Illness:       \"71 y.o. female who presents with confusion, headache behind her eyes and vomiting. Patient is very forgetful and is unable to provide any reliable history. 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 Last chemotherapy was on 9/14/2020. The next morning she woke up confused, her speech did not make any sense and has been vomiting for the past 24 hours. Her oral intake has been very little over the last several days. Patient is unable to tell me when she had her last BM, states that she is not passing any gas, denies any abdominal pain. Her blood pressure was extremely elevated upon presentation to the emergency room with systolic blood pressure more than 200 mmHg     Patient has history of bowel obstruction has undergone lysis of adhesions and debulking of her tumor in July 2020. \"       Subjective: Mental status still better. Some blood pressures still with high readings. No new complaints.      Medications:  Reviewed    Infusion Medications    dextrose       Scheduled Medications    insulin glargine  10 Units Subcutaneous Nightly    insulin lispro  0-12 Units Subcutaneous TID WC    insulin lispro  0-6 Units Subcutaneous Nightly    hydrALAZINE  10 mg Oral 3 times per day    apixaban  5 mg Oral BID    ALPRAZolam  0.5 mg Oral Nightly    losartan  100 mg Oral Daily    pregabalin  75 mg Oral BID    rosuvastatin  40 mg Oral QPM    venlafaxine  75 mg Oral BID    sodium chloride flush  10 mL Intravenous 2 times per day    metoprolol succinate  100 mg Oral Daily    docusate sodium  100 mg Oral BID    polyethylene glycol  17 g Oral Daily    chlorthalidone  25 mg Oral Daily    amLODIPine  10 mg Oral Daily     PRN Meds: oxyCODONE-acetaminophen, secondary to HTN, possibly chemotherapeutic agent effects, resolved. No SBO on CT however she does have stool burden and history of SBO, on narcotics.   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 Nayeli Baptiste, recommended discontinuation of Avastin, will require reimaging/restaging per [facility] once BP issues resolved. DVT/PE history.  Patient on Eliquis per pharm med/rec  - restarted Eliquis     DM II - patient on 7-8 units Lantus nightly at home, diet has been poor however blood sugars elevated this am  -Lantus 10 units qhs started, sliding scale    DVT Prophylaxis: SCDs for now until blood pressure is better controlled, plan to restart Eliquis  Diet: Cardiac; Diabetic oral supplement  Code Status: Full Code     PT/OT Eval Status: As tolerated with assistance and fall precautions     Dispo - [facility]      Tanja Powers DO

## 2020-09-20 NOTE — PROGRESS NOTES
4 Eyes Admission Assessment     I agree as the admission nurse that 2 RN's have performed a thorough Head to Toe Skin Assessment on the patient. ALL assessment sites listed below have been assessed on admission. Areas assessed by both nurses: ***  [x]   Head, Face, and Ears   [x]   Shoulders, Back, and Chest  [x]   Arms, Elbows, and Hands   [x]   Coccyx, Sacrum, and Ischum  [x]   Legs, Feet, and Heels        Does the Patient have Skin Breakdown?   No       Scattered bruising  Redness to buttocks      Librado Prevention initiated:  Yes   Wound Care Orders initiated:  No      WOC nurse consulted for Pressure Injury (Stage 3,4, Unstageable, DTI, NWPT, and Complex wounds):  Yes      Nurse 1 eSignature: Electronically signed by Paulino Gramajo RN on 9/20/20 at 3:37 PM EDT    **SHARE this note so that the co-signing nurse is able to place an eSignature**    Nurse 2 eSignature: {Esignature:[id number]}

## 2020-09-20 NOTE — PROGRESS NOTES
Monitor reading ST- MCL >2.4. Pt denies any chest pain. Pt was complaining of mid to lower back pain due to mattress per pt earlier, percocet was given. Back pain resolved post medication. Dr. Mell Ramesh notified, EKG ordered via telephone. EKG shown to ICU resident per MD request. No new orders.

## 2020-09-20 NOTE — PROGRESS NOTES
No changes overnight. Pt still has confusion but is easily reoriented. BP within defined limits overnight. Pain under control, no reports of new pain. SAfety precautions in place.

## 2020-09-20 NOTE — PROGRESS NOTES
Pt to room 6302 from ICU. Vitals stable. Telemetry applied per order. Pt alert and oriented to self, place, and time. Pt disoriented to situation. Pt forgetful and not at baseline per . Pt up in chair with chair alarm on. NS infusing at 75ml/hr as ordered. Fall precautions in place. Call light within reach. Will continue to monitor.

## 2020-09-20 NOTE — PLAN OF CARE
Problem: Pain:  Goal: Pain level will decrease  9/19/2020 2355 by Alberto Wilson RN  Outcome: Ongoing. Goal: Control of acute pain  9/19/2020 2355 by Alberto Wilson RN  Outcome: Ongoing  Goal: Control of chronic pain  9/19/2020 2355 by Alberto Wilson RN  Outcome: Ongoing  Note:   RN monitoring pt for pain q4h and more frequently as needed. Pain scale 0-10 educated to pt and CPOT used if critically ill pt is unable to answer. RN educated pt on reporting any new onset of pain or sudden increase. PRN pain medication reviewed with pt. Will continue to monitor for changes. Percocet given for back pain, pain resolved. Problem: Nausea/Vomiting:  Goal: Absence of nausea/vomiting  9/19/2020 2355 by Alberto Wilson RN  Outcome: Met This Shift  Goal: Able to drink  9/19/2020 2355 by Alberto Wilson RN  Outcome: Met This Shift  Goal: Able to eat  9/19/2020 2355 by Alberto Wilson RN  Outcome: Met This Shift  Goal: Ability to achieve adequate nutritional intake will improve  9/19/2020 2355 by Alberto Wilson RN  Outcome: Ongoing  Note: Patient able to eat all 3 meals today, however only ate about 50%. Dietary supplements are provided to pt. Problem: Skin Integrity:  Goal: Will show no infection signs and symptoms  9/19/2020 2355 by Alberto Wilson RN  Outcome: Ongoing  Note:   Pt at risk for skin breakdown. See Librado score. Pt remains on bedrest. Unable to reposition self in bed. Heels elevated off bed. Sacral heart mepilex intact to protect,  site inspected and intact underneath. Will continue to turn and reposition patient every two hours and as needed. Will continue to keep patient clean and dry, applying skin care cream as needed. Pillows used for repositioning q2hs. Will continue to monitor and assess for skin breakdown.   Goal: Absence of new skin breakdown  9/19/2020 2355 by Alberto Wilson RN  Outcome: Ongoing     Problem: Skin Integrity:  Goal: Will show no infection signs and symptoms  9/19/2020 2355 by Renaldo Sheppard RN  Outcome: Ongoing  Note:   Pt at risk for skin breakdown. See Librado score. Pt remains on bedrest. Unable to reposition self in bed. Heels elevated off bed. Sacral heart mepilex intact to protect,  site inspected and intact underneath. Will continue to turn and reposition patient every two hours and as needed. Will continue to keep patient clean and dry, applying skin care cream as needed. Pillows used for repositioning q2hs. Will continue to monitor and assess for skin breakdown.     Goal: Absence of new skin breakdown  9/19/2020 2355 by Renaldo Sheppard RN  Outcome: Ongoing

## 2020-09-20 NOTE — PROGRESS NOTES
Patient transferred to room 6302 accompanied by RN. Patient belongings gathered. Patient transferred via wheelchair. Beside report given to barbra JESSICA. Patient assisted up into chair with chair alarm.

## 2020-09-20 NOTE — PLAN OF CARE
Problem: Pain:  Goal: Control of acute pain  Description: Patient's pain will remain at a manageable level throughout the duration of the shift. Patient understands prescribed pain medication regimen for its uses and side effects. Patient understands how to rate pain appropriately and when to call out if experiencing breakthrough pain. Patient understands how to utilize non pharmacological pain management techniques. Will continue to monitor. 9/20/2020 1234 by Zora Badillo RN  Outcome: Ongoing    Problem: Nausea/Vomiting:  Goal: Absence of nausea/vomiting  Description: Absence of nausea/vomiting. Patient denies having any nausea or vomiting this morning or afternoon. Patient endorses decreased appetite. Will continue to monitor and reassess. 9/20/2020 1234 by Zora Badillo RN  Outcome: Ongoing    Problem: Skin Integrity:  Goal: Absence of new skin breakdown  Description: Absence of new skin breakdown. Patient turned routinely with pillow support. Heels and arms elevated off of bed. Skin assessed. Prophylactic sacral heart in place. Patient on special air mattress. Patient educated on risks associated with prolonged bed rest. Patient checked routinely for incontinence, cleansed thoroughly in its presence. Lines off loaded from skin. Will continue to monitor and reassess. Absence of new skin breakdown  9/20/2020 1234 by Zora Badillo RN  Outcome: Ongoing    Problem: Nutrition  Goal: Optimal nutrition therapy  Description: Patient endorses decreased appetite. Patient encouraged to order food that she likes. Patient denies nausea or vomiting. Patient educated on importance of adequate nutritional intake for healing and homeostasis. Will continue to monitor and reassess. Outcome: Ongoing     Problem: Cardiac:  Goal: Hemodynamic stability will improve  Description: Hemodynamic stability will improve. Blood pressure monitored routinely. Patient medicated per [facility] with po pain medication.  Will administer IV antihypertensives if clinically indicated. Patient on continuous hemodynamic monitoring. Outcome: Ongoing     Problem: Mobility - Impaired:  Goal: Mobility will improve  Description: Mobility will improve. Patient ambulated in room this shift and to bathroom. Currently up in the chair. Patient tolerating ambulation well with walker and stand by assist,. Will continue to monitor and reassess.    Outcome: Ongoing

## 2020-09-20 NOTE — PROGRESS NOTES
Bladder scan performed on pt, >915mL shown. Dr. Coty Simpson notified, order to straight cath pt. Straight cath performed with sterile technique, pt tolerated well. Output 800mL.

## 2020-09-21 VITALS
TEMPERATURE: 98.2 F | WEIGHT: 142.42 LBS | RESPIRATION RATE: 16 BRPM | BODY MASS INDEX: 21.58 KG/M2 | HEART RATE: 68 BPM | OXYGEN SATURATION: 100 % | SYSTOLIC BLOOD PRESSURE: 165 MMHG | HEIGHT: 68 IN | DIASTOLIC BLOOD PRESSURE: 74 MMHG

## 2020-09-21 LAB
ANION GAP SERPL CALCULATED.3IONS-SCNC: 11 MMOL/L (ref 3–16)
BASOPHILS ABSOLUTE: 0 K/UL (ref 0–0.2)
BASOPHILS RELATIVE PERCENT: 0.4 %
BUN BLDV-MCNC: 35 MG/DL (ref 7–20)
CALCIUM SERPL-MCNC: 8.5 MG/DL (ref 8.3–10.6)
CHLORIDE BLD-SCNC: 92 MMOL/L (ref 99–110)
CO2: 24 MMOL/L (ref 21–32)
CREAT SERPL-MCNC: 1.2 MG/DL (ref 0.6–1.2)
EKG ATRIAL RATE: 68 BPM
EKG DIAGNOSIS: NORMAL
EKG P AXIS: 27 DEGREES
EKG P-R INTERVAL: 198 MS
EKG Q-T INTERVAL: 434 MS
EKG QRS DURATION: 100 MS
EKG QTC CALCULATION (BAZETT): 461 MS
EKG R AXIS: -4 DEGREES
EKG T AXIS: 82 DEGREES
EKG VENTRICULAR RATE: 68 BPM
EOSINOPHILS ABSOLUTE: 0.2 K/UL (ref 0–0.6)
EOSINOPHILS RELATIVE PERCENT: 4.6 %
GFR AFRICAN AMERICAN: 52
GFR NON-AFRICAN AMERICAN: 43
GLUCOSE BLD-MCNC: 105 MG/DL (ref 70–99)
GLUCOSE BLD-MCNC: 218 MG/DL (ref 70–99)
GLUCOSE BLD-MCNC: 97 MG/DL (ref 70–99)
HCT VFR BLD CALC: 29.9 % (ref 36–48)
HEMOGLOBIN: 10.1 G/DL (ref 12–16)
LYMPHOCYTES ABSOLUTE: 1 K/UL (ref 1–5.1)
LYMPHOCYTES RELATIVE PERCENT: 23.2 %
MCH RBC QN AUTO: 28.9 PG (ref 26–34)
MCHC RBC AUTO-ENTMCNC: 33.9 G/DL (ref 31–36)
MCV RBC AUTO: 85.3 FL (ref 80–100)
MONOCYTES ABSOLUTE: 0.2 K/UL (ref 0–1.3)
MONOCYTES RELATIVE PERCENT: 4.1 %
NEUTROPHILS ABSOLUTE: 2.9 K/UL (ref 1.7–7.7)
NEUTROPHILS RELATIVE PERCENT: 67.7 %
PDW BLD-RTO: 17.7 % (ref 12.4–15.4)
PERFORMED ON: ABNORMAL
PERFORMED ON: NORMAL
PLATELET # BLD: 152 K/UL (ref 135–450)
PMV BLD AUTO: 8 FL (ref 5–10.5)
POTASSIUM SERPL-SCNC: 3.8 MMOL/L (ref 3.5–5.1)
RBC # BLD: 3.5 M/UL (ref 4–5.2)
SODIUM BLD-SCNC: 127 MMOL/L (ref 136–145)
WBC # BLD: 4.3 K/UL (ref 4–11)

## 2020-09-21 PROCEDURE — 80048 BASIC METABOLIC PNL TOTAL CA: CPT

## 2020-09-21 PROCEDURE — 6370000000 HC RX 637 (ALT 250 FOR IP): Performed by: STUDENT IN AN ORGANIZED HEALTH CARE EDUCATION/TRAINING PROGRAM

## 2020-09-21 PROCEDURE — 99221 1ST HOSP IP/OBS SF/LOW 40: CPT | Performed by: NURSE PRACTITIONER

## 2020-09-21 PROCEDURE — 6370000000 HC RX 637 (ALT 250 FOR IP): Performed by: INTERNAL MEDICINE

## 2020-09-21 PROCEDURE — 93010 ELECTROCARDIOGRAM REPORT: CPT | Performed by: INTERNAL MEDICINE

## 2020-09-21 PROCEDURE — 36415 COLL VENOUS BLD VENIPUNCTURE: CPT

## 2020-09-21 PROCEDURE — 85025 COMPLETE CBC W/AUTO DIFF WBC: CPT

## 2020-09-21 RX ORDER — AMLODIPINE BESYLATE 10 MG/1
10 TABLET ORAL DAILY
Qty: 30 TABLET | Refills: 3 | Status: SHIPPED | OUTPATIENT
Start: 2020-09-22

## 2020-09-21 RX ORDER — HYDRALAZINE HYDROCHLORIDE 50 MG/1
50 TABLET, FILM COATED ORAL EVERY 8 HOURS SCHEDULED
Status: DISCONTINUED | OUTPATIENT
Start: 2020-09-21 | End: 2020-09-21 | Stop reason: HOSPADM

## 2020-09-21 RX ORDER — LOSARTAN POTASSIUM 100 MG/1
100 TABLET ORAL DAILY
Qty: 30 TABLET | Refills: 3 | Status: ON HOLD | OUTPATIENT
Start: 2020-09-22 | End: 2020-09-29 | Stop reason: HOSPADM

## 2020-09-21 RX ORDER — HYDRALAZINE HYDROCHLORIDE 50 MG/1
50 TABLET, FILM COATED ORAL EVERY 8 HOURS SCHEDULED
Qty: 90 TABLET | Refills: 3 | Status: SHIPPED | OUTPATIENT
Start: 2020-09-21

## 2020-09-21 RX ADMIN — LOSARTAN POTASSIUM 100 MG: 100 TABLET ORAL at 08:08

## 2020-09-21 RX ADMIN — APIXABAN 5 MG: 5 TABLET, FILM COATED ORAL at 08:01

## 2020-09-21 RX ADMIN — PREGABALIN 75 MG: 75 CAPSULE ORAL at 08:01

## 2020-09-21 RX ADMIN — HYDRALAZINE HYDROCHLORIDE 25 MG: 50 TABLET, FILM COATED ORAL at 04:35

## 2020-09-21 RX ADMIN — HYDRALAZINE HYDROCHLORIDE 50 MG: 50 TABLET, FILM COATED ORAL at 14:49

## 2020-09-21 RX ADMIN — DOCUSATE SODIUM 100 MG: 100 CAPSULE, LIQUID FILLED ORAL at 08:01

## 2020-09-21 RX ADMIN — INSULIN LISPRO 4 UNITS: 100 INJECTION, SOLUTION INTRAVENOUS; SUBCUTANEOUS at 12:36

## 2020-09-21 RX ADMIN — POLYETHYLENE GLYCOL 3350 17 G: 17 POWDER, FOR SOLUTION ORAL at 08:00

## 2020-09-21 RX ADMIN — METOPROLOL SUCCINATE 100 MG: 100 TABLET, EXTENDED RELEASE ORAL at 08:01

## 2020-09-21 RX ADMIN — VENLAFAXINE HYDROCHLORIDE 75 MG: 75 CAPSULE, EXTENDED RELEASE ORAL at 08:01

## 2020-09-21 RX ADMIN — AMLODIPINE BESYLATE 10 MG: 10 TABLET ORAL at 08:01

## 2020-09-21 ASSESSMENT — ENCOUNTER SYMPTOMS
NAUSEA: 0
CONSTIPATION: 0
EYES NEGATIVE: 1
ABDOMINAL PAIN: 0
RESPIRATORY NEGATIVE: 1
GASTROINTESTINAL NEGATIVE: 1

## 2020-09-21 ASSESSMENT — PAIN SCALES - GENERAL
PAINLEVEL_OUTOF10: 0
PAINLEVEL_OUTOF10: 0

## 2020-09-21 NOTE — PROGRESS NOTES
Pt discharged home to private residence with . IV removed with no complications, dressing applied. Tele removed. Discharge instructions gone over with pt and Laoscar Rob, her . New medications and side effects explained thoroughly. Pt/Obed verbalized understanding with no further questions. Aware of follow up appointments that need to be scheduled. Left with all personal belongings via wheelchair with staff.

## 2020-09-21 NOTE — DISCHARGE SUMMARY
with . Mild elevation in troponin  Likely was secondary to NSTEMI type II/HTN. Patient been chest pain-free. Primary peritoneal carcinomatosis:  Oncology was consulted. Recommended discontinuation of Avastin. Will require imaging/restaging per oncology at some point. Recommended palliative care consult. Palliative care met with patient . Full CODE STATUS was continued. Patient to follow-up with oncology outpatient. History of DVT/PE  Eliquis was continued    Diabetes mellitus type 2:  Blood glucose was monitored. Carb controlled diet was continued. Insulin was adjusted. Patient to continue home regimen on discharge        Physical Exam Performed:     BP (!) 165/74 Comment: with exertion getting dressed/ambulating. hydralazine given  Pulse 68   Temp 98.2 °F (36.8 °C) (Oral)   Resp 16   Ht 5' 8\" (1.727 m)   Wt 142 lb 6.7 oz (64.6 kg)   SpO2 100%   BMI 21.65 kg/m²       General appearance:  No apparent distress, appears stated age and cooperative. HEENT:  Normal cephalic, atraumatic without obvious deformity. Pupils equal, round, and reactive to light. Extra ocular muscles intact. Conjunctivae/corneas clear. Neck: Supple, with full range of motion. No jugular venous distention. Trachea midline. Respiratory:  Normal respiratory effort. Clear to auscultation, bilaterally without Rales/Wheezes/Rhonchi. Cardiovascular:  Regular rate and rhythm with normal S1/S2 without murmurs, rubs or gallops. Abdomen: Soft, non-tender, non-distended with normal bowel sounds. Palpable masses in lower abdomen  Musculoskeletal:  B/L LE 1+ edema  Skin: Skin color, texture, turgor normal.  No rashes or lesions. Neurologic:  Neurovascularly intact without any focal sensory/motor deficits. Cranial nerves: II-XII intact, grossly non-focal.  Psychiatric:  Alert and oriented  Capillary Refill: Brisk,< 3 seconds   Peripheral Pulses: +2 palpable, equal bilaterally       Labs:  For convenience and mouth daily, Disp-30 tablet,R-3Normal      amLODIPine (NORVASC) 10 MG tablet Take 1 tablet by mouth daily, Disp-30 tablet,R-3Normal              Details   apixaban (ELIQUIS) 5 MG TABS tablet Take by mouth 2 times dailyHistorical Med      fluocinonide (LIDEX) 0.05 % cream Apply topically 2 times daily as needed Apply topically 2 times daily. , Topical, 2 TIMES DAILY PRN, Historical Med      pregabalin (LYRICA) 75 MG capsule Take 1 capsule by mouth 2 times daily for 120 days. , Disp-240 capsule,R-0NO PRINT      rOPINIRole (REQUIP) 2 MG tablet Take 2 mg by mouth 3 times daily as needed (RLS)Historical Med      oxyCODONE-acetaminophen (PERCOCET)  MG per tablet Take 1 tablet by mouth 4 times daily as needed for Pain. Historical Med      venlafaxine (EFFEXOR XR) 75 MG extended release capsule Take 75 mg by mouth daily Historical Med      albuterol sulfate HFA (VENTOLIN HFA) 108 (90 Base) MCG/ACT inhaler Inhale 2 puffs into the lungs every 4 hours as needed for WheezingHistorical Med      metoprolol succinate (TOPROL XL) 100 MG extended release tablet Take 1 tablet by mouth daily, Disp-30 tablet, R-3Normal      insulin glargine (LANTUS) 100 UNIT/ML injection vial Inject 5 Units into the skin nightly, Disp-1 vial, R-3      insulin lispro (HUMALOG) 100 UNIT/ML injection vial Inject 0-12 Units into the skin 3 times daily (with meals), Disp-1 vial, R-3      rosuvastatin (CRESTOR) 40 MG tablet Take 40 mg by mouth every evening. alprazolam (XANAX) 0.5 MG tablet Take 0.5 mg by mouth nightly. Historical Med             Time Spent on discharge is more than 30 minutes in the examination, evaluation, counseling and review of medications and discharge plan. Signed:    Hassan Favre, MD   9/21/2020      Thank you Lia Jenkins MD for the opportunity to be involved in this patient's care. If you have any questions or concerns please feel free to contact me at [phone].

## 2020-09-21 NOTE — PROGRESS NOTES
Hospitalist Progress Note      PCP: Magda Echols MD    Date of Admission: 9/17/2020    Chief Complaint: Vomiting and confusion per spouse    Subjective:  Patient seen and examined at the bedside. No complaints at this time. PFHS: reviewed as documented 9/17/2020, no changes    Medications:  Reviewed    Infusion Medications    dextrose       Scheduled Medications    hydrALAZINE  50 mg Oral 3 times per day    insulin glargine  10 Units Subcutaneous Nightly    insulin lispro  0-12 Units Subcutaneous TID WC    insulin lispro  0-6 Units Subcutaneous Nightly    apixaban  5 mg Oral BID    ALPRAZolam  0.5 mg Oral Nightly    losartan  100 mg Oral Daily    pregabalin  75 mg Oral BID    rosuvastatin  40 mg Oral QPM    venlafaxine  75 mg Oral BID    sodium chloride flush  10 mL Intravenous 2 times per day    metoprolol succinate  100 mg Oral Daily    docusate sodium  100 mg Oral BID    polyethylene glycol  17 g Oral Daily    amLODIPine  10 mg Oral Daily     PRN Meds: oxyCODONE-acetaminophen, rOPINIRole, sodium chloride flush, acetaminophen **OR** acetaminophen, labetalol, glucose, dextrose, glucagon (rDNA), dextrose, prochlorperazine      Intake/Output Summary (Last 24 hours) at 9/21/2020 1109  Last data filed at 9/21/2020 0855  Gross per 24 hour   Intake 2071.9 ml   Output --   Net 2071.9 ml         Physical Exam Performed:    /67   Pulse 68   Temp 98.6 °F (37 °C) (Oral)   Resp 15   Ht 5' 8\" (1.727 m)   Wt 142 lb 6.7 oz (64.6 kg)   SpO2 100%   BMI 21.65 kg/m²   General appearance:  No apparent distress, appears stated age  Eyes: Pupils equal, round, reactive to light, conjunctiva/corneas clear  Ears/Nose/Mouth/Throat: No external lesions or scars, hearing intact to voice  Neck: Trachea midline, no masses noted  Respiratory:  Normal respiratory effort.  Clear to auscultation bilaterally  Cardiovascular: Regular rate and rhythm, S3 on auscultation  Abdomen: Soft, non-tender, non-distended, palpable masses in lower abdomen  Musculoskeletal: No cyanosis, clubbing or petechiae,1+ bilateral lower extremity edema  Skin: Normal skin color, texture, turgor. No rashes or lesions noted. Psychiatric: Alert and oriented x4, poor insight into need for hospit    Labs:   Recent Labs     09/19/20  0408 09/20/20  0434 09/21/20  0545   WBC 4.2 4.8 4.3   HGB 11.0* 10.8* 10.1*   HCT 32.7* 32.3* 29.9*    153 152     Recent Labs     09/19/20  0408 09/20/20  0434 09/21/20  0545   * 127* 127*   K 3.7 4.0 3.8   CL 93* 94* 92*   CO2 23 26 24   BUN 27* 28* 35*   CREATININE 1.1 1.2 1.2   CALCIUM 8.7 8.4 8.5     No results for input(s): AST, ALT, BILIDIR, BILITOT, ALKPHOS in the last 72 hours. No results for input(s): INR in the last 72 hours. No results for input(s): Shelbi Caul in the last 72 hours. Urinalysis:      Lab Results   Component Value Date    NITRU Negative 09/17/2020    WBCUA 0-2 09/17/2020    BACTERIA Rare 09/17/2020    RBCUA 3-4 09/17/2020    BLOODU SMALL 09/17/2020    SPECGRAV 1.025 09/17/2020    GLUCOSEU 100 09/17/2020    GLUCOSEU Negative 01/04/2012       Radiology:  MRI BRAIN W WO CONTRAST   Final Result      1. No acute stroke, mass, or hemorrhage. 2. Mild chronic small vessel ischemic white matter disease. 3. Mild atrophy. CT ABDOMEN PELVIS WO CONTRAST Additional Contrast? None   Final Result      1. No evidence of bowel obstruction. Prior right hemicolectomy. 2.  Noncontrast CT demonstrating stable right upper quadrant peritoneal thickening, stable perisplenic nodules, and mild to moderate ascites likely representing stable peritoneal metastases. Recommend correlation with outside hospital records. 3.  Small left pleural effusion. CT HEAD WO CONTRAST   Final Result      1. No acute intracranial process. XR CHEST (2 VW)   Final Result      Small left pleural effusion.              Assessment/Plan:    Active Hospital Problems    Diagnosis Date Noted    Moderate malnutrition ([facility] [address] [E44.0] 09/18/2020    Altered mental state [R41.82] 09/17/2020       Plan:    # Encephalopathy, unclear etiology but likely side effect of hyponatremia or Avastin use. - MRI brain w w/o contrast did not show guillaume acute findings.  -Neurology has been consulted  -Oncology following, expressed concern that present encephalopathy may be related to Avastin, but it is unclear when the patient last received this.  -Will need to determine patient's baseline cognitive function to assess for improvement in confusion. #Hypertension  -Oncology consulted, suggest that Avastin treatment may significantly raise blood pressure. -BP elevated at 167/78 at time of exam. Hydralazine increased to 60mg.  -Continue amlodipine 10mg    #Hyponatremia-likely secondary to chlorthalidone use and poor intake. -IV fluids d/c'd  - Continue to monitor BMP daily    #Vomiting  -now resolved    #Primary peritoneal carcinomatosis  -Seen by Dr. Elsi Acevedo.  Oncology recommended discontinuation of Avastin, reimaging and restaging when BP issue is resolved    #DM II  -Fasting sugar this morning 105  -Continue current regimen: 10 units lantus, slidng scale    DVT Prophylaxis: Eliquis 5mg    Diet: Dietary Nutrition Supplements: Diabetic Oral Supplement, Protein Modular  DIET CARDIAC;  Code Status: Full Code    PT/OT Eval Status: As tolerated with assistance and fall precautions    Dispo - Inpatient hospitalization    Birdie Pack

## 2020-09-21 NOTE — CARE COORDINATION
Case Management Assessment            Discharge Note                    Date / Time of Note: 9/21/2020 2:08 PM                  Discharge Note Completed by: Deacon Krueger    Patient Name: Tuan Ruggiero   YOB: 1940  Diagnosis: Altered mental state [R41.82]  Altered mental state [R41.82]   Date / Time: 9/17/2020  7:15 PM    Current PCP: Estiven Dubose MD  Clinic patient: No    Hospitalization in the last 30 days: No    Advance Directives:  Code Status: Full Code  [place] DNR form completed and on chart: No    Financial:  Payor: Sukumar Patiño / Plan: MEDICARE PART A AND B / Product Type: *No Product type* /      Pharmacy:    [facility] [address] [place] [address], [address] [place] [address] [phone] F [phone]  [address] [place] [address]  Phone: [phone] Fax: [phone] [address], [address] [place] [address] [phone] Chika Allen [phone]  [address] [facility]  Phone: [phone] Fax: [phone]    [address] [phone] - F [phone]  [address] [facility] [address] [place] [address]  Phone: [phone] Fax: [phone]      Assistance purchasing medications?: Potential Assistance Purchasing Medications: No  Assistance provided by Case Management: None at this time    Does patient want to participate in local refill/ meds to beds program?: No    Meds To Beds General Rules:  1. Can ONLY be done Monday- Friday between 8:30am-5pm  2. Prescription(s) must be in pharmacy by 3pm to be filled same day  3. Copy of patient's insurance/ prescription drug card and patient face sheet must be sent along with the prescription(s)  4. Cost of Rx cannot be added to hospital bill. If financial assistance is needed, please contact unit  or ;  or  CANNOT provide pharmacy voucher for patients co-pays  5.  Patients can then  the prescription on their way out of the hospital at discharge, or pharmacy can deliver to the bedside if staff is available. (payment due at time of pick-up or delivery - cash, check, or card accepted)     Able to afford home medications/ co-pay costs: No    ADLS:  Current PT AM-PAC Score:   /24  Current OT AM-PAC Score:   /24      DISCHARGE Disposition: { Discharge Disposition:35456}    LOC at discharge: Not Applicable  [place] Completed: No    Notification completed in HENS/PAS?:  Not Applicable    IMM Completed:   Yes, Case management has presented and reviewed IMM letter #2 to the patient and/or family/ POA. Patient and/or family/POA verbalized understanding of their medicare rights and appeal process if needed. Patient and/or family/POA has signed, initialed and placed today's date (09/21/2020) and time (1400) on IMM letter #2 on the the appropriate lines. Patient and/or family/POA, copy of letter offered and they are aware that this original copy of IMM letter #2 is available prior to discharge from the paper chart on the unit. Electronic documentation has been entered into epic for IMM letter #2 and original paper copy has been added to the paper chart at the nurses station.      Transportation:  Transportation PLAN for discharge: family   Mode of Transport: Private Car  Reason for medical transport: Not Applicable  Name of [address] [place] [address],[address]: Not Applicable  Time of Transport: when available    Transport form completed: No    Home Care:  [address] ordered at discharge: {RESPONSES; YES/NO/NOT LQQAGSKAC:06271}  Home Care Agency: { Home Care Providers:10808}  Orders faxed: {Yes/No:32875}    Durable Medical Equipment:  DME Provider: NA  Equipment obtained during hospitalization: NA    Home Oxygen and Respiratory Equipment:  Oxygen needed at discharge?: No    Dialysis:  Dialysis patient: No    Dialysis Center:  Not Applicable    Hospice Services:  Location: Not Applicable  Agency: Not Applicable    Consents signed: Not Indicated    Referrals made at [facility] for outpatient continued care:  Not Applicable    Additional CM Notes: CM confirmed  D/c home w/ spouse and will follow up out pt . Rx:   these medications from [facility] - F [phone]   1 amLODIPine   2 hydrALAZINE   3 losartan      The Plan for Transition of Care is related to the following treatment goals of Altered mental state [R41.82]  Altered mental state [R41.82]    The Patient and/or patient representative Maria T Teague and her family were provided with a choice of provider and agrees with the discharge plan {RESPONSES; YES/NO/NOT BAR:44587}    Freedom of choice list was provided with basic dialogue that supports the patient's individualized plan of care/goals and shares the quality data associated with the providers.  {RESPONSES; YES/NO/NOT ARLENE:35500}    Care Transitions patient: {Yes/No:19726}    Terrance Munoz RN  [facility]  Case Management Department  Ph: ***  Fax: ***

## 2020-09-21 NOTE — PROGRESS NOTES
Oncology Hematology Care  Progress Note    Subjective:     Transferred from ICU to floor,  Slightly better MS but still a bit confused. Reported back pain to nursing. Declined pain when I spoke to her    Review of Systems:     Review of Systems   Constitutional: Positive for fatigue. Negative for fever. HENT: Negative. Eyes: Negative. Respiratory: Negative. Cardiovascular: Negative. Gastrointestinal: Negative. Endocrine: Negative. Genitourinary: Negative. Musculoskeletal: Negative. Skin: Negative. Neurological: Negative for headaches. Oriented x2. [address] [facility] hospital but not why   Hematological: Negative. Objective:     HISTORY OF PRESENT ILLNESS:       Patient is a 50-year-old female who is followed by Dr. Kelley Santiago for ovarian cancer who was admitted to the hospital with retro-orbital headache, severe hypertension, confusion and vomiting. Her systolic blood pressure on presentation was greater than 230. Her  reported that she is increasingly forgetful. She was last seen by Dr. Boom Olivera on 9/14/2020. Complicating her cancer care was a recent bowel obstruction in July 2020, bilateral pulmonary emboli for which she has been treated with Eliquis, iron deficiency anemia status post IV iron and now on Procrit, diabetes which is exacerbated by the use of steroids. Sept 14, 2020, she received cycle 7 carboplatinum, liposomal doxorubicin, and Avastin. At that time her blood pressure was 130/80.     Medications    Current Facility-Administered Medications: hydrALAZINE (APRESOLINE) tablet 25 mg, 25 mg, Oral, 3 times per day  0.9 % sodium chloride infusion, , Intravenous, Continuous  insulin glargine (LANTUS;BASAGLAR) injection pen 10 Units, 10 Units, Subcutaneous, Nightly  insulin lispro (1 Unit Dial) 0-12 Units, 0-12 Units, Subcutaneous, TID WC  insulin lispro (1 Unit Dial) 0-6 Units, 0-6 Units, Subcutaneous, Nightly  apixaban (ELIQUIS) tablet 5 mg, 5 mg, Oral, BID  ALPRAZolam Magy Lugo) tablet 0.5 mg, 0.5 mg, Oral, Nightly  losartan (COZAAR) tablet 100 mg, 100 mg, Oral, Daily  oxyCODONE-acetaminophen (PERCOCET)  MG per tablet 1 tablet, 1 tablet, Oral, 4x Daily PRN  pregabalin (LYRICA) capsule 75 mg, 75 mg, Oral, BID  rOPINIRole (REQUIP) tablet 2 mg, 2 mg, Oral, TID PRN  rosuvastatin (CRESTOR) tablet 40 mg, 40 mg, Oral, QPM  venlafaxine (EFFEXOR XR) extended release capsule 75 mg, 75 mg, Oral, BID  sodium chloride flush 0.9 % injection 10 mL, 10 mL, Intravenous, 2 times per day  sodium chloride flush 0.9 % injection 10 mL, 10 mL, Intravenous, PRN  acetaminophen (TYLENOL) tablet 650 mg, 650 mg, Oral, Q6H PRN **OR** acetaminophen (TYLENOL) suppository 650 mg, 650 mg, Rectal, Q6H PRN  labetalol (NORMODYNE;TRANDATE) injection 10 mg, 10 mg, Intravenous, Q10 Min PRN  glucose (GLUTOSE) 40 % oral gel 15 g, 15 g, Oral, PRN  dextrose 50 % IV solution, 12.5 g, Intravenous, PRN  glucagon (rDNA) injection 1 mg, 1 mg, Intramuscular, PRN  dextrose 5 % solution, 100 mL/hr, Intravenous, PRN  metoprolol succinate (TOPROL XL) extended release tablet 100 mg, 100 mg, Oral, Daily  docusate sodium (COLACE) capsule 100 mg, 100 mg, Oral, BID  polyethylene glycol (GLYCOLAX) packet 17 g, 17 g, Oral, Daily  prochlorperazine (COMPAZINE) injection 10 mg, 10 mg, Intravenous, Q4H PRN  amLODIPine (NORVASC) tablet 10 mg, 10 mg, Oral, Daily    Allergies  Allergies   Allergen Reactions    Cephalexin Rash    Pcn [Penicillins] Rash    Cephalosporins      UNABLE TO RECALL REACTION    Cephalexin Rash       Physical Exam  VITALS:  BP (!) 159/72   Pulse 66   Temp 97.2 °F (36.2 °C) (Oral)   Resp 16   Ht 5' 8\" (1.727 m)   Wt 142 lb 6.7 oz (64.6 kg)   SpO2 97%   BMI 21.65 kg/m²   TEMPERATURE:  Current - Temp: 97.2 °F (36.2 °C);  Max - Temp  Av.5 °F (36.4 °C)  Min: 97.2 °F (36.2 °C)  Max: 97.6 °F (36.4 °C)  PULSE OXIMETRY RANGE: SpO2  Av.4 %  Min: 96 %  Max: 100 %  24HR INTAKE/OUTPUT:      Intake/Output Summary (Last 24 hours) at 9/21/2020 0530  Last data filed at 9/21/2020 0425  Gross per 24 hour   Intake 1071 ml   Output --   Net 1071 ml       Physical Exam  Constitutional:       General: She is not in acute distress. Appearance: She is ill-appearing. Eyes:      General: No scleral icterus. Cardiovascular:      Rate and Rhythm: Normal rate and regular rhythm. Pulmonary:      Effort: Pulmonary effort is normal.      Breath sounds: Normal breath sounds. Abdominal:      Palpations: Abdomen is soft. Tenderness: There is no abdominal tenderness. Musculoskeletal:         General: No swelling. Lymphadenopathy:      Cervical: No cervical adenopathy. Skin:     General: Skin is warm and dry. Neurological:      Comments: Forgetful. Somnolent but arousable         Labs  Recent Labs     09/19/20  0408 09/20/20  0434   WBC 4.2 4.8   HGB 11.0* 10.8*   HCT 32.7* 32.3*    153   MCV 85.9 86.1       Recent Labs     09/19/20  0408 09/20/20  0434   * 127*   K 3.7 4.0   CL 93* 94*   CO2 23 26   BUN 27* 28*   CREATININE 1.1 1.2       No results for input(s): AST, ALT, ALB, BILIDIR, BILITOT, ALKPHOS in the last 72 hours. No results for input(s): MG in the last 72 hours. Radiology  Ct Abdomen Pelvis Wo Contrast Additional Contrast? None    Result Date: 9/17/2020  EXAM: CT ABDOMEN AND PELVIS WITHOUT CONTRAST INDICATION: eval for bowel obstruction, vomiting COMPARISON: 7/3/2020 and 6/21/2020 TECHNIQUE: Axial CT imaging obtained from lung bases through pelvis. Axial images and multiplanar reformatted images are provided for review. Up-to-date CT equipment and radiation dose reduction techniques were employed. IV Contrast: None. Oral Contrast: No. FINDINGS: There is a small left pleural effusion. The heart size is normal without pericardial effusion. There is peritoneal thickening along the right upper quadrant.  3 nodules in the left upper quadrant measuring 0.5 to 1.6 cm in size may represent metastatic disease or splenules. The liver is slightly nodular in contour which may represent hepatic cirrhosis. Two cystic lesion in the right hepatic lobe are unchanged. There is small to moderate volume abdominopelvic ascites. The gallbladder appears normal. Noncontrast images of the spleen, pancreas, and adrenal glands appear normal. No stones are identified in either kidney or along the course of either ureter and no hydronephrosis or hydroureter is seen. The urinary bladder appears normal. The uterus is absent. There has been prior right hemicolectomy. There is no evidence of bowel obstruction. Stomach and small bowel appear normal. There is no evidence of pneumatosis or free air. No lymphadenopathy is seen. Right and left paramedian ventral subcutaneous soft tissue masses are unchanged. The abdominal aorta is severely atherosclerotic. Bone windows demonstrate no suspicious lytic or blastic lesions. There is severe lumbar spondylosis. Thoracic spinal stimulator is noted. 1.  No evidence of bowel obstruction. Prior right hemicolectomy. 2.  Noncontrast CT demonstrating stable right upper quadrant peritoneal thickening, stable perisplenic nodules, and mild to moderate ascites likely representing stable peritoneal metastases. Recommend correlation with outside hospital records. 3.  Small left pleural effusion. Xr Chest (2 Vw)    Result Date: 9/17/2020  EXAM: Chest PA and Lateral views INDICATION: AMS COMPARISON: 7/29/2020 FINDINGS: A left subclavian approach port and thoracic spinal stimulator leads are redemonstrated. There is stable mild left hemidiaphragmatic elevation. There is a small left pleural effusion. There is no focal consolidation or pneumothorax. The cardiomediastinal  silhouette is normal. The visible bony thorax is intact. Small left pleural effusion.      Ct Head Wo Contrast    Result Date: 9/17/2020  PROCEDURE: CT head without contrast INDICATION: change in mental status, hx peritoneal carcinomatosis; COMPARISON: None. TECHNIQUE: CT head was performed without contrast according to standard protocol. Axial images and multiplanar reformatted images reviewed. Up-to-date CT equipment and radiation dose reduction techniques were employed. FINDINGS: No acute intra- or extra-axial fluid collections are identified. There is mild cerebral volume loss with associated ventricular dilatation. The basilar cisterns are patent. No mass effect or midline shift is seen. The gray-white matter differentiation is  normal. No cerebral density abnormality is seen. Other than bilateral lens replacements, visualized portions of the orbits, paranasal sinuses, and mastoids appear normal. No acute fracture is identified. 1.  No acute intracranial process. Mri Brain W Wo Contrast    Result Date: 9/19/2020  EXAM: MRI BRAIN W WO CONTRAST INDICATION: Severe headache, mental status change, hypertension, cancer history COMPARISON: Head CT dated September 17, 2020 TECHNIQUE: Standard per department protocol prior to and following the administration of 14 mL ProHance intravenous contrast. FINDINGS: No diffusion restriction. No evidence of acute intracranial hemorrhage. No abnormal enhancement. Mild scattered foci of hyperintense T2/FLAIR signal within the periventricular and subcortical white matter. Mild diffuse enlargement of the ventricles and extra-axial spaces. The midline structures including the pituitary gland, optic chiasm, corpus callosum, brainstem, pineal gland, and cerebellar tonsils are normal. The paranasal sinuses are clear. The globes and orbits appear normal. The intracranial arterial and venous flow-voids are normal. No skull base or calvarial abnormality. 1. No acute stroke, mass, or hemorrhage. 2. Mild chronic small vessel ischemic white matter disease. 3. Mild atrophy.       Pathology  No new path    Problem List  Patient Active Problem List   Diagnosis    Hyperlipidemia    Diabetes mellitus, type II ([facility]    HTN (hypertension)    Vitamin D deficiency    CAD (coronary artery disease)    Elevated CA-125    Uterine leiomyoma    Abnormal abdominal CT scan    Cancer of peritoneum (HCC)    Delirium due to general medical condition    Iron deficiency anemia    Impaired intestinal absorption    Neoplasm related pain    Weight loss    Abdominal pain    Chest pain, atypical    Partial small bowel obstruction (HCC)    Malignant neoplasm of ovary (HCC)    Altered mental state    Moderate malnutrition (HCC)       Assessment and Plan:     The patient presented with severe hypertension and a significant proteinuria on urinalysis. I suspect that this could be due to her Avastin. This has not been problematic for her in the past. Despite now being normotensive, she remains slightly encephalopathic. With regard to Avastin and risk of hypertension, in a meta-analysis of 12,949 patients treated with or without bevacizumab for advanced solid tumors, the RR of developing \"significantly raised\" blood pressure (defined as more than one drug needed for treatment, more intensive treatment needed than used previously, or life-threatening consequences such as hypertensive crisis; or grade 3 or 4 hypertension according to the [facility] [[facility]] Common Terminology Criteria for Adverse Events [CTCAE] (table 1)) among patients receiving bevacizumab was 5.38 (95% CI 3.63-7.97) [40]. Among patients receiving bevacizumab, the overall incidence of all raised blood pressure events was 24 percent (95% CI 20-29 percent), while the incidence of significantly raised blood pressure was 8 percent (95% CI 6-10 percent). Risk was dose dependent; the RRs of severe hypertension at doses of 5 and 2.5 mg/kg per week were 7.17 (95% CI 3.91-13.13) and 4.11 (95% CI 2.49-6.78), respectively.     At the very least, Avastin needs to be discontinued. I am also concerned that the patient is likely not responding to her treatment. On 7/17/2020 her  was 59; increased to 220 in 8/2020 and in Sept/2020 was up to 275. She will need to be reimaged and restaged at some point. However, right now her blood pressure, cardiac status and MS is the primary focus. The patient remains a full code and this probably needs to be addressed at this point. I do not think the patient can make all the decisions. Plans going forward also should be discussed. We will put in a palliative care consult.     Lucas Chapa MD, MPH  9/21/2020

## 2020-09-21 NOTE — CONSULTS
[facility] [address] [place]  Palliative Medicine Consultation Note      Date Of Admission:9/17/2020  Date of consult: 09/21/20  Seen by [facility] in the past:  No    Recommendations:        Introduced palliative care to pt and  at the bedside.  reports pt is about at her baseline mental status and is wondering if he can take her home today since her BG are improved. D/w Dr. Kelley Damon who does plan on discharging her. Had a voluntary discussion with pt and  about advance care planning. Pt says her  is her HCPOA and she has completed advance directives.  will bring in advance directives next time she comes to the hospital. We discussed code status and intubation in depth including potential risks/burdens.  plans on discussing pt's chemo regimen benefits vs burdens at her next appointment next week. Pt feels her quality of life is good and that she is able to complete most ADLs independently. Pt and  agree to have ongoing discussions about advance care planning. 1. Goals of Care/Advanced Care planning/Code status: Full Code, discussed with pt/ in depth today. Pt hopes to return home today and resume her previous quality of life and level of function. 2. Pain: pt denies pain other than some lower back/right hip pain related to degenerative disc disease. Denies abdominal pain. She has received one dose of percocet in the past 24 hours. She initially c/o headache which was attributed to hypertensive emergency. 3. SOB: denies sob and is breathing comfortably on room air. 4. AMS: pt presented with confusion per 's report, which is now resolved. Encephalopathy suspected to be related to side effect of Avastin.  reports she is about at her baseline mental status.   5. Disposition: d/c home today    Reason for Consult:         [x]  Goals of Care  [x]  Code Status Discussion/Advanced Care Planning   []  Psychosocial/Family Support  []  Symptom Management  [] Other (Specify)    Requesting Physician: Dr. Guillaume Calvert: Vomiting, confusion    History Obtained From:  patient, spouse, electronic medical record    History of Present Illness:         Zeeshan Lackey is a 67 y.o. 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  reported she woke up the morning after receiving chemotherapy with confusion and vomiting x24 hours. Also her BP was elevated to SBP in 200s. She was admitted with vomiting likely secondary to chemotherapy and hypertension and hyponatremia likely secondary to vomiting and dehydration. There was no evidence of bowel obstruction on CT or clinically. Oncology was consulted who recommended discontinuation of Avastin since it may be contributing to her HTN, will require reimaging/restaging per [facility] once BP issues resolved. She was transferred to ICU for management of hypertensive emergency with nicardipine drip. MRI brain was completed showing no acute change to account for HTN. Her symptoms have improved but she still has some forgetfulness.      Subjective:         Past Medical History:        Diagnosis Date    Anxiety     Arthritis     Asthma     poorly controlled    CAD (coronary artery disease)     Cancer of per[facility]um ([facility]     ovarian    Chronic low back pain     Depression     Embolism (HCC)     small pulmonary clot    Hx of blood clots     x1    Hyperlipidemia 4/23/2012    Hypertension     Postoperative delirium 05/26/2016    Pulmonary embolism (HCC)     Short-term memory loss     Type II or unspecified type diabetes mellitus without mention of complication, not stated as uncontrolled     Wears dentures     Wears glasses        Past Surgical History: Procedure Laterality Date    APPENDECTOMY      BREAST BIOPSY Left 06/23/2017    Fibroadenomatous type fibrocystic change    CARDIAC SURGERY      quadruple bypass    CATARACT REMOVAL Bilateral     COLONOSCOPY      COLONOSCOPY N/A 6/6/2019    COLONOSCOPY WITH BIOPSY performed by Tiki Sagastume MD at [address]  6/6/2019    COLONOSCOPY CONTROL HEMORRHAGE performed by Tiki Sagastume MD at [facility] CORONARY ARTERY BYPASS GRAFT  2007    DILATION AND CURETTAGE OF UTERUS      Miscarriage    ENDOSCOPY, COLON, DIAGNOSTIC      EYE SURGERY      HEMICOLECTOMY  05/2016    HYSTERECTOMY      JOINT REPLACEMENT Bilateral     bilat knee    LAPAROSCOPY N/A 7/23/2020    ROBOTIC LAPAROSCOPIC CONVERTED TO OPEN EXTENSIVE LYSIS OF ADHESIONS, EXCISION OF PERITONEAL NODULES, TUMOR DEBULKING, SMALL BOWEL RESECTION WITH ANASTAMOSIS, FLEXIBLE SIGMOIDOSCOPY performed by Tariq Hanson MD at [address] 08/17/2018    SPINAL CORD STIMULATOR LEAD REVISION AND GENERATOR    FRACISCO AND BSO  05/26/2016    TUNNELED VENOUS PORT PLACEMENT Left 7/27/2016    PORT-A-CATH PLACEMENT,LEFT        Current Medications:    Medications Prior to Admission: amLODIPine (NORVASC) 5 MG tablet, Take 5 mg by mouth daily  valsartan (DIOVAN) 80 MG tablet, Take 80 mg by mouth daily  apixaban (ELIQUIS) 5 MG TABS tablet, Take by mouth 2 times daily  fluocinonide (LIDEX) 0.05 % cream, Apply topically 2 times daily as needed Apply topically 2 times daily. pregabalin (LYRICA) 75 MG capsule, Take 1 capsule by mouth 2 times daily for 120 days. rOPINIRole (REQUIP) 2 MG tablet, Take 2 mg by mouth 3 times daily as needed (RLS)  oxyCODONE-acetaminophen (PERCOCET)  MG per tablet, Take 1 tablet by mouth 4 times daily as needed for Pain.   venlafaxine (EFFEXOR XR) 75 MG extended release capsule, Take 75 mg by mouth daily   albuterol sulfate HFA (VENTOLIN HFA) 108 (90 Base) MCG/ACT inhaler, Inhale 2 puffs into the lungs every 4 hours as needed for Wheezing  metoprolol succinate (TOPROL XL) 100 MG extended release tablet, Take 1 tablet by mouth daily  insulin glargine (LANTUS) 100 UNIT/ML injection vial, Inject 5 Units into the skin nightly (Patient taking differently: Inject 7 Units into the skin nightly )  insulin lispro (HUMALOG) 100 UNIT/ML injection vial, Inject 0-12 Units into the skin 3 times daily (with meals) (Patient taking differently: Inject 0-14 Units into the skin 2 times daily (with meals) 121-160 = 6 units  161-200 = 8 units  201-240 = 10 units  241-280 = 12 units  281-320= 14 units)  rosuvastatin (CRESTOR) 40 MG tablet, Take 40 mg by mouth every evening. alprazolam (XANAX) 0.5 MG tablet, Take 0.5 mg by mouth nightly. [DISCONTINUED] furosemide (LASIX) 20 MG tablet, Take 1 tablet by mouth daily  [DISCONTINUED] losartan (COZAAR) 100 MG tablet, Take 1 tablet by mouth daily Pt. takes 100mg of Cozaar daily. Allergies:  Cephalexin; Pcn [penicillins]; Cephalosporins; and Cephalexin    Social History:    · TOBACCO: reports that she quit smoking about 20 years ago. She has a 20.00 pack-year smoking history. She has never used smokeless tobacco.  · ETOH:   reports previous alcohol use of about 1.0 standard drinks of alcohol per week. · Patient currently lives with family -     Review of Systems -   Review of Systems   Constitutional: Positive for appetite change and fatigue. HENT: Negative. Respiratory: Negative. Cardiovascular: Negative. Gastrointestinal: Negative for abdominal pain, constipation and nausea. Endocrine: Negative. Genitourinary: Negative. Musculoskeletal: Negative. Skin: Negative. Neurological: Negative. Hematological:        Weight loss of 12# in the past 6 weeks. Psychiatric/Behavioral: Negative.         Objective:        Physical Exam  Constitutional:       General: She is not in acute

## 2020-09-21 NOTE — PLAN OF CARE
Problem: Pain:  Goal: Pain level will decrease  Description: Pain level will decrease  9/20/2020 2305 by Yudi Tidwell RN  Outcome: Ongoing  Note: Pt was experiencing 5/10 all-over back pain this evening that was improved by repositioning into a sitting position in recliner, and was ultimately relieved with 10/325 of Percocet. Problem: Nausea/Vomiting:  Goal: Absence of nausea/vomiting  Description: Absence of nausea/vomiting. Patient denies having any nausea or vomiting this morning or afternoon. Patient endorses decreased appetite. Will continue to monitor and reassess. 9/20/2020 2305 by Yudi Tidwell RN  Outcome: Ongoing  Note: Pt denies nausea and has not vomitted. Snack offered with evening insulin, but pt declined. Problem: Nausea/Vomiting:  Goal: Able to drink  Description: Able to drink  9/20/2020 2305 by Yudi Tidwell RN  Outcome: Ongoing  Note: Pt able to drink water with meds this evening. No difficulty with swallowing, no concern about aspiration. Problem: Skin Integrity:  Goal: Will show no infection signs and symptoms  Description: Will show no infection signs and symptoms  9/20/2020 2305 by Yudi Tidwell RN  Outcome: Ongoing  Note: Pt afebrile. No open wounds detected. WBC count has been WNL. Problem: Nutrition  Goal: Optimal nutrition therapy  Description: Patient endorses decreased appetite. Patient encouraged to order food that she likes. Patient denies nausea or vomiting. Patient educated on importance of adequate nutritional intake for healing and homeostasis. Will continue to monitor and reassess. 9/20/2020 2305 by Yudi Tidwell RN  Outcome: Ongoing  Note: Pt willing to finish the ensure that was brought with her dinner tray, with encouragement. Problem: Cardiac:  Goal: Hemodynamic stability will improve  Description: Hemodynamic stability will improve. Blood pressure monitored routinely.  Patient medicated per [facility] with po pain medication. Will administer IV antihypertensives if clinically indicated. Patient on continuous hemodynamic monitoring. 9/20/2020 2305 by Jim Hairston RN  Outcome: Ongoing  Note: B/P elevated this evening, 174/79, but improved adequately with scheduled Hydralazine. Problem: Mobility - Impaired:  Goal: Mobility will improve  Description: Mobility will improve. Patient ambulated in room this shift and to bathroom. Currently up in the chair. Patient tolerating ambulation well with walker and stand by assist,. Will continue to monitor and reassess. 9/20/2020 2305 by Jim Hairston RN  Outcome: Ongoing  Note: Pt able to get up in room and ambulate with SBA using walker. Problem: Mobility - Impaired:  Goal: Mobility will improve  Description: Mobility will improve. Patient ambulated in room this shift and to bathroom. Currently up in the chair. Patient tolerating ambulation well with walker and stand by assist,. Will continue to monitor and reassess. 9/20/2020 2305 by Jim Hairston RN  Outcome: Ongoing  Note: Pt able to get up in room and ambulate with SBA using walker. Problem: Confusion - Acute:  Goal: Absence of continued neurological deterioration signs and symptoms  Description: Absence of continued neurological deterioration signs and symptoms  Outcome: Ongoing  Note: Pt very confused this evening, to time, location, and situation. She does know she is in the hospital.  After informing her that it was 10 pm at night, she kept saying, \"It's 10pm at night, so I can't go to sleep. It's 10 pm at night, so Power Jenni should be here. \"  I kept telling her that because it was night time, everyone was getting ready for bed, including her . He had just called an hour prior to this, and she spoke with him on the phone, but she did not remember this. She told me she couldn't remember his number, but that she needed to call him.   When I asked her why she needed to talk to him again, she said it was because she didn't know why he wasn't here yet (even though we had just discussed this and I had just told her that visiting hours were over and that he wouldn't be allowed back in the hospital until morning). Pt did tell me that she didn't know what her mind was doing. Problem: Confusion - Acute:  Goal: Mental status will be restored to baseline  Description: Mental status will be restored to baseline  Outcome: Ongoing  Note: Spoke with patient's  on the phone twice this evening. He also spoke to her and assured me that this is not her baseline. Problem: Injury - Risk of, Physical Injury:  Goal: Absence of physical injury  Description: Absence of physical injury  Outcome: Ongoing  Note: No injuries during this hospitalization. Performing hourly rounding (at least hourly) to keep patient safe. Problem: Injury - Risk of, Physical Injury:  Goal: Will remain free from falls  Description: Will remain free from falls  Outcome: Ongoing  Note: Bed alarm activated, fall sign posted, nonskid footwear and house slippers on (pt is cold). Side rails up x 3. Call monitor placed in lap and explained to pt frequently. Pt demonstrates ability to use call system. Door open. Will monitor closely. Problem: Mood - Altered:  Goal: Verbalizations of feeling emotionally comfortable while being cared for will increase  Description: Verbalizations of feeling emotionally comfortable while being cared for will increase  Outcome: Ongoing  Note: Pt verbalized to her  in my hearing tonight, when he asked her if we were taking good care of her, that the nurses have been very nice. Problem: Sensory Perception - Impaired:  Goal: Demonstrates accurate environmental perceptions  Description: Demonstrates accurate environmental perceptions  Outcome: Ongoing  Note: Pt getting her mornings and evenings mixed up tonight. This may have been partly d/t sleeping a lot during the day. Problem: Sleep Pattern Disturbance:  Goal: Appears well-rested  Description: Appears well-rested  Outcome: Ongoing  Note: Pt appears well-rested and states tonight that she is definitely not tired. States that it feels like morning to her.

## 2020-09-22 LAB — ALDOSTERONE: 5.7 NG/DL

## 2020-09-24 LAB — RENIN ACTIVITY: 0.8 NG/ML/HR

## 2020-09-28 ENCOUNTER — HOSPITAL ENCOUNTER (INPATIENT)
Age: 80
LOS: 1 days | Discharge: HOME | DRG: 643 | End: 2020-09-29
Attending: EMERGENCY MEDICINE | Admitting: INTERNAL MEDICINE
Payer: MEDICARE

## 2020-09-28 ENCOUNTER — APPOINTMENT (OUTPATIENT)
Dept: GENERAL RADIOLOGY | Age: 80
DRG: 643 | End: 2020-09-28
Payer: MEDICARE

## 2020-09-28 PROBLEM — R53.1 GENERALIZED WEAKNESS: Status: ACTIVE | Noted: 2020-09-28

## 2020-09-28 LAB
A/G RATIO: 1.4 (ref 1.1–2.2)
ALBUMIN SERPL-MCNC: 3.3 G/DL (ref 3.4–5)
ALBUMIN SERPL-MCNC: 3.3 G/DL (ref 3.4–5)
ALP BLD-CCNC: 48 U/L (ref 40–129)
ALT SERPL-CCNC: 7 U/L (ref 10–40)
AMORPHOUS: ABNORMAL /HPF
ANION GAP SERPL CALCULATED.3IONS-SCNC: 11 MMOL/L (ref 3–16)
ANION GAP SERPL CALCULATED.3IONS-SCNC: 7 MMOL/L (ref 3–16)
AST SERPL-CCNC: 15 U/L (ref 15–37)
BACTERIA: ABNORMAL /HPF
BASE EXCESS VENOUS: 2.7 MMOL/L (ref -2–3)
BASOPHILS ABSOLUTE: 0 K/UL (ref 0–0.2)
BASOPHILS RELATIVE PERCENT: 0.5 %
BILIRUB SERPL-MCNC: <0.2 MG/DL (ref 0–1)
BILIRUBIN URINE: NEGATIVE
BLOOD, URINE: NEGATIVE
BUN BLDV-MCNC: 45 MG/DL (ref 7–20)
BUN BLDV-MCNC: 46 MG/DL (ref 7–20)
CALCIUM SERPL-MCNC: 8.7 MG/DL (ref 8.3–10.6)
CALCIUM SERPL-MCNC: 8.9 MG/DL (ref 8.3–10.6)
CARBOXYHEMOGLOBIN: 1.9 % (ref 0–1.5)
CHLORIDE BLD-SCNC: 92 MMOL/L (ref 99–110)
CHLORIDE BLD-SCNC: 94 MMOL/L (ref 99–110)
CLARITY: CLEAR
CO2: 22 MMOL/L (ref 21–32)
CO2: 25 MMOL/L (ref 21–32)
COLOR: YELLOW
CREAT SERPL-MCNC: 1.6 MG/DL (ref 0.6–1.2)
CREAT SERPL-MCNC: 1.7 MG/DL (ref 0.6–1.2)
EKG ATRIAL RATE: 64 BPM
EKG DIAGNOSIS: NORMAL
EKG P AXIS: 43 DEGREES
EKG P-R INTERVAL: 222 MS
EKG Q-T INTERVAL: 414 MS
EKG QRS DURATION: 94 MS
EKG QTC CALCULATION (BAZETT): 427 MS
EKG R AXIS: 3 DEGREES
EKG T AXIS: 64 DEGREES
EKG VENTRICULAR RATE: 64 BPM
EOSINOPHILS ABSOLUTE: 0 K/UL (ref 0–0.6)
EOSINOPHILS RELATIVE PERCENT: 1.9 %
EPITHELIAL CELLS, UA: ABNORMAL /HPF (ref 0–5)
FINE CASTS, UA: ABNORMAL /LPF (ref 0–2)
GFR AFRICAN AMERICAN: 35
GFR AFRICAN AMERICAN: 38
GFR NON-AFRICAN AMERICAN: 29
GFR NON-AFRICAN AMERICAN: 31
GLOBULIN: 2.4 G/DL
GLUCOSE BLD-MCNC: 106 MG/DL (ref 70–99)
GLUCOSE BLD-MCNC: 118 MG/DL (ref 70–99)
GLUCOSE BLD-MCNC: 211 MG/DL (ref 70–99)
GLUCOSE BLD-MCNC: 261 MG/DL (ref 70–99)
GLUCOSE BLD-MCNC: 72 MG/DL (ref 70–99)
GLUCOSE BLD-MCNC: 73 MG/DL (ref 70–99)
GLUCOSE BLD-MCNC: 80 MG/DL (ref 70–99)
GLUCOSE URINE: NEGATIVE MG/DL
HCO3 VENOUS: 28.5 MMOL/L (ref 24–28)
HCT VFR BLD CALC: 26 % (ref 36–48)
HEMOGLOBIN, VEN, REDUCED: 51.6 %
HEMOGLOBIN: 8.6 G/DL (ref 12–16)
KETONES, URINE: NEGATIVE MG/DL
LACTIC ACID: 0.9 MMOL/L (ref 0.4–2)
LEUKOCYTE ESTERASE, URINE: NEGATIVE
LYMPHOCYTES ABSOLUTE: 0.7 K/UL (ref 1–5.1)
LYMPHOCYTES RELATIVE PERCENT: 31.3 %
MCH RBC QN AUTO: 28.9 PG (ref 26–34)
MCHC RBC AUTO-ENTMCNC: 33.2 G/DL (ref 31–36)
MCV RBC AUTO: 87.1 FL (ref 80–100)
METHEMOGLOBIN VENOUS: 0.9 % (ref 0–1.5)
MICROSCOPIC EXAMINATION: YES
MONOCYTES ABSOLUTE: 0.2 K/UL (ref 0–1.3)
MONOCYTES RELATIVE PERCENT: 8.6 %
MUCUS: ABNORMAL /LPF
NEUTROPHILS ABSOLUTE: 1.2 K/UL (ref 1.7–7.7)
NEUTROPHILS RELATIVE PERCENT: 57.7 %
NITRITE, URINE: NEGATIVE
O2 SAT, VEN: 47 %
OSMOLALITY URINE: 306 MOSM/KG (ref 390–1070)
OSMOLALITY: 285 MOSM/KG (ref 278–305)
PCO2, VEN: 53.4 MMHG (ref 41–51)
PDW BLD-RTO: 17.4 % (ref 12.4–15.4)
PERFORMED ON: ABNORMAL
PERFORMED ON: NORMAL
PH UA: 6 (ref 5–8)
PH VENOUS: 7.35 (ref 7.35–7.45)
PHOSPHORUS: 3.7 MG/DL (ref 2.5–4.9)
PLATELET # BLD: 72 K/UL (ref 135–450)
PLATELET SLIDE REVIEW: ABNORMAL
PMV BLD AUTO: 8 FL (ref 5–10.5)
PO2, VEN: 30 MMHG (ref 25–40)
POTASSIUM REFLEX MAGNESIUM: 5.2 MMOL/L (ref 3.5–5.1)
POTASSIUM SERPL-SCNC: 5.3 MMOL/L (ref 3.5–5.1)
PRO-BNP: 1723 PG/ML (ref 0–449)
PROTEIN UA: 100 MG/DL
RBC # BLD: 2.98 M/UL (ref 4–5.2)
RBC UA: ABNORMAL /HPF (ref 0–4)
SODIUM BLD-SCNC: 124 MMOL/L (ref 136–145)
SODIUM BLD-SCNC: 127 MMOL/L (ref 136–145)
SODIUM BLD-SCNC: 131 MMOL/L (ref 136–145)
SODIUM BLD-SCNC: 134 MMOL/L (ref 136–145)
SPECIFIC GRAVITY UA: 1.02 (ref 1–1.03)
TCO2 CALC VENOUS: 30 MMOL/L
TOTAL PROTEIN: 5.7 G/DL (ref 6.4–8.2)
TROPONIN: 0.02 NG/ML
TSH REFLEX FT4: 2.78 UIU/ML (ref 0.27–4.2)
URIC ACID, SERUM: 4.2 MG/DL (ref 2.6–6)
URINE TYPE: ABNORMAL
UROBILINOGEN, URINE: 0.2 E.U./DL
WBC # BLD: 2.1 K/UL (ref 4–11)
WBC UA: ABNORMAL /HPF (ref 0–5)

## 2020-09-28 PROCEDURE — 71045 X-RAY EXAM CHEST 1 VIEW: CPT

## 2020-09-28 PROCEDURE — 1200000000 HC SEMI PRIVATE

## 2020-09-28 PROCEDURE — 99285 EMERGENCY DEPT VISIT HI MDM: CPT

## 2020-09-28 PROCEDURE — 85025 COMPLETE CBC W/AUTO DIFF WBC: CPT

## 2020-09-28 PROCEDURE — 80053 COMPREHEN METABOLIC PANEL: CPT

## 2020-09-28 PROCEDURE — 2580000003 HC RX 258: Performed by: INTERNAL MEDICINE

## 2020-09-28 PROCEDURE — 99222 1ST HOSP IP/OBS MODERATE 55: CPT | Performed by: INTERNAL MEDICINE

## 2020-09-28 PROCEDURE — 83935 ASSAY OF URINE OSMOLALITY: CPT

## 2020-09-28 PROCEDURE — 84550 ASSAY OF BLOOD/URIC ACID: CPT

## 2020-09-28 PROCEDURE — 6370000000 HC RX 637 (ALT 250 FOR IP): Performed by: INTERNAL MEDICINE

## 2020-09-28 PROCEDURE — 83605 ASSAY OF LACTIC ACID: CPT

## 2020-09-28 PROCEDURE — 83036 HEMOGLOBIN GLYCOSYLATED A1C: CPT

## 2020-09-28 PROCEDURE — 94664 DEMO&/EVAL PT USE INHALER: CPT

## 2020-09-28 PROCEDURE — 84443 ASSAY THYROID STIM HORMONE: CPT

## 2020-09-28 PROCEDURE — 82803 BLOOD GASES ANY COMBINATION: CPT

## 2020-09-28 PROCEDURE — 2500000003 HC RX 250 WO HCPCS: Performed by: INTERNAL MEDICINE

## 2020-09-28 PROCEDURE — 84484 ASSAY OF TROPONIN QUANT: CPT

## 2020-09-28 PROCEDURE — 83930 ASSAY OF BLOOD OSMOLALITY: CPT

## 2020-09-28 PROCEDURE — 83880 ASSAY OF NATRIURETIC PEPTIDE: CPT

## 2020-09-28 PROCEDURE — 2580000003 HC RX 258: Performed by: EMERGENCY MEDICINE

## 2020-09-28 PROCEDURE — 93005 ELECTROCARDIOGRAM TRACING: CPT | Performed by: EMERGENCY MEDICINE

## 2020-09-28 PROCEDURE — 81001 URINALYSIS AUTO W/SCOPE: CPT

## 2020-09-28 PROCEDURE — 36415 COLL VENOUS BLD VENIPUNCTURE: CPT

## 2020-09-28 PROCEDURE — 84295 ASSAY OF SERUM SODIUM: CPT

## 2020-09-28 PROCEDURE — 6360000002 HC RX W HCPCS: Performed by: INTERNAL MEDICINE

## 2020-09-28 RX ORDER — PROMETHAZINE HYDROCHLORIDE 12.5 MG/1
12.5 TABLET ORAL EVERY 6 HOURS PRN
Status: DISCONTINUED | OUTPATIENT
Start: 2020-09-28 | End: 2020-09-29 | Stop reason: HOSPADM

## 2020-09-28 RX ORDER — INSULIN LISPRO 100 [IU]/ML
0-12 INJECTION, SOLUTION INTRAVENOUS; SUBCUTANEOUS
Status: DISCONTINUED | OUTPATIENT
Start: 2020-09-28 | End: 2020-09-29 | Stop reason: HOSPADM

## 2020-09-28 RX ORDER — ROPINIROLE 2 MG/1
2 TABLET, FILM COATED ORAL 3 TIMES DAILY PRN
Status: DISCONTINUED | OUTPATIENT
Start: 2020-09-28 | End: 2020-09-28

## 2020-09-28 RX ORDER — METOPROLOL TARTRATE 5 MG/5ML
5 INJECTION INTRAVENOUS ONCE
Status: COMPLETED | OUTPATIENT
Start: 2020-09-28 | End: 2020-09-28

## 2020-09-28 RX ORDER — PREGABALIN 75 MG/1
75 CAPSULE ORAL 2 TIMES DAILY
Status: DISCONTINUED | OUTPATIENT
Start: 2020-09-28 | End: 2020-09-29 | Stop reason: HOSPADM

## 2020-09-28 RX ORDER — SODIUM CHLORIDE 0.9 % (FLUSH) 0.9 %
10 SYRINGE (ML) INJECTION PRN
Status: DISCONTINUED | OUTPATIENT
Start: 2020-09-28 | End: 2020-09-29 | Stop reason: HOSPADM

## 2020-09-28 RX ORDER — HYDRALAZINE HYDROCHLORIDE 20 MG/ML
10 INJECTION INTRAMUSCULAR; INTRAVENOUS EVERY 6 HOURS PRN
Status: DISCONTINUED | OUTPATIENT
Start: 2020-09-28 | End: 2020-09-29 | Stop reason: HOSPADM

## 2020-09-28 RX ORDER — ROSUVASTATIN CALCIUM 20 MG/1
40 TABLET, COATED ORAL EVERY EVENING
Status: DISCONTINUED | OUTPATIENT
Start: 2020-09-28 | End: 2020-09-29 | Stop reason: HOSPADM

## 2020-09-28 RX ORDER — ONDANSETRON 2 MG/ML
4 INJECTION INTRAMUSCULAR; INTRAVENOUS EVERY 6 HOURS PRN
Status: DISCONTINUED | OUTPATIENT
Start: 2020-09-28 | End: 2020-09-29 | Stop reason: HOSPADM

## 2020-09-28 RX ORDER — METOPROLOL TARTRATE 5 MG/5ML
5 INJECTION INTRAVENOUS EVERY 8 HOURS
Status: DISCONTINUED | OUTPATIENT
Start: 2020-09-28 | End: 2020-09-29 | Stop reason: HOSPADM

## 2020-09-28 RX ORDER — INSULIN LISPRO 100 [IU]/ML
0-6 INJECTION, SOLUTION INTRAVENOUS; SUBCUTANEOUS NIGHTLY
Status: DISCONTINUED | OUTPATIENT
Start: 2020-09-28 | End: 2020-09-29 | Stop reason: HOSPADM

## 2020-09-28 RX ORDER — HYDRALAZINE HYDROCHLORIDE 50 MG/1
50 TABLET, FILM COATED ORAL EVERY 8 HOURS SCHEDULED
Status: DISCONTINUED | OUTPATIENT
Start: 2020-09-28 | End: 2020-09-29 | Stop reason: HOSPADM

## 2020-09-28 RX ORDER — ALBUTEROL SULFATE 2.5 MG/3ML
2.5 SOLUTION RESPIRATORY (INHALATION) EVERY 6 HOURS PRN
Status: DISCONTINUED | OUTPATIENT
Start: 2020-09-28 | End: 2020-09-29 | Stop reason: HOSPADM

## 2020-09-28 RX ORDER — OXYCODONE AND ACETAMINOPHEN 10; 325 MG/1; MG/1
1 TABLET ORAL 4 TIMES DAILY PRN
Status: DISCONTINUED | OUTPATIENT
Start: 2020-09-28 | End: 2020-09-29 | Stop reason: HOSPADM

## 2020-09-28 RX ORDER — ACETAMINOPHEN 650 MG/1
650 SUPPOSITORY RECTAL EVERY 6 HOURS PRN
Status: DISCONTINUED | OUTPATIENT
Start: 2020-09-28 | End: 2020-09-29 | Stop reason: HOSPADM

## 2020-09-28 RX ORDER — HYDRALAZINE HYDROCHLORIDE 20 MG/ML
10 INJECTION INTRAMUSCULAR; INTRAVENOUS EVERY 6 HOURS PRN
Status: DISCONTINUED | OUTPATIENT
Start: 2020-09-28 | End: 2020-09-28 | Stop reason: SDUPTHER

## 2020-09-28 RX ORDER — ALPRAZOLAM 0.5 MG/1
0.5 TABLET ORAL NIGHTLY
Status: DISCONTINUED | OUTPATIENT
Start: 2020-09-28 | End: 2020-09-29 | Stop reason: HOSPADM

## 2020-09-28 RX ORDER — FUROSEMIDE 20 MG/1
20 TABLET ORAL DAILY
Status: ON HOLD | COMMUNITY
End: 2020-09-29 | Stop reason: HOSPADM

## 2020-09-28 RX ORDER — DEXTROSE MONOHYDRATE 50 MG/ML
100 INJECTION, SOLUTION INTRAVENOUS PRN
Status: DISCONTINUED | OUTPATIENT
Start: 2020-09-28 | End: 2020-09-29 | Stop reason: HOSPADM

## 2020-09-28 RX ORDER — 0.9 % SODIUM CHLORIDE 0.9 %
1000 INTRAVENOUS SOLUTION INTRAVENOUS ONCE
Status: COMPLETED | OUTPATIENT
Start: 2020-09-28 | End: 2020-09-28

## 2020-09-28 RX ORDER — INSULIN GLARGINE 100 [IU]/ML
5 INJECTION, SOLUTION SUBCUTANEOUS NIGHTLY
Status: DISCONTINUED | OUTPATIENT
Start: 2020-09-28 | End: 2020-09-28

## 2020-09-28 RX ORDER — POLYETHYLENE GLYCOL 3350 17 G/17G
17 POWDER, FOR SOLUTION ORAL DAILY PRN
Status: DISCONTINUED | OUTPATIENT
Start: 2020-09-28 | End: 2020-09-29 | Stop reason: HOSPADM

## 2020-09-28 RX ORDER — SODIUM CHLORIDE 9 MG/ML
INJECTION, SOLUTION INTRAVENOUS CONTINUOUS
Status: DISCONTINUED | OUTPATIENT
Start: 2020-09-28 | End: 2020-09-29 | Stop reason: HOSPADM

## 2020-09-28 RX ORDER — NICOTINE POLACRILEX 4 MG
15 LOZENGE BUCCAL PRN
Status: DISCONTINUED | OUTPATIENT
Start: 2020-09-28 | End: 2020-09-29 | Stop reason: HOSPADM

## 2020-09-28 RX ORDER — SODIUM CHLORIDE 0.9 % (FLUSH) 0.9 %
10 SYRINGE (ML) INJECTION EVERY 12 HOURS SCHEDULED
Status: DISCONTINUED | OUTPATIENT
Start: 2020-09-28 | End: 2020-09-29 | Stop reason: HOSPADM

## 2020-09-28 RX ORDER — METOPROLOL SUCCINATE 100 MG/1
100 TABLET, EXTENDED RELEASE ORAL DAILY
Status: DISCONTINUED | OUTPATIENT
Start: 2020-09-28 | End: 2020-09-29 | Stop reason: HOSPADM

## 2020-09-28 RX ORDER — AMLODIPINE BESYLATE 10 MG/1
10 TABLET ORAL DAILY
Status: DISCONTINUED | OUTPATIENT
Start: 2020-09-28 | End: 2020-09-29 | Stop reason: HOSPADM

## 2020-09-28 RX ORDER — VENLAFAXINE HYDROCHLORIDE 75 MG/1
75 CAPSULE, EXTENDED RELEASE ORAL DAILY
Status: DISCONTINUED | OUTPATIENT
Start: 2020-09-28 | End: 2020-09-29 | Stop reason: HOSPADM

## 2020-09-28 RX ORDER — DEXTROSE MONOHYDRATE 25 G/50ML
12.5 INJECTION, SOLUTION INTRAVENOUS PRN
Status: DISCONTINUED | OUTPATIENT
Start: 2020-09-28 | End: 2020-09-29 | Stop reason: HOSPADM

## 2020-09-28 RX ORDER — ACETAMINOPHEN 325 MG/1
650 TABLET ORAL EVERY 6 HOURS PRN
Status: DISCONTINUED | OUTPATIENT
Start: 2020-09-28 | End: 2020-09-29 | Stop reason: HOSPADM

## 2020-09-28 RX ADMIN — ALPRAZOLAM 0.5 MG: 0.5 TABLET ORAL at 22:10

## 2020-09-28 RX ADMIN — METOPROLOL TARTRATE 5 MG: 5 INJECTION, SOLUTION INTRAVENOUS at 10:02

## 2020-09-28 RX ADMIN — HYDRALAZINE HYDROCHLORIDE 50 MG: 50 TABLET, FILM COATED ORAL at 22:11

## 2020-09-28 RX ADMIN — SODIUM CHLORIDE: 9 INJECTION, SOLUTION INTRAVENOUS at 08:52

## 2020-09-28 RX ADMIN — ROSUVASTATIN 40 MG: 20 TABLET, FILM COATED ORAL at 18:00

## 2020-09-28 RX ADMIN — HYDRALAZINE HYDROCHLORIDE 10 MG: 20 INJECTION INTRAMUSCULAR; INTRAVENOUS at 11:08

## 2020-09-28 RX ADMIN — Medication 10 ML: at 22:21

## 2020-09-28 RX ADMIN — Medication 10 ML: at 09:41

## 2020-09-28 RX ADMIN — APIXABAN 5 MG: 5 TABLET, FILM COATED ORAL at 22:11

## 2020-09-28 RX ADMIN — PREGABALIN 75 MG: 75 CAPSULE ORAL at 22:11

## 2020-09-28 RX ADMIN — SODIUM CHLORIDE 1000 ML: 9 INJECTION, SOLUTION INTRAVENOUS at 03:28

## 2020-09-28 RX ADMIN — INSULIN LISPRO 3 UNITS: 100 INJECTION, SOLUTION INTRAVENOUS; SUBCUTANEOUS at 22:08

## 2020-09-28 ASSESSMENT — ENCOUNTER SYMPTOMS
ABDOMINAL PAIN: 0
SORE THROAT: 0
RESPIRATORY NEGATIVE: 1
COUGH: 0
DIARRHEA: 0
GASTROINTESTINAL NEGATIVE: 1
NAUSEA: 0
VOMITING: 0
SHORTNESS OF BREATH: 0
RHINORRHEA: 0
BACK PAIN: 1
EYES NEGATIVE: 1

## 2020-09-28 ASSESSMENT — PAIN DESCRIPTION - LOCATION: LOCATION: KNEE

## 2020-09-28 ASSESSMENT — PAIN DESCRIPTION - PAIN TYPE: TYPE: ACUTE PAIN

## 2020-09-28 ASSESSMENT — PAIN SCALES - GENERAL
PAINLEVEL_OUTOF10: 7
PAINLEVEL_OUTOF10: 0

## 2020-09-28 ASSESSMENT — PAIN DESCRIPTION - ORIENTATION: ORIENTATION: RIGHT;LEFT

## 2020-09-28 ASSESSMENT — PAIN DESCRIPTION - DESCRIPTORS: DESCRIPTORS: ACHING

## 2020-09-28 ASSESSMENT — PAIN DESCRIPTION - FREQUENCY: FREQUENCY: CONTINUOUS

## 2020-09-28 NOTE — ED TRIAGE NOTES
PT to ED from home with c/o increased weakness since Tuesday. EMS sts that they were called for lift assist to hep pt get back in bed and sts that she c/o bilateral knee pain and not being able to stand. Pt arrives awake but slightly lethargic. Will answer most questions appropriately but is slow to answer and will fall back asleep when not being talked too. Has no complaints except for knee pain.  Bilateral lower extremity  swelling noted as well, which pt sts is normal.

## 2020-09-28 NOTE — CONSULTS
the past few days. Denies fever, night sweats, chills, chest pain, cough, dyspnea, palpitations, nausea, vomiting, diarrhea, dysuria.  was present in the room who provided more about the history. He reports patient was doing ok for the past few days and she went outside for a walk with him few days ago which she tolerated very well. Yesterday, pt was very weak and she was unable to walk or stand. He couldn't get her off or carry her to the restroom. He called his son for help. He eventually called 911.  reports he had a conversation with Dr. Steven Delcid regarding regarding continuation of her chemotherapy and advised him that at this point hospice may be a better alternative as chemotherapy is doing more harm than good.  was inquiring about immunotherapy and whether they can be tried on her. He wasn't open to the idea of hospice at this point.         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    Chronic low back pain     Depression     Embolism (HCC)     small pulmonary clot    Hx of blood clots     x1    Hyperlipidemia 4/23/2012    Hypertension     Postoperative delirium 05/26/2016    Pulmonary embolism (HCC)     Short-term memory loss     Type II or unspecified type diabetes mellitus without mention of complication, not stated as uncontrolled     Wears dentures     Wears glasses        Past Surgical History:        Procedure Laterality Date    APPENDECTOMY      BREAST BIOPSY Left 06/23/2017    Fibroadenomatous type fibrocystic change    CARDIAC SURGERY      quadruple bypass    CATARACT REMOVAL Bilateral     COLONOSCOPY      COLONOSCOPY N/A 6/6/2019    COLONOSCOPY WITH BIOPSY performed by Leonor Hill MD at [address] [facility]  6/6/2019    COLONOSCOPY CONTROL HEMORRHAGE performed by Leonor Hill MD at [address] [facility] (ZOFRAN) injection 4 mg, 4 mg, Intravenous, Q6H PRN  glucose (GLUTOSE) 40 % oral gel 15 g, 15 g, Oral, PRN  dextrose 50 % IV solution, 12.5 g, Intravenous, PRN  glucagon (rDNA) injection 1 mg, 1 mg, Intramuscular, PRN  dextrose 5 % solution, 100 mL/hr, Intravenous, PRN  insulin lispro (1 Unit Dial) 0-12 Units, 0-12 Units, Subcutaneous, TID WC  insulin lispro (1 Unit Dial) 0-6 Units, 0-6 Units, Subcutaneous, Nightly  0.9 % sodium chloride infusion, , Intravenous, Continuous  hydrALAZINE (APRESOLINE) injection 10 mg, 10 mg, Intravenous, Q6H PRN  metoprolol (LOPRESSOR) injection 5 mg, 5 mg, Intravenous, Q8H    Allergies:  Cephalexin; Pcn [penicillins];  Cephalosporins; and Cephalexin    Social History:      Social History     Socioeconomic History    Marital status:      Spouse name: Not on file    Number of children: Not on file    Years of education: Not on file    Highest education level: Not on file   Occupational History    Not on file   Social Needs    Financial resource strain: Not on file    Food insecurity     Worry: Not on file     Inability: Not on file    Transportation needs     Medical: Not on file     Non-medical: Not on file   Tobacco Use    Smoking status: Former Smoker     Packs/day: 2.00     Years: 10.00     Pack years: 20.00     Last attempt to quit: 2000     Years since quittin.2    Smokeless tobacco: Never Used   Substance and Sexual Activity    Alcohol use: Not Currently     Alcohol/week: 1.0 standard drinks     Types: 1 Glasses of wine per week    Drug use: Never    Sexual activity: Not Currently   Lifestyle    Physical activity     Days per week: Not on file     Minutes per session: Not on file    Stress: Not on file   Relationships    Social connections     Talks on phone: Not on file     Gets together: Not on file     Attends Congregation service: Not on file     Active member of club or organization: Not on file     Attends meetings of clubs or organizations: Not on file     Relationship status: Not on file    Intimate partner violence     Fear of current or ex partner: Not on file     Emotionally abused: Not on file     Physically abused: Not on file     Forced sexual activity: Not on file   Other Topics Concern    Not on file   Social History Narrative    Not on file       Family History:         Problem Relation Age of Onset    High Blood Pressure Mother     Diabetes Father     Heart Disease Maternal Grandmother     Diabetes Maternal Uncle        REVIEW OF SYSTEMS:    Review of Systems   Constitutional: Positive for fatigue. HENT: Negative. Eyes: Negative. Respiratory: Negative. Cardiovascular: Negative. Gastrointestinal: Negative. Endocrine: Negative. Genitourinary: Negative. Musculoskeletal: Negative. Skin: Negative. Neurological: Positive for weakness. PHYSICAL EXAM:      Vitals:  BP (!) 154/66   Pulse 69   Temp 97.5 °F (36.4 °C) (Oral)   Resp 16   Wt 142 lb (64.4 kg)   SpO2 97%   BMI 21.59 kg/m²       Intake/Output Summary (Last 24 hours) at 9/28/2020 2007  Last data filed at 9/28/2020 1124  Gross per 24 hour   Intake 1000 ml   Output 550 ml   Net 450 ml       Physical Exam  Constitutional:       Appearance: Normal appearance. She is ill-appearing. HENT:      Head: Normocephalic and atraumatic. Eyes:      Extraocular Movements: Extraocular movements intact. Pupils: Pupils are equal, round, and reactive to light. Neck:      Musculoskeletal: Normal range of motion and neck supple. Cardiovascular:      Rate and Rhythm: Normal rate and regular rhythm. Pulses: Normal pulses. Heart sounds: Murmur present. Pulmonary:      Effort: Pulmonary effort is normal.      Breath sounds: Normal breath sounds. No wheezing or rales. Abdominal:      General: Abdomen is flat. Bowel sounds are normal. There is no distension. Palpations: Abdomen is soft. Tenderness: There is no abdominal tenderness. dose reduction techniques were employed. IV Contrast: None. Oral Contrast: No. FINDINGS: There is a small left pleural effusion. The heart size is normal without pericardial effusion. There is peritoneal thickening along the right upper quadrant. 3 nodules in the left upper quadrant measuring 0.5 to 1.6 cm in size may represent metastatic disease or splenules. The liver is slightly nodular in contour which may represent hepatic cirrhosis. Two cystic lesion in the right hepatic lobe are unchanged. There is small to moderate volume abdominopelvic ascites. The gallbladder appears normal. Noncontrast images of the spleen, pancreas, and adrenal glands appear normal. No stones are identified in either kidney or along the course of either ureter and no hydronephrosis or hydroureter is seen. The urinary bladder appears normal. The uterus is absent. There has been prior right hemicolectomy. There is no evidence of bowel obstruction. Stomach and small bowel appear normal. There is no evidence of pneumatosis or free air. No lymphadenopathy is seen. Right and left paramedian ventral subcutaneous soft tissue masses are unchanged. The abdominal aorta is severely atherosclerotic. Bone windows demonstrate no suspicious lytic or blastic lesions. There is severe lumbar spondylosis. Thoracic spinal stimulator is noted. 1.  No evidence of bowel obstruction. Prior right hemicolectomy. 2.  Noncontrast CT demonstrating stable right upper quadrant peritoneal thickening, stable perisplenic nodules, and mild to moderate ascites likely representing stable peritoneal metastases. Recommend correlation with outside hospital records. 3.  Small left pleural effusion. Xr Chest (2 Vw)    Result Date: 9/17/2020  EXAM: Chest PA and Lateral views INDICATION: AMS COMPARISON: 7/29/2020 FINDINGS: A left subclavian approach port and thoracic spinal stimulator leads are redemonstrated. There is stable mild left hemidiaphragmatic elevation.  There is a small left pleural effusion. There is no focal consolidation or pneumothorax. The cardiomediastinal  silhouette is normal. The visible bony thorax is intact. Small left pleural effusion. Ct Head Wo Contrast    Result Date: 9/17/2020  PROCEDURE: CT head without contrast INDICATION: change in mental status, hx peritoneal carcinomatosis; COMPARISON: None. TECHNIQUE: CT head was performed without contrast according to standard protocol. Axial images and multiplanar reformatted images reviewed. Up-to-date CT equipment and radiation dose reduction techniques were employed. FINDINGS: No acute intra- or extra-axial fluid collections are identified. There is mild cerebral volume loss with associated ventricular dilatation. The basilar cisterns are patent. No mass effect or midline shift is seen. The gray-white matter differentiation is  normal. No cerebral density abnormality is seen. Other than bilateral lens replacements, visualized portions of the orbits, paranasal sinuses, and mastoids appear normal. No acute fracture is identified. 1.  No acute intracranial process. Xr Chest Portable    Result Date: 9/28/2020  Patient: Eulalia Jackson  Time Out: 04:14 Exam(s): FILM CXR 1 VIEW  EXAM:   XR Chest, 1 View  CLINICAL HISTORY:    Reason for exam: weakness. Reason for exam:->weakness. TECHNIQUE:   Frontal view of the chest.  COMPARISON:   9/17/20  FINDINGS:   Lungs:  Hyperinflation of the lungs suggests underlying emphysema. No acute airspace opacities. Pleural space:  Unremarkable. No pleural effusion or pneumothorax. Heart:  Unremarkable. No cardiomegaly. Mediastinum:  Unremarkable. Bones/joints:  Status post median sternotomy. Vasculature: Moderate atherosclerosis of the aorta. Tubes, lines and devices: The tip of the left Mediport is at the cavoatrial junction. Partial visualization of spinal electrodes.    Electronically signed by Kya Ireland MD on 09-28-20 at [id number]    Hyperinflation of the not approved for use in this setting.  -Recommend best supportive care. - will await palliative discussion with pt and  regarding goals of care. Pancytopenia 2/2 chemotherapy  Pt receives Epoetin effie-epbx, Pegfilgrastim at [facility] every 4 weeks to mitigate the myelosuppressive effects of systemic chemotherapy. Today Hgb 8.6, WBC 2.1, plt 72 from 10.1, 4.3,153 9/21/2020 respectively  - Since pt is not getting chemotherapy, do not recommend growth factor support    Failure to thrive, generalized weakness  Combination of factors including cancer, poor oral intake and adverse effect of chemotherapy. Renal function suggest possible adrenal insufficiency with hyponatremia and hyperkalemia which could also condition  - will check cortisol AM  - plan as above    Discussed with attending Dr. Yovana Lemons    Thank you for the consultation. Diane Rivas MD  [facility]  9/28/2020  8:07 PM    Attending:    Patient seen and examined. Data reviewed. Case discussed with Dr. Darien Bourgeois on rounds and agree with his consult note with my modification. Patient disoriented during my interview and therefore unable to have a meaningful conversation about goals of care.   Evidently the patient's  has had a conversation with the patient's primary oncologist, Dr. Josephine Conrad, who recommends best supportive care, comfort care, and enrollment in a home hospice program.    Nader Jeong MD

## 2020-09-28 NOTE — PLAN OF CARE
Problem: Falls - Risk of:  Goal: Will remain free from falls  Description: Will remain free from falls  Outcome: Ongoing     Problem: Skin Integrity:  Goal: Absence of new skin breakdown  Description: Absence of new skin breakdown  Outcome: Ongoing     Problem: Discharge Planning:  Goal: Discharged to appropriate level of care  Description: Discharged to appropriate level of care  Outcome: Ongoing     Problem: Mobility - Impaired:  Goal: Mobility will improve  Description: Mobility will improve  Outcome: Ongoing

## 2020-09-28 NOTE — CONSULTS
[facility] NEPHROLOGY    [facility]              [phone]                      Ms. Maxine Goldman is admitted with acute renal failure and generalized weakness. We are following for acute renal failure. Interval History and plan:     No acute events overnight. Sodium decreased to 124. Blood pressure elevated. Assessment :     Acute kidney injury   - baseline creatinine 0.9   - on admission elevated creatinine 1.6   - continue NS @ 100 mL/hr    Hyponatremia etiology unclear, likely SIADH    - history of cancer, pain killers and SNRI use   - continue IV fluids   - Goal of Na 130   - Na q6h   - Serum & urine osmolarity, uric acid, TSH levels    - consider Samsca if sodium does not improve    Hyperkalemia   - hold losartan   - continue IV fluids       [facility] Nephrology would like to thank Boby Santos MD   for opportunity to serve this patient      Please call with questions at-   24 Hrs Answering service [phone] or  7 am- 5 pm via Perfect serve or cell phone          CC/reason for consult :     Acute kidney injury     HPI :     Lalo Pelayo is a 67 y.o. female presented to   the hospital on 9/28/2020 with increasing weakness. She has a history of ovarian cancer and generalized peritoneal carcinomatosis. She was recently admitted to [facility] on 6/40-5/72 for metabolic encephalopathy, hyponatremia and volume depletion. Patient states for the past several days she has had some increasing fatigue and weakness. She was not able to get up from the table without assistance. In the ED, she was found to have increased creatinine of 1.6 with a baseline of 0.8 and worsening hyponatremia (124) and mild hyperkalemia (5.1). Nephrology was consulted for management of her acute kidney injury and hyponatremia.       ROS:     Seen with-     positives in bold   Constitutional:  fever, chills, weakness, weight change, fatigue  Skin:  rash, pruritus, hair loss, bruising, dry skin, petechiae  Head, Face, Neck   headaches, swelling,  cervical adenopathy  Respiratory: shortness of breath, cough, or wheezing  Cardiovascular: chest pain, palpitations, dizzy, edema  Gastrointestinal: nausea, vomiting, diarrhea, constipation,belly pain    Yellow skin, blood in stool  Musculoskeletal:  back pain, muscle weakness, gait problems,       joint pain or swelling. Genitourinary:  dysuria, poor urine flow, flank pain, blood in urine  Neurologic:  vertigo, TIA'S, syncope, seizures, focal weakness  Psychosocial:  insomnia, anxiety, or depression.   Additional positive findings:                       All other remaining systems are negative or unable to obtain        PMH/PSH/SH/Family History:     Past Medical History:   Diagnosis Date    Anxiety     Arthritis     Asthma     poorly controlled    CAD (coronary artery disease)     Cancer of peritoneum (HCC)     ovarian    Chronic low back pain     Depression     Embolism (HCC)     small pulmonary clot    Hx of blood clots     x1    Hyperlipidemia 4/23/2012    Hypertension     Postoperative delirium 05/26/2016    Pulmonary embolism (HCC)     Short-term memory loss     Type II or unspecified type diabetes mellitus without mention of complication, not stated as uncontrolled     Wears dentures     Wears glasses        Past Surgical History:   Procedure Laterality Date    APPENDECTOMY      BREAST BIOPSY Left 06/23/2017    Fibroadenomatous type fibrocystic change    CARDIAC SURGERY      quadruple bypass    CATARACT REMOVAL Bilateral     COLONOSCOPY      COLONOSCOPY N/A 6/6/2019    COLONOSCOPY WITH BIOPSY performed by Joanna Dyer MD at [address] [facility]  6/6/2019    COLONOSCOPY CONTROL HEMORRHAGE performed by Joanna Dyer MD at [address] GRAFT  2007    DILATION AND CURETTAGE OF UTERUS      Miscarriage    ENDOSCOPY, COLON, DIAGNOSTIC      EYE SURGERY      HEMICOLECTOMY  05/2016    HYSTERECTOMY      JOINT REPLACEMENT Bilateral     bilat knee    LAPAROSCOPY N/A 7/23/2020    ROBOTIC LAPAROSCOPIC CONVERTED TO OPEN EXTENSIVE LYSIS OF ADHESIONS, EXCISION OF PERITONEAL NODULES, TUMOR DEBULKING, SMALL BOWEL RESECTION WITH ANASTAMOSIS, FLEXIBLE SIGMOIDOSCOPY performed by Woodard Riedel, MD at [address] 08/17/2018    SPINAL CORD STIMULATOR LEAD REVISION AND GENERATOR    FRACISCO AND BSO  05/26/2016    TUNNELED VENOUS PORT PLACEMENT Left 7/27/2016    PORT-A-CATH PLACEMENT,LEFT         reports that she quit smoking about 20 years ago. She has a 20.00 pack-year smoking history. She has never used smokeless tobacco. She reports previous alcohol use of about 1.0 standard drinks of alcohol per week. She reports that she does not use drugs. family history includes Diabetes in her father and maternal uncle; Heart Disease in her maternal grandmother; High Blood Pressure in her mother.          Medication:     Current Facility-Administered Medications: amLODIPine (NORVASC) tablet 10 mg, 10 mg, Oral, Daily  ALPRAZolam (XANAX) tablet 0.5 mg, 0.5 mg, Oral, Nightly  albuterol (PROVENTIL) nebulizer solution 2.5 mg, 2.5 mg, Nebulization, Q6H PRN  apixaban (ELIQUIS) tablet 5 mg, 5 mg, Oral, BID  hydrALAZINE (APRESOLINE) tablet 50 mg, 50 mg, Oral, 3 times per day  metoprolol succinate (TOPROL XL) extended release tablet 100 mg, 100 mg, Oral, Daily  oxyCODONE-acetaminophen (PERCOCET)  MG per tablet 1 tablet, 1 tablet, Oral, 4x Daily PRN  pregabalin (LYRICA) capsule 75 mg, 75 mg, Oral, BID  rosuvastatin (CRESTOR) tablet 40 mg, 40 mg, Oral, QPM  venlafaxine (EFFEXOR XR) extended release capsule 75 mg, 75 mg, Oral, Daily  sodium chloride flush 0.9 % injection 10 mL, 10 mL, Intravenous, 2 times per day  sodium chloride flush 0.9 % injection 10 mL, 10 mL, Intravenous, PRN  acetaminophen (TYLENOL) tablet 650 mg, 650 mg, Oral, Q6H PRN **OR** acetaminophen (TYLENOL) suppository 650 mg, 650 mg, Rectal, Q6H PRN  polyethylene glycol (GLYCOLAX) packet 17 g, 17 g, Oral, Daily PRN  promethazine (PHENERGAN) tablet 12.5 mg, 12.5 mg, Oral, Q6H PRN **OR** ondansetron (ZOFRAN) injection 4 mg, 4 mg, Intravenous, Q6H PRN  glucose (GLUTOSE) 40 % oral gel 15 g, 15 g, Oral, PRN  dextrose 50 % IV solution, 12.5 g, Intravenous, PRN  glucagon (rDNA) injection 1 mg, 1 mg, Intramuscular, PRN  dextrose 5 % solution, 100 mL/hr, Intravenous, PRN  insulin lispro (1 Unit Dial) 0-12 Units, 0-12 Units, Subcutaneous, TID WC  insulin lispro (1 Unit Dial) 0-6 Units, 0-6 Units, Subcutaneous, Nightly  0.9 % sodium chloride infusion, , Intravenous, Continuous       Vitals :     Vitals:    09/28/20 0730   BP: (!) 169/93   Pulse: 62   Resp: 16   Temp: 97.3 °F (36.3 °C)   SpO2: 98%       I & O :       Intake/Output Summary (Last 24 hours) at 9/28/2020 0944  Last data filed at 9/28/2020 0428  Gross per 24 hour   Intake 1000 ml   Output --   Net 1000 ml        Physical Examination :     General appearance: Anxious- no, distressed- no, in good spirits- yes   , frail appearing  HEENT: Lips- normal, teeth- ok , oral mucosa- moist  Neck : Mass- no, appears symmetrical, JVD- not visible  Respiratory: Respiratory effort- normal, wheeze- no, crackles - no  Cardiovascular:  Ausculation- No M/R/G, Edema +3 bilaterally  Abdomen: visible mass- no, distention- no, scar- no, tenderness- no                            hepatosplenomegaly-  no  Musculoskeletal:  clubbing no,cyanosis- no , digital ischemia- no                           muscle strength- grossly normal , tone - grossly normal  Skin: rashes- no , ulcers- no, induration- no, tightening - no  Psychiatric:  Judgement and insight- normal           AAO X 3  Additional finding:      LABS:     Recent Labs     09/28/20  0327   WBC 2.1*   HGB 8.6*   HCT 26.0*   PLT 72*     Recent Labs     09/28/20  0326   *   K 5.2*   CL 92*   CO2 25   BUN 45*   CREATININE 1.6*   GLUCOSE 73        Patient was seen and examined and the case was discussed with the resident. He acted as my scribe. I agree with the assessment and plan.     Thanks  Nephrology  Octavio [address]  Office: [phone]  Cell: [phone]  Fax: [phone]

## 2020-09-28 NOTE — FLOWSHEET NOTE
09/28/20 1325   Encounter Summary   Services provided to: Patient and family together   Referral/Consult From: Rounding   Continue Visiting   (es 9/28)   Complexity of Encounter Low   Length of Encounter 30 minutes   Spiritual/Moravian   Type Spiritual support   Assessment Approachable   Intervention Active listening;Prayer;Ritual;Discussed belief system/Baptism practices/connie  (listened to songs for Orthodoxy Day of Atonement)   Outcome Connection/belonging;Engaged in conversation

## 2020-09-28 NOTE — CONSULTS
Consult received. Labs and notes were reviewed. Full consult to follow.     Thanks  Nephrology  Octavio Valencia [address] [facility], [facility]  Office: [phone]  Cell: [phone]  Fax: [phone]

## 2020-09-28 NOTE — CONSULTS
Cardiology Consultation                                                                    Pt Name: Mak Rowan  Age: 67 y.o. Sex: female  : 1940  Location: [address] [place] [address]    Referring Physician: Mindy Sotelo MD  Primary cardiologist: Dr Xander Mcgraw at [facility]      Reason for Consult:     Reason for Consultation/Chief Complaint: r/o NSTEMI, CMO    HPI:      Mak Rowan is a 67 y.o. female with a past medical history of ovarian CA with peritoneal carcinomatosis s/p chemo (carboplatin/taxol per ), CAD s/p CABG 14 years ago, PE on Eliquis, HTN, HLP, DM II. Echo 20: normal except for diastolic II, mod MR, mild AS with m-mod AI, mod to sev TR, nl RV, RVSP 72 (8), severe LAE, mod L pleural effusion. Nuc stress 7/3/20: normal    Patient presented to the emergency room on  with progressively worse generalized weakness and fatigue, somnolence. Per  and EMR, she has been unable to take enough p.o. liquids recently. She did not have any shortness of breath, chest pains. She maintains mild lower extremity bilaterally. At the emergency room she was afebrile, vital signs stable with normal saturation on room air. Creatinine 1.7 (baseline 1.2), sodium 127, proBNP 1700, troponin 0.02, albumin 3.3, hemoglobin 8.6, chest x-ray normal, ECG normal.    Home medications: Toprol 100 daily, losartan 100 daily, hydralazine 50 every 8 hours, amlodipine 10 daily, Crestor 40 daily, Eliquis 5 twice daily. Patient was admitted for acute kidney injury on CKD, hyponatremia, hyperkalemia. She was started on normal saline at 100 and mL per hour and Lopressor 5 mg IV x1. Due to her mental status her p.o. medications were initially held. She has not tolerated diet, is more alert and p.o. medications have been resumed.       Histories     Past Medical History:   has a past medical history of Anxiety, Arthritis, Asthma, CAD (coronary artery disease), Cancer of peritoneum ([address] Chronic low back pain, Depression, Embolism ([facility] Hx of blood clots, Hyperlipidemia, Hypertension, Postoperative delirium, Pulmonary embolism ([facility] Short-term memory loss, Type II or unspecified type diabetes mellitus without mention of complication, not stated as uncontrolled, Wears dentures, and Wears glasses. Surgical History:   has a past surgical history that includes Coronary artery bypass graft (2007); Appendectomy; Cataract removal (Bilateral); Dilation and curettage of uterus; Coronary angioplasty with stent (1991); Colonoscopy; Endoscopy, colon, diagnostic; eye surgery; dieter and bso (cervix removed) (05/26/2016); hemicolectomy (05/2016); Hysterectomy; Tunneled venous port placement (Left, 7/27/2016); other surgical history (N/A, 08/17/2018); Breast biopsy (Left, 06/23/2017); joint replacement (Bilateral); Colonoscopy (N/A, 6/6/2019); Colonoscopy (6/6/2019); Cardiac surgery; and laparoscopy (N/A, 7/23/2020). Social History:   reports that she quit smoking about 20 years ago. She has a 20.00 pack-year smoking history. She has never used smokeless tobacco. She reports previous alcohol use of about 1.0 standard drinks of alcohol per week. She reports that she does not use drugs. Family History:  No evidence for sudden cardiac death or premature CAD      Medications:       Home Medications  Were reviewed and are listed in nursing record. and/or listed below  Prior to Admission medications    Medication Sig Start Date End Date Taking?  Authorizing Provider   furosemide (LASIX) 20 MG tablet Take 20 mg by mouth daily   Yes Historical Provider, MD   hydrALAZINE (APRESOLINE) 50 MG tablet Take 1 tablet by mouth every 8 hours 9/21/20  Yes Yamilka Castillo MD   losartan (COZAAR) 100 MG tablet Take 1 tablet by mouth daily 9/22/20  Yes Yamilka Castillo MD   amLODIPine (NORVASC) 10 MG tablet Take 1 tablet by mouth daily 9/22/20  Yes Yamilka Castillo MD   apixaban (ELIQUIS) 5 MG TABS tablet Take by mouth 2 times daily   Yes Historical Provider, MD   pregabalin (LYRICA) 75 MG capsule Take 1 capsule by mouth 2 times daily for 120 days. 7/30/20 11/27/20 Yes DANIAL Gilbert - CNP   oxyCODONE-acetaminophen (PERCOCET)  MG per tablet Take 1 tablet by mouth 4 times daily as needed for Pain. Yes Historical Provider, MD   metoprolol succinate (TOPROL XL) 100 MG extended release tablet Take 1 tablet by mouth daily 1/7/37  Yes Isabel Pedersen MD   alprazolam (XANAX) 0.5 MG tablet Take 0.5 mg by mouth nightly as needed for Sleep. Yes Historical Provider, MD   fluocinonide (LIDEX) 0.05 % cream Apply topically 2 times daily as needed Apply topically 2 times daily. Historical Provider, MD   rOPINIRole (REQUIP) 2 MG tablet Take 2 mg by mouth 3 times daily as needed (RLS)    Historical Provider, MD   venlafaxine (EFFEXOR XR) 75 MG extended release capsule Take 75 mg by mouth daily     Historical Provider, MD   albuterol sulfate HFA (VENTOLIN HFA) 108 (90 Base) MCG/ACT inhaler Inhale 2 puffs into the lungs every 4 hours as needed for Wheezing    Historical Provider, MD   insulin glargine (LANTUS) 100 UNIT/ML injection vial Inject 5 Units into the skin nightly  Patient taking differently: Inject 7 Units into the skin nightly  6/6/16   Dwaine Cordova MD   insulin lispro (HUMALOG) 100 UNIT/ML injection vial Inject 0-12 Units into the skin 3 times daily (with meals)  Patient taking differently: Inject 0-14 Units into the skin 2 times daily (with meals) 121-160 = 6 units   161-200 = 8 units   201-240 = 10 units   241-280 = 12 units   281-320= 14 units 6/6/16   Gold Turner MD   rosuvastatin (CRESTOR) 40 MG tablet Take 40 mg by mouth every evening.       Historical Provider, MD          Inpatient Medications:   amLODIPine  10 mg Oral Daily    ALPRAZolam  0.5 mg Oral Nightly    apixaban  5 mg Oral BID    hydrALAZINE  50 mg Oral 3 times per day    metoprolol succinate  100 mg Oral Daily    pregabalin  75 mg Oral BID    rosuvastatin  40 mg Oral QPM    venlafaxine  75 mg Oral Daily    sodium chloride flush  10 mL Intravenous 2 times per day    insulin lispro  0-12 Units Subcutaneous TID WC    insulin lispro  0-6 Units Subcutaneous Nightly    metoprolol  5 mg Intravenous Q8H       IV drips:   dextrose      sodium chloride 100 mL/hr at 09/28/20 0852       PRN:  albuterol, oxyCODONE-acetaminophen, sodium chloride flush, acetaminophen **OR** acetaminophen, polyethylene glycol, promethazine **OR** ondansetron, glucose, dextrose, glucagon (rDNA), dextrose, hydrALAZINE    Allergy:     Cephalexin; Pcn [penicillins]; Cephalosporins; and Cephalexin       Review of Systems:     All 12 point review of symptoms completed. Pertinent positives identified in the HPI, all other review of symptoms negative as below. CONSTITUTIONAL: ++ fatigue  SKIN: No rash or pruritis. EYES: No visual changes or diplopia. No scleral icterus. ENT: No Headaches, hearing loss or vertigo. No mouth sores or sore throat. CARDIOVASCULAR: No chest pain/chest pressure/chest discomfort. No palpitations. No edema. RESPIRATORY: No dyspnea. No cough or wheezing, no sputum production. GASTROINTESTINAL: No N/V/D. No abdominal pain, appetite loss, blood in stools. GENITOURINARY: No dysuria, trouble voiding, or hematuria. MUSCULOSKELETAL:  No gait disturbance, weakness or joint complaints. NEUROLOGICAL: No headache, diplopia, change in muscle strength, numbness or tingling. No change in gait, balance, coordination, mood, affect, memory, mentation, behavior. ENDOCRINE: No excessive thirst, fluid intake, or urination. No tremor. HEMATOLOGIC: No abnormal bruising or bleeding. ALLERGY: No nasal congestion or hives.       Physical Examination:     Vitals:    09/28/20 1102 09/28/20 1106 09/28/20 1341 09/28/20 1442   BP: (!) 184/76 (!) 174/76 (!) 155/56 (!) 112/56   Pulse: 63 65  66   Resp: 16   16   Temp: 97.7 °F (36.5 °C)   97.6 °F (36.4 °C)   TempSrc: Oral   Oral   SpO2: 100%   97% Weight:           Wt Readings from Last 3 Encounters:   09/28/20 142 lb (64.4 kg)   09/18/20 142 lb 6.7 oz (64.6 kg)   07/30/20 149 lb 0.5 oz (67.6 kg)         General Appearance:  Alert, cooperative, no distress, appears stated age Appropriate weight   Head:  Normocephalic, without obvious abnormality, atraumatic   Eyes:  PERRL, conjunctiva/corneas clear EOM intact  Ears normal   Throat no lesions       Nose: Nares normal, no drainage or sinus tenderness   Throat: Lips, mucosa, and tongue normal   Neck: Supple, symmetrical, trachea midline, no adenopathy, thyroid: not enlarged, symmetric, no tenderness/mass/nodules, no carotid bruit. Lungs:   Respirations unlabored, clear to auscultation bilaterally, without any wheezes, rubs or ronchi. Chest Wall:  No tenderness or deformity   Heart:  Regular rhythm, rate is controlled, S1, S2 normal, there is no murmur, there is no rub or gallop, no jvd, no bilateral lower extremity edema   Abdomen:   Soft, non-tender, bowel sounds active all four quadrants,  no masses, no organomegaly       Extremities: Extremities normal, atraumatic, no cyanosis. Pulses: 2+ and symmetric   Skin: Skin color, texture, turgor normal, no rashes or lesions   Pysch: Normal mood and affect   Neurologic: Normal gross motor and sensory exam.  Cranial nerves intact        Labs:     Recent Labs     09/28/20  0326 09/28/20  0338 09/28/20  1655   * 127* 131*   K 5.2* 5.3*  --    BUN 45* 46*  --    CREATININE 1.6* 1.7*  --    CL 92* 94*  --    CO2 25 22  --    GLUCOSE 73 72  --    CALCIUM 8.7 8.9  --      Recent Labs     09/28/20  0327   WBC 2.1*   HGB 8.6*   HCT 26.0*   PLT 72*   MCV 87.1     No results for input(s): CHOLTOT, TRIG, HDL in the last 72 hours. Invalid input(s): LIPIDCOMM, CHOLHDL, VLDCHOL, LDL  No results for input(s): PTT, INR in the last 72 hours.     Invalid input(s): PT  Recent Labs     09/28/20  0326   TROPONINI 0.02*     No results for input(s): BNP in the last 72 hours. No results for input(s): TSH in the last 72 hours. No results for input(s): CHOL, HDL, LDLCALC, TRIG in the last 72 hours.]    Lab Results   Component Value Date    CKTOTAL 41 05/21/2012    TROPONINI 0.02 (H) 09/28/2020         Imaging:     Telemetry:  No tele      Assessment / Plan:     1. Elevated troponin:  It is most likely due to acute kidney injury in the setting of well-known severe CAD status post CABG and dehydration. There is no evidence of ACS with this admission.    - Continue with home medications  - If unable to take po meds, will use IV meds. 2.  Generalized weakness:  Most likely due to dehydration. Patient appears much improved during my exam today. I do not suspect any cardiac causes for her symptoms. 3.  Essential hypertension:    - Continue with home medications  - If unable to take po meds, will use IV meds. I have personally reviewed the reports and images of labs, radiological studies, cardiac studies including ECG's and telemetry, current and old medical records. The note was completed using EMR and Dragon dictation system. Every effort was made to ensure accuracy; however, inadvertent computerized transcription errors may be present. All questions and concerns were addressed to the patient/family. Alternatives to my treatment were discussed. There are no further recommendations at this time and hence we will now sign off. Patient may follow up with her cardiologist once discharged from the hospital for further cardiac care. Thank you for the consult and please do not hesitate to contact me with any questions.      Rajesh Vargas MD, [facility], [address]  The [address]  [facility] [place] [address] [place] [address]  Ph: [phone]  Fax: [phone]

## 2020-09-28 NOTE — CARE COORDINATION
Case Management Assessment           Initial Evaluation                Date / Time of Evaluation: 9/28/2020 3:55 PM                 Assessment Completed by: Beatriz Roberson    Patient Name: Mak Rowan     YOB: 1940  Diagnosis: Generalized weakness [R53.1]     Date / Time: 9/28/2020  2:41 AM    Patient Admission Status: Inpatient    If patient is discharged prior to next notation, then this note serves as note for discharge by case management.      Current PCP: Fransisco Langston MD  Clinic Patient: No    Chart Reviewed: Yes  Patient/ Family Interviewed: Yes    Initial assessment completed at bedside with: pt and  over phone    Hospitalization in the last 30 days: Yes    Emergency Contacts:  Extended Emergency Contact Information  Primary Emergency Contact: Clau Alcantar  Address: [address] Phone: [phone]  Mobile Phone: [phone]  Relation: Spouse  Secondary Emergency Contact: Boom Hollie  Address:                 Home Phone: [phone]  Relation: Child    Advance Directives:   Code Status: Full Code    Healthcare Power of : No  Agent: na  Contact Number: n/a      Financial  Payor: MEDICARE / Plan: MEDICARE PART A AND B / Product Type: *No Product type* /     Pre-cert required for SNF: No    Pharmacy    [address] [phone]  [address] [place] [address]  Phone: [phone] Fax: [phone] [facility] [address], [address] 2 [facility]  Phone: [phone] Fax: 102 [facility] [id number] [address] [phone] [address]  [address] [phone]  Phone: [phone] Fax: [phone]      Potential assistance Purchasing Medications: Potential Assistance Purchasing Medications: No  Does Patient want to participate in local refill/ meds to beds program?: Not Assessed    Meds To Beds General Rules:  1. Can ONLY be done Monday- Friday between 8:30am-5pm  2. Prescription(s) must be in pharmacy by 3pm to be filled same day  3. Copy of patient's insurance/ prescription drug card and patient face sheet must be sent along with the prescription(s)  4. Cost of Rx cannot be added to hospital bill. If financial assistance is needed, please contact unit  or ;  or  CANNOT provide pharmacy voucher for patients co-pays  5. Patients can then  the prescription on their way out of the hospital at discharge, or pharmacy can deliver to the bedside if staff is available. (payment due at time of pick-up or delivery - cash, check, or card accepted)     Able to afford home medications/ co-pay costs: Yes    ADLS  Support Systems: Spouse/Significant Other, Children    PT AM-PAC:   /24  OT AM-PAC:   /24    [place]: home with   Steps: n/a    Plans to RETURN to current housing: Yes  Barriers to RETURNING to current housing: hospice consult    Francisco Tariq 79  Currently ACTIVE with [address]: No  Home Care Agency: Not Applicable      DISCHARGE PLAN:  Disposition: Home with Hospice: TBD     Transportation PLAN for discharge: family     Factors facilitating achievement of predicted outcomes: Family support and Friend support    Barriers to discharge: Medical complications    Additional Case Management Notes: MC spoke with pt who denied HHC. CM called  d/t hospice consult. CM gave a couple to have  research. [facility], [employer], and [facility]. Will continue to follow.      The Plan for Transition of Care is related to the following treatment goals of Generalized weakness [R53.1]    The Patient and/or patient representative Priya and her family were provided with a choice of provider and agrees with the discharge plan Yes    Freedom of choice list was provided with basic dialogue that supports the patient's individualized plan of care/goals and shares the quality data associated with the providers.  Yes    Care Transition patient: No    Yemi Foy RN  [facility]  Case Management Department  Ph: [phone]   Fax: [phone]

## 2020-09-28 NOTE — PROGRESS NOTES
RESPIRATORY THERAPY ASSESSMENT    Name:  [address] Record Number:  [id number]  Age: 67 y.o. Gender: female  : 1940  Today's Date:  2020  Room:  5513/5513-01    Assessment     Is the patient being admitted for a COPD or Asthma exacerbation? No   (If yes the patient will be seen every 4 hours for the first 24 hours and then reassessed)    Patient Admission Diagnosis: Generalized weakness      Allergies  Allergies   Allergen Reactions    Cephalexin Rash    Pcn [Penicillins] Rash    Cephalosporins      UNABLE TO RECALL REACTION    Cephalexin Rash       Pulmonary History: Former smoker  Home Oxygen Therapy:  room air  Home Respiratory Therapy:Albuterol   Current Respiratory Therapy:  Albuterol          Respiratory Severity Index(RSI)   Patients with orders for inhalation medications, oxygen, or any therapeutic treatment modality will be placed on Respiratory Protocol. They will be assessed with the first treatment and at least every 72 hours thereafter. The following severity scale will be used to determine frequency of treatment intervention.     Smoking History: Pulmonary Disease or Smoking History, Greater than 15 pack year = 2    Social History  Social History     Tobacco Use    Smoking status: Former Smoker     Packs/day: 2.00     Years: 10.00     Pack years: 20.00     Last attempt to quit: 2000     Years since quittin.2    Smokeless tobacco: Never Used   Substance Use Topics    Alcohol use: Not Currently     Alcohol/week: 1.0 standard drinks     Types: 1 Glasses of wine per week    Drug use: Never       Recent Surgical History: None = 0  Past Surgical History  Past Surgical History:   Procedure Laterality Date    APPENDECTOMY      BREAST BIOPSY Left 2017    Fibroadenomatous type fibrocystic change    CARDIAC SURGERY      quadruple bypass    CATARACT REMOVAL Bilateral     COLONOSCOPY      COLONOSCOPY N/A 2019    COLONOSCOPY WITH BIOPSY performed by Gifty Rush MD at [address] [facility]  6/6/2019    COLONOSCOPY CONTROL HEMORRHAGE performed by Gifty Rush MD at [address] [facility] CORONARY ARTERY BYPASS GRAFT  2007    DILATION AND CURETTAGE OF UTERUS      Miscarriage    ENDOSCOPY, COLON, DIAGNOSTIC      EYE SURGERY      HEMICOLECTOMY  05/2016    HYSTERECTOMY      JOINT REPLACEMENT Bilateral     bilat knee    LAPAROSCOPY N/A 7/23/2020    ROBOTIC LAPAROSCOPIC CONVERTED TO OPEN EXTENSIVE LYSIS OF ADHESIONS, EXCISION OF PERITONEAL NODULES, TUMOR DEBULKING, SMALL BOWEL RESECTION WITH ANASTAMOSIS, FLEXIBLE SIGMOIDOSCOPY performed by Dwaine Cordova MD at [address] 08/17/2018    SPINAL CORD STIMULATOR LEAD REVISION AND GENERATOR    FRACISCO AND BSO  05/26/2016    TUNNELED VENOUS PORT PLACEMENT Left 7/27/2016    PORT-A-CATH PLACEMENT,LEFT        Level of Consciousness: Disoriented and Uncooperative = 2    Level of Activity: Mostly sedentary, minimal walking = 2    Respiratory Pattern: Regular Pattern; RR 8-20 = 0    Breath Sounds: Clear = 0    Sputum   ,  ,    Cough: Strong, spontaneous, non-productive = 0    Vital Signs   BP (!) 169/93   Pulse 62   Temp 97.3 °F (36.3 °C) (Oral)   Resp 16   Wt 142 lb (64.4 kg)   SpO2 98%   BMI 21.59 kg/m²   SPO2 (COPD values may differ): Greater than or equal to 92% on room air = 0    Peak Flow (asthma only): not applicable = 0    RSI: 5-6 = Q4hr PRN (every four hours as needed) for dyspnea        Plan       Goals: Medication delivery    Patient/caregiver was educated on the proper method of use for Respiratory Care Devices:  Yes      Level of patient/caregiver understanding able to:   ? Verbalize understanding   ? Demonstrate understanding       ? Teach back        ? Needs reinforcement       ?   No available caregiver               x  Other:     Response to education:  Excellent     Is patient being placed on Home Treatment Regimen? Yes     Does the patient have everything they need prior to discharge? Yes     Comments: Chart reviewed    Plan of Care: Albuterol PRN    Electronically signed by Nadya Perales RCP on 9/28/2020 at 9:58 AM    Respiratory Protocol Guidelines     1. Assessment and treatment by Respiratory Therapy will be initiated for medication and therapeutic interventions upon initiation of aerosolized medication. 2. Physician will be contacted for respiratory rate (RR) greater than 35 breaths per minute. Therapy will be held for heart rate (HR) greater than 140 beats per minute, pending direction from physician. 3. Bronchodilators will be administered via Metered Dose Inhaler (MDI) with spacer when the following criteria are met:  a. Alert and cooperative     b. HR < 140 bpm  c. RR < 30 bpm                d. Can demonstrate a 2-3 second inspiratory hold  4. Bronchodilators will be administered via Hand Held Nebulizer [facility] to patients when ANY of the following criteria are met  a. Incognizant or uncooperative          b. Patients treated with HHN at Home        c. Unable to demonstrate proper use of MDI with spacer     d. RR > 30 bpm   5. Bronchodilators will be delivered via Metered Dose Inhaler (MDI), HHN, Aerogen to intubated patients on mechanical ventilation. 6. Inhalation medication orders will be delivered and/or substituted as outlined below. Aerosolized Medications Ordering and Administration Guidelines:    1. All Medications will be ordered by a physician, and their frequency and/or modality will be adjusted as defined by the patients Respiratory Severity Index (RSI) score. 2. If the patient does not have documented COPD, consider discontinuing anticholinergics when RSI is less than 9.  3. If the bronchospasm worsens (increased RSI), then the bronchodilator frequency can be increased to a maximum of every 4 hours.   If greater than every 4 hours is required, the physician will be contacted. 4. If the bronchospasm improves, the frequency of the bronchodilator can be decreased, based on the patient's RSI, but not less than home treatment regimen frequency. 5. Bronchodilator(s) will be discontinued if patient has a RSI less than 9 and has received no scheduled or as needed treatment for 72  Hrs. Patients Ordered on a Mucolytic Agent:    1. Must always be administered with a bronchodilator. 2. Discontinue if patient experiences worsened bronchospasm, or secretions have lessened to the point that the patient is able to clear them with a cough. Anti-inflammatory and Combination Medications:    1. If the patient lacks prior history of lung disease, is not using inhaled anti-inflammatory medication at home, and lacks wheezing by examination or by history for at least 24 hours, contact physician for possible discontinuation.

## 2020-09-28 NOTE — PROGRESS NOTES
4 Eyes Admission Assessment     I agree as the admission nurse that 2 RN's have performed a thorough Head to Toe Skin Assessment on the patient. ALL assessment sites listed below have been assessed on admission. Areas assessed by both nurses: yes  [x]   Head, Face, and Ears   [x]   Shoulders, Back, and Chest  [x]   Arms, Elbows, and Hands   [x]   Coccyx, Sacrum, and Ischium  [x]   Legs, Feet, and Heels        Does the Patient have Skin Breakdown? Yes, stage one noted to coccyx  Librado Prevention initiated:  Yes   Wound Care Orders initiated:  NA      [facility] nurse consulted for Pressure Injury (Stage 3,4, Unstageable, DTI, NWPT, and Complex wounds) or Librado score 18 or lower:  NA      Nurse 1 eSignature: Electronically signed by Lara Marroquin on 9/28/20 at 6:43 AM EDT    **SHARE this note so that the co-signing nurse is able to place an eSignature**    Nurse 2 eSignature: Electronically signed by Eva Pryor RN on 9/28/20 at 6:44 AM EDT (0) independent

## 2020-09-28 NOTE — PROGRESS NOTES
Patient lethargic, responds to voice and touch, oriented to person and place. VSS with exception to an elevated BP. IUTA neuro checks accurately as patient is too sleepy, pt able to move all extremities. Patient too drowsy to take anything PO, medications held, hospitalist notified. Will continue to monitor.

## 2020-09-28 NOTE — H&P
History and Physical    Admit Date: 9/28/2020    Patient's PCP: Dr. Mac Wright MD    Chief Complaint   Patient presents with    Fatigue     History of Present Illness: This is a very pleasant 67 y.o. female with a history of multiple medical problems presenting with generalized weakness. Pt is very somnolent but is arousable and falls asleep mid-sentence. SHe can tell me she is in the hospital because her  brought her in because she is \"sick\". Otherwise hx is is difficult to obtain and is obtain by chart review:  \"Flory Kraft is a 67 y.o. female who presents to the emergency department from home by EMS due to increasing weakness and now inability to stand and ambulate at home. Patient has an unfortunate history of ovarian cancer, now with generalized peritoneal carcinomatosis, as well as other medical comorbidities. She had a recent admission from September 17 through the [facility]KG for metabolic encephalopathy felt to be related to chemotherapy, with other contributing factors. She was also noted to be hyponatremic and volume depleted at that time, and had severe hypertension for which she was briefly in the ICU on a nicardipine drip. The patient herself seems quite sleepy, and will awaken to answer some specific questions, but most of the history is obtained from the patient's , who lives with her and is her primary caregiver. He states that she was doing well for several days after returning home from the hospital, and in fact at one point last week felt well enough to go on a walk with him and the dog. However in the last several days she has had some increasing fatigue and weakness, and this evening she was unable to get up from the table without assistance. The patient's  required the assistance of his son to help get her up and into her chair.   However, later that evening when he attempted to help get her to the restroom, she was unable to stand on her legs at all, and he is not able to carry her. For this reason he states that he called 911, and she was brought to the emergency department for evaluation.     The patient herself has no particular complaints. She is sleepy but arousable, and when awoken is oriented to person and place, although not to time. She denies any pain. She denies any shortness of breath. She states that she feels sleepy, but otherwise denies any discomfort.     The patient's  denies any additional symptoms on review of systems. He states that she seems to have been eating reasonably well, although she does not drink much fluid. He denies any URI symptoms or cough, shortness of breath or chest pain, fevers or chills, nausea or vomiting, abdominal pain, changes in bowel habits, or urinary symptoms.   He feels that her bilateral lower extremity edema is approximately baseline compared to when she was in the hospital.  Additionally, he states that the patient has not had any chemotherapy infusion since she was discharged from the hospital.\"    Past Medical / Surgical History:    Past Medical History:   Diagnosis Date    Anxiety     Arthritis     Asthma     poorly controlled    CAD (coronary artery disease)     Cancer of peritoneum (HCC)     ovarian    Chronic low back pain     Depression     Embolism (HCC)     small pulmonary clot    Hx of blood clots     x1    Hyperlipidemia 4/23/2012    Hypertension     Postoperative delirium 05/26/2016    Pulmonary embolism (HCC)     Short-term memory loss     Type II or unspecified type diabetes mellitus without mention of complication, not stated as uncontrolled     Wears dentures     Wears glasses      Past Surgical History:   Procedure Laterality Date    APPENDECTOMY      BREAST BIOPSY Left 06/23/2017    Fibroadenomatous type fibrocystic change    CARDIAC SURGERY      quadruple bypass    CATARACT REMOVAL Bilateral     COLONOSCOPY      COLONOSCOPY N/A 6/6/2019    COLONOSCOPY WITH BIOPSY performed by Joannie Osler, MD at [address]  6/6/2019    COLONOSCOPY CONTROL HEMORRHAGE performed by Joannie Osler, MD at [facility] CORONARY ARTERY BYPASS GRAFT  2007    DILATION AND CURETTAGE OF UTERUS      Miscarriage    ENDOSCOPY, COLON, DIAGNOSTIC      EYE SURGERY      HEMICOLECTOMY  05/2016    HYSTERECTOMY      JOINT REPLACEMENT Bilateral     bilat knee    LAPAROSCOPY N/A 7/23/2020    ROBOTIC LAPAROSCOPIC CONVERTED TO OPEN EXTENSIVE LYSIS OF ADHESIONS, EXCISION OF PERITONEAL NODULES, TUMOR DEBULKING, SMALL BOWEL RESECTION WITH ANASTAMOSIS, FLEXIBLE SIGMOIDOSCOPY performed by Viola Jung MD at [address] 08/17/2018    SPINAL CORD STIMULATOR LEAD REVISION AND GENERATOR    FRACISCO AND BSO  05/26/2016    TUNNELED VENOUS PORT PLACEMENT Left 7/27/2016    PORT-A-CATH PLACEMENT,LEFT        Medications Prior to Admission:    No current facility-administered medications on file prior to encounter. Current Outpatient Medications on File Prior to Encounter   Medication Sig Dispense Refill    furosemide (LASIX) 20 MG tablet Take 20 mg by mouth daily      hydrALAZINE (APRESOLINE) 50 MG tablet Take 1 tablet by mouth every 8 hours 90 tablet 3    losartan (COZAAR) 100 MG tablet Take 1 tablet by mouth daily 30 tablet 3    amLODIPine (NORVASC) 10 MG tablet Take 1 tablet by mouth daily 30 tablet 3    apixaban (ELIQUIS) 5 MG TABS tablet Take by mouth 2 times daily      pregabalin (LYRICA) 75 MG capsule Take 1 capsule by mouth 2 times daily for 120 days. 240 capsule 0    oxyCODONE-acetaminophen (PERCOCET)  MG per tablet Take 1 tablet by mouth 4 times daily as needed for Pain.  metoprolol succinate (TOPROL XL) 100 MG extended release tablet Take 1 tablet by mouth daily 30 tablet 3    alprazolam (XANAX) 0.5 MG tablet Take 0.5 mg by mouth nightly as needed for Sleep.        (Oral)   Resp 16   Wt 142 lb (64.4 kg)   SpO2 98%   BMI 21.59 kg/m²     General appearance: appears stated age and slowed mentation  Head: Normocephalic, without obvious abnormality, atraumatic  Lungs: clear to auscultation bilaterally  Heart: regular rate and rhythm, S1, S2 normal, no murmur, click, rub or gallop  Abdomen: normal findings: soft, non-tender  Extremities: edema 2-3+ B/L LE  Neurologic: SOMNOLENT, OTHERWISE NON FOCAL    LABS:  Recent Labs     09/28/20  0327   WBC 2.1*   HGB 8.6*   HCT 26.0*   PLT 72*                                                                  No results for input(s): POCNA, POCK, POCCL, [id number], POCBUN in the last 72 hours. Invalid input(s): POCCR, POCGLUCOSE, POCIONIZEDCALCIUM, POCANIONGAP  Recent Labs     09/28/20  0326   COLORU Yellow   PHUR 6.0   WBCUA 3-5   RBCUA 0-2   MUCUS 1+*   BACTERIA 2+*   CLARITYU Clear   SPECGRAV 1.020   LEUKOCYTESUR Negative   UROBILINOGEN 0.2   BILIRUBINUR Negative   BLOODU Negative   GLUCOSEU Negative   AMORPHOUS 1+      Recent Labs     09/28/20  0326   AST 15   ALT 7*   BILITOT <0.2   ALKPHOS 48       Assessment & Plan:      ARF w/ CKD stage 3 w/ Hyperkalemia  Hyponatremia  Hypertensive urgency  Ovarian ca w/ peritoneal carcinamatosis  Pancytopenia  Generalized weakness    -Admit to inpatient.  -Supportive care w/ ivf/ nephro consulted/ avoid nephrotoxic drugs/ follow I/O and renal fnx  -Place on iv bb and hydralazine until awake enough to take po antihypertensives  -consult oncology  -PT/OT once more awake    External factors, such as comorbidities, previous failure of outpatient therapy, and social factors (e.g., a patients inability to obtain or reliably take medication) necessitate a medically necessary inpatient hospital stay. The patient and / or the family were informed of the results of any tests, a time was given to answer questions, a plan was proposed and they agreed with plan.     Thank you Dr. Sarthak De La O MD for the opportunity to be involved in this patients care. If you have any questions or concerns please feel free to contact me at [phone]. Full Code    Addendum: 9/28 @ 6pm  Went back and spoke to patient's  and discussed goals of care. He states that oncology have told him that there is no further chemo that pt would benefit from as she has failed to respond. She has been following w/ them for prn transfusions when counts are low. Now suffering from dehydration/ malnutrition and failure to thrive.  reports that both him and his wife wish for good quality of life and if aggressive measures would not improve that then they are considering hospice. He states that he has asked oncology about this and they are in agreement. Pt's code status to be changed to dnr cca per discussion w/  and will place a consult for hospice.     Darren Lopez MD

## 2020-09-28 NOTE — PROGRESS NOTES
Clinical Pharmacy Progress Note  Medication History     Admit Date: 9/28/2020    Pharmacy attempted to verify home medications - pt is lethargic and unable to provide any history. [facility] to obtain list of current Rx fills. Unsure if patient uses another pharmacy. List of of current medications patient is taking is in progress. Home Medication list in EPIC updated to reflect changes noted below. Source of information: Rx fill history from [facility]    Patient's home pharmacy: [facility] ([phone])     The following medications have not yet been verified:   · Albuterol  · Fluocinonide 0.05% cream  · Lantus - last filled 3/2020 for 30-day supply at [facility]  · Humalog - last filled 1/2020 for 30-day supply at [facility]  · Pregabalin -- last filled 7/22/20 x 90-day supply; written as TID. Unsure if pt takes BID or TID. · Ropinirole - last filled 4/2020  · Rosuvastatin - last filled 6/18 x 90-day supply  · Venlafaxine - last filled 6/18 x 90-day supply    Changes made to medication list:   Medications added:  · Furosemide 20 mg daily - filled 8/6/20 x 60-day supply  Medication doses adjusted:    Alprazolam - 0.5 mg QHS PRN sleep    Please call with any questions.   Nuria Yan PharmD, [facility]  Wireless # [phone]  9/28/2020 9:15 AM

## 2020-09-28 NOTE — PROGRESS NOTES
Patient much more awake this afternoon, intermittently disoriented to place, time, and situation. Able to ambulate with walker and gait belt, tolerating well. Patient denies pain or nausea, tolerated lunch well. IV fluids infusing. Patient and spouse instructed to call out for needs. Will continue to monitor.

## 2020-09-28 NOTE — ED PROVIDER NOTES
[address]          ATTENDING PHYSICIAN NOTE       Date of evaluation: 9/28/2020    Chief Complaint     Fatigue      History of Present Illness     Mary Anne Porter is a 67 y.o. female who presents to the emergency department from home by EMS due to increasing weakness and now inability to stand and ambulate at home. Patient has an unfortunate history of ovarian cancer, now with generalized peritoneal carcinomatosis, as well as other medical comorbidities. She had a recent admission from September 17 through the [facility] for metabolic encephalopathy felt to be related to chemotherapy, with other contributing factors. She was also noted to be hyponatremic and volume depleted at that time, and had severe hypertension for which she was briefly in the ICU on a nicardipine drip. The patient herself seems quite sleepy, and will awaken to answer some specific questions, but most of the history is obtained from the patient's , who lives with her and is her primary caregiver. He states that she was doing well for several days after returning home from the hospital, and in fact at one point last week felt well enough to go on a walk with him and the dog. However in the last several days she has had some increasing fatigue and weakness, and this evening she was unable to get up from the table without assistance. The patient's  required the assistance of his son to help get her up and into her chair. However, later that evening when he attempted to help get her to the restroom, she was unable to stand on her legs at all, and he is not able to carry her. For this reason he states that he called 911, and she was brought to the emergency department for evaluation. The patient herself has no particular complaints. She is sleepy but arousable, and when awoken is oriented to person and place, although not to time. She denies any pain.   She denies any shortness of breath. She states that she feels sleepy, but otherwise denies any discomfort. The patient's  denies any additional symptoms on review of systems. He states that she seems to have been eating reasonably well, although she does not drink much fluid. He denies any URI symptoms or cough, shortness of breath or chest pain, fevers or chills, nausea or vomiting, abdominal pain, changes in bowel habits, or urinary symptoms. He feels that her bilateral lower extremity edema is approximately baseline compared to when she was in the hospital.  Additionally, he states that the patient has not had any chemotherapy infusion since she was discharged from the hospital.      Review of Systems     Review of Systems   Constitutional: Positive for activity change and fatigue. Negative for appetite change, chills and fever. HENT: Negative. Negative for congestion, rhinorrhea and sore throat. Eyes: Negative. Negative for visual disturbance. Respiratory: Negative. Negative for cough and shortness of breath. Cardiovascular: Positive for leg swelling. Negative for chest pain. Gastrointestinal: Negative. Negative for abdominal pain, diarrhea, nausea and vomiting. Endocrine: Negative. Genitourinary: Negative. Negative for difficulty urinating, frequency and urgency. Musculoskeletal: Positive for back pain. Chronic back pain for which the patient has a spinal stimulator, unchanged   Skin: Negative. Neurological: Positive for weakness. Negative for seizures and syncope. Generalized weakness, unable to stand   Hematological: Negative. Psychiatric/Behavioral: Negative.         Past Medical, Surgical, Family, and Social History     She has a past medical history of Anxiety, Arthritis, Asthma, CAD (coronary artery disease), Cancer of peritoneum ([facility] [address] Chronic low back pain, Depression, Embolism ([facility] [address] Hx of blood clots, Hyperlipidemia, Hypertension, Postoperative delirium, [facility] embolism ([facility] Short-term memory loss, Type II or unspecified type diabetes mellitus without mention of complication, not stated as uncontrolled, Wears dentures, and Wears glasses. She has a past surgical history that includes Coronary artery bypass graft (2007); Appendectomy; Cataract removal (Bilateral); Dilation and curettage of uterus; Coronary angioplasty with stent (1991); Colonoscopy; Endoscopy, colon, diagnostic; eye surgery; dieter and bso (cervix removed) (05/26/2016); hemicolectomy (05/2016); Hysterectomy; Tunneled venous port placement (Left, 7/27/2016); other surgical history (N/A, 08/17/2018); Breast biopsy (Left, 06/23/2017); joint replacement (Bilateral); Colonoscopy (N/A, 6/6/2019); Colonoscopy (6/6/2019); Cardiac surgery; and laparoscopy (N/A, 7/23/2020). Her family history includes Diabetes in her father and maternal uncle; Heart Disease in her maternal grandmother; High Blood Pressure in her mother. She reports that she quit smoking about 20 years ago. She has a 20.00 pack-year smoking history. She has never used smokeless tobacco. She reports previous alcohol use of about 1.0 standard drinks of alcohol per week. She reports that she does not use drugs. Medications     Previous Medications    ALBUTEROL SULFATE HFA (VENTOLIN HFA) 108 (90 BASE) MCG/ACT INHALER    Inhale 2 puffs into the lungs every 4 hours as needed for Wheezing    ALPRAZOLAM (XANAX) 0.5 MG TABLET    Take 0.5 mg by mouth nightly. AMLODIPINE (NORVASC) 10 MG TABLET    Take 1 tablet by mouth daily    APIXABAN (ELIQUIS) 5 MG TABS TABLET    Take by mouth 2 times daily    FLUOCINONIDE (LIDEX) 0.05 % CREAM    Apply topically 2 times daily as needed Apply topically 2 times daily.     HYDRALAZINE (APRESOLINE) 50 MG TABLET    Take 1 tablet by mouth every 8 hours    INSULIN GLARGINE (LANTUS) 100 UNIT/ML INJECTION VIAL    Inject 5 Units into the skin nightly    INSULIN LISPRO (HUMALOG) 100 UNIT/ML INJECTION VIAL    Inject 0-12 Units into the skin 3 times daily (with meals)    LOSARTAN (COZAAR) 100 MG TABLET    Take 1 tablet by mouth daily    METOPROLOL SUCCINATE (TOPROL XL) 100 MG EXTENDED RELEASE TABLET    Take 1 tablet by mouth daily    OXYCODONE-ACETAMINOPHEN (PERCOCET)  MG PER TABLET    Take 1 tablet by mouth 4 times daily as needed for Pain. PREGABALIN (LYRICA) 75 MG CAPSULE    Take 1 capsule by mouth 2 times daily for 120 days. ROPINIROLE (REQUIP) 2 MG TABLET    Take 2 mg by mouth 3 times daily as needed (RLS)    ROSUVASTATIN (CRESTOR) 40 MG TABLET    Take 40 mg by mouth every evening. VENLAFAXINE (EFFEXOR XR) 75 MG EXTENDED RELEASE CAPSULE    Take 75 mg by mouth daily        Allergies     She is allergic to cephalexin; pcn [penicillins]; cephalosporins; and cephalexin. Physical Exam     INITIAL VITALS: BP: (!) 146/60, Temp: 97.9 °F (36.6 °C), Pulse: 64, Resp: 16, SpO2: 100 %     General: Somewhat frail appearing, elderly patient. She is sleeping, but arousable to vocal stimuli, and in NAD. HEENT: Head is atraumatic, normocephalic. Pupils are equal, round, and reactive to light. Extraocular muscles are intact. Conjunctivae are clear and moist. No redness or drainage from the eyes. No drainage from the nose. The oropharynx appeared to be normal.    Neck: Supple, with full range of motion. Cardiovascular: Normal S1-S2. There is a loud murmur best heard at the apex, which the patient's  states is baseline. 2+ radial pulses bilaterally. Respiratory: Unlabored breathing with equal chest rise and fall. Lungs are clear to auscultation bilaterally. No adventitious lung sounds heard. Abdomen: Soft and nontender, without guarding or rebound tenderness. No masses or hepatosplenomegaly. Skin: Warm and dry, without rashes or ecchymoses, lacerations or abrasions. Neuro: Sleepy, but arousable to vocal stimuli, and thereafter oriented to person and place, although not to time.   She is able to answer basic 0.6 K/uL    Basophils Absolute 0.0 0.0 - 0.2 K/uL   Brain Natriuretic Peptide   Result Value Ref Range    Pro-BNP 1,723 (H) 0 - 449 pg/mL   Comprehensive Metabolic Panel w/ Reflex to MG   Result Value Ref Range    Sodium 124 (L) 136 - 145 mmol/L    Potassium reflex Magnesium 5.2 (H) 3.5 - 5.1 mmol/L    Chloride 92 (L) 99 - 110 mmol/L    CO2 25 21 - 32 mmol/L    Anion Gap 7 3 - 16    Glucose 73 70 - 99 mg/dL    BUN 45 (H) 7 - 20 mg/dL    CREATININE 1.6 (H) 0.6 - 1.2 mg/dL    GFR Non- 31 (A) >60    GFR  38 (A) >60    Calcium 8.7 8.3 - 10.6 mg/dL    Total Protein 5.7 (L) 6.4 - 8.2 g/dL    Alb 3.3 (L) 3.4 - 5.0 g/dL    Albumin/Globulin Ratio 1.4 1.1 - 2.2    Total Bilirubin <0.2 0.0 - 1.0 mg/dL    Alkaline Phosphatase 48 40 - 129 U/L    ALT 7 (L) 10 - 40 U/L    AST 15 15 - 37 U/L    Globulin 2.4 g/dL   Blood gas, venous (Lab)   Result Value Ref Range    pH, Colin 7.347 (L) 7.350 - 7.450    pCO2, Colin 53.4 (H) 41.0 - 51.0 mmHg    pO2, Colin 30.0 25.0 - 40.0 mmHg    HCO3, Venous 28.5 (H) 24.0 - 28.0 mmol/L    Base Excess, Colin 2.7 -2.0 - 3.0 mmol/L    O2 Sat, Colin 47 Not established %    Carboxyhemoglobin 1.9 (H) 0.0 - 1.5 %    MetHgb, Colin 0.9 0.0 - 1.5 %    TC02 (Calc), Colin 30 mmol/L    Hemoglobin, Colin, Reduced 51.60 %   Lactate, plasma   Result Value Ref Range    Lactic Acid 0.9 0.4 - 2.0 mmol/L   Troponin (lab)   Result Value Ref Range    Troponin 0.02 (H) <0.01 ng/mL   Urinalysis, reflex to microscopic (Lab)   Result Value Ref Range    Color, UA Yellow Straw/Yellow    Clarity, UA Clear Clear    Glucose, Ur Negative Negative mg/dL    Bilirubin Urine Negative Negative    Ketones, Urine Negative Negative mg/dL    Specific Gravity, UA 1.020 1.005 - 1.030    Blood, Urine Negative Negative    pH, UA 6.0 5.0 - 8.0    Protein,  (A) Negative mg/dL    Urobilinogen, Urine 0.2 <2.0 E.U./dL    Nitrite, Urine Negative Negative    Leukocyte Esterase, Urine Negative Negative    Microscopic Examination elevation of her NT proBNP, and given her recent significant hypertension as well as her cardiotoxic chemotherapy medications, there is concern for the potential of new heart failure. She has a mild baseline troponin leak, unchanged from recently at 0.02. Her CBC additionally shows increasing pancytopenia with white blood cell count now at around 2, hemoglobin at around 8, and platelets in the 85Z. She is not frankly neutropenic, and at this point does not appear to require any transfusions. Patient's  is concerned about the increasing evidence of toxicity related to her chemotherapy agents, and is hopeful that her oncology team will be able to find a regimen which will be effective in treating her neoplastic process without worsening her cardiac and renal functions. Given the patient's worsening renal function, mild hyperkalemia, hyponatremia, and new pancytopenia with generalized weakness now unable to even stand and function at home, the patient does warrant admission. Her case was discussed with the hospitalist.    Critical Care:  Due to the immediate potential for life-threatening deterioration due to acute renal failure, hyponatremia, mild hyperkalemia, pancytopenia, I spent a total of approximately 80 minutes providing critical care. This time excludes time spent performing procedures but includes time spent on direct patient care, history retrieval, review of the chart, and lengthy discussions with patient and her , and consultant(s). Clinical Impression     1. Acute renal failure, unspecified acute renal failure type ([address]    2. Pancytopenia ([address]    3. Hyponatremia    4. Generalized weakness        Disposition     PATIENT REFERRED TO:  No follow-up provider specified. DISCHARGE MEDICATIONS:  New Prescriptions    No medications on file       DISPOSITION Decision To Admit    (Please note that portions of this note were completed with voice recognition software.   Efforts were

## 2020-09-29 VITALS
TEMPERATURE: 98 F | HEART RATE: 66 BPM | DIASTOLIC BLOOD PRESSURE: 69 MMHG | WEIGHT: 142 LBS | OXYGEN SATURATION: 100 % | RESPIRATION RATE: 16 BRPM | SYSTOLIC BLOOD PRESSURE: 157 MMHG | HEIGHT: 68 IN | BODY MASS INDEX: 21.52 KG/M2

## 2020-09-29 LAB
ALBUMIN SERPL-MCNC: 3.2 G/DL (ref 3.4–5)
ANION GAP SERPL CALCULATED.3IONS-SCNC: 10 MMOL/L (ref 3–16)
BUN BLDV-MCNC: 33 MG/DL (ref 7–20)
CALCIUM SERPL-MCNC: 8.4 MG/DL (ref 8.3–10.6)
CHLORIDE BLD-SCNC: 101 MMOL/L (ref 99–110)
CO2: 22 MMOL/L (ref 21–32)
CORTISOL TOTAL: 4.3 UG/DL
CREAT SERPL-MCNC: 1.2 MG/DL (ref 0.6–1.2)
ESTIMATED AVERAGE GLUCOSE: 119.8 MG/DL
GFR AFRICAN AMERICAN: 52
GFR NON-AFRICAN AMERICAN: 43
GLUCOSE BLD-MCNC: 129 MG/DL (ref 70–99)
GLUCOSE BLD-MCNC: 159 MG/DL (ref 70–99)
GLUCOSE BLD-MCNC: 196 MG/DL (ref 70–99)
GLUCOSE BLD-MCNC: 204 MG/DL (ref 70–99)
HBA1C MFR BLD: 5.8 %
PERFORMED ON: ABNORMAL
PHOSPHORUS: 2.9 MG/DL (ref 2.5–4.9)
POTASSIUM SERPL-SCNC: 4.5 MMOL/L (ref 3.5–5.1)
SODIUM BLD-SCNC: 132 MMOL/L (ref 136–145)
SODIUM BLD-SCNC: 133 MMOL/L (ref 136–145)
SODIUM BLD-SCNC: 134 MMOL/L (ref 136–145)

## 2020-09-29 PROCEDURE — 97162 PT EVAL MOD COMPLEX 30 MIN: CPT

## 2020-09-29 PROCEDURE — 99221 1ST HOSP IP/OBS SF/LOW 40: CPT | Performed by: NURSE PRACTITIONER

## 2020-09-29 PROCEDURE — 97530 THERAPEUTIC ACTIVITIES: CPT

## 2020-09-29 PROCEDURE — 2500000003 HC RX 250 WO HCPCS: Performed by: INTERNAL MEDICINE

## 2020-09-29 PROCEDURE — 6370000000 HC RX 637 (ALT 250 FOR IP): Performed by: INTERNAL MEDICINE

## 2020-09-29 PROCEDURE — 36415 COLL VENOUS BLD VENIPUNCTURE: CPT

## 2020-09-29 PROCEDURE — 97535 SELF CARE MNGMENT TRAINING: CPT

## 2020-09-29 PROCEDURE — 82533 TOTAL CORTISOL: CPT

## 2020-09-29 PROCEDURE — 80069 RENAL FUNCTION PANEL: CPT

## 2020-09-29 PROCEDURE — 97166 OT EVAL MOD COMPLEX 45 MIN: CPT

## 2020-09-29 PROCEDURE — 97116 GAIT TRAINING THERAPY: CPT

## 2020-09-29 PROCEDURE — 2580000003 HC RX 258: Performed by: INTERNAL MEDICINE

## 2020-09-29 PROCEDURE — 84295 ASSAY OF SERUM SODIUM: CPT

## 2020-09-29 RX ORDER — COSYNTROPIN 0.25 MG/ML
250 INJECTION, POWDER, FOR SOLUTION INTRAMUSCULAR; INTRAVENOUS ONCE
Status: DISCONTINUED | OUTPATIENT
Start: 2020-09-30 | End: 2020-09-29 | Stop reason: HOSPADM

## 2020-09-29 RX ADMIN — ACETAMINOPHEN 650 MG: 325 TABLET ORAL at 06:20

## 2020-09-29 RX ADMIN — AMLODIPINE BESYLATE 10 MG: 10 TABLET ORAL at 09:17

## 2020-09-29 RX ADMIN — INSULIN LISPRO 2 UNITS: 100 INJECTION, SOLUTION INTRAVENOUS; SUBCUTANEOUS at 09:27

## 2020-09-29 RX ADMIN — INSULIN LISPRO 2 UNITS: 100 INJECTION, SOLUTION INTRAVENOUS; SUBCUTANEOUS at 13:00

## 2020-09-29 RX ADMIN — PREGABALIN 75 MG: 75 CAPSULE ORAL at 09:17

## 2020-09-29 RX ADMIN — HYDRALAZINE HYDROCHLORIDE 50 MG: 50 TABLET, FILM COATED ORAL at 06:20

## 2020-09-29 RX ADMIN — VENLAFAXINE HYDROCHLORIDE 75 MG: 75 CAPSULE, EXTENDED RELEASE ORAL at 09:17

## 2020-09-29 RX ADMIN — METOPROLOL SUCCINATE 100 MG: 100 TABLET, EXTENDED RELEASE ORAL at 09:17

## 2020-09-29 RX ADMIN — METOPROLOL TARTRATE 5 MG: 5 INJECTION INTRAVENOUS at 02:29

## 2020-09-29 RX ADMIN — SODIUM CHLORIDE: 9 INJECTION, SOLUTION INTRAVENOUS at 16:32

## 2020-09-29 RX ADMIN — APIXABAN 5 MG: 5 TABLET, FILM COATED ORAL at 09:30

## 2020-09-29 RX ADMIN — HYDRALAZINE HYDROCHLORIDE 50 MG: 50 TABLET, FILM COATED ORAL at 16:30

## 2020-09-29 ASSESSMENT — PAIN DESCRIPTION - DESCRIPTORS: DESCRIPTORS: HEADACHE

## 2020-09-29 ASSESSMENT — ENCOUNTER SYMPTOMS
ALLERGIC/IMMUNOLOGIC NEGATIVE: 1
RESPIRATORY NEGATIVE: 1
EYES NEGATIVE: 1
CONSTIPATION: 1
GASTROINTESTINAL NEGATIVE: 1

## 2020-09-29 ASSESSMENT — PAIN SCALES - GENERAL
PAINLEVEL_OUTOF10: 3
PAINLEVEL_OUTOF10: 0

## 2020-09-29 ASSESSMENT — PAIN DESCRIPTION - FREQUENCY: FREQUENCY: INTERMITTENT

## 2020-09-29 ASSESSMENT — PAIN DESCRIPTION - PAIN TYPE: TYPE: ACUTE PAIN

## 2020-09-29 ASSESSMENT — PAIN DESCRIPTION - LOCATION: LOCATION: HEAD

## 2020-09-29 NOTE — PROGRESS NOTES
Hospitalist Progress Note      PCP: Olivia Boucher MD    Date of Admission: 9/28/2020      Subjective: feeling better , no new complaints,  at  Bedside, wants to meet oncologist    Medications:  Reviewed    Infusion Medications    dextrose      sodium chloride 100 mL/hr at 09/28/20 0317     Scheduled Medications    [START ON 9/30/2020] cosyntropin  250 mcg Intravenous Once    amLODIPine  10 mg Oral Daily    ALPRAZolam  0.5 mg Oral Nightly    apixaban  5 mg Oral BID    hydrALAZINE  50 mg Oral 3 times per day    metoprolol succinate  100 mg Oral Daily    pregabalin  75 mg Oral BID    rosuvastatin  40 mg Oral QPM    venlafaxine  75 mg Oral Daily    sodium chloride flush  10 mL Intravenous 2 times per day    insulin lispro  0-12 Units Subcutaneous TID WC    insulin lispro  0-6 Units Subcutaneous Nightly    metoprolol  5 mg Intravenous Q8H     PRN Meds: albuterol, oxyCODONE-acetaminophen, sodium chloride flush, acetaminophen **OR** acetaminophen, polyethylene glycol, promethazine **OR** ondansetron, glucose, dextrose, glucagon (rDNA), dextrose, hydrALAZINE      Intake/Output Summary (Last 24 hours) at 9/29/2020 1322  Last data filed at 9/29/2020 1226  Gross per 24 hour   Intake 720 ml   Output --   Net 720 ml       Physical Exam Performed:    BP (!) 144/61   Pulse 71   Temp 97.4 °F (36.3 °C) (Oral)   Resp 16   Wt 142 lb (64.4 kg)   SpO2 99%   BMI 21.59 kg/m²     General appearance: appears stated age and slowed mentation  Head: Normocephalic, without obvious abnormality, atraumatic  Lungs: clear to auscultation bilaterally  Heart: regular rate and rhythm, S1, S2 normal, no murmur, click, rub or gallop  Abdomen: normal findings: soft, non-tender  Extremities: edema 2-3+ B/L LE  Neurologic: alert awake    Labs:   Recent Labs     09/28/20  0327   WBC 2.1*   HGB 8.6*   HCT 26.0*   PLT 72*     Recent Labs     09/28/20  0326 09/28/20  0338  09/28/20  2218 09/29/20  0437 09/29/20  1218   NA 124* 127*   < > 134* 132* 133*   K 5.2* 5.3*  --   --   --  4.5   CL 92* 94*  --   --   --  101   CO2 25 22  --   --   --  22   BUN 45* 46*  --   --   --  33*   CREATININE 1.6* 1.7*  --   --   --  1.2   CALCIUM 8.7 8.9  --   --   --  8.4   PHOS  --  3.7  --   --   --  2.9    < > = values in this interval not displayed. Recent Labs     09/28/20  0326   AST 15   ALT 7*   BILITOT <0.2   ALKPHOS 48     No results for input(s): INR in the last 72 hours. Recent Labs     09/28/20  0326   TROPONINI 0.02*       Urinalysis:      Lab Results   Component Value Date    NITRU Negative 09/28/2020    WBCUA 3-5 09/28/2020    BACTERIA 2+ 09/28/2020    RBCUA 0-2 09/28/2020    BLOODU Negative 09/28/2020    SPECGRAV 1.020 09/28/2020    GLUCOSEU Negative 09/28/2020    GLUCOSEU Negative 01/04/2012       Radiology:  XR CHEST PORTABLE   Final Result   Hyperinflation of the lungs suggests underlying emphysema. No acute    airspace opacities. Assessment/Plan:    Active Hospital Problems    Diagnosis    Generalized weakness [R53.1]       1. ARF w/ CKD stage 3 w/ Hyperkalemia, on iv saline  Nephrology consulted  Improving   Hold ARBs, diuretics     2. Hyponatremia:- prerenal due to poor intake    3. Hypertensive urgency, improving , continue home meds     4. Ovarian ca w/ peritoneal carcinomatosis with FTT  Hem onc consulted  Palliative consult for goals of care    5. Pancytopenia:- due to above     6.  Elevated troponin, likely due to KLAUDIA, non ischemic     DVT Prophylaxis: lovenox  Diet: DIET CARB CONTROL;  Code Status: DNR-CCA    PT/OT Eval Status: ordered    Dispo - inpatient , potential d/c after hospice referal    Dave Lind MD

## 2020-09-29 NOTE — CARE COORDINATION
Received call from palliative nurse asking for an updated. CM informed of providers that were given to  yesterday. She will update me once she talks with pt and family.  Electronically signed by Wilbert Antonio RN on 9/29/2020 at 2:54 PM

## 2020-09-29 NOTE — PROGRESS NOTES
Patient is alert and oriented x4. VSS. Neuro checks WDL. Patient is ambulating  x1 with gait belt and walker, tolerating well. Fall precautions in place. Will continue to monitor.

## 2020-09-29 NOTE — PROGRESS NOTES
Patient is alert and oriented. Vital signs are stable. No changes in neuro exam over night. Patient's pain is controlled with medication per MAR. Patient ambulates x1 with a walker. Patient tolerates ambulation well. Patient voiding urine without complication. Bed is in the lowest position. Bed alarm is activated. Call light is within reach. Will continue to monitor and reassess.

## 2020-09-29 NOTE — DISCHARGE SUMMARY
Hospital Discharge Summary    Patient's PCP: Olivia Boucher MD  Admit Date: 9/28/2020   Discharge Date: 9/29/2020    Admitting Physician: Dr. Jono Tomas MD  Discharge Physician: Dr. Anita Aguirre:   IP CONSULT TO HOSPITALIST  IP CONSULT TO NEPHROLOGY  IP CONSULT TO ONCOLOGY  IP CONSULT TO CARDIOLOGY  IP CONSULT TO SOCIAL WORK  IP CONSULT TO HOSPICE  IP CONSULT TO PALLIATIVE CARE    Brief HPI: This is a very pleasant 67 y.o. female with a history of multiple medical problems presenting with generalized weakness. Pt is very somnolent but is arousable and falls asleep mid-sentence. SHe can tell me she is in the hospital because her  brought her in because she is \"sick\". Otherwise hx is is difficult to obtain and is obtain by chart review:  \"Flory Chau is a 67 y. o. female who presents to the emergency department from home by EMS due to increasing weakness and now inability to stand and ambulate at home. Christi Goltz has an unfortunate history of ovarian cancer, now with generalized peritoneal carcinomatosis, as well as other medical comorbidities. Charity Vela had a recent admission from September 17 through the [facility] for metabolic encephalopathy felt to be related to chemotherapy, with other contributing factors.  She was also noted to be hyponatremic and volume depleted at that time, and had severe hypertension for which she was briefly in the ICU on a nicardipine drip.  The patient herself seems quite sleepy, and will awaken to answer some specific questions, but most of the history is obtained from the patient's , who lives with her and is her primary caregiver. [facility] states that she was doing well for several days after returning home from the hospital, and in fact at one point last week felt well enough to go on a walk with him and the dog. Augustina Arroyo in the last several days she has had some increasing fatigue and weakness, and this evening she was unable to get up from the table setting.  -Recommend best supportive care. - Appreciate palliative discussion with pt and  regarding goals of care.      5. Pancytopenia:-Due to chemotherapy, and she is not getting chemo anymore oncology will not recommend a growth factor support     6. Elevated troponin, likely due to KLAUDIA, non ischemic      Palliative care:-Very difficult palliative situation multiple discussions with family with oncology and palliative care, patient has been not ready for hospice. Patient and family agreed to go home and will meet with hospice back at home    Invasive procedures:  None     Discharge Diagnoses: Active Problems:    Generalized weakness  Resolved Problems:    * No resolved hospital problems. *      Physical Exam: BP (!) 145/62   Pulse 69   Temp 98 °F (36.7 °C) (Oral)   Resp 16   Wt 142 lb (64.4 kg)   SpO2 100%   BMI 21.59 kg/m²     General appearance: appears stated age and slowed mentation  Head: Normocephalic, without obvious abnormality, atraumatic  Lungs: clear to auscultation bilaterally  Heart: regular rate and rhythm, S1, S2 normal, no murmur, click, rub or gallop  Abdomen: normal findings: soft, non-tender  Extremities: edema 2-3+ B/L LE  Neurologic: alert awake    Significant diagnostic studies that may require follow up:  Ct Abdomen Pelvis Wo Contrast Additional Contrast? None    Result Date: 9/17/2020  EXAM: CT ABDOMEN AND PELVIS WITHOUT CONTRAST INDICATION: eval for bowel obstruction, vomiting COMPARISON: 7/3/2020 and 6/21/2020 TECHNIQUE: Axial CT imaging obtained from lung bases through pelvis. Axial images and multiplanar reformatted images are provided for review. Up-to-date CT equipment and radiation dose reduction techniques were employed. IV Contrast: None. Oral Contrast: No. FINDINGS: There is a small left pleural effusion. The heart size is normal without pericardial effusion. There is peritoneal thickening along the right upper quadrant.  3 nodules in the left upper quadrant measuring 0.5 to 1.6 cm in size may represent metastatic disease or splenules. The liver is slightly nodular in contour which may represent hepatic cirrhosis. Two cystic lesion in the right hepatic lobe are unchanged. There is small to moderate volume abdominopelvic ascites. The gallbladder appears normal. Noncontrast images of the spleen, pancreas, and adrenal glands appear normal. No stones are identified in either kidney or along the course of either ureter and no hydronephrosis or hydroureter is seen. The urinary bladder appears normal. The uterus is absent. There has been prior right hemicolectomy. There is no evidence of bowel obstruction. Stomach and small bowel appear normal. There is no evidence of pneumatosis or free air. No lymphadenopathy is seen. Right and left paramedian ventral subcutaneous soft tissue masses are unchanged. The abdominal aorta is severely atherosclerotic. Bone windows demonstrate no suspicious lytic or blastic lesions. There is severe lumbar spondylosis. Thoracic spinal stimulator is noted. 1.  No evidence of bowel obstruction. Prior right hemicolectomy. 2.  Noncontrast CT demonstrating stable right upper quadrant peritoneal thickening, stable perisplenic nodules, and mild to moderate ascites likely representing stable peritoneal metastases. Recommend correlation with outside hospital records. 3.  Small left pleural effusion. Xr Chest (2 Vw)    Result Date: 9/17/2020  EXAM: Chest PA and Lateral views INDICATION: AMS COMPARISON: 7/29/2020 FINDINGS: A left subclavian approach port and thoracic spinal stimulator leads are redemonstrated. There is stable mild left hemidiaphragmatic elevation. There is a small left pleural effusion. There is no focal consolidation or pneumothorax. The cardiomediastinal  silhouette is normal. The visible bony thorax is intact. Small left pleural effusion.      Ct Head Wo Contrast    Result Date: 9/17/2020  PROCEDURE: CT head without contrast INDICATION: change in mental status, hx peritoneal carcinomatosis; COMPARISON: None. TECHNIQUE: CT head was performed without contrast according to standard protocol. Axial images and multiplanar reformatted images reviewed. Up-to-date CT equipment and radiation dose reduction techniques were employed. FINDINGS: No acute intra- or extra-axial fluid collections are identified. There is mild cerebral volume loss with associated ventricular dilatation. The basilar cisterns are patent. No mass effect or midline shift is seen. The gray-white matter differentiation is  normal. No cerebral density abnormality is seen. Other than bilateral lens replacements, visualized portions of the orbits, paranasal sinuses, and mastoids appear normal. No acute fracture is identified. 1.  No acute intracranial process. Xr Chest Portable    Result Date: 9/28/2020  Patient: Maite Cope  Time Out: 04:14 Exam(s): FILM CXR 1 VIEW  EXAM:   XR Chest, 1 View  CLINICAL HISTORY:    Reason for exam: weakness. Reason for exam:->weakness. TECHNIQUE:   Frontal view of the chest.  COMPARISON:   9/17/20  FINDINGS:   Lungs:  Hyperinflation of the lungs suggests underlying emphysema. No acute airspace opacities. Pleural space:  Unremarkable. No pleural effusion or pneumothorax. Heart:  Unremarkable. No cardiomegaly. Mediastinum:  Unremarkable. Bones/joints:  Status post median sternotomy. Vasculature: Moderate atherosclerosis of the aorta. Tubes, lines and devices: The tip of the left Mediport is at the cavoatrial junction. Partial visualization of spinal electrodes. Electronically signed by José Miguel Dwyer MD on 09-28-20 at [id number]    Hyperinflation of the lungs suggests underlying emphysema. No acute airspace opacities.       Mri Brain W Wo Contrast    Result Date: 9/19/2020  EXAM: MRI BRAIN W WO CONTRAST INDICATION: Severe headache, mental status change, hypertension, cancer history COMPARISON: Head CT dated September 17, 2020 TECHNIQUE: Standard per department protocol prior to and following the administration of 14 mL ProHance intravenous contrast. FINDINGS: No diffusion restriction. No evidence of acute intracranial hemorrhage. No abnormal enhancement. Mild scattered foci of hyperintense T2/FLAIR signal within the periventricular and subcortical white matter. Mild diffuse enlargement of the ventricles and extra-axial spaces. The midline structures including the pituitary gland, optic chiasm, corpus callosum, brainstem, pineal gland, and cerebellar tonsils are normal. The paranasal sinuses are clear. The globes and orbits appear normal. The intracranial arterial and venous flow-voids are normal. No skull base or calvarial abnormality. 1. No acute stroke, mass, or hemorrhage. 2. Mild chronic small vessel ischemic white matter disease. 3. Mild atrophy. Treatments: As above.       Discharge Medications:     Medication List      CHANGE how you take these medications    insulin glargine 100 UNIT/ML injection vial  Commonly known as:  LANTUS  Inject 5 Units into the skin nightly  What changed:  how much to take     insulin lispro 100 UNIT/ML injection vial  Commonly known as:  HUMALOG  Inject 0-12 Units into the skin 3 times daily (with meals)  What changed:    · how much to take  · when to take this  · additional instructions        CONTINUE taking these medications    ALPRAZolam 0.5 MG tablet  Commonly known as:  XANAX     amLODIPine 10 MG tablet  Commonly known as:  NORVASC  Take 1 tablet by mouth daily     apixaban 5 MG Tabs tablet  Commonly known as:  ELIQUIS     Crestor 40 MG tablet  Generic drug:  rosuvastatin     fluocinonide 0.05 % cream  Commonly known as:  LIDEX     hydrALAZINE 50 MG tablet  Commonly known as:  APRESOLINE  Take 1 tablet by mouth every 8 hours     metoprolol succinate 100 MG extended release tablet  Commonly known as:  TOPROL XL  Take 1 tablet by mouth daily     oxyCODONE-acetaminophen  MG per tablet  Commonly known as:  PERCOCET     pregabalin 75 MG capsule  Commonly known as:  LYRICA  Take 1 capsule by mouth 2 times daily for 120 days. rOPINIRole 2 MG tablet  Commonly known as:  REQUIP     venlafaxine 75 MG extended release capsule  Commonly known as:  EFFEXOR XR     Ventolin  (90 Base) MCG/ACT inhaler  Generic drug:  albuterol sulfate HFA        STOP taking these medications    furosemide 20 MG tablet  Commonly known as:  LASIX     losartan 100 MG tablet  Commonly known as:  COZAAR            Activity: activity as tolerated  Diet: DIET CARB CONTROL;      Disposition: home  Discharged Condition: Stable  Follow Up:   No follow-up provider specified. Code status:  DNR-CCA         Total time spent on discharge, finalizing medications, referrals and arranging outpatient follow up was more than 45 minutes      Thank you Dr. Cody Rhodes MD for the opportunity to be involved in this patients care.

## 2020-09-29 NOTE — FLOWSHEET NOTE
09/29/20 1507   Encounter Summary   Services provided to: Patient   Referral/Consult From: [facility]   Continue Visiting   (9/29/2020, HECTOR. )   Complexity of Encounter Moderate   Length of Encounter 15 minutes   Routine   Type Initial   Assessment Approachable   Intervention Nurtured hope   Outcome Expressed gratitude

## 2020-09-29 NOTE — PROGRESS NOTES
[facility]  Progress Note    Subjective:   No acute event overnight. patient seen and examined at bedside. She was sitting comfortably on her bed eating breakfast. She has no complaints. Review of Systems:     Review of Systems   Constitutional: Positive for fatigue. Negative for fever. HENT: Negative. Eyes: Negative. Respiratory: Negative. Cardiovascular: Negative. Gastrointestinal: Positive for constipation. Endocrine: Negative. Genitourinary: Negative. Musculoskeletal: Negative. Skin: Negative. Allergic/Immunologic: Negative. Neurological: Positive for weakness. Hematological: Negative. Review of Systems   Constitutional: Positive for fatigue. HENT: Negative. Eyes: Negative. Respiratory: Negative. Cardiovascular: Negative. Gastrointestinal: Negative. Endocrine: Negative. Genitourinary: Negative. Musculoskeletal: Negative. Skin: Negative. Neurological: Positive for weakness.    Objective:     Medications    Current Facility-Administered Medications: amLODIPine (NORVASC) tablet 10 mg, 10 mg, Oral, Daily  ALPRAZolam (XANAX) tablet 0.5 mg, 0.5 mg, Oral, Nightly  albuterol (PROVENTIL) nebulizer solution 2.5 mg, 2.5 mg, Nebulization, Q6H PRN  apixaban (ELIQUIS) tablet 5 mg, 5 mg, Oral, BID  hydrALAZINE (APRESOLINE) tablet 50 mg, 50 mg, Oral, 3 times per day  metoprolol succinate (TOPROL XL) extended release tablet 100 mg, 100 mg, Oral, Daily  oxyCODONE-acetaminophen (PERCOCET)  MG per tablet 1 tablet, 1 tablet, Oral, 4x Daily PRN  pregabalin (LYRICA) capsule 75 mg, 75 mg, Oral, BID  rosuvastatin (CRESTOR) tablet 40 mg, 40 mg, Oral, QPM  venlafaxine (EFFEXOR XR) extended release capsule 75 mg, 75 mg, Oral, Daily  sodium chloride flush 0.9 % injection 10 mL, 10 mL, Intravenous, 2 times per day  sodium chloride flush 0.9 % injection 10 mL, 10 mL, Intravenous, PRN  acetaminophen (TYLENOL) tablet 650 mg, 650 mg, Oral, Q6H PRN **OR** acetaminophen (TYLENOL) suppository 650 mg, 650 mg, Rectal, Q6H PRN  polyethylene glycol (GLYCOLAX) packet 17 g, 17 g, Oral, Daily PRN  promethazine (PHENERGAN) tablet 12.5 mg, 12.5 mg, Oral, Q6H PRN **OR** ondansetron (ZOFRAN) injection 4 mg, 4 mg, Intravenous, Q6H PRN  glucose (GLUTOSE) 40 % oral gel 15 g, 15 g, Oral, PRN  dextrose 50 % IV solution, 12.5 g, Intravenous, PRN  glucagon (rDNA) injection 1 mg, 1 mg, Intramuscular, PRN  dextrose 5 % solution, 100 mL/hr, Intravenous, PRN  insulin lispro (1 Unit Dial) 0-12 Units, 0-12 Units, Subcutaneous, TID WC  insulin lispro (1 Unit Dial) 0-6 Units, 0-6 Units, Subcutaneous, Nightly  0.9 % sodium chloride infusion, , Intravenous, Continuous  hydrALAZINE (APRESOLINE) injection 10 mg, 10 mg, Intravenous, Q6H PRN  metoprolol (LOPRESSOR) injection 5 mg, 5 mg, Intravenous, Q8H    Allergies  Allergies   Allergen Reactions    Cephalexin Rash    Pcn [Penicillins] Rash    Cephalosporins      UNABLE TO RECALL REACTION    Cephalexin Rash       Physical Exam  VITALS:  BP (!) 154/71   Pulse 66   Temp 97.9 °F (36.6 °C) (Oral)   Resp 16   Wt 142 lb (64.4 kg)   SpO2 98%   BMI 21.59 kg/m²   TEMPERATURE:  Current - Temp: 97.9 °F (36.6 °C); Max - Temp  Av.9 °F (36.6 °C)  Min: 97.5 °F (36.4 °C)  Max: 98.5 °F (36.9 °C)  PULSE OXIMETRY RANGE: SpO2  Av.6 %  Min: 96 %  Max: 100 %  24HR INTAKE/OUTPUT:      Intake/Output Summary (Last 24 hours) at 2020 0839  Last data filed at 2020 2208  Gross per 24 hour   Intake 240 ml   Output 550 ml   Net -310 ml       Physical Exam  Constitutional:       Appearance: She is ill-appearing. Eyes:      General: No scleral icterus. Cardiovascular:      Rate and Rhythm: Normal rate and regular rhythm. Heart sounds: Murmur present. Pulmonary:      Breath sounds: No wheezing, rhonchi or rales. Abdominal:      Palpations: Abdomen is soft. Tenderness: There is no abdominal tenderness.    Musculoskeletal: INDICATION: eval for bowel obstruction, vomiting COMPARISON: 7/3/2020 and 6/21/2020 TECHNIQUE: Axial CT imaging obtained from lung bases through pelvis. Axial images and multiplanar reformatted images are provided for review. Up-to-date CT equipment and radiation dose reduction techniques were employed. IV Contrast: None. Oral Contrast: No. FINDINGS: There is a small left pleural effusion. The heart size is normal without pericardial effusion. There is peritoneal thickening along the right upper quadrant. 3 nodules in the left upper quadrant measuring 0.5 to 1.6 cm in size may represent metastatic disease or splenules. The liver is slightly nodular in contour which may represent hepatic cirrhosis. Two cystic lesion in the right hepatic lobe are unchanged. There is small to moderate volume abdominopelvic ascites. The gallbladder appears normal. Noncontrast images of the spleen, pancreas, and adrenal glands appear normal. No stones are identified in either kidney or along the course of either ureter and no hydronephrosis or hydroureter is seen. The urinary bladder appears normal. The uterus is absent. There has been prior right hemicolectomy. There is no evidence of bowel obstruction. Stomach and small bowel appear normal. There is no evidence of pneumatosis or free air. No lymphadenopathy is seen. Right and left paramedian ventral subcutaneous soft tissue masses are unchanged. The abdominal aorta is severely atherosclerotic. Bone windows demonstrate no suspicious lytic or blastic lesions. There is severe lumbar spondylosis. Thoracic spinal stimulator is noted. 1.  No evidence of bowel obstruction. Prior right hemicolectomy. 2.  Noncontrast CT demonstrating stable right upper quadrant peritoneal thickening, stable perisplenic nodules, and mild to moderate ascites likely representing stable peritoneal metastases. Recommend correlation with outside hospital records. 3.  Small left pleural effusion.      Xr Chest (2 Vw)    Result Date: 9/17/2020  EXAM: Chest PA and Lateral views INDICATION: AMS COMPARISON: 7/29/2020 FINDINGS: A left subclavian approach port and thoracic spinal stimulator leads are redemonstrated. There is stable mild left hemidiaphragmatic elevation. There is a small left pleural effusion. There is no focal consolidation or pneumothorax. The cardiomediastinal  silhouette is normal. The visible bony thorax is intact. Small left pleural effusion. Ct Head Wo Contrast    Result Date: 9/17/2020  PROCEDURE: CT head without contrast INDICATION: change in mental status, hx peritoneal carcinomatosis; COMPARISON: None. TECHNIQUE: CT head was performed without contrast according to standard protocol. Axial images and multiplanar reformatted images reviewed. Up-to-date CT equipment and radiation dose reduction techniques were employed. FINDINGS: No acute intra- or extra-axial fluid collections are identified. There is mild cerebral volume loss with associated ventricular dilatation. The basilar cisterns are patent. No mass effect or midline shift is seen. The gray-white matter differentiation is  normal. No cerebral density abnormality is seen. Other than bilateral lens replacements, visualized portions of the orbits, paranasal sinuses, and mastoids appear normal. No acute fracture is identified. 1.  No acute intracranial process. Xr Chest Portable    Result Date: 9/28/2020  Patient: Debbie Kinsey  Time Out: 04:14 Exam(s): FILM CXR 1 VIEW  EXAM:   XR Chest, 1 View  CLINICAL HISTORY:    Reason for exam: weakness. Reason for exam:->weakness. TECHNIQUE:   Frontal view of the chest.  COMPARISON:   9/17/20  FINDINGS:   Lungs:  Hyperinflation of the lungs suggests underlying emphysema. No acute airspace opacities. Pleural space:  Unremarkable. No pleural effusion or pneumothorax. Heart:  Unremarkable. No cardiomegaly. Mediastinum:  Unremarkable. Bones/joints:  Status post median sternotomy. Vasculature: Moderate atherosclerosis of the aorta. Tubes, lines and devices: The tip of the left Mediport is at the cavoatrial junction. Partial visualization of spinal electrodes. Electronically signed by Maggi Ugarte MD on 09-28-20 at 9846    Hyperinflation of the lungs suggests underlying emphysema. No acute airspace opacities. Mri Brain W Wo Contrast    Result Date: 9/19/2020  EXAM: MRI BRAIN W WO CONTRAST INDICATION: Severe headache, mental status change, hypertension, cancer history COMPARISON: Head CT dated September 17, 2020 TECHNIQUE: Standard per department protocol prior to and following the administration of 14 mL ProHance intravenous contrast. FINDINGS: No diffusion restriction. No evidence of acute intracranial hemorrhage. No abnormal enhancement. Mild scattered foci of hyperintense T2/FLAIR signal within the periventricular and subcortical white matter. Mild diffuse enlargement of the ventricles and extra-axial spaces. The midline structures including the pituitary gland, optic chiasm, corpus callosum, brainstem, pineal gland, and cerebellar tonsils are normal. The paranasal sinuses are clear. The globes and orbits appear normal. The intracranial arterial and venous flow-voids are normal. No skull base or calvarial abnormality. 1. No acute stroke, mass, or hemorrhage. 2. Mild chronic small vessel ischemic white matter disease. 3. Mild atrophy.       Pathology  TUMOR MARKERS:   Lab Results   Component Value Date    CEA 2.9 05/06/2016     19 06/19/2017       Problem List  Patient Active Problem List   Diagnosis    Hyperlipidemia    Diabetes mellitus, type II ([address]    HTN (hypertension)    Vitamin D deficiency    CAD (coronary artery disease)    Elevated CA-125    Uterine leiomyoma    Abnormal abdominal CT scan    Cancer of peritoneum ([address]    Delirium due to general medical condition    Iron deficiency anemia    Impaired intestinal absorption    Neoplasm related pain    Weight loss    Abdominal pain    Chest pain, atypical    Partial small bowel obstruction (HCC)    Malignant neoplasm of ovary (HCC)    Altered mental state    Moderate malnutrition [address] [address]    Generalized weakness       Assessment and Plan:   Papillary serous primary peritoneal carcinoma, stage III C  S/p debulking surgery  - front line chemotherapy 2016; carbo/taxol, then carbo/taxotere, then Carolyn  - Upon relapse, started on Bevacizumab, carboplatin, doxorubicin x7 cycles in 1/2020  - evidence of failure to respond to treatment  - palliative care consulted. - no alternative tx at this point.   - At this point, with the patient's poor and deteriorating performance status, systemic chemotherapy is not appropriate and would be expected to cause significant toxicity and perhaps harm the patient.  -Immunotherapy is not approved for use in this setting.  -Recommend best supportive care. - Appreciate palliative discussion with pt and  regarding goals of care.      Pancytopenia 2/2 chemotherapy  Pt receives Epoetin effie-epbx, Pegfilgrastim at [facility] every 4 weeks to mitigate the myelosuppressive effects of systemic chemotherapy. Today Hgb 8.6, WBC 2.1, plt 72 from 10.1, 4.3,153 9/21/2020 respectively  - Since pt is not getting chemotherapy, do not recommend growth factor support     Failure to thrive, generalized weakness  Combination of factors including cancer, poor oral intake and adverse effect of chemotherapy. Renal function suggest possible adrenal insufficiency with hyponatremia and hyperkalemia which could also condition. Cortisol 4.3 this AM (low normal)  - plan as above    Discussed and staffed with Dr. Rusty Hernandez    Thank you for consultation. Nely Villavicencio MD  PG-Y2  9/29/2020  8:39 AM    Attending:  Patient seen and examined. Data reviewed. Case discussed with Dr. Perdomo Postin on rounds and agree with his progress note with my modification.     I engaged in a lengthy discussion with the patient's  by telephone regarding goals of care. He is struggling with the fact that his wife has a poor and deteriorating performance status and that she is not appropriate for any further systemic therapy.  I, along with her primary oncologist Dr. Kathrin Ornelas, recommend a best supportive care approach and enrollment in a hospice program.    Sukhi Heredia MD

## 2020-09-29 NOTE — CARE COORDINATION
Case Management Assessment            Discharge Note                    Date / Time of Note: 9/29/2020 4:25 PM                  Discharge Note Completed by: Jyothi Stacia    Patient Name: Saray Dalton   YOB: 1940  Diagnosis: Generalized weakness [R53.1]   Date / Time: 9/28/2020  2:41 AM    Current PCP: Brandon Ramírez MD  Clinic patient: No    Hospitalization in the last 30 days: Yes    Advance Directives:  Code Status: DNR-CCA  [facility] DNR form completed and on chart: Yes    Financial:  Payor: MEDICARE / Plan: MEDICARE PART A AND B / Product Type: *No Product type* /      Pharmacy:    [address] [facility], [address] [phone] - f [phone]  [address] [place] [address]  Phone: [phone] Fax: [phone] [address] 609 [facility], [address] [place] [address] [phone] - F [phone]  [address] [place] [address]  Phone: [phone] Fax: [phone]    [facility] [address] [place] [address], [address] [phone] - F [phone]  [address] [facility] [address]  Phone: [phone] Fax: [phone]      Assistance purchasing medications?: Potential Assistance Purchasing Medications: No  Assistance provided by Case Management: None at this time    Does patient want to participate in local refill/ meds to beds program?: Not Assessed    Meds To Beds General Rules:  1. Can ONLY be done Monday- Friday between 8:30am-5pm  2. Prescription(s) must be in pharmacy by 3pm to be filled same day  3. Copy of patient's insurance/ prescription drug card and patient face sheet must be sent along with the prescription(s)  4. Cost of Rx cannot be added to hospital bill. If financial assistance is needed, please contact unit  or ;  or  CANNOT provide pharmacy voucher for patients co-pays  5.  Patients can then  the prescription on their way out of the hospital at discharge, or pharmacy can deliver to the bedside if staff is available. (payment due at time of pick-up or delivery - cash, check, or card accepted)     Able to afford home medications/ co-pay costs: Yes    ADLS:  Current PT AM-PAC Score: 18 /24  Current OT AM-PAC Score: 20 /24      DISCHARGE Disposition: Home with Hospice: [address] [place] [address]     LOC at discharge: Not Applicable  MALDONADO Completed: Yes    Notification completed in HENS/PAS?:  Not Applicable    IMM Completed:   Not Indicated    Transportation:  Transportation PLAN for discharge: family   Mode of Transport: Private Car  Reason for medical transport: Not Applicable  Name of [address] [place] [address],[address]: Not [address]:  [address] [employer] ordered at discharge: Yes  [address]: [address] [place] [address]  Phone: [phone]  Fax: [phone]  Orders faxed: Yes    Durable Medical Equipment:    Hospice Services:  Location: Home  Agency: [facility]  Phone: [phone]    Consents signed: Yes, Not Indicated    Additional CM Notes: Pt will DC home today with referral to [address] [place] [address]. Family aware and in agreement.  to transport home. The Plan for Transition of Care is related to the following treatment goals of Generalized weakness [R53.1]    The Patient and/or patient representative Isabelle Bell and her family were provided with a choice of provider and agrees with the discharge plan Yes    Freedom of choice list was provided with basic dialogue that supports the patient's individualized plan of care/goals and shares the quality data associated with the providers.  Yes    Care Transitions patient: No    Tran Bardales RN  [facility]  Case Management Department  Ph: [phone]  Fax: [phone]

## 2020-09-29 NOTE — CONSULTS
(Specify)    Requesting Physician: Dr. Haleigh Bianchier: Fatigue    History Obtained From:  patient, spouse, electronic medical record    History of Present Illness:         Chase Velázquez is a 67 y.o. 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 She was admitted with acute renal failure with CKD stage 3 with hyperkalemia, and hyponatremia.  reports he had a conversation with Dr. Azra Ramirez regarding regarding continuation of her chemotherapy and advised him that at this point hospice may be a better alternative as chemotherapy is doing more harm than good.     Subjective:         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    Chronic low back pain     Depression     Embolism (HCC)     small pulmonary clot    Hx of blood clots     x1    Hyperlipidemia 4/23/2012    Hypertension     Postoperative delirium 05/26/2016    Pulmonary embolism (HCC)     Short-term memory loss     Type II or unspecified type diabetes mellitus without mention of complication, not stated as uncontrolled     Wears dentures     Wears glasses        Past Surgical History:        Procedure Laterality Date    APPENDECTOMY      BREAST BIOPSY Left 06/23/2017    Fibroadenomatous type fibrocystic change    CARDIAC SURGERY      quadruple bypass    CATARACT REMOVAL Bilateral     COLONOSCOPY      COLONOSCOPY N/A 6/6/2019    COLONOSCOPY WITH BIOPSY performed by Ben Feliz MD at [address] [facility]  6/6/2019    COLONOSCOPY CONTROL HEMORRHAGE performed by Ben Feliz MD at [address] [place] [address]  CORONARY ANGIOPLASTY WITH STENT PLACEMENT  1991    CORONARY ARTERY BYPASS GRAFT  2007    DILATION AND CURETTAGE OF UTERUS      Miscarriage    ENDOSCOPY, COLON, DIAGNOSTIC      EYE SURGERY      HEMICOLECTOMY  05/2016    HYSTERECTOMY      JOINT REPLACEMENT Bilateral     bilat knee    LAPAROSCOPY N/A 7/23/2020    ROBOTIC LAPAROSCOPIC CONVERTED TO OPEN EXTENSIVE LYSIS OF ADHESIONS, EXCISION OF PERITONEAL NODULES, TUMOR DEBULKING, SMALL BOWEL RESECTION WITH ANASTAMOSIS, FLEXIBLE SIGMOIDOSCOPY performed by Kimberly Gama MD at [address] 08/17/2018    SPINAL CORD STIMULATOR LEAD REVISION AND GENERATOR    FRACISCO AND BSO  05/26/2016    TUNNELED VENOUS PORT PLACEMENT Left 7/27/2016    PORT-A-CATH PLACEMENT,LEFT        Current Medications:    Medications Prior to Admission: furosemide (LASIX) 20 MG tablet, Take 20 mg by mouth daily  hydrALAZINE (APRESOLINE) 50 MG tablet, Take 1 tablet by mouth every 8 hours  losartan (COZAAR) 100 MG tablet, Take 1 tablet by mouth daily  amLODIPine (NORVASC) 10 MG tablet, Take 1 tablet by mouth daily  apixaban (ELIQUIS) 5 MG TABS tablet, Take by mouth 2 times daily  pregabalin (LYRICA) 75 MG capsule, Take 1 capsule by mouth 2 times daily for 120 days. oxyCODONE-acetaminophen (PERCOCET)  MG per tablet, Take 1 tablet by mouth 4 times daily as needed for Pain.  metoprolol succinate (TOPROL XL) 100 MG extended release tablet, Take 1 tablet by mouth daily  alprazolam (XANAX) 0.5 MG tablet, Take 0.5 mg by mouth nightly as needed for Sleep. fluocinonide (LIDEX) 0.05 % cream, Apply topically 2 times daily as needed Apply topically 2 times daily.   rOPINIRole (REQUIP) 2 MG tablet, Take 2 mg by mouth 3 times daily as needed (RLS)  venlafaxine (EFFEXOR XR) 75 MG extended release capsule, Take 75 mg by mouth daily   albuterol sulfate HFA (VENTOLIN HFA) 108 (90 Base) MCG/ACT inhaler, Inhale 2 puffs into the lungs every 4 hours as needed for Wheezing  insulin glargine (LANTUS) 100 UNIT/ML injection vial, Inject 5 Units into the skin nightly (Patient taking differently: Inject 7 Units into the skin nightly )  insulin lispro (HUMALOG) 100 UNIT/ML injection vial, Inject 0-12 Units into the skin 3 times daily (with meals) (Patient taking differently: Inject 0-14 Units into the skin 2 times daily (with meals) 121-160 = 6 units  161-200 = 8 units  201-240 = 10 units  241-280 = 12 units  281-320= 14 units)  rosuvastatin (CRESTOR) 40 MG tablet, Take 40 mg by mouth every evening. Allergies:  Cephalexin; Pcn [penicillins]; Cephalosporins; and Cephalexin    Social History:    · TOBACCO: reports that she quit smoking about 20 years ago. She has a 20.00 pack-year smoking history. She has never used smokeless tobacco.  · ETOH:   reports previous alcohol use of about 1.0 standard drinks of alcohol per week. · Patient currently lives with family -     Review of Systems -   Review of Systems   Constitutional: Positive for activity change and fatigue. HENT: Negative. Respiratory: Negative. Cardiovascular: Negative. Gastrointestinal: Negative. Genitourinary: Negative. Musculoskeletal:        Generalized weakness   Neurological: Negative. Psychiatric/Behavioral: Negative.    : A 10 point review of systems was conducted, significant findings as notedin HPI. Objective:         Physical Exam  Constitutional:       General: She is not in acute distress. HENT:      Head: Normocephalic and atraumatic. Cardiovascular:      Rate and Rhythm: Normal rate and regular rhythm. Positive murmur. Pulmonary:      Effort: Pulmonary effort is normal.      Breath sounds: Normal breath sounds. Abdominal:      General: Bowel sounds are normal.      Palpations: Abdomen is soft. Musculoskeletal: Normal range of motion. General: 1+ edema BLE. Skin:     General: Skin is warm and dry.    Neurological:      Mental Status: She is alert and oriented to person, place, and time. Psychiatric:         Mood and Affect: Mood normal.    Palliative Performance Scale:  [x] 60% Ambulation reduced; Significant disease; Can't do hobbies/housework; intake normal or reduced; occasional assist; LOC full/confusion  [] 50% Mainly sit/lie; Extensive disease; Can't do any work; Considerable assist; intake normal  Or reduced; LOC full/confusion  [] 40% Mainly in bed; Extensive disease; Mainly assist; intake normal or reduced; occasional assist; LOC full/confusion  [] 30% Bed Bound; Extensive disease; Total care; intake reduced; LOC full/confusion  [] 20% Bed Bound; Extensive disease; Total care; intake minimal; Drowsy/coma  [] 10% Bed Bound; Extensive disease; Total care; Mouth care only; Drowsy/coma  [] 0% Death    PPS: 60%    Vitals:    BP (!) 145/62   Pulse 69   Temp 98 °F (36.7 °C) (Oral)   Resp 16   Wt 142 lb (64.4 kg)   SpO2 100%   BMI 21.59 kg/m²     Labs:    BMP:   Recent Labs     09/28/20 0326 09/28/20  0338  09/28/20  2218 09/29/20  0437 09/29/20  1218   * 127*   < > 134* 132* 133*   K 5.2* 5.3*  --   --   --  4.5   CL 92* 94*  --   --   --  101   CO2 25 22  --   --   --  22   BUN 45* 46*  --   --   --  33*   CREATININE 1.6* 1.7*  --   --   --  1.2   GLUCOSE 73 72  --   --   --  204*    < > = values in this interval not displayed. CBC:   Recent Labs     09/28/20 0327   WBC 2.1*   HGB 8.6*   HCT 26.0*   PLT 72*       LFT's:   Recent Labs     09/28/20 0326   AST 15   ALT 7*   BILITOT <0.2   ALKPHOS 48     Troponin:   Recent Labs     09/28/20 0326   TROPONINI 0.02*     BNP: No results for input(s): BNP in the last 72 hours. ABGs: No results for input(s): PHART, RZS0NNW, PO2ART in the last 72 hours. INR: No results for input(s): INR in the last 72 hours.     U/A:  Recent Labs     09/28/20  0326   COLORU Yellow   PHUR 6.0   WBCUA 3-5   RBCUA 0-2   MUCUS 1+*   BACTERIA 2+*   CLARITYU Clear   SPECGRAV 1.020   LEUKOCYTESUR Negative   UROBILINOGEN 0.2   BILIRUBINUR Negative BLOODU Negative   GLUCOSEU Negative   AMORPHOUS 1+       XR CHEST PORTABLE   Final Result   Hyperinflation of the lungs suggests underlying emphysema. No acute    airspace opacities. Conclusion/Time spent:         Recommendations see above    Pt/ 8964-4381, 8645-5071  Time spent with patient and/or family: 25  Time reviewing records: 10 min   Time communicating with staff: 5 min     A total of 40 minutes spent with the patient and family on unit greater than 50% in counseling regarding palliative care and in goals of care for the patient. Thank you to Dr. Ted Anderson for this consultation. We will continue to follow Ms. Sherifkita lovelace as needed.     Radha Garcia NP  [address]

## 2020-09-29 NOTE — PLAN OF CARE
Problem: Falls - Risk of:  Goal: Will remain free from falls  Description: Will remain free from falls  9/29/2020 0358 by Alexandre Henson RN  Outcome: Ongoing  Patient remains free from falls during this shift. Patient is up x1 person assist with walker. Bed is in the lowest position and the bed and chair alarm is activated. Anti-slip socks are on. Call light is within reach. Will continue to monitor and reassess. Problem: Skin Integrity:  Goal: Absence of new skin breakdown  Description: Absence of new skin breakdown  9/29/2020 0358 by Alexandre Henson RN  Outcome: Ongoing  Patient turned and repositioned with RN assistance. No new skin breakdown thus far this shift. Specialty bed ordered. Will continue to monitor and reassess.

## 2020-09-29 NOTE — DISCHARGE INSTR - COC
Continuity of Care Form    Patient Name: Carlotta Hoffman   :  1940  MRN:  [id number]    Admit date:  2020  Discharge date:  ***    Code Status Order: DNR-CCA   Advance Directives:   [facility] Documentation       Date/Time Healthcare Directive Type of Healthcare Directive Copy in [address] Agent's Name Healthcare Agent's Phone Number    20 [phone]  No, patient does not have an advance directive for healthcare treatment -- -- -- -- --            Admitting Physician:  Lupillo Medrano MD  PCP: Pawel Modi MD    Discharging Nurse: [facility] Unit/Room#: 0095/2698-53  Discharging Unit Phone Number: ***    Emergency Contact:   Extended Emergency Contact Information  [facility]  Address: [address] [place] [address] [address]  Phone: [phone]  Mobile Phone: [phone]  Relation: Spouse  Secondary Emergency Contact: Westley Chau  Address:                 Home Phone: [phone]  Relation: Child    Past Surgical History:  Past Surgical History:   Procedure Laterality Date    APPENDECTOMY      BREAST BIOPSY Left 2017    Fibroadenomatous type fibrocystic change    CARDIAC SURGERY      quadruple bypass    CATARACT REMOVAL Bilateral     COLONOSCOPY      COLONOSCOPY N/A 2019    COLONOSCOPY WITH BIOPSY performed by Adarsh Pollock MD at [address] [facility]  2019    COLONOSCOPY CONTROL HEMORRHAGE performed by Adarsh Pollock MD at [facility] CORONARY ARTERY BYPASS GRAFT  2007    DILATION AND CURETTAGE OF UTERUS      Miscarriage    ENDOSCOPY, COLON, DIAGNOSTIC      EYE SURGERY      Brenda  2016    HYSTERECTOMY      JOINT REPLACEMENT Bilateral     bilat knee    LAPAROSCOPY N/A 2020    ROBOTIC LAPAROSCOPIC CONVERTED TO OPEN EXTENSIVE LYSIS OF ADHESIONS, EXCISION OF PERITONEAL NODULES, TUMOR DEBULKING, SMALL BOWEL RESECTION WITH ANASTAMOSIS, FLEXIBLE SIGMOIDOSCOPY performed by Viola Jung MD at [address] [facility] N/A 08/17/2018    [address] AND GENERATOR    FRACISCO AND BSO  05/26/2016    TUNNELED VENOUS PORT PLACEMENT Left 7/27/2016    PORT-A-CATH PLACEMENT,LEFT        Immunization History:   Immunization History   Administered Date(s) Administered    Influenza, High Dose (Fluzone 65 yrs and older) 10/04/2016    Pneumococcal Conjugate 13-valent ([employer]) 10/04/2016       Active Problems:  Patient Active Problem List   Diagnosis Code    Hyperlipidemia E78.5    Diabetes mellitus, type II ([facility] E11.9    HTN (hypertension) I10    Vitamin D deficiency E55.9    CAD (coronary artery disease) I25.10    Elevated CA-125 R97.1    Uterine leiomyoma D25.9    Abnormal abdominal CT scan R93.5    Cancer of peritoneum ([facility] C48.2    Delirium due to general medical condition F05    Iron deficiency anemia D50.9    Impaired intestinal absorption K90.9    Neoplasm related pain G89.3    Weight loss R63.4    Abdominal pain R10.9    Chest pain, atypical R07.89    Partial small bowel obstruction (HCC) K56.600    Malignant neoplasm of ovary (HCC) C56.9    Altered mental state R41.82    Moderate malnutrition (HCC) E44.0    Generalized weakness R53.1       Isolation/Infection:   Isolation            No Isolation          Patient Infection Status       None to display            Nurse Assessment:  Last Vital Signs: BP (!) 145/62   Pulse 69   Temp 98 °F (36.7 °C) (Oral)   Resp 16   Wt 142 lb (64.4 kg)   SpO2 100%   BMI 21.59 kg/m²     Last documented pain score (0-10 scale): Pain Level: 0  Last Weight:   Wt Readings from Last 1 Encounters:   09/28/20 142 lb (64.4 kg)     Mental Status:  {IP PT MENTAL STATUS:61725:::0}    IV Access:  { MALDONADO IV ACCESS:833370189:::0}    Nursing Mobility/ADLs:  Walking   {[facility] DME [facility]:337637785:::5}  Transfer  {[facility] DME ADLs:779229929:::0}  Bathing  {[facility] DME ADLs:344321299:::0}  Dressing  {[facility]P DME ADLs:659614424:::0}  Toileting  {[facility]P DME ADLs:469956632:::0}  Feeding  {CHP DME ADLs:066656573:::0}  Med Admin  {CHP DME ADLs:771517137:::0}  Med Delivery   {[facility] MALDONADO MED Delivery:924656950:::0}    Wound Care Documentation and Therapy:  Incision 06/27/16 Chest Left;Upper (Active)   Number of days: 3847       Incision 08/17/18 Back Mid;Left (Active)   Number of days: 774       Incision 08/17/18 Back Lower;Right (Active)   Number of days: 774       Wound 09/28/20 Coccyx Mid red, not blanching (Active)   Wound Pressure Stage  1 09/28/20 0730   Dressing Status Other (Comment) 09/29/20 1100   Dressing/Treatment Open to air 09/29/20 1100   Wound Assessment Clean;Dry; Intact; Red 09/29/20 1100   Drainage Amount None 09/29/20 1100   Odor None 09/29/20 1100   Number of days: 1        Elimination:  Continence:   · Bowel: {YES / NI:61736}  · Bladder: {YES / DH:76348}  Urinary Catheter: {Urinary Catheter:481591364:::0}   Colostomy/Ileostomy/Ileal Conduit: {YES / XE:53254}       Date of Last BM: ***    Intake/Output Summary (Last 24 hours) at 9/29/2020 1601  Last data filed at 9/29/2020 1226  Gross per 24 hour   Intake 720 ml   Output --   Net 720 ml     I/O last 3 completed shifts:   In: 5 [P.O.:720]  Out: -     Safety Concerns:     [employer] Safety Concerns:194914212:::0}    Impairments/Disabilities:      [address] Impairments/Disabilities:463122654:::0}    Nutrition Therapy:  Current Nutrition Therapy:   [employer] Diet List:162192530:::0}    Routes of Feeding: {CHP DME Other Feedings:938364479:::0}  Liquids: {Slp liquid thickness:66317}  Daily Fluid Restriction: {CHP DME Yes amt example:511433499:::0}  Last Modified Barium Swallow with Video (Video Swallowing Test): {Done Not Done SLAN:951240887:::1}    Treatments at the Time of Hospital Discharge:   Respiratory Treatments: ***  Oxygen Therapy:  {Therapy; copd oxygen:58454:::0}  Ventilator:    { CC Vent List:981063216:::0}    Rehab Therapies: {THERAPEUTIC INTERVENTION:7384439466}  Weight Bearing Status/Restrictions: [address] [employer] CC Weight Bearin:::0}  Other Medical Equipment (for information only, NOT a DME order):  {EQUIPMENT:444754763}  Other Treatments: ***    Patient's personal belongings (please select all that are sent with patient):  {CHP DME Belongings:644917073:::0}    RN SIGNATURE:  {Esignature:655127941:::0}    CASE MANAGEMENT/SOCIAL WORK SECTION    Inpatient Status Date: ***    Readmission Risk Assessment Score:  Readmission Risk              Risk of Unplanned Readmission:        36           Discharging to [facility]            [address] [address]       Phone: [phone]       Fax: [phone]        ·     / signature: Electronically signed by Jefferson Menchaca RN on 20 at 4:16 PM EDT    PHYSICIAN SECTION    Prognosis: Guarded    Condition at Discharge: terminal     Rehab Potential (if transferring to Rehab): Guarded    Recommended Labs or Other Treatments After Discharge: Pt, OT, nursing , palliative care     Physician Certification: I certify the above information and transfer of Chase Velázquez  is necessary for the continuing treatment of the diagnosis listed and that she requires Home Care for less 30 days.      Update Admission H&P: No change in H&P    PHYSICIAN SIGNATURE:  Electronically signed by Yenni Peñaloza MD on 20 at 4:02 PM EDT

## 2020-09-29 NOTE — PROGRESS NOTES
[facility] NEPHROLOGY    [facility]. [facility]              [phone]                      Ms. Guido Dejesus is admitted with acute renal failure and generalized weakness. We are following for acute renal failure. Interval History and plan:     No acute events overnight. Sodium increased from 127>>132  Blood pressure elevated. Cortisol is 4.3. Uric acid is 4.2 and osmolality is 285. Urine osmolality is 306. Requested stat renal panel. Continue normal saline. Continue amlodipine, hydralazine and metoprolol. Check cosyntropin stim test.                   Assessment :     Acute kidney injury   - baseline creatinine 0.9   - on admission elevated creatinine 1.6   - continue NS @ 100 mL/hr    Hyponatremia etiology unclear, likely SIADH    - history of cancer, pain killers and SNRI use   - continue IV fluids   - Goal of Na 130   - Na q6h   - Serum & urine osmolarity, uric acid, TSH levels    - consider Samsca if sodium does not improve    Hyperkalemia   - hold losartan   - continue IV fluids       [facility] Nephrology would like to thank Bipin Vicente MD   for opportunity to serve this patient      Please call with questions at-   24 Hrs Answering service [phone] or  7 am- 5 pm via Perfect serve or cell phone          CC/reason for consult :     Acute kidney injury     HPI :     Westley Robert is a 67 y.o. female presented to   the hospital on 9/28/2020 with increasing weakness. She has a history of ovarian cancer and generalized peritoneal carcinomatosis. She was recently admitted to [facility] on 0/24-3/85 for metabolic encephalopathy, hyponatremia and volume depletion. Patient states for the past several days she has had some increasing fatigue and weakness. She was not able to get up from the table without assistance. In the ED, she was found to have increased creatinine of 1.6 with a baseline of 0.8 and worsening hyponatremia (124) and mild hyperkalemia (5.1).  Nephrology was consulted for management of her acute kidney injury and hyponatremia. ROS:     Seen with-     positives in bold   Constitutional:  fever, chills, weakness, weight change, fatigue  Skin:  rash, pruritus, hair loss, bruising, dry skin, petechiae  Head, Face, Neck   headaches, swelling,  cervical adenopathy  Respiratory: shortness of breath, cough, or wheezing  Cardiovascular: chest pain, palpitations, dizzy, edema  Gastrointestinal: nausea, vomiting, diarrhea, constipation,belly pain    Yellow skin, blood in stool  Musculoskeletal:  back pain, muscle weakness, gait problems,       joint pain or swelling. Genitourinary:  dysuria, poor urine flow, flank pain, blood in urine  Neurologic:  vertigo, TIA'S, syncope, seizures, focal weakness  Psychosocial:  insomnia, anxiety, or depression.   Additional positive findings:                       All other remaining systems are negative or unable to obtain        PMH/PSH/SH/Family History:     Past Medical History:   Diagnosis Date    Anxiety     Arthritis     Asthma     poorly controlled    CAD (coronary artery disease)     Cancer of peritoneum (HCC)     ovarian    Chronic low back pain     Depression     Embolism (HCC)     small pulmonary clot    Hx of blood clots     x1    Hyperlipidemia 4/23/2012    Hypertension     Postoperative delirium 05/26/2016    Pulmonary embolism (HCC)     Short-term memory loss     Type II or unspecified type diabetes mellitus without mention of complication, not stated as uncontrolled     Wears dentures     Wears glasses        Past Surgical History:   Procedure Laterality Date    APPENDECTOMY      BREAST BIOPSY Left 06/23/2017    Fibroadenomatous type fibrocystic change    CARDIAC SURGERY      quadruple bypass    CATARACT REMOVAL Bilateral     COLONOSCOPY      COLONOSCOPY N/A 6/6/2019    COLONOSCOPY WITH BIOPSY performed by Adarsh Pollock MD at [address] [facility]  6/6/2019    COLONOSCOPY CONTROL HEMORRHAGE performed by Chuck Salas MD at [address] [facility] CORONARY ARTERY BYPASS GRAFT  2007    DILATION AND CURETTAGE OF UTERUS      Miscarriage    ENDOSCOPY, COLON, DIAGNOSTIC      EYE SURGERY      HEMICOLECTOMY  05/2016    HYSTERECTOMY      JOINT REPLACEMENT Bilateral     bilat knee    LAPAROSCOPY N/A 7/23/2020    ROBOTIC LAPAROSCOPIC CONVERTED TO OPEN EXTENSIVE LYSIS OF ADHESIONS, EXCISION OF PERITONEAL NODULES, TUMOR DEBULKING, SMALL BOWEL RESECTION WITH ANASTAMOSIS, FLEXIBLE SIGMOIDOSCOPY performed by Sarah Wolfe MD at [address] [facility] 08/17/2018    SPINAL CORD STIMULATOR LEAD REVISION AND GENERATOR    FRACISCO AND BSO  05/26/2016    TUNNELED VENOUS PORT PLACEMENT Left 7/27/2016    PORT-A-CATH PLACEMENT,LEFT         reports that she quit smoking about 20 years ago. She has a 20.00 pack-year smoking history. She has never used smokeless tobacco. She reports previous alcohol use of about 1.0 standard drinks of alcohol per week. She reports that she does not use drugs. family history includes Diabetes in her father and maternal uncle; Heart Disease in her maternal grandmother; High Blood Pressure in her mother.          Medication:     Current Facility-Administered Medications: amLODIPine (NORVASC) tablet 10 mg, 10 mg, Oral, Daily  ALPRAZolam (XANAX) tablet 0.5 mg, 0.5 mg, Oral, Nightly  albuterol (PROVENTIL) nebulizer solution 2.5 mg, 2.5 mg, Nebulization, Q6H PRN  apixaban (ELIQUIS) tablet 5 mg, 5 mg, Oral, BID  hydrALAZINE (APRESOLINE) tablet 50 mg, 50 mg, Oral, 3 times per day  metoprolol succinate (TOPROL XL) extended release tablet 100 mg, 100 mg, Oral, Daily  oxyCODONE-acetaminophen (PERCOCET)  MG per tablet 1 tablet, 1 tablet, Oral, 4x Daily PRN  pregabalin (LYRICA) capsule 75 mg, 75 mg, Oral, BID  rosuvastatin (CRESTOR) tablet 40 mg, 40 mg, Oral, QPM  venlafaxine (EFFEXOR XR) extended release capsule 75 mg, 75 mg, Oral, Daily  sodium chloride flush 0.9 % injection 10 mL, 10 mL, Intravenous, 2 times per day  sodium chloride flush 0.9 % injection 10 mL, 10 mL, Intravenous, PRN  acetaminophen (TYLENOL) tablet 650 mg, 650 mg, Oral, Q6H PRN **OR** acetaminophen (TYLENOL) suppository 650 mg, 650 mg, Rectal, Q6H PRN  polyethylene glycol (GLYCOLAX) packet 17 g, 17 g, Oral, Daily PRN  promethazine (PHENERGAN) tablet 12.5 mg, 12.5 mg, Oral, Q6H PRN **OR** ondansetron (ZOFRAN) injection 4 mg, 4 mg, Intravenous, Q6H PRN  glucose (GLUTOSE) 40 % oral gel 15 g, 15 g, Oral, PRN  dextrose 50 % IV solution, 12.5 g, Intravenous, PRN  glucagon (rDNA) injection 1 mg, 1 mg, Intramuscular, PRN  dextrose 5 % solution, 100 mL/hr, Intravenous, PRN  insulin lispro (1 Unit Dial) 0-12 Units, 0-12 Units, Subcutaneous, TID WC  insulin lispro (1 Unit Dial) 0-6 Units, 0-6 Units, Subcutaneous, Nightly  0.9 % sodium chloride infusion, , Intravenous, Continuous  hydrALAZINE (APRESOLINE) injection 10 mg, 10 mg, Intravenous, Q6H PRN  metoprolol (LOPRESSOR) injection 5 mg, 5 mg, Intravenous, Q8H       Vitals :     Vitals:    09/29/20 0730   BP: (!) 154/71   Pulse: 66   Resp: 16   Temp: 97.9 °F (36.6 °C)   SpO2: 98%       I & O :       Intake/Output Summary (Last 24 hours) at 9/29/2020 0935  Last data filed at 9/29/2020 0841  Gross per 24 hour   Intake 480 ml   Output 550 ml   Net -70 ml        Physical Examination :     General appearance: Anxious- no, distressed- no, in good spirits- yes   , frail appearing  HEENT: Lips- normal, teeth- ok , oral mucosa- moist  Neck : Mass- no, appears symmetrical, JVD- not visible  Respiratory: Respiratory effort- normal, wheeze- no, crackles - no  Cardiovascular:  Ausculation- No M/R/G, Edema +3 bilaterally  Abdomen: visible mass- no, distention- no, scar- no, tenderness- no                            hepatosplenomegaly-  no  Musculoskeletal:  clubbing no,cyanosis- no , digital ischemia- no                           muscle

## 2020-09-29 NOTE — PLAN OF CARE
Problem: Falls - Risk of:  Goal: Will remain free from falls  Description: Will remain free from falls  9/29/2020 1711 by Martha Preston RN  Outcome: Ongoing  Note: Patient is up in chair with alarm on. Non-skid socks are on. Patient is ambulating x1 with walker and gaitbelt, tolerating well.  is at bedside. Fall precautions in place. Call light and bedside table in reach. Problem: Skin Integrity:  Goal: Will show no infection signs and symptoms  Description: Will show no infection signs and symptoms  Outcome: Ongoing  Note: No signs of infection. VSS. No new redness, swelling or drainage.

## 2020-09-29 NOTE — PROGRESS NOTES
Physician Progress Note      Mauri Richardson  [facility] #:                  [id number]  :                       1940  ADMIT DATE:       2020 2:41 AM  DISCH DATE:  RESPONDING  PROVIDER #:        Jay Cadet MD          QUERY TEXT:    Patient admitted with SIADH and KLAUDIA, noted to be lethargic and somnolent. If   possible, please document in the progress notes and discharge summary if you   are evaluating and / or treating any of the following: The medical record reflects the following:  Risk Factors: SIADH, KLAUDIA  Clinical Indicators: Per H&P \"very somnolent but is arousable and falls asleep   mid-sentence;  iv bb and hydralazine until awake enough to take po   antihypertensives\"; \"Hypertensive urgency\" documented, BP: 146/60- 144/55  Treatment: 1L NS bolus, NS @ 100/ hr  Options provided:  -- Metabolic encephalopathy due to SIADH and KLAUDIA  -- Hypertensive encephalopathy  -- Encephalopathy due to medications or drugs, Please specify  -- Other - I will add my own diagnosis  -- Disagree - Not applicable / Not valid  -- Disagree - Clinically unable to determine / Unknown  -- Refer to Clinical Documentation Reviewer    PROVIDER RESPONSE TEXT:    This patient has metabolic encephalopathy due to failure to thrive, dehydration and cancer    Query created by: Jorge Garcia on 2020 1:59 PM      QUERY TEXT:    Patient admitted with SIADH and KLAUDIA and has documented pancytopenia.   If   possible, please document in progress notes and discharge summary further   specificity regarding pancytopenia:    The medical record reflects the following:  Risk Factors: Chemotherapy, Lyrica, Requip  Clinical Indicators: Per recent admission HPI last chemotherapy was on   2020; WBC: 2.1, HGB: 8.6, PLT: 72, ANC: 1.2; \"Pancytopenia\" documented;   Pt on Requip and Lyrica at home  Treatment: Daily lab monitoring, Requip not started this stay, Lyrica   continued  Options provided:  -- Chemotherapy induced pancytopenia  -- Medication induced pancytopenia due to Requip and Lyrica  -- Other - I will add my own diagnosis  -- Disagree - Not applicable / Not valid  -- Disagree - Clinically unable to determine / Unknown  -- Refer to Clinical Documentation Reviewer    PROVIDER RESPONSE TEXT:    Patient has Chemotherapy induced pancytopenia    Query created by:  Abdirashid Ayers on 9/28/2020 2:13 PM      Electronically signed by:  Rickie Garcia MD 9/29/2020 1:15 PM

## 2020-09-29 NOTE — CARE COORDINATION
Pt has decided to go with [facility]. CM reaching out to them. For Care within the home. CM gave all information needed. They will be reaching out to family at this time. GILBERTO faxed paperwork to [phone].  Electronically signed by Kyler Kirby RN on 9/29/2020 at 4:25 PM

## 2020-09-29 NOTE — PROGRESS NOTES
Occupational Therapy   Occupational Therapy Initial Assessment and Treatment  Date: 2020   Patient Name: Johnson Gallo  MRN: [id number]     : 1940    Date of Service: 2020    Discharge Recommendations:    Johnson Gallo scored a 20/24 on the AM-PAC ADL Inpatient form. Current research shows that an AM-PAC score of 18 or greater is typically associated with a discharge to the patient's home setting. Based on the patient's AM-PAC score, and their current ADL deficits, it is recommended that the patient have 2-3 sessions per week of Occupational Therapy at d/c to increase the patient's independence. At this time, this patient demonstrates the endurance and safety to discharge home with home services and a follow up treatment frequency of 2-3x/wk. Please see assessment section for further patient specific details. If patient discharges prior to next session this note will serve as a discharge summary. Please see below for the latest assessment towards goals. OT Equipment Recommendations  Equipment Needed: Yes  Other: shower chair as pt is at risk of falling in shower environment; use owned RW    Assessment   Performance deficits / Impairments: Decreased functional mobility ; Decreased ADL status; Decreased strength;Decreased safe awareness;Decreased cognition;Decreased endurance;Decreased balance;Decreased posture;Decreased high-level IADLs  After evaluation and treatment, pt found to be presenting with the above mentioned deficits. Pt would benefit from continued skilled occupational therapy to address these deficits, increasing safety and independence with ADL and functional mobility. She is currently C[facility] to [facility] for standing level functional activity with RW. Normally she reports she is independent, but pt is a questionable historian. Will continue to assess for discharge needs.      Treatment Diagnosis: decreased ability to perform ADL 2/2 generalized weakness and peritoneal carcinoma  Prognosis: Fair;Guarded  Decision Making: Medium Complexity  REQUIRES OT FOLLOW UP: Yes  Activity Tolerance  Activity Tolerance: Patient Tolerated treatment well  Safety Devices  Safety Devices in place: Yes  Type of devices: Call light within reach; Chair alarm in place; Left in chair;Nurse notified;Gait belt        Patient Diagnosis(es): The primary encounter diagnosis was Acute renal failure, unspecified acute renal failure type ([address] Diagnoses of Pancytopenia ([address] Hyponatremia, and Generalized weakness were also pertinent to this visit. has a past medical history of Anxiety, Arthritis, Asthma, CAD (coronary artery disease), Cancer of peritoneum ([address] Chronic low back pain, Depression, Embolism ([address] Hx of blood clots, Hyperlipidemia, Hypertension, Postoperative delirium, Pulmonary embolism ([address] Short-term memory loss, Type II or unspecified type diabetes mellitus without mention of complication, not stated as uncontrolled, Wears dentures, and Wears glasses. has a past surgical history that includes Coronary artery bypass graft (2007); Appendectomy; Cataract removal (Bilateral); Dilation and curettage of uterus; Coronary angioplasty with stent (1991); Colonoscopy; Endoscopy, colon, diagnostic; eye surgery; dieter and bso (cervix removed) (05/26/2016); hemicolectomy (05/2016); Hysterectomy; Tunneled venous port placement (Left, 7/27/2016); other surgical history (N/A, 08/17/2018); Breast biopsy (Left, 06/23/2017); joint replacement (Bilateral); Colonoscopy (N/A, 6/6/2019); Colonoscopy (6/6/2019); Cardiac surgery; and laparoscopy (N/A, 7/23/2020).     Treatment Diagnosis: decreased ability to perform ADL 2/2 generalized weakness and peritoneal carcinoma      Restrictions  Position Activity Restriction  Other position/activity restrictions: up with assist    Subjective   General  Chart Reviewed: Yes  Patient assessed for rehabilitation services?: Yes  Family / Caregiver Present: No  Referring Practitioner: Dayne Wright MD  Diagnosis: Generalized weakness, peritoneal carcinoma  Subjective  Subjective: RN cleared pt for tx. Pt supine at session start. Patient Currently in Pain: No    Social/Functional History  Social/Functional History  Lives With: Spouse  Type of Home: House  Home Layout: Multi-level, 1/2 bath on main level, Bed/Bath upstairs  Home Access: Stairs to enter with rails  Entrance Stairs - Number of Steps: 6-7 stairs- Pt is unclear about how many stair their are to enter home. Pt continues to repeat that there are 6-7 stairs to get to second level of home. Bathroom Shower/Tub: Walk-in shower  Bathroom Toilet: Standard  Bathroom Equipment: Grab bars in shower, Hand-held shower  Bathroom Accessibility: Accessible  Home Equipment: Rolling walker, 4 wheeled walker  ADL Assistance: [address] [place] [address] Avenue: [address] [facility] Responsibilities: Yes(shares with )  Ambulation Assistance: Independent(uses walker but not all the time around the home)  Transfer Assistance: Independent  Active : Yes(spouse typically does driving)  Additional Comments: Pt is a questionable historian and no family present. She reports she may be staying at brother's house at d/c. Information above is for her personal home. Pt denies recent falls and also states she does not remember.  Pt reports  can provide 24hr A.       Objective   Vision: Impaired  Vision Exceptions: Wears glasses at all times  Hearing: Within functional limits      Orientation  Overall Orientation Status: Within Functional Limits(grossly)        Balance  Sitting Balance: Stand by assistance(dynamic during toileting)  Standing Balance: Contact guard assistance(with RW)  Functional Mobility  Functional - Mobility Device: Rolling Walker  Activity: To/from bathroom(plus functional household distance in kurtz, roughly about 5 mins)  Assist Level: Contact guard assistance  Toilet Transfers  Toilet - Technique: Ambulating(with RW)  Equipment Used: Standard toilet(with R grab bar)  Toilet Transfer: Contact guard assistance     ADL  Feeding: Stand by assistance(to drink sitting)  Grooming: Stand by assistance(to wash hands standing)  LE Dressing: (Pt refusing)  Toileting: Contact guard assistance(clothing management in standing with RW; set-up for seated hygiene)     Tone RUE  RUE Tone: Normotonic  Tone LUE  LUE Tone: Normotonic  Coordination  Movements Are Fluid And Coordinated: Yes        Bed mobility  Supine to Sit: Stand by assistance  Scooting: Stand by assistance(to EOB)     Transfers  Sit to stand: Contact guard assistance  Stand to sit: Contact guard assistance        Cognition  Overall Cognitive Status: Exceptions  Arousal/Alertness: Appropriate responses to stimuli  Following Commands: Follows one step commands with increased time  Attention Span: Appears intact  Memory: Decreased recall of recent events  Safety Judgement: Decreased awareness of need for assistance;Decreased awareness of need for safety  Problem Solving: Assistance required to implement solutions;Assistance required to generate solutions;Assistance required to identify errors made;Assistance required to correct errors made;Decreased awareness of errors  Insights: Decreased awareness of deficits  Initiation: Requires cues for some  Sequencing: Requires cues for some        Sensation  Overall Sensation Status: WFL(denies numbness/tingling in BLE)                       BUE strength and AROM are [facility] based on functional presentation.     Education: Role of OT, safe t/f training, safe use of DME, awareness of deficits, discharge planning, ADL as therapeutic exercise, importance of OOB, cognitive orientation, DME recs        Plan   Plan  Times per week: 2-5    AM-PAC Score        AM-[facility] Inpatient Daily Activity Raw Score: 20 (09/29/20 1158)  AM-PAC Inpatient ADL T-Scale Score : 42.03 (09/29/20 1158)  ADL Inpatient CMS 0-100% Score: 38.32 (09/29/20 1158)  ADL Inpatient CMS G-Code Modifier : CJ (09/29/20 1158)    Goals  Short term goals  Time Frame for Short term goals: 1 week  Short term goal 1: LB dressing with SBA  Short term goal 2: Toileting with mod I  Short term goal 3: 8 mins functional standing activity with supervision  Short term goal 4: Bathing standing with SBA  Patient Goals   Patient goals : \"I want to go home today. \"       Therapy Time   Individual Concurrent Group Co-treatment   Time In 1022         Time Out 1100         Minutes 38         Timed Code Treatment Minutes: 23 Minutes       If patient is discharged prior to next treatment session, this note will serve as the discharge summary.   Odilia Bolden, OTR/L #[id number]

## 2020-09-29 NOTE — PROGRESS NOTES
Physical Therapy    Facility/Department: [facility]  Initial Assessment/Treatment     NAME: Jolie Novak  : 1940  MRN: [id number]    Date of Service: 2020    Discharge Recommendations:  Jolie Novak scored a 18/24 on the AM-PAC short mobility form. Current research shows that an AM-PAC score of 18 or greater is typically associated with a discharge to the patient's home setting. Based on the patient's AM-PAC score and their current functional mobility deficits, it is recommended that the patient have 2-3 sessions per week of Physical Therapy at d/c to increase the patient's independence. At this time, this patient demonstrates the endurance and safety to discharge home with 24hr and home PT and a follow up treatment frequency of 2-3x/wk. Please see assessment section for further patient specific details. If patient discharges prior to next session this note will serve as a discharge summary. Please see below for the latest assessment towards goals. 24 hour supervision or assist, Home with Home health PT   PT Equipment Recommendations  Equipment Needed: No(Pt owns walker)    Assessment   Body structures, Functions, Activity limitations: Decreased functional mobility ; Decreased balance;Decreased endurance  Assessment: Pt presents with functional mobility below baseline. Pt requires CGA and RW for ambulation and is unsafe to ambulate independently a this time. Pt plans to d/c home with assist from spouse. Pt demonstrates periods of confusion throughout session and may be a poor historian in regards to home set up and prior level of function. Pt would benefit from continued IP therapy to address the deficits listed above. Rec home with 24hr assist and home PT. Will continue to follow.   Treatment Diagnosis: decreased functional mobility  Prognosis: Good  Decision Making: Medium Complexity  PT Education: Goals;PT Role;Plan of Care;General Safety  REQUIRES PT FOLLOW UP: Yes  Activity Tolerance  Activity Tolerance: Pt denies increased fatigue or pain following evaluation. Patient Diagnosis(es): The primary encounter diagnosis was Acute renal failure, unspecified acute renal failure type ([address] Diagnoses of Pancytopenia ([address] Hyponatremia, and Generalized weakness were also pertinent to this visit. has a past medical history of Anxiety, Arthritis, Asthma, CAD (coronary artery disease), Cancer of peritoneum ([address] Chronic low back pain, Depression, Embolism ([address] Hx of blood clots, Hyperlipidemia, Hypertension, Postoperative delirium, Pulmonary embolism ([address] Short-term memory loss, Type II or unspecified type diabetes mellitus without mention of complication, not stated as uncontrolled, Wears dentures, and Wears glasses. has a past surgical history that includes Coronary artery bypass graft (2007); Appendectomy; Cataract removal (Bilateral); Dilation and curettage of uterus; Coronary angioplasty with stent (1991); Colonoscopy; Endoscopy, colon, diagnostic; eye surgery; dieter and bso (cervix removed) (05/26/2016); hemicolectomy (05/2016); Hysterectomy; Tunneled venous port placement (Left, 7/27/2016); other surgical history (N/A, 08/17/2018); Breast biopsy (Left, 06/23/2017); joint replacement (Bilateral); Colonoscopy (N/A, 6/6/2019); Colonoscopy (6/6/2019); Cardiac surgery; and laparoscopy (N/A, 7/23/2020). Restrictions  Position Activity Restriction  Other position/activity restrictions: up with assist  Vision/Hearing  Vision: Impaired  Vision Exceptions: Wears glasses at all times  Hearing: Within functional limits     Subjective  General  Chart Reviewed: Yes  Patient assessed for rehabilitation services?: Yes  Additional Pertinent Hx: Admitted 9/28. 68 y/o female presenting with increasing weakness and now inability to stand and ambulate at home, KLAUDIA, and hyponaturemia. PMH: DM, HTN, HLD, hx of blood clots, cancer, CAD, asthma, arthritis.  Chest XR: Hyperinflation of the lungs suggests underlying emphysema. No acute airspace opacities  Family / Caregiver Present: No  Referring Practitioner: Ephraim Kaur MD  Diagnosis: generalized weakness  Follows Commands: Within Functional Limits  Subjective  Subjective: Pt is supine in bed upon arrival and agreeable to therapy. Pt states she plans on returning to her brothers house in the local area upon discharge. Pt reports her  is available for 24 hr assist and uses a cane for ambulation. Pt demonstrates confusion at time throughout session. Pt presents with BLE swelling stating that it is new, RN aware. Pain Screening  Patient Currently in Pain: No  Vital Signs  Patient Currently in Pain: No       Orientation  Orientation  Orientation Level: Oriented to time;Oriented to person(states she is in a hospital but does not know the name)  Social/Functional History  Social/Functional History  Lives With: Spouse  Type of Home: House  Home Layout: Multi-level, 1/2 bath on main level, Bed/Bath upstairs  Home Access: Stairs to enter with rails  Entrance Stairs - Number of Steps: 6-7 stairs- Pt is unclear about how many stairs there are to enter home. Pt continues to repeat that there are 6-7 stairs to get to second level of home. Bathroom Shower/Tub: Walk-in shower  Bathroom Toilet: Standard  Bathroom Equipment: Grab bars in shower, Hand-held shower  Bathroom Accessibility: Accessible  Home Equipment: Rolling walker, 4 wheeled walker  ADL Assistance: [address] [place] [address] Avenue: [address] [place] [address] Responsibilities: Yes(shares with )  Ambulation Assistance: Independent(uses walker but not all the time around the home)  Transfer Assistance: Independent  Active : Yes(spouse typically does driving)  Additional Comments: Pt is a questionable historian and no family present. She reports she may be staying at brother's house at d/c. Information above is for her personal home. Pt denies recent falls and also states she does not remember. Pt reports  can provide 24hr A.         Objective          AROM RLE (degrees)  RLE AROM: WFL  RLE General AROM: observed during functional mobility  AROM LLE (degrees)  LLE AROM : WFL  LLE General AROM: observed during functional mobility  Strength RLE  R Hip Flexion: 4/5  R Knee Extension: 4+/5  R Ankle Dorsiflexion: 4+/5  Strength LLE  L Hip Flexion: 4+/5  L Knee Extension: 4+/5  L Ankle Dorsiflexion: 5/5     Sensation  Overall Sensation Status: WFL(denies numbness/tingling in BLE)  Bed mobility  Supine to Sit: Stand by assistance(HOB elevated, increased time to complete)  Scooting: Stand by assistance(to EOB)  Transfers  Sit to Stand: Contact guard assistance(to RW, from bed and toilet, cues for hand placement)  Stand to sit: Contact guard assistance  Ambulation  Ambulation?: Yes  Ambulation 1  Device: Rolling Walker  Assistance: Contact guard assistance  Quality of Gait: Pt demonstrates steady gait with narrow EDWINA and decreased B step length. No LOB. Distance: 15 ft (bed->toilet), 130 ft (toilet->hallway->chair)  Stairs/Curb  Stairs?: No(Pt refused stair training this session)     Balance  Comments: Pt requires SBA for sitting balance EOB with occasional CGA to correct backward trunk lean during MMTs. Pt requires SBA for dynamic standing with RW in front of sink to wash hands.         Plan   Plan  Times per week: 2-5  Times per day: Daily  Current Treatment Recommendations: Strengthening, Neuromuscular Re-education, Safety Education & Training, Balance Training, Endurance Training, Patient/Caregiver Education & Training, Functional Mobility Training, Equipment Evaluation, Education, & procurement, Transfer Training, Gait Training, Stair training  Safety Devices  Type of devices: Call light within reach, Chair alarm in place, Gait belt, Left in chair, Nurse notified      AM-PAC Score  AM-PAC Inpatient Mobility Raw Score : 18 (09/29/20 1159)  AM-PAC Inpatient T-Scale Score : 43.63 (09/29/20 1159)  Mobility Inpatient CMS 0-100% Score: 46.58 (09/29/20 1159)  Mobility Inpatient CMS G-Code Modifier : CK (09/29/20 1159)          Goals  Short term goals  Time Frame for Short term goals: discharge  Short term goal 1: Pt to perform bed mobility with supervision and HOB flat in order to increase independence with functional mobility. Short term goal 2: Pt to perform sit-->stand to RW with supervision in order to prepare for ambulation. Short term goal 3: Pt to ambulate 200 ft with RW and supervision in order to increase mobility around the home. Short term goal 4: Pt to navigate 7 stairs with CGA in order to access bedroom/bathroom at home. Patient Goals   Patient goals : to return home/to brother's home       Therapy Time   Individual Concurrent Group Co-treatment   Time In 1021         Time Out 1100         Minutes 39               Timed code treatment minutes: 24    Total treatment time: 44    If patient discharges prior to next session this note will serve as a discharge summary. Please see below for the latest assessment towards goals.    Olivia Barraza, Student PT

## 2020-10-30 ENCOUNTER — HOSPITAL ENCOUNTER (EMERGENCY)
Age: 80
Discharge: HOME | End: 2020-10-31
Attending: EMERGENCY MEDICINE
Payer: MEDICARE

## 2020-10-30 ENCOUNTER — APPOINTMENT (OUTPATIENT)
Dept: GENERAL RADIOLOGY | Age: 80
End: 2020-10-30
Payer: MEDICARE

## 2020-10-30 LAB
ANION GAP SERPL CALCULATED.3IONS-SCNC: 10 MMOL/L (ref 3–16)
BASOPHILS ABSOLUTE: 0 K/UL (ref 0–0.2)
BASOPHILS RELATIVE PERCENT: 0.3 %
BUN BLDV-MCNC: 42 MG/DL (ref 7–20)
CALCIUM SERPL-MCNC: 8.5 MG/DL (ref 8.3–10.6)
CHLORIDE BLD-SCNC: 89 MMOL/L (ref 99–110)
CO2: 29 MMOL/L (ref 21–32)
CREAT SERPL-MCNC: 1.9 MG/DL (ref 0.6–1.2)
EOSINOPHILS ABSOLUTE: 0 K/UL (ref 0–0.6)
EOSINOPHILS RELATIVE PERCENT: 0.4 %
GFR AFRICAN AMERICAN: 31
GFR NON-AFRICAN AMERICAN: 26
GLUCOSE BLD-MCNC: 221 MG/DL (ref 70–99)
HCT VFR BLD CALC: 23.1 % (ref 36–48)
HEMOGLOBIN: 7.7 G/DL (ref 12–16)
LYMPHOCYTES ABSOLUTE: 0.3 K/UL (ref 1–5.1)
LYMPHOCYTES RELATIVE PERCENT: 6.4 %
MCH RBC QN AUTO: 27.9 PG (ref 26–34)
MCHC RBC AUTO-ENTMCNC: 33.4 G/DL (ref 31–36)
MCV RBC AUTO: 83.4 FL (ref 80–100)
MONOCYTES ABSOLUTE: 0.3 K/UL (ref 0–1.3)
MONOCYTES RELATIVE PERCENT: 7.2 %
NEUTROPHILS ABSOLUTE: 4 K/UL (ref 1.7–7.7)
NEUTROPHILS RELATIVE PERCENT: 85.7 %
PDW BLD-RTO: 18.3 % (ref 12.4–15.4)
PLATELET # BLD: 222 K/UL (ref 135–450)
PMV BLD AUTO: 7.5 FL (ref 5–10.5)
POTASSIUM REFLEX MAGNESIUM: 5 MMOL/L (ref 3.5–5.1)
RBC # BLD: 2.78 M/UL (ref 4–5.2)
SODIUM BLD-SCNC: 128 MMOL/L (ref 136–145)
WBC # BLD: 4.7 K/UL (ref 4–11)

## 2020-10-30 PROCEDURE — 80048 BASIC METABOLIC PNL TOTAL CA: CPT

## 2020-10-30 PROCEDURE — 85025 COMPLETE CBC W/AUTO DIFF WBC: CPT

## 2020-10-30 PROCEDURE — 99285 EMERGENCY DEPT VISIT HI MDM: CPT

## 2020-10-30 RX ORDER — 0.9 % SODIUM CHLORIDE 0.9 %
1000 INTRAVENOUS SOLUTION INTRAVENOUS ONCE
Status: COMPLETED | OUTPATIENT
Start: 2020-10-30 | End: 2020-10-31

## 2020-10-31 ENCOUNTER — APPOINTMENT (OUTPATIENT)
Dept: GENERAL RADIOLOGY | Age: 80
End: 2020-10-31
Payer: MEDICARE

## 2020-10-31 VITALS
DIASTOLIC BLOOD PRESSURE: 58 MMHG | OXYGEN SATURATION: 95 % | BODY MASS INDEX: 23.34 KG/M2 | HEIGHT: 68 IN | HEART RATE: 86 BPM | SYSTOLIC BLOOD PRESSURE: 149 MMHG | RESPIRATION RATE: 17 BRPM | TEMPERATURE: 100.1 F | WEIGHT: 154 LBS

## 2020-10-31 PROBLEM — R50.9 FEVER AND CHILLS: Status: ACTIVE | Noted: 2020-10-31

## 2020-10-31 LAB
ALBUMIN SERPL-MCNC: 3.1 G/DL (ref 3.4–5)
ALP BLD-CCNC: 49 U/L (ref 40–129)
ALT SERPL-CCNC: <5 U/L (ref 10–40)
AST SERPL-CCNC: 12 U/L (ref 15–37)
BILIRUB SERPL-MCNC: 0.4 MG/DL (ref 0–1)
BILIRUBIN DIRECT: <0.2 MG/DL (ref 0–0.3)
BILIRUBIN URINE: NEGATIVE
BILIRUBIN, INDIRECT: ABNORMAL MG/DL (ref 0–1)
BLOOD, URINE: ABNORMAL
CLARITY: CLEAR
COLOR: YELLOW
GLUCOSE URINE: NEGATIVE MG/DL
KETONES, URINE: NEGATIVE MG/DL
LACTATE DEHYDROGENASE: 176 U/L (ref 100–190)
LACTIC ACID, SEPSIS: 0.9 MMOL/L (ref 0.4–1.9)
LEUKOCYTE ESTERASE, URINE: NEGATIVE
MAGNESIUM: 1.8 MG/DL (ref 1.8–2.4)
MICROSCOPIC EXAMINATION: YES
NITRITE, URINE: NEGATIVE
PH UA: 7 (ref 5–8)
PROTEIN UA: 100 MG/DL
RBC UA: NORMAL /HPF (ref 0–4)
SPECIFIC GRAVITY UA: 1.02 (ref 1–1.03)
TOTAL PROTEIN: 5.6 G/DL (ref 6.4–8.2)
URIC ACID, SERUM: 7.6 MG/DL (ref 2.6–6)
URINE TYPE: ABNORMAL
UROBILINOGEN, URINE: 0.2 E.U./DL
WBC UA: NORMAL /HPF (ref 0–5)

## 2020-10-31 PROCEDURE — 94664 DEMO&/EVAL PT USE INHALER: CPT

## 2020-10-31 PROCEDURE — 81001 URINALYSIS AUTO W/SCOPE: CPT

## 2020-10-31 PROCEDURE — 87040 BLOOD CULTURE FOR BACTERIA: CPT

## 2020-10-31 PROCEDURE — 83615 LACTATE (LD) (LDH) ENZYME: CPT

## 2020-10-31 PROCEDURE — 96365 THER/PROPH/DIAG IV INF INIT: CPT

## 2020-10-31 PROCEDURE — 84550 ASSAY OF BLOOD/URIC ACID: CPT

## 2020-10-31 PROCEDURE — 80076 HEPATIC FUNCTION PANEL: CPT

## 2020-10-31 PROCEDURE — 83605 ASSAY OF LACTIC ACID: CPT

## 2020-10-31 PROCEDURE — 83735 ASSAY OF MAGNESIUM: CPT

## 2020-10-31 PROCEDURE — 2500000003 HC RX 250 WO HCPCS: Performed by: PHYSICIAN ASSISTANT

## 2020-10-31 PROCEDURE — 96367 TX/PROPH/DG ADDL SEQ IV INF: CPT

## 2020-10-31 PROCEDURE — 2580000003 HC RX 258: Performed by: PHYSICIAN ASSISTANT

## 2020-10-31 PROCEDURE — 87086 URINE CULTURE/COLONY COUNT: CPT

## 2020-10-31 PROCEDURE — 87103 BLOOD FUNGUS CULTURE: CPT

## 2020-10-31 PROCEDURE — 1200000000 HC SEMI PRIVATE

## 2020-10-31 PROCEDURE — 6360000002 HC RX W HCPCS: Performed by: PHYSICIAN ASSISTANT

## 2020-10-31 PROCEDURE — 71045 X-RAY EXAM CHEST 1 VIEW: CPT

## 2020-10-31 RX ORDER — ROSUVASTATIN CALCIUM 20 MG/1
40 TABLET, COATED ORAL EVERY EVENING
Status: DISCONTINUED | OUTPATIENT
Start: 2020-10-31 | End: 2020-10-31 | Stop reason: HOSPADM

## 2020-10-31 RX ORDER — AMLODIPINE BESYLATE 10 MG/1
10 TABLET ORAL DAILY
Status: DISCONTINUED | OUTPATIENT
Start: 2020-10-31 | End: 2020-10-31 | Stop reason: HOSPADM

## 2020-10-31 RX ORDER — ALPRAZOLAM 0.5 MG/1
0.5 TABLET ORAL NIGHTLY PRN
Status: DISCONTINUED | OUTPATIENT
Start: 2020-10-31 | End: 2020-10-31 | Stop reason: HOSPADM

## 2020-10-31 RX ORDER — ACETAMINOPHEN 325 MG/1
650 TABLET ORAL EVERY 6 HOURS PRN
Status: DISCONTINUED | OUTPATIENT
Start: 2020-10-31 | End: 2020-10-31 | Stop reason: HOSPADM

## 2020-10-31 RX ORDER — NICOTINE POLACRILEX 4 MG
15 LOZENGE BUCCAL PRN
Status: DISCONTINUED | OUTPATIENT
Start: 2020-10-31 | End: 2020-10-31 | Stop reason: HOSPADM

## 2020-10-31 RX ORDER — SODIUM CHLORIDE 0.9 % (FLUSH) 0.9 %
10 SYRINGE (ML) INJECTION EVERY 12 HOURS SCHEDULED
Status: DISCONTINUED | OUTPATIENT
Start: 2020-10-31 | End: 2020-10-31 | Stop reason: HOSPADM

## 2020-10-31 RX ORDER — HYDRALAZINE HYDROCHLORIDE 50 MG/1
50 TABLET, FILM COATED ORAL EVERY 8 HOURS SCHEDULED
Status: DISCONTINUED | OUTPATIENT
Start: 2020-10-31 | End: 2020-10-31 | Stop reason: HOSPADM

## 2020-10-31 RX ORDER — INSULIN LISPRO 100 [IU]/ML
0-3 INJECTION, SOLUTION INTRAVENOUS; SUBCUTANEOUS NIGHTLY
Status: DISCONTINUED | OUTPATIENT
Start: 2020-10-31 | End: 2020-10-31 | Stop reason: HOSPADM

## 2020-10-31 RX ORDER — PREGABALIN 75 MG/1
75 CAPSULE ORAL 2 TIMES DAILY
Status: DISCONTINUED | OUTPATIENT
Start: 2020-10-31 | End: 2020-10-31 | Stop reason: HOSPADM

## 2020-10-31 RX ORDER — VENLAFAXINE HYDROCHLORIDE 75 MG/1
75 CAPSULE, EXTENDED RELEASE ORAL DAILY
Status: DISCONTINUED | OUTPATIENT
Start: 2020-10-31 | End: 2020-10-31 | Stop reason: HOSPADM

## 2020-10-31 RX ORDER — LEVOFLOXACIN 750 MG/1
750 TABLET ORAL DAILY
Qty: 5 TABLET | Refills: 0 | Status: SHIPPED | OUTPATIENT
Start: 2020-10-31 | End: 2020-11-10

## 2020-10-31 RX ORDER — ONDANSETRON 2 MG/ML
4 INJECTION INTRAMUSCULAR; INTRAVENOUS EVERY 6 HOURS PRN
Status: DISCONTINUED | OUTPATIENT
Start: 2020-10-31 | End: 2020-10-31 | Stop reason: HOSPADM

## 2020-10-31 RX ORDER — INSULIN LISPRO 100 [IU]/ML
0-6 INJECTION, SOLUTION INTRAVENOUS; SUBCUTANEOUS
Status: DISCONTINUED | OUTPATIENT
Start: 2020-10-31 | End: 2020-10-31 | Stop reason: HOSPADM

## 2020-10-31 RX ORDER — SODIUM CHLORIDE 0.9 % (FLUSH) 0.9 %
10 SYRINGE (ML) INJECTION PRN
Status: DISCONTINUED | OUTPATIENT
Start: 2020-10-31 | End: 2020-10-31 | Stop reason: HOSPADM

## 2020-10-31 RX ORDER — OXYCODONE AND ACETAMINOPHEN 10; 325 MG/1; MG/1
1 TABLET ORAL 4 TIMES DAILY PRN
Status: DISCONTINUED | OUTPATIENT
Start: 2020-10-31 | End: 2020-10-31 | Stop reason: HOSPADM

## 2020-10-31 RX ORDER — ACETAMINOPHEN 650 MG/1
650 SUPPOSITORY RECTAL EVERY 6 HOURS PRN
Status: DISCONTINUED | OUTPATIENT
Start: 2020-10-31 | End: 2020-10-31 | Stop reason: HOSPADM

## 2020-10-31 RX ORDER — SODIUM CHLORIDE 9 MG/ML
INJECTION, SOLUTION INTRAVENOUS CONTINUOUS
Status: DISCONTINUED | OUTPATIENT
Start: 2020-10-31 | End: 2020-10-31 | Stop reason: HOSPADM

## 2020-10-31 RX ORDER — METOPROLOL SUCCINATE 100 MG/1
100 TABLET, EXTENDED RELEASE ORAL DAILY
Status: DISCONTINUED | OUTPATIENT
Start: 2020-10-31 | End: 2020-10-31 | Stop reason: HOSPADM

## 2020-10-31 RX ORDER — DEXTROSE MONOHYDRATE 25 G/50ML
12.5 INJECTION, SOLUTION INTRAVENOUS PRN
Status: DISCONTINUED | OUTPATIENT
Start: 2020-10-31 | End: 2020-10-31 | Stop reason: HOSPADM

## 2020-10-31 RX ORDER — ALBUTEROL SULFATE 90 UG/1
2 AEROSOL, METERED RESPIRATORY (INHALATION) EVERY 4 HOURS PRN
Status: DISCONTINUED | OUTPATIENT
Start: 2020-10-31 | End: 2020-10-31 | Stop reason: HOSPADM

## 2020-10-31 RX ORDER — PROMETHAZINE HYDROCHLORIDE 25 MG/1
12.5 TABLET ORAL EVERY 6 HOURS PRN
Status: DISCONTINUED | OUTPATIENT
Start: 2020-10-31 | End: 2020-10-31 | Stop reason: HOSPADM

## 2020-10-31 RX ORDER — DEXTROSE MONOHYDRATE 50 MG/ML
100 INJECTION, SOLUTION INTRAVENOUS PRN
Status: DISCONTINUED | OUTPATIENT
Start: 2020-10-31 | End: 2020-10-31 | Stop reason: HOSPADM

## 2020-10-31 RX ORDER — POLYETHYLENE GLYCOL 3350 17 G/17G
17 POWDER, FOR SOLUTION ORAL DAILY PRN
Status: DISCONTINUED | OUTPATIENT
Start: 2020-10-31 | End: 2020-10-31 | Stop reason: HOSPADM

## 2020-10-31 RX ORDER — INSULIN GLARGINE 100 [IU]/ML
5 INJECTION, SOLUTION SUBCUTANEOUS NIGHTLY
Status: DISCONTINUED | OUTPATIENT
Start: 2020-10-31 | End: 2020-10-31 | Stop reason: HOSPADM

## 2020-10-31 RX ADMIN — VANCOMYCIN HYDROCHLORIDE 1000 MG: 10 INJECTION, POWDER, LYOPHILIZED, FOR SOLUTION INTRAVENOUS at 04:16

## 2020-10-31 RX ADMIN — AZTREONAM 1 G: 1 INJECTION, POWDER, LYOPHILIZED, FOR SOLUTION INTRAMUSCULAR; INTRAVENOUS at 01:04

## 2020-10-31 RX ADMIN — SODIUM CHLORIDE 1000 ML: 9 INJECTION, SOLUTION INTRAVENOUS at 01:04

## 2020-10-31 NOTE — ED PROVIDER NOTES
several hours in the emergency department without fevers. Other than confusion, her exam is baseline. I would send her home on antibiotics but I have nothing that I am treating at this time. I do feel patient may have coronavirus but she refused coronavirus swab. Given questionable opacities on chest x-ray, will send the patient home on community-acquired pneumonia antibiotics. Followed up with Mak Rowan on 10/31/2020 at 6:59 AM. Patient left the ED with a disposition of AMA on . Patient cited personal obligations as reason. Advised patient to follow up with a primary care physician or return to the Emergency Department if symptoms worsen. Bailey Romano     CRITICAL CARE TIME    Upon my evaluation, this patient had a critical illness or injury that acutely impaired one or more vital organ systems such that there was a high probability of imminent or life threatening deterioration without intervention. In this patient that illness with a high probability of imminent or life threatening deterioration was altered mental status, KLAUDIA, fever while undergoing chemotherapy, which required my direct attention, intervention, and personal management. I have personally provided 95 minutes of critical care time exclusive of separately billed procedures and treating other patients. Critical care was necessary to treat or prevent imminent or life threatening deterioration of the following condition(s): altered mental status, KLAUDIA. Critical care time was spent personally by me on the following activities: included obtaining a history; examining the patient; pulse oximetry; ordering and review of studies including CXR, blood gases; arranging urgent treatment with development of a management plan; evaluation of patient's response to treatment; frequent reassessment; and, discussions with other providers including consultants. Of note the thing I spent the most time on was trying to obtain a rapid covid swab. Patient was confused, did not want to stay if her  had to leave. I spent at least 65 minutes in the room discussing with patient and her  about if she had the capacity to sign out [address] and what the risks were of leaving. I also spent at least 20 minutes on the phone with various nurses and administration attempting to get a rapid Covid swab to help alleviate the patient being left alone in the hospital bed for 2 to 3 days while awaiting Covid test results.        Regulo Sherwood MD  10/31/20 [address] [place] [address] Asuncion Tello MD  10/31/20 8100       Regulo Sherwood MD  10/31/20 9105

## 2020-10-31 NOTE — ED NOTES
Mint Green, Lavender, Blue, Yellow/Orange blood tubes collected and sent to lab.         Dmaon Cassidy RN  10/30/20 1191

## 2020-10-31 NOTE — ED NOTES
Patient's  argumentative regarding visitor policy for patients who present with fever and who need to be COVID tested. This RN explained the visitor policy several times to the . He continued to tell RN the patient no longer has a fever and she does not have COVID. This RN explained the patient presented with a fever and came to the hospital specifically for a fever and she is being COVID tested, which per policy, restricts visitors.            Julio Liriano RN  10/31/20 3761

## 2020-10-31 NOTE — PROGRESS NOTES
RESPIRATORY THERAPY ASSESSMENT    Name:  [address] Record Number:  [id number]  Age: 67 y.o. Gender: female  : 1940  Today's Date:  10/31/2020  [facility]    Assessment     Is the patient being admitted for a COPD or Asthma exacerbation? No   (If yes the patient will be seen every 4 hours for the first 24 hours and then reassessed)    Patient Admission Diagnosis      Allergies  Allergies   Allergen Reactions    Cephalexin Rash    Pcn [Penicillins] Rash    Cephalosporins      UNABLE TO RECALL REACTION    Cephalexin Rash     Pulmonary History:Asthma  Home Oxygen Therapy:  room air  Home Respiratory Therapy:Albuterol   Current Respiratory Therapy:  albuterol          Respiratory Severity Index(RSI)   Patients with orders for inhalation medications, oxygen, or any therapeutic treatment modality will be placed on Respiratory Protocol. They will be assessed with the first treatment and at least every 72 hours thereafter. The following severity scale will be used to determine frequency of treatment intervention.     Smoking History: Pulmonary Disease or Smoking History, Greater than 15 pack year = 2    Social History  Social History     Tobacco Use    Smoking status: Former Smoker     Packs/day: 2.00     Years: 10.00     Pack years: 20.00     Last attempt to quit: 2000     Years since quittin.2    Smokeless tobacco: Never Used   Substance Use Topics    Alcohol use: Not Currently     Alcohol/week: 1.0 standard drinks     Types: 1 Glasses of wine per week    Drug use: Never       Recent Surgical History: None = 0  Past Surgical History  Past Surgical History:   Procedure Laterality Date    APPENDECTOMY      BREAST BIOPSY Left 2017    Fibroadenomatous type fibrocystic change    CARDIAC SURGERY      quadruple bypass    CATARACT REMOVAL Bilateral     COLONOSCOPY      COLONOSCOPY N/A 2019    COLONOSCOPY WITH BIOPSY performed by Herb Sanchez MD at [address] [place] [address] ENDOSCOPY    COLONOSCOPY  6/6/2019    COLONOSCOPY CONTROL HEMORRHAGE performed by Nathalie Fuentes MD at [facility] CORONARY ARTERY BYPASS GRAFT  2007    DILATION AND CURETTAGE OF UTERUS      Miscarriage    ENDOSCOPY, COLON, DIAGNOSTIC      EYE SURGERY      HEMICOLECTOMY  05/2016    HYSTERECTOMY      JOINT REPLACEMENT Bilateral     bilat knee    LAPAROSCOPY N/A 7/23/2020    ROBOTIC LAPAROSCOPIC CONVERTED TO OPEN EXTENSIVE LYSIS OF ADHESIONS, EXCISION OF PERITONEAL NODULES, TUMOR DEBULKING, SMALL BOWEL RESECTION WITH ANASTAMOSIS, FLEXIBLE SIGMOIDOSCOPY performed by Chris Wu MD at [facility] 08/17/2018    SPINAL CORD STIMULATOR LEAD REVISION AND GENERATOR    FRACISCO AND BSO  05/26/2016    TUNNELED VENOUS PORT PLACEMENT Left 7/27/2016    PORT-A-CATH PLACEMENT,LEFT        Level of Consciousness: Alert, Oriented, and Cooperative = 0    Level of Activity: Walking with assistance = 1    Respiratory Pattern: Regular Pattern; RR 8-20 = 0    Breath Sounds: Clear = 0    Sputum   ,  ,    Cough: Strong, spontaneous, non-productive = 0    Vital Signs   BP (!) 149/58   Pulse 86   Temp 100.1 °F (37.8 °C) (Oral)   Resp 17   Ht 5' 7.99\" (1.727 m)   Wt 154 lb (69.9 kg)   SpO2 95%   BMI 23.42 kg/m²   SPO2 (COPD values may differ): Greater than or equal to 92% on room air = 0    Peak Flow (asthma only): not applicable = 0    RSI: 5-6 = Q4hr PRN (every four hours as needed) for dyspnea        Plan       Goals: medication delivery, mobilize retained secretions, volume expansion and improve oxygenation    Patient/caregiver was educated on the proper method of use for Respiratory Care Devices:  Yes       Response to education:  Excellent     Is patient being placed on Home Treatment Regimen? Yes     Does the patient have everything they need prior to discharge?   NA     Comments: pt in no resp distress, chart reviewed    Plan of Care: albuterol prn    Electronically signed by Eduardo Delong RCP on 10/31/2020 at 5:38 AM    Respiratory Protocol Guidelines     1. Assessment and treatment by Respiratory Therapy will be initiated for medication and therapeutic interventions upon initiation of aerosolized medication. 2. Physician will be contacted for respiratory rate (RR) greater than 35 breaths per minute. Therapy will be held for heart rate (HR) greater than 140 beats per minute, pending direction from physician. 3. Bronchodilators will be administered via Metered Dose Inhaler (MDI) with spacer when the following criteria are met:  a. Alert and cooperative     b. HR < 140 bpm  c. RR < 30 bpm                d. Can demonstrate a 2-3 second inspiratory hold  4. Bronchodilators will be administered via Hand Held Nebulizer [facility]) to patients when ANY of the following criteria are met  a. Incognizant or uncooperative          b. Patients treated with HHN at Home        c. Unable to demonstrate proper use of MDI with spacer     d. RR > 30 bpm   5. Bronchodilators will be delivered via Metered Dose Inhaler (MDI), HHN, Aerogen to intubated patients on mechanical ventilation. 6. Inhalation medication orders will be delivered and/or substituted as outlined below. Aerosolized Medications Ordering and Administration Guidelines:    1. All Medications will be ordered by a physician, and their frequency and/or modality will be adjusted as defined by the patients Respiratory Severity Index (RSI) score. 2. If the patient does not have documented COPD, consider discontinuing anticholinergics when RSI is less than 9.  3. If the bronchospasm worsens (increased RSI), then the bronchodilator frequency can be increased to a maximum of every 4 hours. If greater than every 4 hours is required, the physician will be contacted.   4. If the bronchospasm improves, the frequency of the bronchodilator can be decreased, based on the patient's RSI, but not less than home treatment regimen frequency. 5. Bronchodilator(s) will be discontinued if patient has a RSI less than 9 and has received no scheduled or as needed treatment for 72  Hrs. Patients Ordered on a Mucolytic Agent:    1. Must always be administered with a bronchodilator. 2. Discontinue if patient experiences worsened bronchospasm, or secretions have lessened to the point that the patient is able to clear them with a cough. Anti-inflammatory and Combination Medications:    1. If the patient lacks prior history of lung disease, is not using inhaled anti-inflammatory medication at home, and lacks wheezing by examination or by history for at least 24 hours, contact physician for possible discontinuation.

## 2020-10-31 NOTE — ED NOTES
This RN approached patient to place her in a hospital bed and to perform COVID swab. Patient states she is refusing the COVID swab.  still arguing about whether or not patient has COVID and about visitation. Patient states \"you can take those rules and shove it\". Patient refusing COVID test. Patient states she is leaving.  wants to speak to MD regarding signing out AMA.       Nicole Neely RN  10/31/20 0648

## 2020-10-31 NOTE — ED PROVIDER NOTES
[facility]  EMERGENCY DEPARTMENT ENCOUNTER          PHYSICIAN ASSISTANT NOTE       Date of evaluation: 10/30/2020    Chief Complaint     Fever      History of Present Illness     Zack Samuel is a 67 y.o. female with PMH of diabetes, hypertension, hyperlipidemia, CAD, PE on Eliquis, ovarian cancer with generalized peritoneal cacinomatosis who presents with Fever. Patient is pleasantly confused at baseline and unable to contribute to history. Per patient's , patient was admitted approximately 1 month ago for acute renal failure and hyponatremia. Oncology at that time did not feel that systemic chemotherapy was appropriate and referred patient to hospice. However, patient's  states that he was not ready to place her in hospice and they sought evaluation by a second oncologist through [address] [place] [address] who felt that her cancer could be treated. She is now following with a Dr. Tate Coyne at [address] [place] [address] and she started chemotherapy (?gemzar) yesterday. Per , she was doing well after chemo and ate normally this morning. He took her to get a blood transfusion this afternoon but noted afterwards that she became increasingly weak. States that she fell in the bedroom. He noted at that time that she was warm and took her temperature and noted it to be 100.6. He then rechecked it shortly after and it was 101.5. He called oncology and was referred to the emergency department. Review of Systems     Review of Systems   Unable to perform ROS: Dementia   Constitutional: Positive for chills and fever.        Past Medical, Surgical, Family, and Social History     She has a past medical history of Anxiety, Arthritis, Asthma, CAD (coronary artery disease), Cancer of peritoneum ([address] Chronic low back pain, Depression, Embolism ([address] Hx of blood clots, Hyperlipidemia, Hypertension, Postoperative delirium, Pulmonary embolism ([address] Short-term memory loss, Type II or unspecified type diabetes mellitus without mention of complication, not stated as uncontrolled, Wears dentures, and Wears glasses. She has a past surgical history that includes Coronary artery bypass graft (2007); Appendectomy; Cataract removal (Bilateral); Dilation and curettage of uterus; Coronary angioplasty with stent (1991); Colonoscopy; Endoscopy, colon, diagnostic; eye surgery; dieter and bso (cervix removed) (05/26/2016); hemicolectomy (05/2016); Hysterectomy; Tunneled venous port placement (Left, 7/27/2016); other surgical history (N/A, 08/17/2018); Breast biopsy (Left, 06/23/2017); joint replacement (Bilateral); Colonoscopy (N/A, 6/6/2019); Colonoscopy (6/6/2019); Cardiac surgery; and laparoscopy (N/A, 7/23/2020). Her family history includes Diabetes in her father and maternal uncle; Heart Disease in her maternal grandmother; High Blood Pressure in her mother. She reports that she quit smoking about 20 years ago. She has a 20.00 pack-year smoking history. She has never used smokeless tobacco. She reports previous alcohol use of about 1.0 standard drinks of alcohol per week. She reports that she does not use drugs. Medications     Previous Medications    ALBUTEROL SULFATE HFA (VENTOLIN HFA) 108 (90 BASE) MCG/ACT INHALER    Inhale 2 puffs into the lungs every 4 hours as needed for Wheezing    ALPRAZOLAM (XANAX) 0.5 MG TABLET    Take 0.5 mg by mouth nightly as needed for Sleep. AMLODIPINE (NORVASC) 10 MG TABLET    Take 1 tablet by mouth daily    APIXABAN (ELIQUIS) 5 MG TABS TABLET    Take by mouth 2 times daily    FLUOCINONIDE (LIDEX) 0.05 % CREAM    Apply topically 2 times daily as needed Apply topically 2 times daily.     HYDRALAZINE (APRESOLINE) 50 MG TABLET    Take 1 tablet by mouth every 8 hours    INSULIN GLARGINE (LANTUS) 100 UNIT/ML INJECTION VIAL    Inject 5 Units into the skin nightly    INSULIN LISPRO (HUMALOG) 100 UNIT/ML INJECTION VIAL    Inject 0-12 Units into the skin 3 times daily (with meals)    METOPROLOL SUCCINATE (TOPROL XL) 100 MG EXTENDED RELEASE TABLET    Take 1 tablet by mouth daily    OXYCODONE-ACETAMINOPHEN (PERCOCET)  MG PER TABLET    Take 1 tablet by mouth 4 times daily as needed for Pain. PREGABALIN (LYRICA) 75 MG CAPSULE    Take 1 capsule by mouth 2 times daily for 120 days. ROPINIROLE (REQUIP) 2 MG TABLET    Take 2 mg by mouth 3 times daily as needed (RLS)    ROSUVASTATIN (CRESTOR) 40 MG TABLET    Take 40 mg by mouth every evening. VENLAFAXINE (EFFEXOR XR) 75 MG EXTENDED RELEASE CAPSULE    Take 75 mg by mouth daily        Allergies     She is allergic to cephalexin; pcn [penicillins]; cephalosporins; and cephalexin. Physical Exam     INITIAL VITALS: BP: (!) 153/53,Temp: 100.1 °F (37.8 °C), Pulse: 98, Resp: 16, SpO2: 95 %   Physical Exam  Vitals signs and nursing note reviewed. Constitutional:       General: She is not in acute distress. HENT:      Head: Normocephalic and atraumatic. Mouth/Throat:      Mouth: Mucous membranes are moist.      Pharynx: Oropharynx is clear. Eyes:      Extraocular Movements: Extraocular movements intact. Conjunctiva/sclera: Conjunctivae normal.   Neck:      Musculoskeletal: Neck supple. Cardiovascular:      Rate and Rhythm: Normal rate and regular rhythm. Pulmonary:      Effort: Pulmonary effort is normal. No respiratory distress. Breath sounds: Normal breath sounds. No wheezing, rhonchi or rales. Abdominal:      General: Bowel sounds are normal. There is no distension. Palpations: Abdomen is soft. Tenderness: There is no abdominal tenderness. There is no guarding or rebound. Musculoskeletal:         General: No deformity. Right lower leg: Edema present. Left lower leg: Edema present. Skin:     General: Skin is warm and dry. Neurological:      Mental Status: She is alert. Comments: Oriented to self. Able to follow commands and answer questions.    Psychiatric:         Mood and Affect: Mood normal. Behavior: Behavior normal.         Diagnostic Results     RADIOLOGY:  XR CHEST PORTABLE    (Results Pending)       LABS:   Results for orders placed or performed during the hospital encounter of 10/30/20   CBC auto differential   Result Value Ref Range    WBC 4.7 4.0 - 11.0 K/uL    RBC 2.78 (L) 4.00 - 5.20 M/uL    Hemoglobin 7.7 (L) 12.0 - 16.0 g/dL    Hematocrit 23.1 (L) 36.0 - 48.0 %    MCV 83.4 80.0 - 100.0 fL    MCH 27.9 26.0 - 34.0 pg    MCHC 33.4 31.0 - 36.0 g/dL    RDW 18.3 (H) 12.4 - 15.4 %    Platelets 611 987 - 733 K/uL    MPV 7.5 5.0 - 10.5 fL    Neutrophils % 85.7 %    Lymphocytes % 6.4 %    Monocytes % 7.2 %    Eosinophils % 0.4 %    Basophils % 0.3 %    Neutrophils Absolute 4.0 1.7 - 7.7 K/uL    Lymphocytes Absolute 0.3 (L) 1.0 - 5.1 K/uL    Monocytes Absolute 0.3 0.0 - 1.3 K/uL    Eosinophils Absolute 0.0 0.0 - 0.6 K/uL    Basophils Absolute 0.0 0.0 - 0.2 K/uL   Basic Metabolic Panel w/ Reflex to MG   Result Value Ref Range    Sodium 128 (L) 136 - 145 mmol/L    Potassium reflex Magnesium 5.0 3.5 - 5.1 mmol/L    Chloride 89 (L) 99 - 110 mmol/L    CO2 29 21 - 32 mmol/L    Anion Gap 10 3 - 16    Glucose 221 (H) 70 - 99 mg/dL    BUN 42 (H) 7 - 20 mg/dL    CREATININE 1.9 (H) 0.6 - 1.2 mg/dL    GFR Non-African American 26 (A) >60    GFR  31 (A) >60    Calcium 8.5 8.3 - 10.6 mg/dL   Urinalysis   Result Value Ref Range    Color, UA Yellow Straw/Yellow    Clarity, UA Clear Clear    Glucose, Ur Negative Negative mg/dL    Bilirubin Urine Negative Negative    Ketones, Urine Negative Negative mg/dL    Specific Gravity, UA 1.020 1.005 - 1.030    Blood, Urine TRACE-LYSED (A) Negative    pH, UA 7.0 5.0 - 8.0    Protein,  (A) Negative mg/dL    Urobilinogen, Urine 0.2 <2.0 E.U./dL    Nitrite, Urine Negative Negative    Leukocyte Esterase, Urine Negative Negative    Microscopic Examination YES     Urine Type Voided        RECENT VITALS:  BP: (!) 153/53, Temp: 100.1 °F (37.8 °C), Pulse: 98,Resp: 16, [facility].    This patient was also evaluated by the attending physician. All care plans were discussed and agreed upon. Clinical Impression     1.  Fever, unspecified fever cause           Conchis Rey PA-C  10/31/20 0102

## 2020-10-31 NOTE — ED NOTES
Collected first set of blood cultures and fungal cultures from ED placed angiocath. Collected second set of blood cultures and fungal cultures from straight stick in right hand.  Collected Shazia Gracia and Glenda Casiano on ice blood tubes to lab     JASKARAN Matos  10/31/20 1066

## 2020-10-31 NOTE — ED TRIAGE NOTES
Patient presents to ED with complaints of fever. Patient unsure why she is here other than her  took her temperature and it was high. Patient does not know how high her temp was. Patient has no complaints. Patient placed on cardiac monitor.

## 2020-11-01 LAB — URINE CULTURE, ROUTINE: NORMAL

## 2020-11-04 LAB
BLOOD CULTURE, ROUTINE: NORMAL
CULTURE, BLOOD 2: NORMAL

## 2020-11-30 LAB
CULTURE, FUNGUS BLOOD: NORMAL
CULTURE, FUNGUS BLOOD: NORMAL

## 2023-04-26 NOTE — PLAN OF CARE
- consolidation left base  - resume antibiotics for 7d total course; had already received vanc/zosyn at admission for LE cellulitis     Problem: Pain:  Goal: Pain level will decrease  Description: Pain level will decrease  Outcome: Ongoing  Note: Educated patient on pain management. Will assess patients pain level per unit protocol, and provide pain management measures as needed. Electronically signed by Rosalva Beyer RN on 7/30/2020 at 3:29 PM      Problem: Falls - Risk of:  Goal: Will remain free from falls  Description: Will remain free from falls  Outcome: Ongoing  Note: Fall risk assessment completed. Fall precautions in place. Call light within reach. Pt educated on calling for assistance before getting up. Walkway free of clutter. Will continue to monitor. Problem: Skin Integrity:  Goal: Will show no infection signs and symptoms  Description: Will show no infection signs and symptoms  Outcome: Ongoing  Note: Will monitor skin and mucous membranes. Will turn patient every 2 hours, monitor for friction and sheering, and change dressings as needed. Will preform skin assessment every shift.    Electronically signed by Rosalva Beyer RN on 7/30/2020 at 3:29 PM

## 2023-05-12 NOTE — CARE COORDINATION
[facility]  Hyperglycemia Event      NAME: Kristy Marcum RECORD NUMBER:  [id number]  AGE: 67 y.o. GENDER: female  : 1940  EPISODE DATE:  2020     Data     Recent Labs     20  0781 20  1132 20  1618 20  2119 20  0617 20  1132   POCGLU 286* 283* 204* 272* 265* 301*       Medications  Scheduled Medications:   fat emulsion  250 mL Intravenous Once per day on Mon Thu    losartan  100 mg Oral Daily    insulin lispro  0-18 Units Subcutaneous TID WC    insulin lispro  0-9 Units Subcutaneous Nightly    insulin glargine  10 Units Subcutaneous Nightly    insulin lispro  0.05 Units/kg Subcutaneous TID WC    ALPRAZolam  0.5 mg Oral Nightly    amLODIPine  10 mg Oral Nightly    metoprolol succinate  100 mg Oral Daily    pregabalin  75 mg Oral BID    rOPINIRole  2 mg Oral Nightly    rosuvastatin  40 mg Oral QPM    venlafaxine  75 mg Oral Daily    sodium chloride flush  10 mL Intravenous 2 times per day    acetaminophen  500 mg Oral Q6H    [Held by provider] enoxaparin  40 mg Subcutaneous Daily       Diet  Current diet/supplement order: DIET CLEAR LIQUID; Carb Control: 4 carb choices (60 gms)/meal  Dietary Nutrition Supplements: Clear Liquid Oral Supplement  PN-Adult Premix 5/20 - Central  PN-Adult Premix 5/20 - Standard Electrolytes - Central Line     Recorded PO: PO Meals Eaten (%): 1 - 25% last meal in flowsheets      Action      Physician Notified of event: Yes   Eliseo Fisher MD    Total dose of insulin yesterday = 32 units. Consider increasing Lantus if TPN is continued in addition to oral intake.       Responce     Medication plan updated: Yes      Electronically signed by Rosendo Walker RN on 2020 at 3:30 PM Plan: Discussed treatment with PO Lamisil. Detail Level: Zone Render In Strict Bullet Format?: Yes

## (undated) DEVICE — MERCY HEALTH WEST TURNOVER: Brand: MEDLINE INDUSTRIES, INC.

## (undated) DEVICE — DRAPE LAP 104X35X124IN BLU CTRL + FAB REINF ABD FEN

## (undated) DEVICE — AGENT HEMSTAT 3GM OXIDIZED REGENERATED CELOS ABSRB FOR CONT (ORDER MULTIPLES OF 5EA)

## (undated) DEVICE — SUTURE VCRL SZ 0 L27IN ABSRB UD L26MM CT-2 1/2 CIR J270H

## (undated) DEVICE — SYRINGE IRRIG 60ML SFT PLIABLE BLB EZ TO GRP 1 HND USE W/

## (undated) DEVICE — COVER,MAYO STAND,STERILE: Brand: MEDLINE

## (undated) DEVICE — RELOAD STPL L75MM OPN H3.8MM CLS 1.5MM WIRE DIA0.2MM REG

## (undated) DEVICE — SYRINGE MED 10ML TRNSLUC BRL PLUNG BLK MRK POLYPR CTRL

## (undated) DEVICE — CHLORAPREP 26ML ORANGE

## (undated) DEVICE — CANISTER, RIGID, 1200CC: Brand: MEDLINE INDUSTRIES, INC.

## (undated) DEVICE — FORCEPS BX L240CM WRK CHN 2.8MM STD CAP W/ NDL MIC MESH

## (undated) DEVICE — SET VLV 3 PC AWS DISPOSABLE GRDIAN SCOPEVALET

## (undated) DEVICE — PAD,NON-ADHERENT,3X8,STERILE,LF,1/PK: Brand: MEDLINE

## (undated) DEVICE — DRAPE,UNDERBUTTOCKS,PCH,STERILE: Brand: MEDLINE

## (undated) DEVICE — INVIEW CLEAR LEGGINGS: Brand: CONVERTORS

## (undated) DEVICE — SPECIMEN TRAP: Brand: ARGYLE

## (undated) DEVICE — AIRSEAL 8 MM ACCESS PORT AND LOW PROFILE OBTURATOR WITH BLADELESS OPTICAL TIP, 120 MM LENGTH: Brand: AIRSEAL

## (undated) DEVICE — TOTAL TRAY, 16FR 10ML SIL FOLEY, URN: Brand: MEDLINE

## (undated) DEVICE — COVER LT HNDL BLU PLAS

## (undated) DEVICE — GOWN SIRUS NONREIN XL W/TWL: Brand: MEDLINE INDUSTRIES, INC.

## (undated) DEVICE — SUTURE SZ 0 27IN 5/8 CIR UR-6  TAPER PT VIOLET ABSRB VICRYL J603H

## (undated) DEVICE — GLOVE SURG SZ 65 THK91MIL LTX FREE SYN POLYISOPRENE

## (undated) DEVICE — SOLUTION IV IRRIG WATER 500ML POUR BRL ST 2F7113

## (undated) DEVICE — PROCEDURE KIT ENDOSCP CUST

## (undated) DEVICE — SUTURE VCRL SZ 3-0 L27IN ABSRB UD L26MM SH 1/2 CIR J416H

## (undated) DEVICE — Z DISCONTINUED USE 2271023 SYRINGE IRRIGATION TOOM 70 CC CATH LUER ADPT TIP PLAS STRL

## (undated) DEVICE — Z DUP USE 2431569 PATIENT POSITIONING KIT SURGYPAD

## (undated) DEVICE — INTENDED FOR TISSUE SEPARATION, AND OTHER PROCEDURES THAT REQUIRE A SHARP SURGICAL BLADE TO PUNCTURE OR CUT.: Brand: BARD-PARKER ® STAINLESS STEEL BLADES

## (undated) DEVICE — Device

## (undated) DEVICE — STAPLER INT L75MM CUT LN L73MM STPL LN L77MM BLU B FRM 8

## (undated) DEVICE — BW-412T DISP COMBO CLEANING BRUSH: Brand: SINGLE USE COMBINATION CLEANING BRUSH

## (undated) DEVICE — TOWEL,OR,DSP,ST,BLUE,STD,4/PK,20PK/CS: Brand: MEDLINE

## (undated) DEVICE — PAD SANITARY MTRN TAB BELT WRP NS 11IN

## (undated) DEVICE — SUTURE VCRL SZ 3-0 L18IN ABSRB UD L26MM SH 1/2 CIR J864D

## (undated) DEVICE — ARM DRAPE

## (undated) DEVICE — SOLUTION PREP POVIDONE IOD FOR SKIN MUCOUS MEM PRIOR TO

## (undated) DEVICE — LAPAROSCOPY PK

## (undated) DEVICE — GLOVE,SURG,SENSICARE SLT,LF,PF,6: Brand: MEDLINE

## (undated) DEVICE — Z DUP USE 2257490 ADHESIVE SKIN CLSRE 036ML TPCL 2CTL CNCRLTE HIGH VSCSTY DRMB

## (undated) DEVICE — TRI-LUMEN FILTERED TUBE SET WITH ACTIVATED CHARCOAL FILTER: Brand: AIRSEAL

## (undated) DEVICE — CANNULA SEAL

## (undated) DEVICE — SUTURE PDS II SZ 0 L60IN ABSRB VLT L48MM CTX 1/2 CIR Z990G

## (undated) DEVICE — Z DISCONTINUED BY MEDLINE USE 2711682 TRAY SKIN PREP DRY W/ PREM GLV

## (undated) DEVICE — INSUFFLATION NEEDLE TO ESTABLISH PNEUMOPERITONEUM.: Brand: INSUFFLATION NEEDLE

## (undated) DEVICE — SPONGE LAP W18XL18IN WHT COT 4 PLY FLD STRUNG RADPQ DISP ST

## (undated) DEVICE — SUTURE MCRYL SZ 4-0 L18IN ABSRB UD L19MM PS-2 3/8 CIR PRIM Y496G

## (undated) DEVICE — BLADELESS OBTURATOR: Brand: WECK VISTA

## (undated) DEVICE — SOLUTION SCRB 4OZ 10% POVIDONE IOD ANTIMIC BTL

## (undated) DEVICE — PMI DISPOSABLE PUNCTURE CLOSURE DEVICE / SUTURE GRASPER: Brand: PMI

## (undated) DEVICE — TRAP,MUCUS SPECIMEN, 80CC: Brand: MEDLINE

## (undated) DEVICE — SOLUTION IV IRRIG POUR BRL 0.9% SODIUM CHL 2F7124

## (undated) DEVICE — SOLUTION ANTIFOG VIS SYS CLEARIFY LAPSCP

## (undated) DEVICE — TIP COVER ACCESSORY

## (undated) DEVICE — ELECTRODE PT RET AD L9FT HI MOIST COND ADH HYDRGEL CORDED

## (undated) DEVICE — SEALER ENDOSCP NANO COAT OPN DIV CRV L JAW LIGASURE IMPACT